# Patient Record
Sex: FEMALE | Race: WHITE | NOT HISPANIC OR LATINO | Employment: OTHER | URBAN - NONMETROPOLITAN AREA
[De-identification: names, ages, dates, MRNs, and addresses within clinical notes are randomized per-mention and may not be internally consistent; named-entity substitution may affect disease eponyms.]

---

## 2017-01-03 DIAGNOSIS — M25.551 CHRONIC PAIN OF RIGHT HIP: Primary | ICD-10-CM

## 2017-01-03 DIAGNOSIS — G89.29 CHRONIC PAIN OF RIGHT HIP: Primary | ICD-10-CM

## 2017-01-13 NOTE — OR NURSING
Email sent to total joint replacement team at [facility] as follows;    We have Claudine Bustos : 1930 coming in  for RTHA with Dr. Muñoz.  Alex attended class in  and had a knee replacement done 2016.  After her knee replacement, she went to [facility] and had a good experience there with rehab.  She said she is open to going there again.  She will have one of her daughters home with her during surgery.   She also lives with her  but he has had some health issues this year and he is not able to take care of her at home.  Alex has four steps to enter home, bedroom/bathroom on same level.  She still have equipment from knee replacement.  Currently using Tylenol arthritis for pain control.    Thank you,  Rosmery Guerrero R.N.  Pre-Anesthesia Testing  [facility]

## 2017-01-16 PROBLEM — I10 BENIGN ESSENTIAL HYPERTENSION: Status: ACTIVE | Noted: 2017-01-16

## 2017-01-17 ENCOUNTER — OFFICE VISIT (OUTPATIENT)
Dept: FAMILY MEDICINE | Facility: OTHER | Age: 82
End: 2017-01-17
Attending: FAMILY MEDICINE
Payer: MEDICARE

## 2017-01-17 VITALS
RESPIRATION RATE: 16 BRPM | WEIGHT: 125 LBS | SYSTOLIC BLOOD PRESSURE: 121 MMHG | BODY MASS INDEX: 25.2 KG/M2 | DIASTOLIC BLOOD PRESSURE: 72 MMHG | HEART RATE: 68 BPM | HEIGHT: 59 IN | TEMPERATURE: 97.1 F

## 2017-01-17 DIAGNOSIS — Z01.818 PREOP GENERAL PHYSICAL EXAM: ICD-10-CM

## 2017-01-17 DIAGNOSIS — I10 BENIGN ESSENTIAL HYPERTENSION: ICD-10-CM

## 2017-01-17 DIAGNOSIS — Z01.810 PRE-OPERATIVE CARDIOVASCULAR EXAMINATION: Primary | ICD-10-CM

## 2017-01-17 LAB
ALBUMIN SERPL-MCNC: 3.5 G/DL (ref 3.4–5)
ALP SERPL-CCNC: 112 U/L (ref 40–150)
ALT SERPL W P-5'-P-CCNC: 22 U/L (ref 0–50)
ANION GAP SERPL CALCULATED.3IONS-SCNC: 8 MMOL/L (ref 3–14)
AST SERPL W P-5'-P-CCNC: 16 U/L (ref 0–45)
BILIRUB SERPL-MCNC: 0.5 MG/DL (ref 0.2–1.3)
BUN SERPL-MCNC: 19 MG/DL (ref 7–30)
CALCIUM SERPL-MCNC: 9.1 MG/DL (ref 8.5–10.1)
CHLORIDE SERPL-SCNC: 107 MMOL/L (ref 94–109)
CO2 SERPL-SCNC: 28 MMOL/L (ref 20–32)
CREAT SERPL-MCNC: 0.83 MG/DL (ref 0.52–1.04)
ERYTHROCYTE [DISTWIDTH] IN BLOOD BY AUTOMATED COUNT: 14.9 % (ref 10–15)
GFR SERPL CREATININE-BSD FRML MDRD: 65 ML/MIN/1.7M2
GLUCOSE SERPL-MCNC: 94 MG/DL (ref 70–99)
HCT VFR BLD AUTO: 37.5 % (ref 35–47)
HGB BLD-MCNC: 11.9 G/DL (ref 11.7–15.7)
MCH RBC QN AUTO: 28.1 PG (ref 26.5–33)
MCHC RBC AUTO-ENTMCNC: 31.7 G/DL (ref 31.5–36.5)
MCV RBC AUTO: 89 FL (ref 78–100)
PLATELET # BLD AUTO: 263 10E9/L (ref 150–450)
POTASSIUM SERPL-SCNC: 3.9 MMOL/L (ref 3.4–5.3)
PROT SERPL-MCNC: 7 G/DL (ref 6.8–8.8)
RBC # BLD AUTO: 4.23 10E12/L (ref 3.8–5.2)
SODIUM SERPL-SCNC: 143 MMOL/L (ref 133–144)
WBC # BLD AUTO: 6.5 10E9/L (ref 4–11)

## 2017-01-17 PROCEDURE — 85027 COMPLETE CBC AUTOMATED: CPT | Performed by: FAMILY MEDICINE

## 2017-01-17 PROCEDURE — 99212 OFFICE O/P EST SF 10 MIN: CPT

## 2017-01-17 PROCEDURE — 93005 ELECTROCARDIOGRAM TRACING: CPT

## 2017-01-17 PROCEDURE — 99214 OFFICE O/P EST MOD 30 MIN: CPT | Performed by: FAMILY MEDICINE

## 2017-01-17 PROCEDURE — 36415 COLL VENOUS BLD VENIPUNCTURE: CPT | Performed by: FAMILY MEDICINE

## 2017-01-17 PROCEDURE — 93010 ELECTROCARDIOGRAM REPORT: CPT | Performed by: INTERNAL MEDICINE

## 2017-01-17 PROCEDURE — 80053 COMPREHEN METABOLIC PANEL: CPT | Performed by: FAMILY MEDICINE

## 2017-01-17 RX ORDER — LISINOPRIL 20 MG/1
10 TABLET ORAL DAILY
Qty: 30 TABLET | Refills: 0 | COMMUNITY
Start: 2017-01-17 | End: 2017-03-01 | Stop reason: DRUGHIGH

## 2017-01-17 ASSESSMENT — PAIN SCALES - GENERAL: PAINLEVEL: WORST PAIN (10)

## 2017-01-17 NOTE — NURSING NOTE
"Chief Complaint   Patient presents with     Pre-Op Exam       Initial /72 mmHg  Pulse 68  Temp(Src) 97.1  F (36.2  C)  Resp 16  Ht 4' 11\" (1.499 m)  Wt 125 lb (56.7 kg)  BMI 25.23 kg/m2 Estimated body mass index is 25.23 kg/(m^2) as calculated from the following:    Height as of this encounter: 4' 11\" (1.499 m).    Weight as of this encounter: 125 lb (56.7 kg).  BP completed using cuff size: regular  "

## 2017-01-17 NOTE — MR AVS SNAPSHOT
After Visit Summary   1/17/2017    Claudine Bustos    MRN: [id number]           Patient Information     Date Of Birth          4/11/1930        Visit Information        Provider Department      1/17/2017 10:00 AM Katarina Gutierrez MD [facility]        Today's Diagnoses     Pre-operative cardiovascular examination    -  1     Benign essential hypertension         Preop general physical exam           Care Instructions      Before Your Surgery      Call your surgeon if there is any change in your health. This includes signs of a cold or flu (such as a sore throat, runny nose, cough, rash or fever).    Do not smoke, drink alcohol or take over the counter medicine (unless your surgeon or primary care doctor tells you to) for the 24 hours before and after surgery.    If you take prescribed drugs: Follow your doctor s orders about which medicines to take and which to stop until after surgery.    Eating and drinking prior to surgery: follow the instructions from your surgeon    Take a shower or bath the night before surgery. Use the soap your surgeon gave you to gently clean your skin. If you do not have soap from your surgeon, use your regular soap. Do not shave or scrub the surgery site.  Wear clean pajamas and have clean sheets on your bed.         Follow-ups after your visit        Your next 10 appointments already scheduled     Jan 23, 2017   Procedure with Jose Manuel Muñoz MD   [facility] ([facility] )    [address] [place] [id number] [phone]            Feb 22, 2017 11:40 AM   (Arrive by 11:25 AM)   Post Op with Jose Manuel Muñoz MD    ORTHOPEDICS ([facility])    [address] [place] [id number] [phone]              Who to contact     If you have questions or need follow up information about today's clinic visit or your schedule please contact [facility] directly at [phone].  Normal or non-critical  "lab and imaging results will be communicated to you by MyChart, letter or phone within 4 business days after the clinic has received the results. If you do not hear from us within 7 days, please contact the clinic through [employer] or phone. If you have a critical or abnormal lab result, we will notify you by phone as soon as possible.  Submit refill requests through [employer] or call your pharmacy and they will forward the refill request to us. Please allow 3 business days for your refill to be completed.          Additional Information About Your Visit        [employer] Information     [employer] lets you send messages to your doctor, view your test results, renew your prescriptions, schedule appointments and more. To sign up, go to [email or web address] . Click on \"Log in\" on the left side of the screen, which will take you to the Welcome page. Then click on \"Sign up Now\" on the right side of the page.     You will be asked to enter the access code listed below, as well as some personal information. Please follow the directions to create your username and password.     Your access code is: [email or web address]  Expires: 2017 10:56 AM     Your access code will  in 90 days. If you need help or a new code, please call your [place] clinic or [phone].        Care EveryWhere ID     This is your Care EveryWhere ID. This could be used by other organizations to access your [place] medical records  [id number]        Your Vitals Were     Pulse Temperature Respirations Height BMI (Body Mass Index)       68 97.1  F (36.2  C) 16 4' 11\" (1.499 m) 25.23 kg/m2        Blood Pressure from Last 3 Encounters:   17 121/72   16 132/68   16 122/76    Weight from Last 3 Encounters:   17 125 lb (56.7 kg)   16 127 lb 12.8 oz (57.97 kg)   16 131 lb (59.421 kg)              We Performed the Following     CBC with platelets     Comprehensive metabolic panel     EKG 12-lead complete w/read - (Clinic " Performed)        Primary Care Provider Office Phone # Fax #    Katarina Gutierrez [phone]       [facility] [place] [address]  [place] [address]        Thank you!     Thank you for choosing [facility] [place]  for your care. Our goal is always to provide you with excellent care. Hearing back from our patients is one way we can continue to improve our services. Please take a few minutes to complete the written survey that you may receive in the mail after your visit with us. Thank you!             Your Updated Medication List - Protect others around you: Learn how to safely use, store and throw away your medicines at www.disposemymeds.org.          This list is accurate as of: 1/17/17 10:56 AM.  Always use your most recent med list.                   Brand Name Dispense Instructions for use    acetaminophen 650 MG 8 hour tablet     100 tablet    Take 650 mg by mouth every 4 hours as needed for other (surgical pain)       Calcium + D3 600-200 MG-UNIT Tabs      Take 1 tablet by mouth daily       HYDROcodone-acetaminophen 5-325 MG per tablet    NORCO    30 tablet    TAKE 1 TABLET BY MOUTH EVERY 6 HOURS AS NEEDED FOR MODERATE TO SEVEREPAIN       lisinopril 20 MG tablet    PRINIVIL/ZESTRIL    90 tablet    TAKE 1 TABLET BY MOUTH DAILY       multivitamin per tablet      Take 1 tablet by mouth daily with food.

## 2017-01-17 NOTE — PROGRESS NOTES
[facility] [place]  [address]  [place] [address]  [phone]  Dept: [phone]    PRE-OP EVALUATION:  Today's date: 2017    Claudine Bustos (: 1930) presents for pre-operative evaluation assessment as requested by Dr. Muñoz.  She requires evaluation and anesthesia risk assessment prior to undergoing surgery/procedure for treatment of right hip .  Proposed procedure: right hip replacement     Date of Surgery/ Procedure: 17  Time of Surgery/ Procedure: [facility]/Surgical Facility: [facility]  Primary Physician: Katarina Gutierrez  Type of Anesthesia Anticipated: to be determined    Patient has a Health Care Directive or Living Will:  NO    1. NO - Do you have a history of heart attack, stroke, stent, bypass or surgery on an artery in the head, neck, heart or legs?  2. NO - Do you ever have any pain or discomfort in your chest?  3. NO - Do you have a history of  Heart Failure?  4. NO - Are you troubled by shortness of breath when: walking on the level, up a slight hill or at night?  5. NO - Do you currently have a cold, bronchitis or other respiratory infection?  6. NO - Do you have a cough, shortness of breath or wheezing?  7. NO - Do you sometimes get pains in the calves of your legs when you walk?  8. NO - Do you or anyone in your family have previous history of blood clots?  9. NO - Do you or does anyone in your family have a serious bleeding problem such as prolonged bleeding following surgeries or cuts?  10. YES - Have you ever had problems with anemia or been told to take iron pills?  11. NO - Have you had any abnormal blood loss such as black, tarry or bloody stools, or abnormal vaginal bleeding?  12. NO - Have you ever had a blood transfusion?  13. NO - Have you or any of your relatives ever had problems with anesthesia?  14. NO - Do you have sleep apnea, excessive snoring or daytime drowsiness?  15. NO - Do you have any prosthetic heart valves?  16. YES- Do you have  prosthetic joints?bilateral knee  17. NO - Is there any chance that you may be pregnant?      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  Problems all longstanding and stable, except as noted/documented.  See ROS for pertinent symptoms related to these conditions.                                                                                                  .    MEDICAL HISTORY:                                                      Patient Active Problem List    Diagnosis Date Noted     Benign essential hypertension 01/16/2017     Priority: Medium     ACP (advance care planning) 06/09/2016     Priority: Medium     Advance Care Planning 6/9/2016: ACP Review of Chart / Resources Provided:  Reviewed chart for advance care plan.  Claudine Bustos has no plan or code status on file. Discussed available resources and provided with information. Confirmed code status reflects current choices pending further ACP discussions.  Confirmed/documented legally designated decision makers.  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 Provider to review and confirm.       Advanced care planning/counseling discussion 08/03/2012     Priority: Medium      Past Medical History   Diagnosis Date     HTN (hypertension) 4/4/2000     Personal history of colonic polyps 6/8/2004     Diastolic dysfunction 2/27/2012     echo 2/2012  EF 60%     Chronic kidney disease, unspecified 8/3/2012     Past Surgical History   Procedure Laterality Date     Tonsillectomy       Urethral dilitation every 4 months       Excision of rectal villus adenoma       Colonoscopy  2004, 2009      Thoracic schwannoma resection       Knee replacement Right 1/2011     Phacoemulsification with standard intraocular lens implant  2/25/2014     Procedure: PHACOEMULSIFICATION WITH STANDARD INTRAOCULAR LENS IMPLANT;  CATARACT EXTRACTION WITH INTRA OCULAR LENS LEFT;  Surgeon: Jah Bee MD;  Location: [facility]     Phacoemulsification with standard intraocular lens implant Right 5/12/2015     Procedure: PHACOEMULSIFICATION WITH STANDARD INTRAOCULAR LENS IMPLANT;  Surgeon: Jah Bee MD;  Location: [facility]     Arthroplasty knee Left 1/25/2016     Procedure: ARTHROPLASTY KNEE;  Surgeon: Jose Manuel Muñoz MD;  Location: [facility] OR     Current Outpatient Prescriptions   Medication Sig Dispense Refill     HYDROcodone-acetaminophen (NORCO) 5-325 MG per tablet TAKE 1 TABLET BY MOUTH EVERY 6 HOURS AS NEEDED FOR MODERATE TO SEVEREPAIN 30 tablet 0     lisinopril (PRINIVIL,ZESTRIL) 20 MG tablet TAKE 1 TABLET BY MOUTH DAILY 90 tablet 0     acetaminophen 650 MG TABS Take 650 mg by mouth every 4 hours as needed for other (surgical pain) 100 tablet      Calcium Carb-Cholecalciferol (CALCIUM + D3) 600-200 MG-UNIT TABS Take 1 tablet by mouth daily       Multiple Vitamin (MULTIVITAMIN) per tablet Take 1 tablet by mouth daily with food.       OTC products: None, except as noted above    No Known Allergies   Latex Allergy: NO    Social History   Substance Use Topics     Smoking status: Never Smoker      Smokeless tobacco: Not on file     Alcohol Use: Yes      Comment: Mixed, rarely     History   Drug Use No       REVIEW OF SYSTEMS:                                                    C: NEGATIVE for fever, chills, change in weight  I: NEGATIVE for worrisome rashes, moles or lesions  E: NEGATIVE for vision changes or irritation  E/M: NEGATIVE for ear, mouth and throat problems  R: NEGATIVE for significant cough or SOB  B: NEGATIVE for masses, tenderness or discharge  CV: NEGATIVE for chest pain, palpitations or peripheral edema  GI:  NEGATIVE for nausea, abdominal pain, heartburn, or change in bowel habits  : NEGATIVE for frequency, dysuria, or hematuria  M: NEGATIVE for significant arthralgias or myalgia  N: NEGATIVE for weakness, dizziness or paresthesias  E: NEGATIVE for temperature intolerance, skin/hair changes  H: NEGATIVE for bleeding problems  P: NEGATIVE for changes in mood or affect    EXAM:                                                    There were no vitals taken for this visit.    GENERAL APPEARANCE: healthy, alert and no distress     EYES: EOMI,- PERRL     HENT: ear canals and TM's normal and nose and mouth without ulcers or lesions     NECK: no adenopathy, no asymmetry, masses, or scars and thyroid normal to palpation     RESP: lungs clear to auscultation - no rales, rhonchi or wheezes     CV: regular rates and rhythm, normal S1 S2, no S3 or S4 and no murmur, click or rub -     ABDOMEN:  soft, nontender, no HSM or masses and bowel sounds normal     MS: extremities normal- no gross deformities noted, no evidence of inflammation in joints, FROM in all extremities.     SKIN: no suspicious lesions or rashes     NEURO: Normal strength and tone, sensory exam grossly normal, mentation intact and speech normal     PSYCH: mentation appears normal. and affect normal/bright     LYMPHATICS: No axillary, cervical, inguinal, or supraclavicular nodes    DIAGNOSTICS:                                                      EKG: appears normal, NSR, normal axis, normal intervals, no acute ST/T changes c/w ischemia, no LVH by voltage criteria, unchanged from previous tracings  Labs Resulted Today:   Results for orders placed or performed in visit on 01/17/17   CBC with platelets   Result Value Ref Range    WBC 6.5 4.0 - 11.0 10e9/L    RBC Count 4.23 3.8 - 5.2 10e12/L    Hemoglobin 11.9 11.7 - 15.7 g/dL    Hematocrit 37.5 35.0 - 47.0 %    MCV 89 78 - 100 fl    MCH 28.1 26.5 - 33.0 pg    MCHC 31.7 31.5 - 36.5 g/dL    RDW 14.9 10.0 - 15.0 %     Platelet Count 263 150 - 450 10e9/L   Comprehensive metabolic panel   Result Value Ref Range    Sodium 143 133 - 144 mmol/L    Potassium 3.9 3.4 - 5.3 mmol/L    Chloride 107 94 - 109 mmol/L    Carbon Dioxide 28 20 - 32 mmol/L    Anion Gap 8 3 - 14 mmol/L    Glucose 94 70 - 99 mg/dL    Urea Nitrogen 19 7 - 30 mg/dL    Creatinine 0.83 0.52 - 1.04 mg/dL    GFR Estimate 65 >60 mL/min/1.7m2    GFR Estimate If Black 79 >60 mL/min/1.7m2    Calcium 9.1 8.5 - 10.1 mg/dL    Bilirubin Total 0.5 0.2 - 1.3 mg/dL    Albumin 3.5 3.4 - 5.0 g/dL    Protein Total 7.0 6.8 - 8.8 g/dL    Alkaline Phosphatase 112 40 - 150 U/L    ALT 22 0 - 50 U/L    AST 16 0 - 45 U/L       Recent Labs   Lab Test  01/17/17   0955  12/08/16   1215   01/20/16   0830   HGB  11.9  12.4   < >  13.5   PLT  263  302   --   256   NA   --   141   --   140   POTASSIUM   --   4.2   --   4.1   CR   --   0.88   --   0.90    < > = values in this interval not displayed.        IMPRESSION:                                                    Reason for surgery/procedure: right hip DJD  Diagnosis/reason for consult: cardiopulmonary clearance    The proposed surgical procedure is considered INTERMEDIATE risk.    REVISED CARDIAC RISK INDEX  The patient has the following serious cardiovascular risks for perioperative complications such as (MI, PE, VFib and 3  AV Block):  No serious cardiac risks  INTERPRETATION: 0 risks: Class I (very low risk - 0.4% complication rate)    The patient has the following additional risks for perioperative complications:  No identified additional risks  Score not calculated      ICD-10-CM    1. Pre-operative cardiovascular examination Z01.810 EKG 12-lead complete w/read - (Clinic Performed)     CBC with platelets     Comprehensive metabolic panel     EKG 12-lead complete w/read - (Clinic Performed)     CBC with platelets     Comprehensive metabolic panel   2. Benign essential hypertension I10 EKG 12-lead complete w/read - (Clinic Performed)      CBC with platelets     Comprehensive metabolic panel     EKG 12-lead complete w/read - (Clinic Performed)     CBC with platelets     Comprehensive metabolic panel       RECOMMENDATIONS:                                                      --Patient is to take all scheduled medications on the day of surgery EXCEPT for modifications listed below.    ACE Inhibitor or Angiotensin Receptor Blocker (ARB) Use  Ace inhibitor or Angiotensin Receptor Blocker (ARB) and should HOLD this medication for the 24 hours prior to surgery.  WILL DECREASE DOSE TO 10 MG AND F/U IN 6-7 WKS    APPROVAL GIVEN to proceed with proposed procedure, without further diagnostic evaluation       Signed Electronically by: Katarina Gutierrez MD    Copy of this evaluation report is provided to requesting physician.    [place] Preop Guidelines

## 2017-01-19 DIAGNOSIS — R30.0 DYSURIA: Primary | ICD-10-CM

## 2017-01-19 NOTE — H&P (VIEW-ONLY)
[facility] [place]  [address]  [place] [address]  [phone]  Dept: [phone]    PRE-OP EVALUATION:  Today's date: 2017    Claudine Bustos (: 1930) presents for pre-operative evaluation assessment as requested by Dr. Muñoz.  She requires evaluation and anesthesia risk assessment prior to undergoing surgery/procedure for treatment of right hip .  Proposed procedure: right hip replacement     Date of Surgery/ Procedure: 17  Time of Surgery/ Procedure: [facility]: [facility] [place]  Primary Physician: Katarina Gutierrez  Type of Anesthesia Anticipated: to be determined    Patient has a Health Care Directive or Living Will:  NO    1. NO - Do you have a history of heart attack, stroke, stent, bypass or surgery on an artery in the head, neck, heart or legs?  2. NO - Do you ever have any pain or discomfort in your chest?  3. NO - Do you have a history of  Heart Failure?  4. NO - Are you troubled by shortness of breath when: walking on the level, up a slight hill or at night?  5. NO - Do you currently have a cold, bronchitis or other respiratory infection?  6. NO - Do you have a cough, shortness of breath or wheezing?  7. NO - Do you sometimes get pains in the calves of your legs when you walk?  8. NO - Do you or anyone in your family have previous history of blood clots?  9. NO - Do you or does anyone in your family have a serious bleeding problem such as prolonged bleeding following surgeries or cuts?  10. YES - Have you ever had problems with anemia or been told to take iron pills?  11. NO - Have you had any abnormal blood loss such as black, tarry or bloody stools, or abnormal vaginal bleeding?  12. NO - Have you ever had a blood transfusion?  13. NO - Have you or any of your relatives ever had problems with anesthesia?  14. NO - Do you have sleep apnea, excessive snoring or daytime drowsiness?  15. NO - Do you have any prosthetic heart valves?  16. YES- Do you have  prosthetic joints?bilateral knee  17. NO - Is there any chance that you may be pregnant?      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  Problems all longstanding and stable, except as noted/documented.  See ROS for pertinent symptoms related to these conditions.                                                                                                  .    MEDICAL HISTORY:                                                      Patient Active Problem List    Diagnosis Date Noted     Benign essential hypertension 01/16/2017     Priority: Medium     ACP (advance care planning) 06/09/2016     Priority: Medium     Advance Care Planning 6/9/2016: ACP Review of Chart / Resources Provided:  Reviewed chart for advance care plan.  Claudine Bustos has no plan or code status on file. Discussed available resources and provided with information. Confirmed code status reflects current choices pending further ACP discussions.  Confirmed/documented legally designated decision makers.  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 Provider to review and confirm.       Advanced care planning/counseling discussion 08/03/2012     Priority: Medium      Past Medical History   Diagnosis Date     HTN (hypertension) 4/4/2000     Personal history of colonic polyps 6/8/2004     Diastolic dysfunction 2/27/2012     echo 2/2012  EF 60%     Chronic kidney disease, unspecified 8/3/2012     Past Surgical History   Procedure Laterality Date     Tonsillectomy       Urethral dilitation every 4 months       Excision of rectal villus adenoma       Colonoscopy  2004, 2009      Thoracic schwannoma resection       Knee replacement Right 1/2011     Phacoemulsification with standard intraocular lens implant  2/25/2014     Procedure: PHACOEMULSIFICATION WITH STANDARD INTRAOCULAR LENS IMPLANT;  CATARACT EXTRACTION WITH INTRA OCULAR LENS LEFT;  Surgeon: Jah Bee MD;  Location: [facility] OR     Phacoemulsification with standard intraocular lens implant Right 5/12/2015     Procedure: PHACOEMULSIFICATION WITH STANDARD INTRAOCULAR LENS IMPLANT;  Surgeon: Jah Bee MD;  Location: [facility] OR     Arthroplasty knee Left 1/25/2016     Procedure: ARTHROPLASTY KNEE;  Surgeon: Jose Manuel Muñoz MD;  Location: [facility] OR     Current Outpatient Prescriptions   Medication Sig Dispense Refill     HYDROcodone-acetaminophen (NORCO) 5-325 MG per tablet TAKE 1 TABLET BY MOUTH EVERY 6 HOURS AS NEEDED FOR MODERATE TO SEVEREPAIN 30 tablet 0     lisinopril (PRINIVIL,ZESTRIL) 20 MG tablet TAKE 1 TABLET BY MOUTH DAILY 90 tablet 0     acetaminophen 650 MG TABS Take 650 mg by mouth every 4 hours as needed for other (surgical pain) 100 tablet      Calcium Carb-Cholecalciferol (CALCIUM + D3) 600-200 MG-UNIT TABS Take 1 tablet by mouth daily       Multiple Vitamin (MULTIVITAMIN) per tablet Take 1 tablet by mouth daily with food.       OTC products: None, except as noted above    No Known Allergies   Latex Allergy: NO    Social History   Substance Use Topics     Smoking status: Never Smoker      Smokeless tobacco: Not on file     Alcohol Use: Yes      Comment: Mixed, rarely     History   Drug Use No       REVIEW OF SYSTEMS:                                                    C: NEGATIVE for fever, chills, change in weight  I: NEGATIVE for worrisome rashes, moles or lesions  E: NEGATIVE for vision changes or irritation  E/M: NEGATIVE for ear, mouth and throat problems  R: NEGATIVE for significant cough or SOB  B: NEGATIVE for masses, tenderness or discharge  CV: NEGATIVE for chest pain, palpitations or peripheral edema  GI:  NEGATIVE for nausea, abdominal pain, heartburn, or change in bowel habits  : NEGATIVE for frequency, dysuria, or hematuria  M: NEGATIVE for significant arthralgias or myalgia  N: NEGATIVE for weakness, dizziness or paresthesias  E: NEGATIVE for temperature intolerance, skin/hair changes  H: NEGATIVE for bleeding problems  P: NEGATIVE for changes in mood or affect    EXAM:                                                    There were no vitals taken for this visit.    GENERAL APPEARANCE: healthy, alert and no distress     EYES: EOMI,- PERRL     HENT: ear canals and TM's normal and nose and mouth without ulcers or lesions     NECK: no adenopathy, no asymmetry, masses, or scars and thyroid normal to palpation     RESP: lungs clear to auscultation - no rales, rhonchi or wheezes     CV: regular rates and rhythm, normal S1 S2, no S3 or S4 and no murmur, click or rub -     ABDOMEN:  soft, nontender, no HSM or masses and bowel sounds normal     MS: extremities normal- no gross deformities noted, no evidence of inflammation in joints, FROM in all extremities.     SKIN: no suspicious lesions or rashes     NEURO: Normal strength and tone, sensory exam grossly normal, mentation intact and speech normal     PSYCH: mentation appears normal. and affect normal/bright     LYMPHATICS: No axillary, cervical, inguinal, or supraclavicular nodes    DIAGNOSTICS:                                                      EKG: appears normal, NSR, normal axis, normal intervals, no acute ST/T changes c/w ischemia, no LVH by voltage criteria, unchanged from previous tracings  Labs Resulted Today:   Results for orders placed or performed in visit on 01/17/17   CBC with platelets   Result Value Ref Range    WBC 6.5 4.0 - 11.0 10e9/L    RBC Count 4.23 3.8 - 5.2 10e12/L    Hemoglobin 11.9 11.7 - 15.7 g/dL    Hematocrit 37.5 35.0 - 47.0 %    MCV 89 78 - 100 fl    MCH 28.1 26.5 - 33.0 pg    MCHC 31.7 31.5 - 36.5 g/dL    RDW 14.9 10.0 - 15.0 %     Platelet Count 263 150 - 450 10e9/L   Comprehensive metabolic panel   Result Value Ref Range    Sodium 143 133 - 144 mmol/L    Potassium 3.9 3.4 - 5.3 mmol/L    Chloride 107 94 - 109 mmol/L    Carbon Dioxide 28 20 - 32 mmol/L    Anion Gap 8 3 - 14 mmol/L    Glucose 94 70 - 99 mg/dL    Urea Nitrogen 19 7 - 30 mg/dL    Creatinine 0.83 0.52 - 1.04 mg/dL    GFR Estimate 65 >60 mL/min/1.7m2    GFR Estimate If Black 79 >60 mL/min/1.7m2    Calcium 9.1 8.5 - 10.1 mg/dL    Bilirubin Total 0.5 0.2 - 1.3 mg/dL    Albumin 3.5 3.4 - 5.0 g/dL    Protein Total 7.0 6.8 - 8.8 g/dL    Alkaline Phosphatase 112 40 - 150 U/L    ALT 22 0 - 50 U/L    AST 16 0 - 45 U/L       Recent Labs   Lab Test  01/17/17   0955  12/08/16   1215   01/20/16   0830   HGB  11.9  12.4   < >  13.5   PLT  263  302   --   256   NA   --   141   --   140   POTASSIUM   --   4.2   --   4.1   CR   --   0.88   --   0.90    < > = values in this interval not displayed.        IMPRESSION:                                                    Reason for surgery/procedure: right hip DJD  Diagnosis/reason for consult: cardiopulmonary clearance    The proposed surgical procedure is considered INTERMEDIATE risk.    REVISED CARDIAC RISK INDEX  The patient has the following serious cardiovascular risks for perioperative complications such as (MI, PE, VFib and 3  AV Block):  No serious cardiac risks  INTERPRETATION: 0 risks: Class I (very low risk - 0.4% complication rate)    The patient has the following additional risks for perioperative complications:  No identified additional risks  Score not calculated      ICD-10-CM    1. Pre-operative cardiovascular examination Z01.810 EKG 12-lead complete w/read - (Clinic Performed)     CBC with platelets     Comprehensive metabolic panel     EKG 12-lead complete w/read - (Clinic Performed)     CBC with platelets     Comprehensive metabolic panel   2. Benign essential hypertension I10 EKG 12-lead complete w/read - (Clinic Performed)      CBC with platelets     Comprehensive metabolic panel     EKG 12-lead complete w/read - (Clinic Performed)     CBC with platelets     Comprehensive metabolic panel       RECOMMENDATIONS:                                                      --Patient is to take all scheduled medications on the day of surgery EXCEPT for modifications listed below.    ACE Inhibitor or Angiotensin Receptor Blocker (ARB) Use  Ace inhibitor or Angiotensin Receptor Blocker (ARB) and should HOLD this medication for the 24 hours prior to surgery.  WILL DECREASE DOSE TO 10 MG AND F/U IN 6-7 WKS    APPROVAL GIVEN to proceed with proposed procedure, without further diagnostic evaluation       Signed Electronically by: Katarina Gutierrez MD    Copy of this evaluation report is provided to requesting physician.    [facility] Preop Guidelines

## 2017-01-20 ENCOUNTER — ANESTHESIA EVENT (OUTPATIENT)
Dept: SURGERY | Facility: HOSPITAL | Age: 82
DRG: 470 | End: 2017-01-20
Payer: MEDICARE

## 2017-01-23 ENCOUNTER — ANESTHESIA (OUTPATIENT)
Dept: SURGERY | Facility: HOSPITAL | Age: 82
DRG: 470 | End: 2017-01-23
Payer: MEDICARE

## 2017-01-23 ENCOUNTER — HOSPITAL ENCOUNTER (INPATIENT)
Facility: HOSPITAL | Age: 82
LOS: 3 days | Discharge: NURSING, REHAB OR LONG-TERM CARE | DRG: 470 | End: 2017-01-26
Attending: ORTHOPAEDIC SURGERY | Admitting: ORTHOPAEDIC SURGERY
Payer: MEDICARE

## 2017-01-23 ENCOUNTER — APPOINTMENT (OUTPATIENT)
Dept: GENERAL RADIOLOGY | Facility: HOSPITAL | Age: 82
DRG: 470 | End: 2017-01-23
Attending: ORTHOPAEDIC SURGERY
Payer: MEDICARE

## 2017-01-23 DIAGNOSIS — R30.0 DYSURIA: ICD-10-CM

## 2017-01-23 DIAGNOSIS — T40.2X5A CONSTIPATION DUE TO OPIOID THERAPY: ICD-10-CM

## 2017-01-23 DIAGNOSIS — K59.03 CONSTIPATION DUE TO OPIOID THERAPY: ICD-10-CM

## 2017-01-23 DIAGNOSIS — Z96.641 STATUS POST TOTAL REPLACEMENT OF RIGHT HIP: Primary | ICD-10-CM

## 2017-01-23 LAB
ALBUMIN UR-MCNC: NEGATIVE MG/DL
APPEARANCE UR: CLEAR
BILIRUB UR QL STRIP: NEGATIVE
COLOR UR AUTO: NORMAL
GLUCOSE UR STRIP-MCNC: NEGATIVE MG/DL
GRAM STN SPEC: NORMAL
HGB UR QL STRIP: NEGATIVE
KETONES UR STRIP-MCNC: NEGATIVE MG/DL
LEUKOCYTE ESTERASE UR QL STRIP: NEGATIVE
MICRO REPORT STATUS: NORMAL
NITRATE UR QL: NEGATIVE
PH UR STRIP: 7 PH (ref 4.7–8)
SP GR UR STRIP: 1.01 (ref 1–1.03)
SPECIMEN SOURCE: NORMAL
URN SPEC COLLECT METH UR: NORMAL
UROBILINOGEN UR STRIP-MCNC: NORMAL MG/DL (ref 0–2)

## 2017-01-23 PROCEDURE — 71000014 ZZH RECOVERY PHASE 1 LEVEL 2 FIRST HR: Performed by: ORTHOPAEDIC SURGERY

## 2017-01-23 PROCEDURE — 25000125 ZZHC RX 250: Performed by: ORTHOPAEDIC SURGERY

## 2017-01-23 PROCEDURE — 25000128 H RX IP 250 OP 636: Performed by: NURSE ANESTHETIST, CERTIFIED REGISTERED

## 2017-01-23 PROCEDURE — 25000128 H RX IP 250 OP 636: Performed by: ORTHOPAEDIC SURGERY

## 2017-01-23 PROCEDURE — C1713 ANCHOR/SCREW BN/BN,TIS/BN: HCPCS | Performed by: ORTHOPAEDIC SURGERY

## 2017-01-23 PROCEDURE — 87070 CULTURE OTHR SPECIMN AEROBIC: CPT | Performed by: ORTHOPAEDIC SURGERY

## 2017-01-23 PROCEDURE — 99100 ANES PT EXTEME AGE<1 YR&>70: CPT | Performed by: NURSE ANESTHETIST, CERTIFIED REGISTERED

## 2017-01-23 PROCEDURE — 25000125 ZZHC RX 250: Performed by: NURSE ANESTHETIST, CERTIFIED REGISTERED

## 2017-01-23 PROCEDURE — 27810169 ZZH OR IMPLANT GENERAL: Performed by: ORTHOPAEDIC SURGERY

## 2017-01-23 PROCEDURE — C9290 INJ, BUPIVACAINE LIPOSOME: HCPCS | Performed by: ORTHOPAEDIC SURGERY

## 2017-01-23 PROCEDURE — 40000832 H STATISTIC POST OPERATIVE IMAGING: Mod: TC | Performed by: RADIOLOGY

## 2017-01-23 PROCEDURE — 40000306 ZZH STATISTIC PRE PROC ASSESS II: Performed by: ORTHOPAEDIC SURGERY

## 2017-01-23 PROCEDURE — 0SR9029 REPLACEMENT OF RIGHT HIP JOINT WITH METAL ON POLYETHYLENE SYNTHETIC SUBSTITUTE, CEMENTED, OPEN APPROACH: ICD-10-PCS | Performed by: ORTHOPAEDIC SURGERY

## 2017-01-23 PROCEDURE — C1776 JOINT DEVICE (IMPLANTABLE): HCPCS | Performed by: ORTHOPAEDIC SURGERY

## 2017-01-23 PROCEDURE — 25000566 ZZH SEVOFLURANE, EA 15 MIN: Performed by: NURSE ANESTHETIST, CERTIFIED REGISTERED

## 2017-01-23 PROCEDURE — 37000008 ZZH ANESTHESIA TECHNICAL FEE, 1ST 30 MIN: Performed by: ORTHOPAEDIC SURGERY

## 2017-01-23 PROCEDURE — 27130 TOTAL HIP ARTHROPLASTY: CPT | Mod: AS | Performed by: PHYSICIAN ASSISTANT

## 2017-01-23 PROCEDURE — A9270 NON-COVERED ITEM OR SERVICE: HCPCS | Mod: GY | Performed by: PHYSICIAN ASSISTANT

## 2017-01-23 PROCEDURE — 27210995 ZZH RX 272: Performed by: ORTHOPAEDIC SURGERY

## 2017-01-23 PROCEDURE — 87205 SMEAR GRAM STAIN: CPT | Performed by: ORTHOPAEDIC SURGERY

## 2017-01-23 PROCEDURE — 87075 CULTR BACTERIA EXCEPT BLOOD: CPT | Performed by: ORTHOPAEDIC SURGERY

## 2017-01-23 PROCEDURE — 40000832 H STATISTIC POST OPERATIVE IMAGING: Mod: TC

## 2017-01-23 PROCEDURE — 40000275 ZZH STATISTIC RCP TIME EA 10 MIN

## 2017-01-23 PROCEDURE — 36000063 ZZH SURGERY LEVEL 4 EA 15 ADDTL MIN: Performed by: ORTHOPAEDIC SURGERY

## 2017-01-23 PROCEDURE — 20000003 ZZH R&B ICU

## 2017-01-23 PROCEDURE — 27110028 ZZH OR GENERAL SUPPLY NON-STERILE: Performed by: ORTHOPAEDIC SURGERY

## 2017-01-23 PROCEDURE — 36000093 ZZH SURGERY LEVEL 4 1ST 30 MIN: Performed by: ORTHOPAEDIC SURGERY

## 2017-01-23 PROCEDURE — 40000786 ZZHCL STATISTIC ACTIVE MRSA SURVEILLANCE CULTURE: Performed by: ORTHOPAEDIC SURGERY

## 2017-01-23 PROCEDURE — 40000864: Mod: TC | Performed by: RADIOLOGY

## 2017-01-23 PROCEDURE — 25000132 ZZH RX MED GY IP 250 OP 250 PS 637: Mod: GY | Performed by: PHYSICIAN ASSISTANT

## 2017-01-23 PROCEDURE — 25800025 ZZH RX 258: Performed by: NURSE ANESTHETIST, CERTIFIED REGISTERED

## 2017-01-23 PROCEDURE — 27210794 ZZH OR GENERAL SUPPLY STERILE: Performed by: ORTHOPAEDIC SURGERY

## 2017-01-23 PROCEDURE — 40000864: Mod: TC

## 2017-01-23 PROCEDURE — 88300 SURGICAL PATH GROSS: CPT | Mod: TC | Performed by: ORTHOPAEDIC SURGERY

## 2017-01-23 PROCEDURE — 37000009 ZZH ANESTHESIA TECHNICAL FEE, EACH ADDTL 15 MIN: Performed by: ORTHOPAEDIC SURGERY

## 2017-01-23 PROCEDURE — 27130 TOTAL HIP ARTHROPLASTY: CPT | Mod: RT | Performed by: ORTHOPAEDIC SURGERY

## 2017-01-23 PROCEDURE — 25000125 ZZHC RX 250: Performed by: PHYSICIAN ASSISTANT

## 2017-01-23 PROCEDURE — 27130 TOTAL HIP ARTHROPLASTY: CPT | Performed by: NURSE ANESTHETIST, CERTIFIED REGISTERED

## 2017-01-23 PROCEDURE — 71000015 ZZH RECOVERY PHASE 1 LEVEL 2 EA ADDTL HR: Performed by: ORTHOPAEDIC SURGERY

## 2017-01-23 PROCEDURE — 81003 URINALYSIS AUTO W/O SCOPE: CPT | Performed by: ORTHOPAEDIC SURGERY

## 2017-01-23 DEVICE — IMPLANTABLE DEVICE: Type: IMPLANTABLE DEVICE | Site: HIP | Status: FUNCTIONAL

## 2017-01-23 DEVICE — BONE CEMENT-SIMPLEX: Type: IMPLANTABLE DEVICE | Site: HIP | Status: FUNCTIONAL

## 2017-01-23 RX ORDER — ACETAMINOPHEN 325 MG/1
975 TABLET ORAL EVERY 8 HOURS
Status: COMPLETED | OUTPATIENT
Start: 2017-01-23 | End: 2017-01-26

## 2017-01-23 RX ORDER — EPHEDRINE SULFATE 50 MG/ML
INJECTION, SOLUTION INTRAMUSCULAR; INTRAVENOUS; SUBCUTANEOUS PRN
Status: DISCONTINUED | OUTPATIENT
Start: 2017-01-23 | End: 2017-01-23

## 2017-01-23 RX ORDER — DEXAMETHASONE SODIUM PHOSPHATE 4 MG/ML
4 INJECTION, SOLUTION INTRA-ARTICULAR; INTRALESIONAL; INTRAMUSCULAR; INTRAVENOUS; SOFT TISSUE
Status: DISCONTINUED | OUTPATIENT
Start: 2017-01-23 | End: 2017-01-23 | Stop reason: HOSPADM

## 2017-01-23 RX ORDER — SCOLOPAMINE TRANSDERMAL SYSTEM 1 MG/1
1 PATCH, EXTENDED RELEASE TRANSDERMAL
Status: DISCONTINUED | OUTPATIENT
Start: 2017-01-23 | End: 2017-01-25

## 2017-01-23 RX ORDER — PROPOFOL 10 MG/ML
INJECTION, EMULSION INTRAVENOUS PRN
Status: DISCONTINUED | OUTPATIENT
Start: 2017-01-23 | End: 2017-01-23

## 2017-01-23 RX ORDER — NALOXONE HYDROCHLORIDE 0.4 MG/ML
.1-.4 INJECTION, SOLUTION INTRAMUSCULAR; INTRAVENOUS; SUBCUTANEOUS
Status: DISCONTINUED | OUTPATIENT
Start: 2017-01-23 | End: 2017-01-23

## 2017-01-23 RX ORDER — HYDROMORPHONE HYDROCHLORIDE 1 MG/ML
0.2 INJECTION, SOLUTION INTRAMUSCULAR; INTRAVENOUS; SUBCUTANEOUS
Status: DISCONTINUED | OUTPATIENT
Start: 2017-01-23 | End: 2017-01-26 | Stop reason: HOSPADM

## 2017-01-23 RX ORDER — ONDANSETRON 4 MG/1
4 TABLET, ORALLY DISINTEGRATING ORAL EVERY 30 MIN PRN
Status: DISCONTINUED | OUTPATIENT
Start: 2017-01-23 | End: 2017-01-23 | Stop reason: HOSPADM

## 2017-01-23 RX ORDER — SODIUM CHLORIDE, SODIUM LACTATE, POTASSIUM CHLORIDE, CALCIUM CHLORIDE 600; 310; 30; 20 MG/100ML; MG/100ML; MG/100ML; MG/100ML
INJECTION, SOLUTION INTRAVENOUS CONTINUOUS
Status: DISCONTINUED | OUTPATIENT
Start: 2017-01-23 | End: 2017-01-24

## 2017-01-23 RX ORDER — KETOROLAC TROMETHAMINE 30 MG/ML
15 INJECTION, SOLUTION INTRAMUSCULAR; INTRAVENOUS
Status: COMPLETED | OUTPATIENT
Start: 2017-01-23 | End: 2017-01-23

## 2017-01-23 RX ORDER — ONDANSETRON 4 MG/1
4 TABLET, ORALLY DISINTEGRATING ORAL EVERY 6 HOURS PRN
Status: DISCONTINUED | OUTPATIENT
Start: 2017-01-23 | End: 2017-01-26 | Stop reason: HOSPADM

## 2017-01-23 RX ORDER — LISINOPRIL 10 MG/1
10 TABLET ORAL DAILY
Status: DISCONTINUED | OUTPATIENT
Start: 2017-01-23 | End: 2017-01-24

## 2017-01-23 RX ORDER — DEXAMETHASONE SODIUM PHOSPHATE 10 MG/ML
INJECTION, SOLUTION INTRAMUSCULAR; INTRAVENOUS PRN
Status: DISCONTINUED | OUTPATIENT
Start: 2017-01-23 | End: 2017-01-23

## 2017-01-23 RX ORDER — SODIUM CHLORIDE, SODIUM LACTATE, POTASSIUM CHLORIDE, CALCIUM CHLORIDE 600; 310; 30; 20 MG/100ML; MG/100ML; MG/100ML; MG/100ML
INJECTION, SOLUTION INTRAVENOUS CONTINUOUS
Status: DISCONTINUED | OUTPATIENT
Start: 2017-01-23 | End: 2017-01-23

## 2017-01-23 RX ORDER — LIDOCAINE 40 MG/G
CREAM TOPICAL
Status: DISCONTINUED | OUTPATIENT
Start: 2017-01-23 | End: 2017-01-23

## 2017-01-23 RX ORDER — OXYCODONE HYDROCHLORIDE 5 MG/1
5 TABLET ORAL
Status: DISCONTINUED | OUTPATIENT
Start: 2017-01-23 | End: 2017-01-25

## 2017-01-23 RX ORDER — LIDOCAINE HYDROCHLORIDE 20 MG/ML
INJECTION, SOLUTION INFILTRATION; PERINEURAL PRN
Status: DISCONTINUED | OUTPATIENT
Start: 2017-01-23 | End: 2017-01-23

## 2017-01-23 RX ORDER — ONDANSETRON 2 MG/ML
4 INJECTION INTRAMUSCULAR; INTRAVENOUS EVERY 30 MIN PRN
Status: DISCONTINUED | OUTPATIENT
Start: 2017-01-23 | End: 2017-01-23 | Stop reason: HOSPADM

## 2017-01-23 RX ORDER — ASPIRIN 325 MG
325 TABLET ORAL 2 TIMES DAILY
Status: DISCONTINUED | OUTPATIENT
Start: 2017-01-23 | End: 2017-01-26 | Stop reason: HOSPADM

## 2017-01-23 RX ORDER — FENTANYL CITRATE 50 UG/ML
INJECTION, SOLUTION INTRAMUSCULAR; INTRAVENOUS PRN
Status: DISCONTINUED | OUTPATIENT
Start: 2017-01-23 | End: 2017-01-23

## 2017-01-23 RX ORDER — CEFAZOLIN SODIUM 2 G/100ML
2 INJECTION, SOLUTION INTRAVENOUS
Status: COMPLETED | OUTPATIENT
Start: 2017-01-23 | End: 2017-01-23

## 2017-01-23 RX ORDER — BUPIVACAINE HYDROCHLORIDE 2.5 MG/ML
INJECTION, SOLUTION INFILTRATION; PERINEURAL PRN
Status: DISCONTINUED | OUTPATIENT
Start: 2017-01-23 | End: 2017-01-23 | Stop reason: HOSPADM

## 2017-01-23 RX ORDER — AMOXICILLIN 250 MG
1-2 CAPSULE ORAL 2 TIMES DAILY
Status: DISCONTINUED | OUTPATIENT
Start: 2017-01-23 | End: 2017-01-26 | Stop reason: HOSPADM

## 2017-01-23 RX ORDER — SODIUM CHLORIDE, SODIUM LACTATE, POTASSIUM CHLORIDE, CALCIUM CHLORIDE 600; 310; 30; 20 MG/100ML; MG/100ML; MG/100ML; MG/100ML
INJECTION, SOLUTION INTRAVENOUS CONTINUOUS
Status: DISCONTINUED | OUTPATIENT
Start: 2017-01-23 | End: 2017-01-23 | Stop reason: HOSPADM

## 2017-01-23 RX ORDER — ONDANSETRON 2 MG/ML
INJECTION INTRAMUSCULAR; INTRAVENOUS PRN
Status: DISCONTINUED | OUTPATIENT
Start: 2017-01-23 | End: 2017-01-23

## 2017-01-23 RX ORDER — MEPERIDINE HYDROCHLORIDE 25 MG/ML
12.5 INJECTION INTRAMUSCULAR; INTRAVENOUS; SUBCUTANEOUS EVERY 5 MIN PRN
Status: DISCONTINUED | OUTPATIENT
Start: 2017-01-23 | End: 2017-01-23 | Stop reason: HOSPADM

## 2017-01-23 RX ORDER — ONDANSETRON 2 MG/ML
4 INJECTION INTRAMUSCULAR; INTRAVENOUS EVERY 6 HOURS PRN
Status: DISCONTINUED | OUTPATIENT
Start: 2017-01-23 | End: 2017-01-26 | Stop reason: HOSPADM

## 2017-01-23 RX ORDER — FENTANYL CITRATE 50 UG/ML
25-50 INJECTION, SOLUTION INTRAMUSCULAR; INTRAVENOUS
Status: DISCONTINUED | OUTPATIENT
Start: 2017-01-23 | End: 2017-01-23 | Stop reason: HOSPADM

## 2017-01-23 RX ORDER — MORPHINE SULFATE 2 MG/ML
2-4 INJECTION, SOLUTION INTRAMUSCULAR; INTRAVENOUS EVERY 5 MIN PRN
Status: DISCONTINUED | OUTPATIENT
Start: 2017-01-23 | End: 2017-01-23 | Stop reason: HOSPADM

## 2017-01-23 RX ORDER — NALOXONE HYDROCHLORIDE 0.4 MG/ML
.1-.4 INJECTION, SOLUTION INTRAMUSCULAR; INTRAVENOUS; SUBCUTANEOUS
Status: DISCONTINUED | OUTPATIENT
Start: 2017-01-23 | End: 2017-01-26 | Stop reason: HOSPADM

## 2017-01-23 RX ADMIN — Medication 5 MG: at 10:33

## 2017-01-23 RX ADMIN — FENTANYL CITRATE 50 MCG: 50 INJECTION, SOLUTION INTRAMUSCULAR; INTRAVENOUS at 11:20

## 2017-01-23 RX ADMIN — PROPOFOL 100 MG: 10 INJECTION, EMULSION INTRAVENOUS at 10:17

## 2017-01-23 RX ADMIN — FENTANYL CITRATE 50 MCG: 50 INJECTION, SOLUTION INTRAMUSCULAR; INTRAVENOUS at 10:08

## 2017-01-23 RX ADMIN — FENTANYL CITRATE 25 MCG: 50 INJECTION, SOLUTION INTRAMUSCULAR; INTRAVENOUS at 15:12

## 2017-01-23 RX ADMIN — CEFAZOLIN SODIUM 2 G: 2 INJECTION, SOLUTION INTRAVENOUS at 10:08

## 2017-01-23 RX ADMIN — HYDROMORPHONE HYDROCHLORIDE 0.2 MG: 1 INJECTION, SOLUTION INTRAMUSCULAR; INTRAVENOUS; SUBCUTANEOUS at 19:53

## 2017-01-23 RX ADMIN — Medication 10 MG: at 10:35

## 2017-01-23 RX ADMIN — ASPIRIN 325 MG: 325 TABLET ORAL at 20:00

## 2017-01-23 RX ADMIN — TRANEXAMIC ACID 1 G: 100 INJECTION, SOLUTION INTRAVENOUS at 10:08

## 2017-01-23 RX ADMIN — SODIUM CHLORIDE, POTASSIUM CHLORIDE, SODIUM LACTATE AND CALCIUM CHLORIDE 1000 ML: 600; 310; 30; 20 INJECTION, SOLUTION INTRAVENOUS at 15:24

## 2017-01-23 RX ADMIN — ROCURONIUM BROMIDE 10 MG: 10 INJECTION INTRAVENOUS at 10:17

## 2017-01-23 RX ADMIN — SENNOSIDES AND DOCUSATE SODIUM 1 TABLET: 8.6; 5 TABLET ORAL at 20:00

## 2017-01-23 RX ADMIN — Medication 10 MG: at 12:52

## 2017-01-23 RX ADMIN — KETOROLAC TROMETHAMINE 15 MG: 30 INJECTION, SOLUTION INTRAMUSCULAR; INTRAVENOUS at 14:45

## 2017-01-23 RX ADMIN — FENTANYL CITRATE 50 MCG: 50 INJECTION, SOLUTION INTRAMUSCULAR; INTRAVENOUS at 11:13

## 2017-01-23 RX ADMIN — FENTANYL CITRATE 25 MCG: 50 INJECTION, SOLUTION INTRAMUSCULAR; INTRAVENOUS at 15:19

## 2017-01-23 RX ADMIN — OXYCODONE HYDROCHLORIDE 5 MG: 5 TABLET ORAL at 18:28

## 2017-01-23 RX ADMIN — ACETAMINOPHEN 975 MG: 325 TABLET, FILM COATED ORAL at 16:48

## 2017-01-23 RX ADMIN — SODIUM CHLORIDE, POTASSIUM CHLORIDE, SODIUM LACTATE AND CALCIUM CHLORIDE 1000 ML: 600; 310; 30; 20 INJECTION, SOLUTION INTRAVENOUS at 09:36

## 2017-01-23 RX ADMIN — Medication 10 MG: at 13:34

## 2017-01-23 RX ADMIN — SODIUM CHLORIDE, POTASSIUM CHLORIDE, SODIUM LACTATE AND CALCIUM CHLORIDE: 600; 310; 30; 20 INJECTION, SOLUTION INTRAVENOUS at 12:00

## 2017-01-23 RX ADMIN — CEFAZOLIN SODIUM 1 G: 1 INJECTION, SOLUTION INTRAVENOUS at 18:08

## 2017-01-23 RX ADMIN — OXYCODONE HYDROCHLORIDE 5 MG: 5 TABLET ORAL at 23:03

## 2017-01-23 RX ADMIN — ONDANSETRON 4 MG: 2 INJECTION INTRAMUSCULAR; INTRAVENOUS at 12:50

## 2017-01-23 RX ADMIN — LIDOCAINE HYDROCHLORIDE 40 MG: 20 INJECTION, SOLUTION INFILTRATION; PERINEURAL at 10:17

## 2017-01-23 RX ADMIN — FENTANYL CITRATE 25 MCG: 50 INJECTION, SOLUTION INTRAMUSCULAR; INTRAVENOUS at 15:06

## 2017-01-23 RX ADMIN — ACETAMINOPHEN 975 MG: 325 TABLET, FILM COATED ORAL at 23:03

## 2017-01-23 RX ADMIN — DEXAMETHASONE SODIUM PHOSPHATE 10 MG: 10 INJECTION, SOLUTION INTRAMUSCULAR; INTRAVENOUS at 10:45

## 2017-01-23 RX ADMIN — HYDROMORPHONE HYDROCHLORIDE 0.2 MG: 1 INJECTION, SOLUTION INTRAMUSCULAR; INTRAVENOUS; SUBCUTANEOUS at 16:14

## 2017-01-23 RX ADMIN — SUCCINYLCHOLINE CHLORIDE 100 MG: 20 INJECTION, SOLUTION INTRAMUSCULAR; INTRAVENOUS at 10:17

## 2017-01-23 RX ADMIN — Medication 1 TABLET: at 16:48

## 2017-01-23 RX ADMIN — FENTANYL CITRATE 50 MCG: 50 INJECTION, SOLUTION INTRAMUSCULAR; INTRAVENOUS at 10:17

## 2017-01-23 ASSESSMENT — PAIN DESCRIPTION - DESCRIPTORS
DESCRIPTORS: ACHING
DESCRIPTORS: ACHING

## 2017-01-23 NOTE — IP AVS SNAPSHOT
MRN:[id number]                      After Visit Summary   1/23/2017    Claudine Bustos    MRN: [id number]           Thank you!     Thank you for choosing [facility] for your care. Our goal is always to provide you with excellent care. Hearing back from our patients is one way we can continue to improve our services. Please take a few minutes to complete the written survey that you may receive in the mail after you visit with us. Thank you!        Patient Information     Date Of Birth          4/11/1930        About your hospital stay     You were admitted on:  January 23, 2017 You last received care in the:  14 Intensive Care Unit    You were discharged on:  January 26, 2017       Who to Call     For medical emergencies, please call 911.  For non-urgent questions about your medical care, please call your primary care provider or clinic, [phone]  For questions related to your surgery, please call your surgery clinic        Attending Provider     Provider    Jose Manuel Muñoz MD       Primary Care Provider Office Phone # Fax #    Katarina Gutierrez [phone]       [facility] [place] [address]  [place] [address]        After Care Instructions     Activity - Up with assistive device           Advance Diet as Tolerated       Follow this diet upon discharge: Orders Placed This Encounter  Advance Diet as Tolerated: Regular Diet Adult            General info for SNF       Length of Stay Estimate: Short Term Care: Estimated # of Days <30  Condition at Discharge: Improving  Level of care:skilled   Rehabilitation Potential: Excellent  Admission H&P remains valid and up-to-date: Yes  Recent Chemotherapy: N/A  Use Nursing Home Standing Orders: Yes            Hip precautions           Mantoux instructions       Give two-step Mantoux (PPD) Per Facility Policy Yes            Wound care (specify)       Site:   Right hip  Instructions:  Daily dressing changes with dry sterile gauze   May use press and seal type dressing or other waterproof dressing to shower.                  Your next 10 appointments already scheduled     Feb 22, 2017 11:40 AM   (Arrive by 11:25 AM)   Post Op with Jose Manuel Muñoz MD    [facility] ([facility] [place] [facility])    [address]  [place] [address]   [phone]            Mar 02, 2017  2:15 PM   (Arrive by 2:00 PM)   SHORT with Katarina Gutierrez MD   [facility] [place] ([facility] [place] [facility])    [address]  [place] [address]   [phone]              Additional Services     Occupational Therapy Adult Consult       Evaluate and treat as clinically indicated.    Reason:  Right total hip            Physical Therapy Adult Consult       Evaluate and treat as clinically indicated.    Reason:  Right total hip                  Future tests that were ordered for you     AntiEmbolism Stockings       Bilateral below knee length.On in the morning, off at night                  Further instructions from your care team       You have a post op appointment scheduled for February 22, 2017 @ 11:25 AM with Dr. Muñoz.    Pending Results     Date and Time Order Name Status Description    1/23/2017 1502 XR Post Surgical Imaging [place] Preliminary     1/23/2017 1118 Fluid Culture Aerobic Bacterial Preliminary     1/23/2017 1118 Anaerobic bacterial culture Preliminary             Statement of Approval     Ordered          01/26/17 0941  I have reviewed and agree with all the recommendations and orders detailed in this document.   EFFECTIVE NOW     Approved and electronically signed by:  Darren Cordero MD             Admission Information        Provider Department Dept Phone    1/23/2017 Jose Manuel Muñoz MD [facility] [phone]      Your Vitals Were     Blood Pressure Pulse Temperature    111/65 mmHg 88 97.7  F (36.5  C) (Tympanic)    Respirations Height Weight    18 1.524 m (5') 63.2 kg (139 lb 5.3 oz)    BMI (Body Mass Index) Pulse Oximetry       27.21  "kg/m2 97%       [facility] Information     [facility] lets you send messages to your doctor, view your test results, renew your prescriptions, schedule appointments and more. To sign up, go to [email or web address] . Click on \"Log in\" on the left side of the screen, which will take you to the Welcome page. Then click on \"Sign up Now\" on the right side of the page.     You will be asked to enter the access code listed below, as well as some personal information. Please follow the directions to create your username and password.     Your access code is: [email or web address]  Expires: 2017 10:56 AM     Your access code will  in 90 days. If you need help or a new code, please call your [facility] clinic or [phone].        Care EveryWhere ID     This is your Care EveryWhere ID. This could be used by other organizations to access your [facility] medical records  [id number]           Review of your medicines      START taking        Dose / Directions    aspirin 325 MG tablet   Used for:  Status post total replacement of right hip        Dose:  325 mg   Take 1 tablet (325 mg) by mouth 2 times daily for 28 days   Quantity:  120 tablet   Refills:  0       oxyCODONE 10 MG IR tablet   Commonly known as:  ROXICODONE   Used for:  Status post total replacement of right hip        Dose:  10 mg   Take 1 tablet (10 mg) by mouth every 3 hours as needed for moderate to severe pain   Quantity:  40 tablet   Refills:  0       senna-docusate 8.6-50 MG per tablet   Commonly known as:  SENOKOT-S;PERICOLACE   Used for:  Constipation due to opioid therapy        Dose:  1-2 tablet   Take 1-2 tablets by mouth 2 times daily   Quantity:  100 tablet   Refills:  0         CONTINUE these medicines which have NOT CHANGED        Dose / Directions    acetaminophen 650 MG 8 hour tablet   Used for:  S/P total knee replacement using cement, left        Dose:  650 mg   Take 650 mg by mouth every 4 hours as needed for other (surgical pain)   Quantity:  100 " tablet   Refills:  0       Calcium + D3 600-200 MG-UNIT Tabs        Dose:  1 tablet   Take 1 tablet by mouth daily   Refills:  0       lisinopril 20 MG tablet   Commonly known as:  PRINIVIL/ZESTRIL   Used for:  Benign essential hypertension        Dose:  10 mg   Take 10 mg by mouth daily   Quantity:  30 tablet   Refills:  0       multivitamin per tablet        Dose:  1 tablet   Take 1 tablet by mouth daily with food.   Refills:  0         STOP taking     HYDROcodone-acetaminophen 5-325 MG per tablet   Commonly known as:  NORCO                Where to get your medicines      Some of these will need a paper prescription and others can be bought over the counter. Ask your nurse if you have questions.     Bring a paper prescription for each of these medications    - oxyCODONE 10 MG IR tablet    You don't need a prescription for these medications    - aspirin 325 MG tablet  - senna-docusate 8.6-50 MG per tablet             Protect others around you: Learn how to safely use, store and throw away your medicines at www.disposemymeds.org.             Medication List: This is a list of all your medications and when to take them. Check marks below indicate your daily home schedule. Keep this list as a reference.      Medications           Morning Afternoon Evening Bedtime As Needed    acetaminophen 650 MG 8 hour tablet   Take 650 mg by mouth every 4 hours as needed for other (surgical pain)   Last time this was given:  975 mg on 1/26/2017  6:31 AM                                aspirin 325 MG tablet   Take 1 tablet (325 mg) by mouth 2 times daily for 28 days   Last time this was given:  325 mg on 1/26/2017  8:38 AM                                Calcium + D3 600-200 MG-UNIT Tabs   Take 1 tablet by mouth daily                                lisinopril 20 MG tablet   Commonly known as:  PRINIVIL/ZESTRIL   Take 10 mg by mouth daily                                multivitamin per tablet   Take 1 tablet by mouth daily with food.                                 oxyCODONE 10 MG IR tablet   Commonly known as:  ROXICODONE   Take 1 tablet (10 mg) by mouth every 3 hours as needed for moderate to severe pain   Last time this was given:  10 mg on 1/26/2017  8:38 AM                                senna-docusate 8.6-50 MG per tablet   Commonly known as:  SENOKOT-S;PERICOLACE   Take 1-2 tablets by mouth 2 times daily   Last time this was given:  2 tablets on 1/26/2017  8:38 AM

## 2017-01-23 NOTE — IP AVS SNAPSHOT
JaciquiqueClaudine #[id number] (CSN: [id number])  (86 year old F)  (Adm: 17)     [id number]               14 INTENSIVE CARE UNIT: [phone]            Patient Demographics     Patient Name Sex          Age SSN Address Phone    Claudine Bustos Female 1930 (86 year old) [id number] [facility] [address] [phone] (Home)  [phone] (Work)  [phone] (Mobile) *Preferred*      Emergency Contact(s)     Name Relation Home Work Mobile    Dharmesh Bustos Spouse [phone]      Latonia Gustafson Daughter [phone]      Hira Bustos Son   [phone]      Admission Information     Attending Provider Admitting Provider Admission Type Admission Date/Time    Jose Manuel Muñoz MD Denoncourt, Paul M, MD Elective 17  0824    Discharge Date Hospital Service Auth/Cert Status Service Area     General Medicine Incomplete [facility]    Unit Room/Bed Admission Status    HI ICU 3128/3128-1 Admission (Confirmed)            Admission     Complaint    CHRONIC PAIN RIGHT HIP, Status post total hip replacement, right      Hospital Account     Name Acct ID Class Status Primary Coverage    [employer] TREVOR [id number] Inpatient Open MEDICARE - MEDICARE            Guarantor Account (for Hospital Account #[id number])     Name Relation to Pt Service Area Active? Acct Type    Claudine Bustos Self [facility] Yes Personal/Family    Address Phone          [address] [place] [address] [phone](H)  [phone](O)              Coverage Information (for Hospital Account #[id number])     1. MEDICARE/MEDICARE     F/O Payor/Plan Precert #    MEDICARE/MEDICARE     Subscriber Subscriber #    Claudine Bustos [id number]    Address Phone    ATTN CLAIMS  [address]  [facility] [place] [address] [phone]          2. COMMERCIAL/AARP     F/O Payor/Plan Precert #    COMMERCIAL/AARP     Subscriber Subscriber #    Claudine Bustos [id number]    Address Phone    [employer]  [address]  [place] [address] [phone]                                                      INTERAGENCY TRANSFER FORM - PHYSICIAN ORDERS   1/23/2017                       14 INTENSIVE CARE UNIT: [phone]            Attending Provider: Jose Manuel Muñoz MD     Allergies:  No Known Allergies    Infection:  None   Service:  GENERAL MEDI    Ht:  1.524 m (5')   Wt:  63.2 kg (139 lb 5.3 oz)   Admission Wt:  56.7 kg (125 lb)    BMI:  27.21 kg/m 2   BSA:  1.64 m 2            ED Clinical Impression     Diagnosis Description Comment Added By Time Added    Dysuria [R30.0] Dysuria [R30.0]  Vanessa Robison RN 1/23/2017  9:05 AM    Status post total replacement of right hip [Z96.641] Status post total replacement of right hip [Z96.641]  Darren Cordero MD 1/26/2017  9:35 AM    Constipation due to opioid therapy [K59.03, T40.2X5A] Constipation due to opioid therapy [K59.03, T40.2X5A]  Darren Cordero MD 1/26/2017  9:37 AM      Hospital Problems as of 1/26/2017              Priority Class Noted POA    Hyperlipidemia with target LDL less than 130   3/30/2015 Yes    Benign essential hypertension Medium  1/16/2017 Yes    * (Principal)Status post total replacement of right hip Medium  1/23/2017 Yes    Postoperative anemia due to acute blood loss Medium  1/24/2017 No      Non-Hospital Problems as of 1/26/2017              Priority Class Noted    Diastolic dysfunction Medium  2/27/2012    Advanced care planning/counseling discussion Medium  8/3/2012    Chronic kidney disease, unspecified Medium  8/3/2012    S/P total knee replacement using cement, left Medium  1/25/2016    Nursing Home Visit Medium  2/10/2016    ACP (advance care planning)   6/9/2016    Status post total hip replacement, right Medium  1/23/2017      Code Status History     Date Active Date Inactive Code Status Order ID Comments User Context    1/26/2017  9:41 AM  Full Code [id number]  Darren Cordero MD Outpatient     1/23/2017  3:42 PM 1/26/2017  9:41 AM Full Code [id number]  Vasile Shelton PA-C Inpatient    1/28/2016  8:17 AM 1/23/2017  3:42 PM Full Code [id number]  Darren Cordero MD Outpatient    1/25/2016  1:43 PM 1/28/2016  8:17 AM Full Code [id number]  Vasile Shelton PA-C Inpatient      Current Code Status     Date Active Code Status Order ID Comments User Context       Prior         Medication Review      START taking        Dose / Directions Comments    aspirin 325 MG tablet   Used for:  Status post total replacement of right hip        Dose:  325 mg   Take 1 tablet (325 mg) by mouth 2 times daily for 28 days   Quantity:  120 tablet   Refills:  0        oxyCODONE 10 MG IR tablet   Commonly known as:  ROXICODONE   Used for:  Status post total replacement of right hip        Dose:  10 mg   Take 1 tablet (10 mg) by mouth every 3 hours as needed for moderate to severe pain   Quantity:  40 tablet   Refills:  0        senna-docusate 8.6-50 MG per tablet   Commonly known as:  SENOKOT-S;PERICOLACE   Used for:  Constipation due to opioid therapy        Dose:  1-2 tablet   Take 1-2 tablets by mouth 2 times daily   Quantity:  100 tablet   Refills:  0          CONTINUE these medications which have NOT CHANGED        Dose / Directions Comments    acetaminophen 650 MG 8 hour tablet   Used for:  S/P total knee replacement using cement, left        Dose:  650 mg   Take 650 mg by mouth every 4 hours as needed for other (surgical pain)   Quantity:  100 tablet   Refills:  0        Calcium + D3 600-200 MG-UNIT Tabs        Dose:  1 tablet   Take 1 tablet by mouth daily   Refills:  0        lisinopril 20 MG tablet   Commonly known as:  PRINIVIL/ZESTRIL   Used for:  Benign essential hypertension        Dose:  10 mg   Take 10 mg by mouth daily   Quantity:  30 tablet   Refills:  0        multivitamin per tablet        Dose:  1 tablet   Take 1 tablet by mouth daily with food.   Refills:  0          STOP taking      HYDROcodone-acetaminophen 5-325 MG per tablet   Commonly known as:  NORCO                   After Care     Activity - Up with assistive device           Advance Diet as Tolerated       Follow this diet upon discharge: Orders Placed This Encounter  Advance Diet as Tolerated: Regular Diet Adult       General info for SNF       Length of Stay Estimate: Short Term Care: Estimated # of Days <30  Condition at Discharge: Improving  Level of care:skilled   Rehabilitation Potential: Excellent  Admission H&P remains valid and up-to-date: Yes  Recent Chemotherapy: N/A  Use Nursing Home Standing Orders: Yes       Hip precautions           Mantoux instructions       Give two-step Mantoux (PPD) Per Facility Policy Yes       Wound care (specify)       Site:   Right hip  Instructions:  Daily dressing changes with dry sterile gauze  May use press and seal type dressing or other waterproof dressing to shower.               Further instructions from your care team       You have a post op appointment scheduled for February 22, 2017 @ 11:25 AM with Dr. Muñoz.    Supplies     AntiEmbolism Stockings       Bilateral below knee length.On in the morning, off at night             Referrals     Occupational Therapy Adult Consult       Evaluate and treat as clinically indicated.    Reason:  Right total hip       Physical Therapy Adult Consult       Evaluate and treat as clinically indicated.    Reason:  Right total hip             Your next 10 appointments already scheduled     Feb 22, 2017 11:40 AM   (Arrive by 11:25 AM)   Post Op with Jose Manuel Muñoz MD    ORTHOPEDICS ([facility])    [address] [facility] [address]   [phone]            Mar 02, 2017  2:15 PM   (Arrive by 2:00 PM)   SHORT with Katarina Gutierrez MD   [facility] ([facility])    [address] [place] [address]   [phone]              Statement of Approval     Ordered          01/26/17 0941  I have reviewed and agree  with all the recommendations and orders detailed in this document.   EFFECTIVE NOW     Approved and electronically signed by:  Darren Cordero MD                                                 INTERAGENCY TRANSFER FORM - NURSING   1/23/2017                       14 INTENSIVE CARE UNIT: [phone]            Attending Provider: Jose Manuel Muñoz MD     Allergies:  No Known Allergies    Infection:  None   Service:  GENERAL MEDI    Ht:  1.524 m (5')   Wt:  63.2 kg (139 lb 5.3 oz)   Admission Wt:  56.7 kg (125 lb)    BMI:  27.21 kg/m 2   BSA:  1.64 m 2            Advance Directives        Does patient have a scanned Advance Directive/ACP document in EPIC?           Yes        Immunizations     Name Date      Pneumococcal (PCV 13) 09/12/16     Pneumococcal 23 valent 08/15/14     TD (ADULT, 7+) 01/18/08     TD (ADULT, 7+) 08/20/97       ASSESSMENT     Discharge Profile Flowsheet     EXPECTED DISCHARGE     Assesssment of Functional Status  Needs placement in a SNF/TCF for rehabilitation 01/25/17 1040    Expected Discharge Date  01/27/17 (1/26 or 1/27, as per pain control) 01/25/17 1040   GASTROINTESTINAL (ADULT,PEDIATRIC,OB)      DISCHARGE NEEDS ASSESSMENT     GI WDL  WDL 01/25/17 2310    Patient/family verbalizes understanding of discharge plan recommendations?  Yes 01/24/17 0802   Last Bowel Movement  01/25/16 (prior to surgery) 01/25/16 1634    Medical Team notified of plan?  yes 01/24/17 0802   COMMUNICATION ASSESSMENT      Readmission Within The Last 30 Days  no previous admission in last 30 days 01/24/17 0802   Patient's communication style  spoken language (English or Bilingual) 01/23/17 1558    Equipment Currently Used at Home  walker, rolling 01/25/17 1040   SKIN      Transportation Available  agency transportation 01/25/17 1040   Inspection  Full 01/25/17 2310    Current Discharge Risk  physical impairment 01/24/17 0802   Procedural focused assessment (identify areas inspected)   -- (operative site)  "01/23/17 1517    Interventions provided  discharge planning 01/24/17 0802   Skin WDL  ex 01/25/17 2310    # of Referrals Placed by CTS  Post Acute Facilities 01/24/17 0802   Skin Color/Characteristics  without discoloration 01/24/17 2017    Follow up plan  Community resource assistance 01/24/17 0802   Skin Temperature  warm 01/25/17 1910    Key Recommendations  snf 01/24/17 0802   Skin Moisture  dry 01/24/17 2017    Equipment Used at Home  cane, straight 01/26/16 1337   Skin Integrity  incision(s) 01/25/17 2310    ASSESSMENT OF FUNCTIONAL STATUS     SAFETY      Prior to admission patient needed assistance with:  Laundry/Housekeeping;Home maintenance/Yard work 01/25/17 1040   Safety WDL  WDL 01/25/17 2310                 Assessment WDL (Within Defined Limits) Definitions           Safety WDL     Effective: 09/28/15    Row Information: <b>WDL Definition:</b> Bed in low position, wheels locked; call light in reach; upper side rails up x 2; ID band on<br> <font color=\"gray\"><i>Item=AS safety wdl>>List=AS safety wdl>>Version=F14</i></font>      Skin WDL     Effective: 09/28/15    Row Information: <b>WDL Definition:</b> Warm; dry; intact; elastic; without discoloration; pressure points without redness<br> <font color=\"gray\"><i>Item=AS skin wdl>>List=AS skin wdl>>Version=F14</i></font>      Vitals     Vital Signs Flowsheet     VITAL SIGNS     Response to Interventions  Decrease in pain 01/25/17 2022    Temp  97.7  F (36.5  C) 01/26/17 0838   ANALGESIA SIDE EFFECTS MONITORING      Temp src  Tympanic 01/26/17 0838   Side Effects Monitoring: Respiratory Quality  R 01/25/17 2258    Resp  18 01/26/17 0838   Side Effects Monitoring: Respiratory Depth  N 01/25/17 2258    Pulse  88 01/25/17 2235   Side Effects Monitoring: Sedation Level  S 01/25/17 2258    Heart Rate  82 01/26/17 0838   HEIGHT AND WEIGHT      Pulse/Heart Rate Source  Monitor 01/24/17 1927   Height  1.524 m (5') 01/23/17 1748    BP  111/65 mmHg 01/26/17 0838   " Weight  63.2 kg (139 lb 5.3 oz) 01/26/17 0513    Patient Position  Sitting 01/23/17 0924   BSA (Calculated - sq m)  1.53 01/24/17 0949    OXYGEN THERAPY     BMI (Calculated)  23.99 01/24/17 0949    SpO2  97 % 01/26/17 0838   POSITIONING      O2 Device  None (Room air) 01/26/17 0838   Body Position  independently positioning 01/26/17 0838    Oxygen Delivery  3 LPM 01/23/17 1448   Head of Bed (HOB)  HOB at 60 degrees (eating breakfast) 01/25/17 1910    PAIN/COMFORT     Chair  Upright in chair 01/26/17 0838    Patient Currently in Pain  yes 01/26/17 0838   ECG      Preferred Pain Scale  number (Numeric Rating Pain Scale) 01/26/17 0838   ECG Rhythm  Sinus rhythm 01/24/17 1319    Patient's Stated Pain Goal  No pain 01/26/17 0838   Ectopy  None 01/24/17 1319    0-10 Pain Scale  5 01/26/17 0838   DAILY CARE      Pain Location  Hip 01/26/17 0844   Activity Type  activity adjusted per tolerance;activity encouraged 01/26/17 0838    Pain Orientation  Right 01/26/17 0844   Activity Level of Assistance  assistance, 1 person 01/26/17 0838    Pain Descriptors  Aching 01/26/17 0844   Activity Assistive Device  gait belt;walker 01/26/17 0652    Pain Management Interventions  analgesia administered 01/26/17 0844   Ambulation Distance (Feet)  25 01/23/17 1852    Pain Intervention(s)  Medication (See eMAR) 01/26/17 0844                 Patient Lines/Drains/Airways Status    Active LINES/DRAINS/AIRWAYS     Name: Placement date: Placement time: Site: Days: Last dressing change:    Peripheral IV 01/23/17 Right 01/23/17  0933    3     Incision/Surgical Site 01/25/16 Left Knee 01/25/16  0959   366     Incision/Surgical Site 01/23/17 Right Hip 01/23/17  1129   2             Patient Lines/Drains/Airways Status    Active PICC/CVC     **None**            Intake/Output Detail Report     Date Intake     Output   Net    Shift P.O. I.V. IV Piggyback Total Urine Blood Total       Noc 01/24/17 2300 - 01/25/17 0659 -- -- -- -- 300 -- 300 -300    Day  01/25/17 0700 - 01/25/17 1459 240 -- -- 240 300 -- 300 -60    Sofiya 01/25/17 1500 - 01/25/17 2259 240 -- -- 240 400 -- 400 -160    Noc 01/25/17 2300 - 01/26/17 0659 -- -- -- -- 300 -- 300 -300    Day 01/26/17 0700 - 01/26/17 1459 480 -- -- 480 -- -- -- 480      Last Void/BM       Most Recent Value    Urine Occurrence 1 at 01/25/2017 0400    Stool Occurrence       Case Management/Discharge Planning     Case Management/Discharge Planning Flowsheet     REFERRAL INFORMATION     COPING/STRESS      Did the Initial Social Work Assessment result in a Social Work Case?  Yes 01/24/17 0802   Major Change/Loss/Stressor  hospitalization;surgery/procedure 01/13/17 1251    Admission Type  inpatient 01/24/17 0802   EXPECTED DISCHARGE      Arrived From  home or self-care 01/24/17 0802   Expected Discharge Date  01/27/17 (1/26 or 1/27, as per pain control) 01/25/17 1040    Referral Source  admission list 01/24/17 0802   DISCHARGE PLANNING      # of Referrals Placed by [facility]  Post Acute Facilities 01/24/17 0802   Patient/family verbalizes understanding of discharge plan recommendations?  Yes 01/24/17 0802    Reason For Consult  discharge planning 01/24/17 0802   Medical Team notified of plan?  yes 01/24/17 0802    Record Reviewed  history and physical;medical record;patient profile 01/24/17 0802   Readmission Within The Last 30 Days  no previous admission in last 30 days 01/24/17 0802     Assigned to Ghassan Hansen RN 01/24/17 0802   Transportation Available  agency transportation 01/25/17 1040    [facility] 01/24/17 0802   Current Discharge Risk  physical impairment 01/24/17 0802    Primary Care MD Name  Matt 01/24/17 0802   Interventions provided  discharge planning 01/24/17 0802    LIVING ENVIRONMENT     Follow up plan  Community resource assistance 01/24/17 0802    Lives With  spouse 01/24/17 1458   Key Recommendations  snf 01/24/17 0802    Living Arrangements  house 01/24/17 1458   Equipment  Used at Home  cane, straight 01/26/16 1337    Provides Primary Care For  no one 01/24/17 0802   PATIENT PLACEMENT INFORMATION      Able to Return to Prior Living Arrangements  yes 01/24/17 0802   [facility] 01/24/17 0802    Living Arrangement Comments  SNF prior to returning home  01/24/17 0802   [facility] 2 [facility]  [facility] 01/24/17 0802    HOME SAFETY     FINAL NOTE      Patient Feels Safe Living in Home?  yes 01/24/17 0802   Final Note  see care pllan 01/24/17 0802    ASSESSMENT OF FAMILY/SOCIAL SUPPORT     FINAL RESOURCES      Marital Status   01/24/17 0802   Equipment Currently Used at Home  walker, rolling 01/25/17 1040    Who is your support system?   01/24/17 0802   ABUSE RISK SCREEN      Spouse's Name  Lee 01/24/17 0802   QUESTION TO PATIENT:  Has a member of your family or a partner(now or in the past) intimidated, hurt, manipulated, or controlled you in any way?  no 01/23/17 0848    ASSESSMENT OF FUNCTIONAL STATUS     QUESTION TO PATIENT: Do you feel safe going back to the place where you are living?  yes 01/23/17 0848    Prior to admission patient needed assistance with:  Laundry/Housekeeping;Home maintenance/Yard work 01/25/17 1040   OBSERVATION: Is there reason to believe there has been maltreatment of a vulnerable adult (ie. Physical/Sexual/Emotional abuse, self neglect, lack of adequate food, shelter, medical care, or financial exploitation)?  no 01/23/17 0848    Assesssment of Functional Status  Needs placement in a SNF/TCF for rehabilitation 01/25/17 1040   (R) MENTAL HEALTH SUICIDE RISK      EMPLOYMENT     Are you depressed or being treated for depression?  No 01/23/17 1608    Do you work full or part-time?  no 01/25/17 1040                       14 INTENSIVE CARE UNIT: [phone]            Medication Administration Report for Claudine Bustos as of 01/26/17 0953   Legend:    Given Hold Not Given Due Canceled Entry Other Actions    Time Time (Time) Time   Time-Action       Inactive    Active    Linked        Medications 01/20/17 01/21/17 01/22/17 01/23/17 01/24/17 01/25/17 01/26/17    aspirin tablet 325 mg  Dose: 325 mg Freq: 2 TIMES DAILY Route: PO  Start: 01/23/17 2100       2000 (325 mg)-Given        0810 (325 mg)-Given       2157 (325 mg)-Given        1030 (325 mg)-Given       2218 (325 mg)-Given        0838 (325 mg)-Given       [ ] 2100           benzocaine-menthol (CEPACOL) 15-3.6 MG lozenge 1-2 lozenge  Dose: 1-2 lozenge Freq: EVERY 1 HOUR PRN Route: BU  PRN Reason: sore throat  PRN Comment: sore throat without fever  Start: 01/23/17 1542   Admin Instructions: Attn Nursing: Available from [address] [place] [address]               calcium-vitamin D (CALTRATE) 600-400 MG-UNIT per tablet 1 tablet  Dose: 1 tablet Freq: DAILY Route: PO  Start: 01/23/17 1545       1648 (1 tablet)-Given        0810 (1 tablet)-Given        1030 (1 tablet)-Given        0838 (1 tablet)-Given           HYDROmorphone (PF) (DILAUDID) injection 0.2 mg  Dose: 0.2 mg Freq: EVERY 2 HOURS PRN Route: IV  PRN Reason: severe pain  PRN Comment: or if patient unable to take PO  Start: 01/23/17 1542   Admin Instructions: Hold while on PCA.        1614 (0.2 mg)-Given       1953 (0.2 mg)-Given        1353 (0.2 mg)-Given       1900 (0.2 mg)-Given             naloxone (NARCAN) injection 0.1-0.4 mg  Dose: 0.1-0.4 mg Freq: EVERY 2 MIN PRN Route: IV  PRN Reason: opioid reversal  Start: 01/23/17 1437   Admin Instructions: For respiratory rate LESS than or EQUAL to 8.  Partial reversal dose:  0.1 mg titrated q 2 minutes for Analgesia Side Effects Monitoring Sedation Level of 3 (frequently drowsy, arousable, drifts to sleep during conversation).Full reversal dose:  0.4 mg bolus for Analgesia Side Effects Monitoring Sedation Level of 4 (somnolent, minimal or no response to stimulation).               ondansetron (ZOFRAN-ODT) ODT tab 4 mg  Dose: 4 mg Freq: EVERY 6 HOURS PRN Route: PO  PRN Reason: nausea  Start: 01/23/17  1542   Admin Instructions: This is Step 1 of nausea and vomiting management.  If nausea not resolved in 15 minutes, go to Step 2 prochlorperazine (COMPAZINE). Do not push through foil backing. Peel back foil and gently remove. Place on tongue immediately. Administration with liquid unnecessary              Or  ondansetron (ZOFRAN) injection 4 mg  Dose: 4 mg Freq: EVERY 6 HOURS PRN Route: IV  PRN Reasons: nausea,vomiting  Start: 01/23/17 1542   Admin Instructions: This is Step 1 of nausea and vomiting management.  If nausea not resolved in 15 minutes, go to Step 2 prochlorperazine (COMPAZINE).               oxyCODONE (ROXICODONE) IR tablet 10 mg  Dose: 10 mg Freq: EVERY 3 HOURS PRN Route: PO  PRN Reason: moderate to severe pain  Start: 01/25/17 0917   Admin Instructions: IF CrCl is UNKNOWN start at lowest end of dosing range.  Hold while on PCA or with regular IV opioid dosing.          0922 (10 mg)-Given       1355 (10 mg)-Given       1936 (10 mg)-Given        0037 (10 mg)-Given       0501 (10 mg)-Given       0838 (10 mg)-Given           senna-docusate (SENOKOT-S;PERICOLACE) 8.6-50 MG per tablet 1-2 tablet  Dose: 1-2 tablet Freq: 2 TIMES DAILY Route: PO  Start: 01/23/17 2100   Admin Instructions: Start with 1 tablet PO BID, If no bowel movement in 24 hours, increase to 2 tablets PO BID.  Hold for loose stools.        2000 (1 tablet)-Given        0810 (1 tablet)-Given       2157 (2 tablet)-Given        1030 (1 tablet)-Given       2218 (1 tablet)-Given        0838 (2 tablet)-Given       [ ] 2100           sodium chloride (PF) 0.9% PF flush 3 mL  Dose: 3 mL Freq: EVERY 8 HOURS Route: IK  Start: 01/23/17 1545   Admin Instructions: And Q1H PRN, to lock peripheral IV dormant line.        (1619)-Not Given       (2304)-Not Given        (0656)-Not Given       2258 (3 mL)-Given               1356 (3 mL)-Given        0641 (3 mL)-Given       (0642)-Not Given       [ ] 1300       [ ] 2100           sodium chloride (PF) 0.9% PF  flush 3 mL  Dose: 3 mL Freq: EVERY 1 HOUR PRN Route: IK  PRN Reason: line flush  PRN Comment: for peripheral IV flush post IV meds  Start: 01/23/17 1542             Completed Medications  Medications 01/20/17 01/21/17 01/22/17 01/23/17 01/24/17 01/25/17 01/26/17         Dose: 500 mL Freq: ONCE Route: IV  Last Dose: 500 mL (01/24/17 0651)  Start: 01/24/17 0645   End: 01/24/17 0851        0651 (500 mL)-New Bag               Dose: 975 mg Freq: EVERY 8 HOURS Route: PO  Start: 01/23/17 1545   End: 01/26/17 0631   Admin Instructions: Do not use if patient has an active opioid/acetaminophen analgesic order for pain  Maximum acetaminophen dose from all sources = 75 mg/kg/day not to exceed 4 grams/day.        1648 (975 mg)-Given       2303 (975 mg)-Given        0655 (975 mg)-Given       1522 (975 mg)-Given       2257 (975 mg)-Given        0614 (975 mg)-Given       1521 (975 mg)-Given       2218 (975 mg)-Given        0631 (975 mg)-Given       [ ] 1500             Dose: 1 g Freq: EVERY 8 HOURS Route: IV  Indications of Use: SURGICAL PROPHYLAXIS  Start: 01/23/17 1800   End: 01/24/17 0231   Admin Instructions: First post-op dose due 8 hours after intra-op dose, see eMAR.        1808 (1 g)-New Bag        0201 (1 g)-New Bag               Dose: 2 g Freq: PRE-OP/PRE-PROCEDURE Route: IV  Indications of Use: SURGICAL PROPHYLAXIS  Start: 01/23/17 0850   End: 01/23/17 1008   Admin Instructions: Give first dose within 1 hour PRIOR to incision. If patient weight is greater than or equal to 120 kg increase dose to 3 g.        1008 (2 g)-Given                Dose: 15 mg Freq: ONCE PRN Route: IV  PRN Reason: moderate to severe pain  Start: 01/23/17 1435   End: 01/23/17 1445   Admin Instructions: IF celecoxib was given pre-operatively, start ketorolac 12 hours after celecoxib given  Check with Surgical Team PRIOR to administration.        1445 (15 mg)-Given             Discontinued Medications  Medications 01/20/17 01/21/17 01/22/17 01/23/17  01/24/17 01/25/17 01/26/17         Freq: PRN  Start: 01/23/17 1403   End: 01/23/17 1539       1403 (30 mL)-Given       1539-Med Discontinued            Freq: PRN  Start: 01/23/17 1403   End: 01/23/17 1539       1403 (20 mL)-Given       1539-Med Discontinued            Dose: 1 g Freq: SEE ADMIN INSTRUCTIONS Route: IV  Indications of Use: SURGICAL PROPHYLAXIS  Start: 01/23/17 0850   End: 01/25/17 0855   Admin Instructions: Intra-Op Dose.  Give every 2 hours while patient in surgery, starting 2 hours after pre-op dose.  DO NOT GIVE intra-op dose if CrCl less than 10 mL/min (on dialysis).  If CrCL less than 50 mL/min, double the time interval between doses.          0855-Med Discontinued          Dose: 4 mg Freq: ONCE PRN Route: IV  PRN Reason: nausea  PRN Comment: nausea  (If not administered in the OR and Post Op Nausea and Vomiting persists after droperidol dosing if ordered)  Start: 01/23/17 1435   End: 01/23/17 1539       1539-Med Discontinued            Dose: 4 mg Freq: ONCE PRN Route: IV  PRN Reason: other  PRN Comment: nausea  (If not administered in the OR and Post Op Nausea and Vomiting persists after droperidol dosing if ordered)  Start: 01/23/17 1435   End: 01/23/17 1539   Admin Instructions: May repeat times 1        1539-Med Discontinued            Dose: 25-50 mcg Freq: EVERY 2 MIN PRN Route: IV  PRN Reason: other  PRN Comment: acute pain  Start: 01/23/17 1435   End: 01/23/17 1539   Admin Instructions: MAX cumulative dose = 250 mcg.    Use Fentanyl initially, as a short acting agent for acute pain control.  If insufficient, or a longer acting agent is needed, begin Morphine or Hydromorphone if ordered.        1506 (25 mcg)-Given       1512 (25 mcg)-Given       1519 (25 mcg)-Given       1539-Med Discontinued            Freq: PRN  Start: 01/23/17 1439   End: 01/23/17 1539       1439 (1 each)-Given [C]       1539-Med Discontinued            Rate: 75 mL/hr Freq: CONTINUOUS Route: IV  Last Dose: Stopped  "(01/24/17 1318)  Start: 01/23/17 1545   End: 01/24/17 1208   Admin Instructions: Change to saline lock when PO well tolerated.        1619 ( )-Rate/Dose Change        0218 ( )-New Bag       1208-Med Discontinued  1318-Stopped               Rate: 100 mL/hr Freq: CONTINUOUS Route: IV  Start: 01/23/17 0945   End: 01/23/17 1608       1608-Med Discontinued                  Rate: 100 mL/hr Freq: CONTINUOUS Route: IV  Last Dose: 1,000 mL (01/23/17 1524)  Start: 01/23/17 0930   End: 01/23/17 1608       0936 (1,000 mL)-New Bag       1200 ( )-New Bag       1428-Stopped       1437 (200 mL)-Rate/Dose Verify       1524 (1,000 mL)-New Bag       1608-Med Discontinued            Rate: 100 mL/hr Freq: CONTINUOUS Route: IV  Start: 01/23/17 1445   End: 01/23/17 1539   Admin Instructions: Continue until IV catheter is weaned               1539-Med Discontinued            Freq: EVERY 1 HOUR PRN Route: Top  PRN Reason: pain  PRN Comment: with VAD insertion or accessing implanted port.  Start: 01/23/17 1542   End: 01/23/17 1554   Admin Instructions: Do NOT give if patient has a history of allergy to any local anesthetic or any \"grisel\" product.   Apply 30 minutes prior to VAD insertion or port access.  MAX Dose:  2.5 g (  of 5 g tube)        1554-Med Discontinued            Dose: 1 mL Freq: EVERY 1 HOUR PRN Route: OTHER  PRN Comment: mild pain with VAD insertion or accessing implanted port  Start: 01/23/17 1542   End: 01/23/17 1554   Admin Instructions: Do NOT give if patient has a history of allergy to any local anesthetic or any \"grisel\" product. MAX dose 1 mL subcutaneous OR intradermal in divided doses.        1554-Med Discontinued            Dose: 10 mg Freq: DAILY Route: PO  Start: 01/23/17 1545   End: 01/24/17 0635       (1616)-Not Given [C]        0635-Med Discontinued           Dose: 12.5 mg Freq: EVERY 5 MIN PRN Route: IV  PRN Comment: post anesthesia shivering if Respiratory Rate greater than 10  Start: 01/23/17 1435   End: " 01/23/17 1539       1539-Med Discontinued            Dose: 2-4 mg Freq: EVERY 5 MIN PRN Route: IV  PRN Reason: other  PRN Comment: acute pain.  Start: 01/23/17 1435   End: 01/23/17 1539   Admin Instructions: If fentanyl is also ordered, use morphine if pain control insufficient with fentanyl or a longer acting agent is needed.   Max cumulative dose = 10 mg        1539-Med Discontinued            Dose: 0.1-0.4 mg Freq: EVERY 2 MIN PRN Route: IV  PRN Reason: opioid reversal  Start: 01/23/17 1542   End: 01/23/17 1608   Admin Instructions: For respiratory rate LESS than or EQUAL to 8.  Partial reversal dose:  0.1 mg titrated q 2 minutes for Analgesia Side Effects Monitoring Sedation Level of 3 (frequently drowsy, arousable, drifts to sleep during conversation).Full reversal dose:  0.4 mg bolus for Analgesia Side Effects Monitoring Sedation Level of 4 (somnolent, minimal or no response to stimulation).        1608-Med Discontinued            Dose: 4 mg Freq: EVERY 30 MIN PRN Route: PO  PRN Reason: nausea  Start: 01/23/17 1435   End: 01/23/17 1539   Admin Instructions: MAX total dose = 8 mg, including OR dosing. If not resolved in 15 minutes, then go to step 2 (Prochlorperazine if ordered).        1539-Med Discontinued         Or    Dose: 4 mg Freq: EVERY 30 MIN PRN Route: IV  PRN Reason: nausea  Start: 01/23/17 1435   End: 01/23/17 1539   Admin Instructions: MAX total dose = 8 mg, including OR dosing. If not resolved in 15 minutes, then go to step 2 (Prochlorperazine if ordered).        1539-Med Discontinued            Dose: 5 mg Freq: EVERY 3 HOURS PRN Route: PO  PRN Reason: moderate to severe pain  Start: 01/23/17 1542   End: 01/25/17 0918   Admin Instructions: IF CrCl is UNKNOWN start at lowest end of dosing range.  Hold while on PCA or with regular IV opioid dosing.        1828 (5 mg)-Given       2303 (5 mg)-Given        0218 (5 mg)-Given       0810 (5 mg)-Given       1123 (5 mg)-Given       1709 (5 mg)-Given        2157 (5 mg)-Given        0237 (5 mg)-Given       0614 (5 mg)-Given       0918-Med Discontinued          Dose: 1 patch Freq: PRE-OP/PRE-PROCEDURE Route: TD  Start: 01/23/17 0918   End: 01/25/17 0855   Admin Instructions: Apply patch to skin, behind ear.  Place in Pre-Op.  Remove after 24 hours.  Each 1.5 mg patch delivers 1 mg of scopolamine.          0855-Med Discontinued          Freq: EVERY 8 HOURS Route: TD  Start: 01/23/17 1930   End: 01/24/17 1929   Admin Instructions: Chart every shift, confirming that patch is still in place on patient (no barcode scan needed). See patch order for dose information.        (2240)-Not Given [C]        (0409)-Not Given [C]       (1125)-Not Given [C]       1929-Med Discontinued           Freq: ONCE Route: TD  Start: 01/24/17 1600   End: 01/25/17 0855   Admin Instructions: Remove at 16:00 on P0D # 1.  Remove patch 24 hours after it was placed.                 0855-Med Discontinued          Freq: PRN  Start: 01/23/17 1440   End: 01/23/17 1539       1440 (1,000 Units)-Given [C]       1539-Med Discontinued            Dose: 1 g Freq: PRE-OP/PRE-PROCEDURE Route: IV  Start: 01/23/17 0853   End: 01/25/17 0855   Admin Instructions: Each 1 gram to be infused over 10 minutes.          0855-Med Discontinued     Medications 01/20/17 01/21/17 01/22/17 01/23/17 01/24/17 01/25/17 01/26/17               INTERAGENCY TRANSFER FORM - NOTES (H&P, Discharge Summary, Consults, Procedures, Therapies)   1/23/2017                       14 INTENSIVE CARE UNIT: [phone]               History & Physicals      Interval H&P Note by Soumya Yousif RN at 1/19/2017  8:22 AM     Author:  Soumya Yousif RN Service:  (none) Author Type:  Registered Nurse    Filed:  1/19/2017  8:22 AM Note Time:  1/19/2017  8:22 AM Related:  Original note: H&P (View-Only) by Katarina Gutierrez MD filed at 1/17/2017 11:45 AM    Status:  Signed :  Soumya Yousif RN (Registered Nurse)         [facility]       H&P (View-Only) by  Katarina Gutierrez MD at 2017 10:24 AM     Author:  Katarina Gutierrez MD Service:  (none) Author Type:  Physician    Filed:  2017 11:45 AM Note Time:  2017 10:24 AM Status:  Signed    :  Katarina Gutierrez MD (Physician)             [facility] [place]  [address]  [place] [address]  [phone]  Dept: [phone]    PRE-OP EVALUATION:  Today's date: 2017    Claudine Bustos (: 1930) presents for pre-operative evaluation assessment as requested by Dr. Muñoz.  She requires evaluation and anesthesia risk assessment prior to undergoing surgery/procedure for treatment of right hip .  Proposed procedure: right hip replacement     Date of Surgery/ Procedure: 17  Time of Surgery/ Procedure: [facility] [facility] [place] Physician: Katarina Gutierrez  Type of Anesthesia Anticipated: to be determined    Patient has a Health Care Directive or Living Will:  NO    1. NO - Do you have a history of heart attack, stroke, stent, bypass or surgery on an artery in the head, neck, heart or legs?  2. NO - Do you ever have any pain or discomfort in your chest?  3. NO - Do you have a history of  Heart Failure?  4. NO - Are you troubled by shortness of breath when: walking on the level, up a slight hill or at night?  5. NO - Do you currently have a cold, bronchitis or other respiratory infection?  6. NO - Do you have a cough, shortness of breath or wheezing?  7. NO - Do you sometimes get pains in the calves of your legs when you walk?  8. NO - Do you or anyone in your family have previous history of blood clots?  9. NO - Do you or does anyone in your family have a serious bleeding problem such as prolonged bleeding following surgeries or cuts?  10. YES - Have you ever had problems with anemia or been told to take iron pills?  11. NO - Have you had any abnormal blood loss such as black, tarry or bloody stools, or abnormal vaginal bleeding?  12. NO - Have  you ever had a blood transfusion?  13. NO - Have you or any of your relatives ever had problems with anesthesia?  14. NO - Do you have sleep apnea, excessive snoring or daytime drowsiness?  15. NO - Do you have any prosthetic heart valves?  16. YES- Do you have prosthetic joints?bilateral knee  17. NO - Is there any chance that you may be pregnant?      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  Problems all longstanding and stable, except as noted/documented.  See ROS for pertinent symptoms related to these conditions.                                                                                                  .    MEDICAL HISTORY:                                                      Patient Active Problem List    Diagnosis Date Noted     Benign essential hypertension 01/16/2017     Priority: Medium     ACP (advance care planning) 06/09/2016     Priority: Medium     Advance Care Planning 6/9/2016: ACP Review of Chart / Resources Provided:  Reviewed chart for advance care plan.  Claudine TREVOR Bustos has no plan or code status on file. Discussed available resources and provided with information. Confirmed code status reflects current choices pending further ACP discussions.  Confirmed/documented legally designated decision makers.  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 Provider to review and confirm.       Advanced care planning/counseling discussion 08/03/2012     Priority: Medium      Past Medical History   Diagnosis Date     HTN (hypertension) 4/4/2000     Personal history of colonic polyps 6/8/2004     Diastolic dysfunction 2/27/2012      echo 2/2012  EF 60%     Chronic kidney disease, unspecified 8/3/2012     Past Surgical History   Procedure Laterality Date     Tonsillectomy       Urethral dilitation every 4 months       Excision of rectal villus adenoma       Colonoscopy  2004, 2009     Thoracic schwannoma resection       Knee replacement Right 1/2011     Phacoemulsification with standard intraocular lens implant  2/25/2014     Procedure: PHACOEMULSIFICATION WITH STANDARD INTRAOCULAR LENS IMPLANT;  CATARACT EXTRACTION WITH INTRA OCULAR LENS LEFT;  Surgeon: Jah Bee MD;  Location: [facility] OR     Phacoemulsification with standard intraocular lens implant Right 5/12/2015     Procedure: PHACOEMULSIFICATION WITH STANDARD INTRAOCULAR LENS IMPLANT;  Surgeon: Jah Bee MD;  Location: [facility] OR     Arthroplasty knee Left 1/25/2016     Procedure: ARTHROPLASTY KNEE;  Surgeon: Jose Manuel Muñoz MD;  Location: [facility] OR     Current Outpatient Prescriptions   Medication Sig Dispense Refill     HYDROcodone-acetaminophen (NORCO) 5-325 MG per tablet TAKE 1 TABLET BY MOUTH EVERY 6 HOURS AS NEEDED FOR MODERATE TO SEVEREPAIN 30 tablet 0     lisinopril (PRINIVIL,ZESTRIL) 20 MG tablet TAKE 1 TABLET BY MOUTH DAILY 90 tablet 0     acetaminophen 650 MG TABS Take 650 mg by mouth every 4 hours as needed for other (surgical pain) 100 tablet      Calcium Carb-Cholecalciferol (CALCIUM + D3) 600-200 MG-UNIT TABS Take 1 tablet by mouth daily       Multiple Vitamin (MULTIVITAMIN) per tablet Take 1 tablet by mouth daily with food.       OTC products: None, except as noted above    No Known Allergies   Latex Allergy: NO    Social History   Substance Use Topics     Smoking status: Never Smoker      Smokeless tobacco: Not on file     Alcohol Use: Yes      Comment: Mixed, rarely     History   Drug Use No       REVIEW OF SYSTEMS:                                                    C: NEGATIVE for fever, chills, change in weight  I: NEGATIVE for worrisome rashes, moles or lesions  E:  NEGATIVE for vision changes or irritation  E/M: NEGATIVE for ear, mouth and throat problems  R: NEGATIVE for significant cough or SOB  B: NEGATIVE for masses, tenderness or discharge  CV: NEGATIVE for chest pain, palpitations or peripheral edema  GI: NEGATIVE for nausea, abdominal pain, heartburn, or change in bowel habits  : NEGATIVE for frequency, dysuria, or hematuria  M: NEGATIVE for significant arthralgias or myalgia  N: NEGATIVE for weakness, dizziness or paresthesias  E: NEGATIVE for temperature intolerance, skin/hair changes  H: NEGATIVE for bleeding problems  P: NEGATIVE for changes in mood or affect    EXAM:                                                    There were no vitals taken for this visit.    GENERAL APPEARANCE: healthy, alert and no distress     EYES: EOMI,- PERRL     HENT: ear canals and TM's normal and nose and mouth without ulcers or lesions     NECK: no adenopathy, no asymmetry, masses, or scars and thyroid normal to palpation     RESP: lungs clear to auscultation - no rales, rhonchi or wheezes     CV: regular rates and rhythm, normal S1 S2, no S3 or S4 and no murmur, click or rub -     ABDOMEN:  soft, nontender, no HSM or masses and bowel sounds normal     MS: extremities normal- no gross deformities noted, no evidence of inflammation in joints, FROM in all extremities.     SKIN: no suspicious lesions or rashes     NEURO: Normal strength and tone, sensory exam grossly normal, mentation intact and speech normal     PSYCH: mentation appears normal. and affect normal/bright     LYMPHATICS: No axillary, cervical, inguinal, or supraclavicular nodes    DIAGNOSTICS:                                                      EKG: appears normal, NSR, normal axis, normal intervals, no acute ST/T changes c/w ischemia, no LVH by voltage criteria, unchanged from previous tracings  Labs Resulted Today:   Results for orders placed or performed in visit on 01/17/17   CBC with platelets   Result Value Ref  Range    WBC 6.5 4.0 - 11.0 10e9/L    RBC Count 4.23 3.8 - 5.2 10e12/L    Hemoglobin 11.9 11.7 - 15.7 g/dL    Hematocrit 37.5 35.0 - 47.0 %    MCV 89 78 - 100 fl    MCH 28.1 26.5 - 33.0 pg    MCHC 31.7 31.5 - 36.5 g/dL    RDW 14.9 10.0 - 15.0 %    Platelet Count 263 150 - 450 10e9/L   Comprehensive metabolic panel   Result Value Ref Range    Sodium 143 133 - 144 mmol/L    Potassium 3.9 3.4 - 5.3 mmol/L    Chloride 107 94 - 109 mmol/L    Carbon Dioxide 28 20 - 32 mmol/L    Anion Gap 8 3 - 14 mmol/L    Glucose 94 70 - 99 mg/dL    Urea Nitrogen 19 7 - 30 mg/dL    Creatinine 0.83 0.52 - 1.04 mg/dL    GFR Estimate 65 >60 mL/min/1.7m2    GFR Estimate If Black 79 >60 mL/min/1.7m2    Calcium 9.1 8.5 - 10.1 mg/dL    Bilirubin Total 0.5 0.2 - 1.3 mg/dL    Albumin 3.5 3.4 - 5.0 g/dL    Protein Total 7.0 6.8 - 8.8 g/dL    Alkaline Phosphatase 112 40 - 150 U/L    ALT 22 0 - 50 U/L    AST 16 0 - 45 U/L       Recent Labs   Lab Test  01/17/17   0955  12/08/16   1215   01/20/16   0830   HGB  11.9  12.4   < >  13.5   PLT  263  302   --   256   NA   --   141   --   140   POTASSIUM   --   4.2   --   4.1   CR   --   0.88   --   0.90    < > = values in this interval not displayed.        IMPRESSION:                                                    Reason for surgery/procedure: right hip DJD  Diagnosis/reason for consult: cardiopulmonary clearance    The proposed surgical procedure is considered INTERMEDIATE risk.    REVISED CARDIAC RISK INDEX  The patient has the following serious cardiovascular risks for perioperative complications such as (MI, PE, VFib and 3  AV Block):  No serious cardiac risks  INTERPRETATION: 0 risks: Class I (very low risk - 0.4% complication rate)    The patient has the following additional risks for perioperative complications:  No identified additional risks  Score not calculated      ICD-10-CM    1. Pre-operative cardiovascular examination Z01.810 EKG 12-lead complete w/read - (Clinic Performed)     CBC with  platelets     Comprehensive metabolic panel     EKG 12-lead complete w/read - (Clinic Performed)     CBC with platelets     Comprehensive metabolic panel   2. Benign essential hypertension I10 EKG 12-lead complete w/read - (Clinic Performed)     CBC with platelets     Comprehensive metabolic panel     EKG 12-lead complete w/read - (Clinic Performed)     CBC with platelets     Comprehensive metabolic panel       RECOMMENDATIONS:                                                      --Patient is to take all scheduled medications on the day of surgery EXCEPT for modifications listed below.    ACE Inhibitor or Angiotensin Receptor Blocker (ARB) Use  Ace inhibitor or Angiotensin Receptor Blocker (ARB) and should HOLD this medication for the 24 hours prior to surgery.  WILL DECREASE DOSE TO 10 MG AND F/U IN 6-7 WKS    APPROVAL GIVEN to proceed with proposed procedure, without further diagnostic evaluation       Signed Electronically by: Katarina Gutierrez MD    Copy of this evaluation report is provided to requesting physician.    [facility] Preop Guidelines             Discharge Summaries     No notes of this type exist for this encounter.      Consult Notes     No notes of this type exist for this encounter.         Progress Notes - Physician (Notes for yesterday and today)      Progress Notes by Vasile Shelton PA-C at 1/26/2017  7:44 AM     Author:  Vasile Shelton PA-C Service:  (none) Author Type:  Physician Assistant - C    Filed:  1/26/2017  7:52 AM Note Time:  1/26/2017  7:44 AM Status:  Signed    :  Vasile Shelton PA-C (Physician Assistant - C)           [facility]    Orthopedics  Daily Post-Op Note    Assessment and Plan  Procedure(s):  ARTHROPLASTY HIP, RIGHT   -3 Days Post-Op  Doing well.  Clean wound without signs of infection.  No immediate surgical complications identified.  No excessive bleeding  Pain well-controlled.  Tolerating physical therapy and rehabilitation  "well.  Plan:  -Ambulate  -Continue supportive and symptomatic treatment  -Start or continue physical therapy  -Pain control measures  -Advance diet as tolerated  -Routine wound care  -Aspirin for DVT prophylaxis  -Additional problems being followed by medicine service  -TEDS   -D/C today to NH if ok with medicine    UPON DISCHARGE  -Patient may continue Oxycodone 5 mg 1-2 tabs orally every 4-6 hours prn and Tylenol 975 mg every 8 hrs for pain control  -Patient would benefit from stool softener until regular bowel movements  -Patient will do PT/OT at NH following standard [facility] protocol for anterolateral approach.  No precautions necessary.  May weight-bear as tolerated with walker assistance.  -Dressing changes should be performed daily with dry sterile dressings. Keep wound clean and dry. May shower with \"press and seal\" type dressing or other waterproof dressing. If wound gets wet, please pat dry.  -Allow steri strips to remain in place.  Will fall off on own.  -Patient should follow up in clinic with Orthopaedics 4 weeks from the date of surgery.  -Patient should take Aspirin 325 mg twice daily until seen in orthopedic clinic for postop follow up.  Continue TEDS.  -Upon leaving NH, patient may need to be set up with Home Health for PT versus outpatient PT.    Principal Problem:    Status post total replacement of right hip  Active Problems:    Hyperlipidemia with target LDL less than 130    Benign essential hypertension    Postoperative anemia due to acute blood loss      Vasile Shelton PA-C    Interval History  Patient seen today along with Dr. Muñoz and physical therapist and .  Doing well.  Continues to improve.  Pain is well-controlled.  No fevers.  Ambulating well with PT.  Denies major complaints.    Physical Exam  Temp: 98.6  F (37  C) Temp src: Tympanic BP: 120/54 mmHg Pulse: 88 Heart Rate: 85 Resp: 18 SpO2: 95 % O2 Device: None (Room air)    Filed Vitals:    01/23/17 1506 01/24/17 " 0617 01/26/17 0513   Weight: 122 lb 9.2 oz (55.6 kg) 121 lb 4.1 oz (55 kg) 139 lb 5.3 oz (63.2 kg)     Vital Signs with Ranges  Temp:  [98.4  F (36.9  C)-99.1  F (37.3  C)] 98.6  F (37  C)  Pulse:  [88] 88  Heart Rate:  [] 85  Resp:  [16-18] 18  BP: (117-136)/(45-68) 120/54 mmHg  SpO2:  [95 %-98 %] 95 %  I/O last 3 completed shifts:  In: 480 [P.O.:480]  Out: 1000 [Urine:1000]    Dressing dry and intact.  Wound clean and dry with no drainage.  Surrounding skin without erythema.  Compartments soft and nttp.  Minimal distal RLE edema.  Distal CMS intact.      Data  Results for orders placed or performed during the hospital encounter of 01/23/17 (from the past 24 hour(s))   Basic metabolic panel   Result Value Ref Range    Sodium 136 133 - 144 mmol/L    Potassium 3.9 3.4 - 5.3 mmol/L    Chloride 105 94 - 109 mmol/L    Carbon Dioxide 27 20 - 32 mmol/L    Anion Gap 4 3 - 14 mmol/L    Glucose 105 (H) 70 - 99 mg/dL    Urea Nitrogen 23 7 - 30 mg/dL    Creatinine 0.68 0.52 - 1.04 mg/dL    GFR Estimate 81 >60 mL/min/1.7m2    GFR Estimate If Black >90   GFR Calc   >60 mL/min/1.7m2    Calcium 7.9 (L) 8.5 - 10.1 mg/dL   CBC with platelets differential   Result Value Ref Range    WBC 12.3 (H) 4.0 - 11.0 10e9/L    RBC Count 2.59 (L) 3.8 - 5.2 10e12/L    Hemoglobin 7.3 (L) 11.7 - 15.7 g/dL    Hematocrit 23.3 (L) 35.0 - 47.0 %    MCV 90 78 - 100 fl    MCH 28.2 26.5 - 33.0 pg    MCHC 31.3 (L) 31.5 - 36.5 g/dL    RDW 15.1 (H) 10.0 - 15.0 %    Platelet Count 174 150 - 450 10e9/L    Diff Method Automated Method     % Neutrophils 75.8 %    % Lymphocytes 12.0 %    % Monocytes 10.7 %    % Eosinophils 0.9 %    % Basophils 0.1 %    % Immature Granulocytes 0.5 %    Nucleated RBCs 0 0 /100    Absolute Neutrophil 9.3 (H) 1.6 - 8.3 10e9/L    Absolute Lymphocytes 1.5 0.8 - 5.3 10e9/L    Absolute Monocytes 1.3 0.0 - 1.3 10e9/L    Absolute Eosinophils 0.1 0.0 - 0.7 10e9/L    Absolute Basophils 0.0 0.0 - 0.2 10e9/L    Abs  Immature Granulocytes 0.1 0 - 0.4 10e9/L    Absolute Nucleated RBC 0.0               Progress Notes by Darren Cordero MD at 1/25/2017 10:23 AM     Author:  Darren Cordero MD Service:  Internal Medicine Author Type:  Physician    Filed:  1/25/2017 10:31 AM Note Time:  1/25/2017 10:23 AM Status:  Signed    :  Darren Cordero MD (Physician)           [facility]    Hospitalist Progress Note    Date of Service (when I saw the patient): 01/25/2017    Assessment and Plan  Claudine Bustos is a 86 year old female who was admitted on 1/23/2017.      1.  POD #2 s/p Right total hip arthroplasty:  Doing well except for some increased pain  Getting 5 mg oxycodone and still uncomfortable.  Have increased to 10 mg dosing  will continue with her PT OT.  Wound looks good.  Tentatively planning on going to [facility] for rehab post discharge.  No fevers  Hgb stable at 8.1.    2. Hypertension hx:  vitals stable here on no medications.    3. Pulmonary  On room air with good SATs has IS and is up with PT and up in chair.     DVT Prophylaxis: Defer to primary service  Is on ASA  Code Status: Full Code    Disposition: Expected discharge in 1-2 days once pain control adequate..    Darren Cordero    Interval History  Feels okay but has pain.   [place] that her knee replacements were easier to handle in past than this hip.   No nausea no sob or chest pain   will increase her oxycodone and see how does  Is getting up with PT. otherwise feels fine.  [facility] going [facility] maybe in am.    -Data reviewed today: I reviewed all new labs and imaging results over the last 24 hours. I personally reviewed no images or EKG's today.    Physical Exam  Temp: 98.6  F (37  C) Temp src: Tympanic BP: 116/70 mmHg   Heart Rate: 104 Resp: 20 SpO2: 97 % O2 Device: None (Room air)    Filed Vitals:    01/23/17 0921 01/23/17 1506 01/24/17 0617   Weight: 56.7 kg (125 lb) 55.6 kg (122 lb 9.2 oz) 55 kg (121 lb 4.1 oz)      Vital Signs with Ranges  Temp:  [97.9  F (36.6  C)-99.1  F (37.3  C)] 98.6  F (37  C)  Heart Rate:  [] 104  Resp:  [20] 20  BP: (113-132)/(55-70) 116/70 mmHg  SpO2:  [96 %-98 %] 97 %  I/O last 3 completed shifts:  In: 2709 [P.O.:600; I.V.:1599; IV Piggyback:510]  Out: 1400 [Urine:1400]    Peripheral IV 01/23/17 Right (Active)   Site Assessment WDL 1/24/2017 10:00 PM   Line Status Saline locked 1/24/2017 10:00 PM   Phlebitis Scale 0-->no symptoms 1/24/2017 10:00 PM   Infiltration Scale 0 1/24/2017 10:00 PM   Extravasation? No 1/24/2017  6:00 AM   Dressing Intervention New dressing  1/23/2017  9:33 AM   Number of days:2       Incision/Surgical Site 01/25/16 Left Knee (Active)   Number of days:366       Incision/Surgical Site 01/23/17 Right Hip (Active)   Incision Assessment [place] 1/24/2017  8:15 PM   Leni-Incision Assessment [facility] 1/24/2017  8:15 PM   Closure Sutures;Adhesive strip(s) 1/23/2017  2:21 PM   Drainage Description [facility] 1/24/2017  8:15 PM   Incision Care Ice applied 1/24/2017  8:15 PM   Dressing Intervention Clean, dry, intact 1/24/2017  8:15 PM   Number of days:2     nothing    Constitutional: Alert and oriented x3. No distress    HEENT: NC/AT, PERRL, EOMI, mouth moist, neck supple, no LN.     Cardiovascular: RRR. no Murmur, no  rubs, or gallops.  JVD flat.  Bruits no.  Pulses 2+    Respiratory: Clear to auscultation bilaterally    Abdomen: Soft, NTND, no organomegaly. Bowel sounds present    Extremities: Warm/dry. No edema    Neuro:   Non focal, cranial nerves intact, Moves all extremities.    Medications       calcium-vitamin D  1 tablet Oral Daily     sodium chloride (PF)  3 mL Intracatheter Q8H     acetaminophen  975 mg Oral Q8H     senna-docusate  1-2 tablet Oral BID     aspirin  325 mg Oral BID       Data    Recent Labs  Lab 01/25/17  0447 01/24/17  0445   HGB 8.1* 8.3*   GLC  --  129*          No results found for this or any previous visit (from the past 24 hour(s)).                 Progress  Notes by Vasile Shelton PA-C at 1/25/2017  7:42 AM     Author:  Vasile Shelton PA-C Service:  (none) Author Type:  Physician Assistant Nathen ISAACS    Filed:  1/25/2017  7:46 AM Note Time:  1/25/2017  7:42 AM Status:  Signed    :  Vasile Shelton PA-C (Physician Assistant - FERNY)           [facility]    Orthopedics  Daily Post-Op Note    Assessment and Plan  Procedure(s):  ARTHROPLASTY HIP, RIGHT   -2 Days Post-Op  Doing well.  No immediate surgical complications identified.  No excessive bleeding  Pain reasonably well-controlled.    Tolerating physical therapy and rehabilitation well.  Plan:  -Ambulate  -Continue supportive and symptomatic treatment  -Start or continue physical therapy  -Pain control measures  -Advance diet as tolerated  -Routine wound care  -Aspirin for DVT prophylaxis  -Additional problems being followed by medicine service  -D/C planning for once patient meets criteria  Principal Problem:    Status post total replacement of right hip  Active Problems:    Hyperlipidemia with target LDL less than 130    Benign essential hypertension    Postoperative anemia due to acute blood loss      Vasile Shelton PA-C    Interval History  Patient seen today along with Dr. Muñoz and physical therapist and .  Doing well.  Continues to improve.  Pain is reasonably well-controlled.  No fevers.  Denies N/V.  Did have increased pain yesterday after PT in afternoon.  Was able to ambulate 120 ft x 2 with walker assist.      Physical Exam  Temp: 98.6  F (37  C) Temp src: Tympanic BP: 116/70 mmHg   Heart Rate: 104 Resp: 20 SpO2: 97 % O2 Device: None (Room air)    Filed Vitals:    01/23/17 0921 01/23/17 1506 01/24/17 0617   Weight: 125 lb (56.7 kg) 122 lb 9.2 oz (55.6 kg) 121 lb 4.1 oz (55 kg)     Vital Signs with Ranges  Temp:  [97.9  F (36.6  C)-99.1  F (37.3  C)] 98.6  F (37  C)  Heart Rate:  [] 104  Resp:  [20] 20  BP: (113-132)/(55-70) 116/70 mmHg  SpO2:  [95  %-98 %] 97 %  I/O last 3 completed shifts:  In: 2709 [P.O.:600; I.V.:1599; IV Piggyback:510]  Out: 1400 [Urine:1400]    Dressing dry and intact.  Compartments soft and nttp RLE.  Distal CMS intact RLE.      Data  Results for orders placed or performed during the hospital encounter of 01/23/17 (from the past 24 hour(s))   Hemoglobin   Result Value Ref Range    Hemoglobin 8.1 (L) 11.7 - 15.7 g/dL     Nares MRSA culture negative.  Intraoperative cultures show no growth after 2 days.                 Procedure Notes     No notes of this type exist for this encounter.         Progress Notes - Therapies (Notes from 01/23/17 through 01/26/17)      Progress Notes by Sushma Berger OTR/L at 1/24/2017  2:58 PM     Author:  Sushma Berger OTR/L Service:  Acute IP Rehab Author Type:  Occupational Therapist    Filed:  1/24/2017  2:58 PM Note Time:  1/24/2017  2:58 PM Status:  Signed    :  Sushma Berger OTR/L (Occupational Therapist)            01/24/17 1429   Quick Adds   Type of Visit Initial Occupational Therapy Evaluation   Living Environment   Lives With spouse   Living Arrangements house   Home Accessibility stairs to enter home;stairs within home;tub/shower is not walk in;grab bars present (bathtub)   Number of Stairs to Enter Home 4   Number of Stairs Within Home 12   Stair Railings at Home outside, present at both sides;inside, present at both sides   Transportation Available car   Self-Care   Dominant Hand right   Usual Activity Tolerance moderate   Current Activity Tolerance fair   Regular Exercise no   Equipment Currently Used at Home cane, straight   Functional Level Prior   Ambulation 1-->assistive equipment   Transferring 1-->assistive equipment   Toileting 0-->independent   Bathing 0-->independent   Dressing 0-->independent   Eating 0-->independent   Communication 0-->understands/communicates without difficulty   Swallowing 0-->swallows foods/liquids without difficulty   Cognition 0 - no  cognition issues reported   Fall history within last six months no   General Information   Onset of Illness/Injury or Date of Surgery - Date 01/23/17   Referring Physician Vasile Shelton PA-C   Patient/Family Goals Statement get back home after rehab   Additional Occupational Profile Info/Pertinent History of Current Problem s/p R VY   Precautions/Limitations fall precautions   Weight-Bearing Status - LUE full weight-bearing   Weight-Bearing Status - RUE full weight-bearing   Weight-Bearing Status - LLE full weight-bearing   Weight-Bearing Status - RLE weight-bearing as tolerated   Heart Disease Risk Factors High blood pressure   Cognitive Status Examination   Orientation orientation to person, place and time   Level of Consciousness alert   Able to Follow Commands WNL/WFL   Personal Safety (Cognitive) WNL/WFL   Memory intact   Attention No deficits were identified   Organization/Problem Solving No deficits were identified   Executive Function No deficits were identified   Visual Perception   Visual Perception Wears glasses   Sensory Examination   Sensory Quick Adds No deficits were identified   Pain Assessment   Patient Currently in Pain Yes, see Vital Sign flowsheet  (7/10)   Range of Motion (ROM)   ROM Comment L shoulder 100 degrees of flx, B UE WFL   Strength   Strength Comments L shoulder 2+/5, R UE grossly 3/5   Muscle Tone Assessment   Muscle Tone Quick Adds No deficits were identified   Coordination   Upper Extremity Coordination No deficits were identified   Transfer Skill: Sit to Stand   Level of [place]: Sit/Stand minimum assist (75% patients effort)   Physical Assist/Nonphysical Assist: Sit/Stand 1 person assist;verbal cues   Transfer Skill: Sit to Stand weight-bearing as tolerated   Assistive Device for Transfer: Sit/Stand standard walker   Transfer Skill: Toilet Transfer   Level of Palmer: Toilet minimum assist (75% patients effort)   Physical Assist/Nonphysical Assist: Toilet  "supervision;verbal cues   Weight-Bearing Restrictions: Toilet weight-bearing as tolerated   Assistive Device standard walker;grab bars   Upper Body Dressing   Level of Dolores: Dress Upper Body stand-by assist   Lower Body Dressing   Level of Dolores: Dress Lower Body moderate assist (50% patients effort)   Physical Assist/Nonphysical Assist: Dress Lower Body 1 person assist   Toileting   Level of Dolores: Toilet stand-by assist   Physical Assist/Nonphysical Assist: Toilet supervision   Grooming   Level of Dolores: Grooming stand-by assist   Physical Assist/Nonphysical Assist: Grooming set-up required   Eating/Self Feeding   Level of Dol[place]: Eating independent   Instrumental Activities of Daily Living (IADL)   Previous Responsibilities meal prep;housekeeping;driving;finances;medication management;shopping;laundry   Activities of Daily Living Analysis   Impairments Contributing to Impaired Activities of Daily Living pain;post surgical precautions;strength decreased   General Therapy Interventions   Planned Therapy Interventions ADL retraining;progressive activity/exercise;strengthening   Clinical Impression   Criteria for Skilled Therapeutic Interventions Met yes, treatment indicated   OT Diagnosis decreased ADL function   Influenced by the following impairments pain and post-surgical precautions   Assessment of Occupational Performance 3-5 Performance Deficits   Identified Performance Deficits flexibility, strength, mobility   Clinical Decision Making (Complexity) Moderate complexity   Therapy Frequency 5 times/wk   Predicted Duration of Therapy Intervention (days/wks) 3 days   Anticipated Discharge Disposition Transitional Care Facility   Risks and Benefits of Treatment have been explained. Yes   Patient, Family & other staff in agreement with plan of care Yes   Clinical Impression Comments Pt will need short-term rehab to return to prior level of function   [facility] AM-PAC TM \"6 " "Clicks\"   2016, Trustees of [facility], under license to [employer].  All rights reserved.   6 Clicks Short Forms Basic Mobility Inpatient Short Form;Daily Activity Inpatient Short Form   [facility]  \"6 Clicks\" V.2 Basic Mobility Inpatient Short Form   1. Turning from your back to your side while in a flat bed without using bedrails? 3 - A Little   2. Moving from lying on your back to sitting on the side of a flat bed without using bedrails? 3 - A Little   3. Moving to and from a bed to a chair (including a wheelchair)? 3 - A Little   4. Standing up from a chair using your arms (e.g., wheelchair, or bedside chair)? 3 - A Little   5. To walk in hospital room? 3 - A Little   6. Climbing 3-5 steps with a railing? 2 - A Lot   Basic Mobility Raw Score (Score out of 24.Lower scores equate to lower levels of function) 17   [facility]  \"6 Clicks\" Daily Activity Inpatient Short Form   1. Putting on and taking off regular lower body clothing? 2 - A Lot   2. Bathing (including washing, rinsing, drying)? 3 - A Little   3. Toileting, which includes using toilet, bedpan or urinal? 3 - A Little   4. Putting on and taking off regular upper body clothing? 4 - None   5. Taking care of personal grooming such as brushing teeth? 4 - None   6. Eating meals? 4 - None   Daily Activity Raw Score (Score out of 24.Lower scores equate to lower levels of function) 20   Total Evaluation Time   Total Evaluation Time (Minutes) 30          Progress Notes by April Schuster, PT at 1/24/2017  2:21 PM     Author:  April Schuster, PT Service:  (none) Author Type:  Physical Therapist    Filed:  1/24/2017  2:21 PM Note Time:  1/24/2017  2:21 PM Status:  Signed    :  April Schuster, PT (Physical Therapist)            01/24/17 1243   Quick Adds   Type of Visit Initial PT Evaluation   Living Environment   Lives With spouse   Living Arrangements house   Home Accessibility stairs to enter home;stairs within home "   Number of Stairs to Enter Home 4   Number of Stairs Within Home 12   Stair Railings at Home outside, present at both sides;inside, present at both sides   Transportation Available car   Self-Care   Usual Activity Tolerance moderate   Current Activity Tolerance fair   Regular Exercise no   Equipment Currently Used at Home cane, straight   Activity/Exercise/Self-Care Comment Has FWW at home as well   Functional Level Prior   Ambulation 1-->assistive equipment   Transferring 1-->assistive equipment   Toileting 0-->independent   Bathing 0-->independent   Dressing 0-->independent   Eating 0-->independent   Communication 0-->understands/communicates without difficulty   Swallowing 0-->swallows foods/liquids without difficulty   Cognition 0 - no cognition issues reported   Fall history within last six months no   Which of the above functional risks had a recent onset or change? ambulation;transferring;toileting;bathing;dressing   Prior Functional Level Comment Pt states she was independent however pain was getting to be so severe that she was becoming more limited   General Information   Onset of Illness/Injury or Date of Surgery - Date 01/23/17   Referring Physician Vasile Shelton PA-C   Patient/Family Goals Statement to go to SNF for short term rehab   Pertinent History of Current Problem (include personal factors and/or comorbidities that impact the POC) Pt s/p R VY with more of an anterolateral approach.  Per Dr. Muñoz no hip precautions except excessive hip extension/ER.  Pt was previously independent with all functional mobility and ADLs prior to surgery but no requiring assist for all mobility and all ADLs   Weight-Bearing Status - LLE full weight-bearing   Weight-Bearing Status - RLE weight-bearing as tolerated   General Observations Pt up in chair upon PT arrival, pleasant and agreeable to PT eval   Cognitive Status Examination   Orientation orientation to person, place and time   Level of Consciousness  alert   Follows Commands and Answers Questions 100% of the time   Personal Safety and Judgment intact   Memory intact   Pain Assessment   Patient Currently in Pain Yes, see Vital Sign flowsheet  (5/10 in R hip)   Integumentary/Edema   Integumentary/Edema Comments incision to lyly/lateral aspect of R hip   Range of Motion (ROM)   ROM Comment Bilateral LE AROM WFL,  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 and 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   Strength   Strength Comments R hip flex and abduction NT due to recent surgery but appear at least 3/5, R knee ext 4-/5, L LE grossly 4+/5 throughout   Bed Mobility   Bed Mobility Comments NT, up in chair   Transfer Skills   Transfer Comments sit>stand mod A with retropulsion noted initially upon standing   Gait   Gait Comments Pt ambulated 120' with FWW min A with step-to gait noted, does ambulate onto R forefoot, did provide cues to work on heel strike but pt has difficulty with this due to foot deformity   Balance   Balance Comments fair-   General Therapy Interventions   Planned Therapy Interventions balance training;bed mobility training;gait training;neuromuscular re-education;strengthening;transfer training;home program guidelines;risk factor education;progressive activity/exercise   Clinical Impression   Criteria for Skilled Therapeutic Intervention yes, treatment indicated   PT Diagnosis gait disturbance s/p R VY   Influenced by the following impairments pain, decreased ROM, decreased strength, decreased balance, decreased activity tolerance   Functional limitations due to impairments difficulty with transfers, difficulty with gait, difficulty with stairs   Clinical Presentation Stable/Uncomplicated   Clinical Presentation Rationale pt s/p R VY with pain and mobility advancing as expected   Clinical Decision Making (Complexity) Low complexity   Therapy Frequency` 2 times/day   Predicted Duration of Therapy Intervention (days/wks) 5 days  "  Anticipated Discharge Disposition Transitional Care Facility   Risk & Benefits of therapy have been explained Yes   Patient, Family & other staff in agreement with plan of care Yes   Clinical Impression Comments Pt s/p R VY.  Doing well with mobility, pain well controlled at time of eval.   [facility] AM-PAC TM \"6 Clicks\"   2016, Trustees of [facility], under license to [employer].  All rights reserved.   6 Clicks Short Forms Basic Mobility Inpatient Short Form   [facility] AM-PAC  \"6 Clicks\" V.2 Basic Mobility Inpatient Short Form   1. Turning from your back to your side while in a flat bed without using bedrails? 3 - A Little   2. Moving from lying on your back to sitting on the side of a flat bed without using bedrails? 3 - A Little   3. Moving to and from a bed to a chair (including a wheelchair)? 3 - A Little   4. Standing up from a chair using your arms (e.g., wheelchair, or bedside chair)? 2 - A Lot   5. To walk in hospital room? 3 - A Little   6. Climbing 3-5 steps with a railing? 2 - A Lot   Basic Mobility Raw Score (Score out of 24.Lower scores equate to lower levels of function) 16                                                    INTERAGENCY TRANSFER FORM - LAB / IMAGING / EKG / EMG RESULTS   1/23/2017                       14 INTENSIVE CARE UNIT: [phone]            Unresulted Labs     None         Lab Results - 3 Days (01/26/17 - 01/23/17)      Fluid Culture Aerobic Bacterial [[id number]]  Resulted: 01/26/17 0809, Result status: Preliminary result    Ordering provider: Jose Manuel Muñoz MD  01/23/17 1118 Resulting lab: [facility]    Specimen Information    Type Source Collected On   Fluid Other 01/23/17 1113          Components       Value Reference Range Flag Lab   Specimen Description Synovial fluid Right Hip   HI   Culture Micro No growth after 3 days   HI   Micro Report Status Pending   HI            Basic metabolic panel [[id number]] (Abnormal)  " Resulted: 01/26/17 0534, Result status: Final result    Ordering provider: Darren Cordero MD  01/25/17 2300 Resulting lab: [facility]    Specimen Information    Type Source Collected On   Blood  01/26/17 0450          Components       Value Reference Range Flag Lab   Sodium 136 133 - 144 mmol/L  HI   Potassium 3.9 3.4 - 5.3 mmol/L  HI   Chloride 105 94 - 109 mmol/L  HI   Carbon Dioxide 27 20 - 32 mmol/L  HI   Anion Gap 4 3 - 14 mmol/L  HI   Glucose 105 70 - 99 mg/dL H HI   Urea Nitrogen 23 7 - 30 mg/dL  HI   Creatinine 0.68 0.52 - 1.04 mg/dL  HI   GFR Estimate 81 >60 mL/min/1.7m2  HI   Comment:  Non  GFR Calc   GFR Estimate If Black -- >60 mL/min/1.7m2  HI   Result:         >90   GFR Calc     Calcium 7.9 8.5 - 10.1 mg/dL L HI   Result:              CBC with platelets differential [[id number]] (Abnormal)  Resulted: 01/26/17 0522, Result status: Final result    Ordering provider: Darren Cordero MD  01/25/17 2300 Resulting lab: [facility]    Specimen Information    Type Source Collected On   Blood  01/26/17 0450          Components       Value Reference Range Flag Lab   WBC 12.3 4.0 - 11.0 10e9/L H HI   RBC Count 2.59 3.8 - 5.2 10e12/L L HI   Hemoglobin 7.3 11.7 - 15.7 g/dL L HI   Hematocrit 23.3 35.0 - 47.0 % L HI   MCV 90 78 - 100 fl  HI   MCH 28.2 26.5 - 33.0 pg  HI   MCHC 31.3 31.5 - 36.5 g/dL L HI   RDW 15.1 10.0 - 15.0 % H HI   Platelet Count 174 150 - 450 10e9/L  HI   Diff Method Automated Method   HI   % Neutrophils 75.8 %  HI   % Lymphocytes 12.0 %  HI   % Monocytes 10.7 %  HI   % Eosinophils 0.9 %  HI   % Basophils 0.1 %  HI   % Immature Granulocytes 0.5 %  HI   Nucleated RBCs 0 0 /100  HI   Absolute Neutrophil 9.3 1.6 - 8.3 10e9/L H HI   Absolute Lymphocytes 1.5 0.8 - 5.3 10e9/L  HI   Absolute Monocytes 1.3 0.0 - 1.3 10e9/L  HI   Absolute Eosinophils 0.1 0.0 - 0.7 10e9/L  HI   Absolute Basophils 0.0 0.0 - 0.2 10e9/L  HI   Abs Immature  Granulocytes 0.1 0 - 0.4 10e9/L  HI   Absolute Nucleated RBC 0.0   HI            Surgical pathology exam [[id number]]  Resulted: 01/25/17 1607, Result status: Edited Result - FINAL    Ordering provider: Jose Manuel Muñoz MD  01/23/17 1446 Resulting lab: [facility]    Specimen Information    Type Source Collected On   Bone Hip, Right 01/23/17 1443          Components       Value Reference Range Flag Lab   Copath Report --      Result:         Patient Name: MANUELA FONSECA  MR#: [id number]  Specimen #:   Collected: 1/23/2017  Received: 1/24/2017  Reported: 1/25/2017 15:59  Ordering Phy(s): JOSE MANUEL MUÑOZ  Additional Phy(s): LISA PEREZ    For improved result formatting, select 'View Enhanced Report Format'  under Linked Documents section.    SPECIMEN(S):  Bone fragment(s), right hip and tissue    FINAL DIAGNOSIS:  Right hip, total arthroplasty  - Arthritis    Electronically signed out by:    Erlin Mae M.D.    CLINICAL HISTORY:  Chronic pain right hip; right total hip replacement.    GROSS:  The specimen is a femoral head which is 4.5 x 4 x 4.5 cm.  It has severe  eburnation and fibrillation.  The proximal cut margin has a fairly  uniform trabeculated appearance.  There are pieces of bone or bone  marrow in the container which are approximately 3 x 3 x 2 cm in  aggregate and have a uniform trabeculated maroon appearance. (Dictated  by: Erlin Mae MD 1/24/2017 05:29 PM)    CPT Codes  A: 36917-MY     TESTING LAB LOCATION:  [address] [address]  [phone]    COLLECTION SITE:  Client: [facility]  Location: HIPACU (B)              Anaerobic bacterial culture [[id number]]  Resulted: 01/25/17 1129, Result status: Preliminary result    Ordering provider: Jose Manuel Muñoz MD  01/23/17 1118 Resulting lab: [facility]    Specimen Information    Type Source Collected On   Fluid Other 01/23/17 1113           Components       Value Reference Range Flag Lab   Specimen Description Synovial fluid Right Hip   HI   Culture Micro Culture in progress   HI   Micro Report Status Pending   HI            Hemoglobin [[id number]] (Abnormal)  Resulted: 01/25/17 0458, Result status: Final result    Ordering provider: Vasile Shelton PA-C  01/24/17 2300 Resulting lab: [facility]    Specimen Information    Type Source Collected On   Blood  01/25/17 0447          Components       Value Reference Range Flag Lab   Hemoglobin 8.1 11.7 - 15.7 g/dL L HI            Active MRSA Surveillance Culture [[id number]]  Resulted: 01/24/17 0808, Result status: Final result    Ordering provider: Jose Manuel Muñoz MD  01/23/17 0919 Resulting lab: [facility]    Specimen Information    Type Source Collected On   Swab Nasopharyngeal 01/23/17 0919          Components       Value Reference Range Flag Lab   Specimen Description Nares   HI   Culture Micro No MRSA isolated   HI   Micro Report Status FINAL 01/24/2017   HI            Glucose (AM Draw) [[id number]] (Abnormal)  Resulted: 01/24/17 0600, Result status: Final result    Ordering provider: Jose Manuel Muñoz MD  01/24/17 0515 Resulting lab: [facility]    Specimen Information    Type Source Collected On   Blood  01/24/17 0445          Components       Value Reference Range Flag Lab   Glucose 129 70 - 99 mg/dL H HI            Hemoglobin [[id number]] (Abnormal)  Resulted: 01/24/17 0456, Result status: Final result    Ordering provider: Vasile Shelton PA-C  01/23/17 2300 Resulting lab: [facility]    Specimen Information    Type Source Collected On   Blood  01/24/17 0445          Components       Value Reference Range Flag Lab   Hemoglobin 8.3 11.7 - 15.7 g/dL L HI            Hematocrit [[id number]] (Abnormal)  Resulted: 01/24/17 0456, Result status: Final result    Ordering provider: Vasile Shelton PA-C  01/23/17 2300  Resulting lab: [facility]    Specimen Information    Type Source Collected On   Blood  01/24/17 0445          Components       Value Reference Range Flag Lab   Hematocrit 25.4 35.0 - 47.0 % L HI            Gram stain [[id number]]  Resulted: 01/23/17 1315, Result status: Final result    Ordering provider: Jose Manuel Muñoz MD  01/23/17 1118 Resulting lab: [facility]    Specimen Information    Type Source Collected On   Fluid Other 01/23/17 1113          Components       Value Reference Range Flag Lab   Specimen Description Synovial fluid Right Hip   HI   Gram Stain --   HI   Result:         Few PMNs seen  No organisms seen     Micro Report Status FINAL 01/23/2017   HI   Result:              UA reflex to Microscopic and Culture [[id number]]  Resulted: 01/23/17 0931, Result status: Final result    Ordering provider: Jose Manuel Muñoz MD  01/23/17 0905 Resulting lab: [facility]    Specimen Information    Type Source Collected On   Urine  01/23/17 0911          Components       Value Reference Range Flag Lab   Color Urine Light Yellow   HI   Appearance Urine Clear   HI   Glucose Urine Negative NEG mg/dL  HI   Bilirubin Urine Negative NEG   HI   Ketones Urine Negative NEG mg/dL  HI   Specific Gravity Urine 1.009 1.003 - 1.035   HI   Blood Urine Negative NEG   HI   pH Urine 7.0 4.7 - 8.0 pH  HI   Protein Albumin Urine Negative NEG mg/dL  HI   Urobilinogen mg/dL Normal 0.0 - 2.0 mg/dL  HI   Nitrite Urine Negative NEG   HI   Leukocyte Esterase Urine Negative NEG   HI   Source Midstream Urine   HI            Testing Performed By     Lab - Abbreviation Name Director Address Valid Date Range    88 - Unknown [facility] Unknown Unknown 10/30/02 0000 - Present    210 - HI [facility] Unknown [address]  [place] [address] 05/08/15 1057 - Present               Imaging Results - 3 Days (01/23/17 - 01/23/17)      XR Post Surgical Imaging [place] [[id number]]   Resulted: 01/23/17 1738, Result status: Preliminary result    Ordering provider: Jose Manuel Muñoz MD  01/23/17 1502 Resulted by: Alban Tiwari MD    Performed: 01/23/17 1515 - 01/23/17 1515 Resulting lab: [facility]    Narrative:           RIGHT HIP TWO VIEWS    FINDINGS:  Two views of the right hip were obtained.  The patient is  status post total right total right arthroplasty.  The femoral  component has been cemented.  A cerclage wire is present.  Alignment  is anatomic.    IMPRESSION:  RIGHT TOTAL HIP ARTHROPLASTY WITHOUT EVIDENCE OF  COMPLICATION AT THIS TIME.  Exam Date: Jan 23, 2017 03:15:00 PM  Author: ALBAN TIWARI  This report is preliminary and transcribed      Specimen Information    Type Source Collected On     01/23/17 1527            XR Intra-Operative [facility] [place] [[id number]]  Resulted: 01/23/17 1606, Result status: Final result    Ordering provider: Jose Manuel Muñoz MD  01/23/17 0958 Resulted by: Alban Tiwari MD    Performed: 01/23/17 1000 - 01/23/17 1352 Resulting lab: [facility]    Narrative:           RIGHT HIP TWO VIEWS    HISTORY:  An 86-year-old male with right hip arthroplasty.    FINDINGS:  Two views of right hip were obtained during right hip  arthroplasty.  A femoral localizer device is in place.  Alignment is  anatomic.    IMPRESSION: ANATOMIC ALIGNMENT DURING RIGHT TOTAL HIP ARTHROPLASTY.  Exam Date: Jan 23, 2017 01:52:56 PM  Author: ALBAN TIWARI  This report is final and signed      Specimen Information    Type Source Collected On     01/23/17 1352            Testing Performed By     Lab - Abbreviation Name Director Address Valid Date Range    183 -  [facility] Unknown Unknown 11/29/12 1227 - Present            Encounter-Level Documents:     There are no encounter-level documents.      Order-Level Documents:     There are no order-level documents.

## 2017-01-23 NOTE — PLAN OF CARE
[facility] Inpatient Admission Note:    Patient admitted to 3128/3128-1 at approximately 1522 via bed accompanied by daughter from surgery . Report received from DIVYA Cano in SBAR format at 1530 via face to face in room. Patient is alert and oriented X 3, reports pain; rates at 5 on 0-10 scale.  Patient oriented to room, unit, hourly rounding, and plan of care. Explained admission packet and patient handbook with patient bill of rights brochure. Will continue to monitor and document as needed.     Inpatient Nursing criteria listed below was met:      Health care directives status obtained and documented: Yes      Care Everywhere authorization obtained No      MRSA swab completed for patient 65 years and older: Yes - in surgery      Patient identifies a surrogate decision maker: Yes If yes, who:Dharmesh (spouse) Contact Information:[phone]      Core Measure diagnosis present:Yes. If yes, state diagnosis: SKIP       If initial lactic acid >2.0, repeat lactic acid drawn within one hour of arrival to unit: NA.       Vaccination assessment and education completed: Yes   Vaccinations received prior to admission: Pneumovax yes  Influenza(seasonal)  NO   Vaccination(s) ordered: patient declines      Clergy visit ordered if patient requests: Yes      Skin issues/needs documented: Yes      Isolation Patient: yes Education given, correct sign in place and documentation row added to PCS:  Yes      Fall Prevention Yes: Care plan updated, education given and documented, sticker and magnet in place: Yes      Care Plan initiated: Yes      Education Documented (including assessment): Yes      Patient has discharge needs : No

## 2017-01-23 NOTE — ANESTHESIA PREPROCEDURE EVALUATION
Anesthesia Evaluation     .        ROS/MED HX    ENT/Pulmonary:  - neg pulmonary ROS     Neurologic:  - neg neurologic ROS   (+)TIA     Cardiovascular:     (+) Dyslipidemia, hypertension----. : . . . :. .       METS/Exercise Tolerance:     Hematologic:     (+) History of blood clots pt is not anticoagulated, -      Musculoskeletal:   (+) , , other musculoskeletal- DJD with s/p bilat TKA      GI/Hepatic:  - neg GI/hepatic ROS       Renal/Genitourinary:  - ROS Renal section negative       Endo:  - neg endo ROS       Psychiatric:  - neg psychiatric ROS       Infectious Disease:   (+) MRSA,       Malignancy:      - no malignancy   Other:    (+) No chance of pregnancy C-spine cleared: N/A, no H/O Chronic Pain,no other significant disability   - neg other ROS           Physical Exam  Normal systems: cardiovascular and pulmonary    Airway   Mallampati: II  TM distance: >3 FB  Neck ROM: full    Dental     Cardiovascular   Rhythm and rate: regular and normal      Pulmonary    breath sounds clear to auscultation                    Anesthesia Plan      History & Physical Review  History and physical reviewed and following examination; no interval change.    ASA Status:  3 .    NPO Status:  > 8 hours    Plan for General with Intravenous and Propofol induction. Maintenance will be Balanced.    PONV prophylaxis:  Ondansetron (or other 5HT-3), Dexamethasone or Solumedrol and Scopolamine patch       Postoperative Care  Postoperative pain management:  IV analgesics.      Consents  Anesthetic plan, risks, benefits and alternatives discussed with:  Patient.  Use of blood products discussed: No .   .                          .

## 2017-01-23 NOTE — PROGRESS NOTES
[facility]    Hospitalist Progress Note    Date of Service (when I saw the patient): 01/23/2017    Assessment and Plan   Patient presented today for planned right total hip arthroplasty for severe degenerative arthritis. She has had bilateral TKA in the past. Dr. Muñoz has asked to follow patient for medical management. Patient is examined upon arrival to the medical floor.       Principal Problem:    Status post total replacement of right hip: per orthopedics, Request ICU overnight for close monitoring.     Active Problems:    Benign essential hypertension: Will continue her low dose Lisinopril tomorrow if her blood pressures remain stable, no dose today.      Diastolic dysfunction: Lat ECHO EF 60%, she is not currently on any long term medications for this diagnosis. She was at one time on Lasix 20 mg PRN and Norvasc but these have been discontinued for some time now. Watch I/O carefully.       Hyperlipidemia with target LDL less than 130: not currently on a statin. Last LDL this summer was 127.    DVT Prophylaxis: Defer to primary service-Plan ASA BID,pneumatic pumps  Code Status: Full Code    Disposition: Expected discharge in 2-3 days once approved by orthopedics and medically stable.    Arabella Angulo, CNP3    -Data reviewed today: I reviewed all new labs and imaging results over the last 24 hours. I personally reviewed no images or EKG's today.    Physical Exam  Temp: 97.9  F (36.6  C) Temp src: Temporal BP: 128/66 mmHg Pulse: 88 Heart Rate: 83 Resp: 20 SpO2: 99 % O2 Device: Nasal cannula Oxygen Delivery: 3 LPM  Filed Vitals:    01/23/17 0921   Weight: 56.7 kg (125 lb)     Vital Signs with Ranges  Temp:  [96.9  F (36.1  C)-97.9  F (36.6  C)] 97.9  F (36.6  C)  Pulse:  [88] 88  Heart Rate:  [83-95] 83  Resp:  [16-26] 20  BP: (121-164)/(63-80) 128/66 mmHg  SpO2:  [94 %-99 %] 99 %  I/O last 3 completed shifts:  In: 1800 [I.V.:1800]  Out: 700 [Urine:400; Blood:300]    Peripheral IV 01/23/17  Right (Active)   Site Assessment WDL 1/23/2017  2:48 PM   Line Status Infusing 1/23/2017  2:48 PM   Phlebitis Scale 0-->no symptoms 1/23/2017  2:48 PM   Infiltration Scale 0 1/23/2017  2:48 PM   Extravasation? No 1/23/2017  2:48 PM   Dressing Intervention New dressing  1/23/2017  9:33 AM   Number of days:0       Incision/Surgical Site 01/25/16 Left Knee (Active)   Number of days:364       Incision/Surgical Site 01/23/17 Right Hip (Active)   Incision Assessment UTV 1/23/2017  2:48 PM   Closure Sutures;Adhesive strip(s) 1/23/2017  2:21 PM   Dressing Intervention Clean, dry, intact 1/23/2017  2:48 PM   Number of days:0     Line/device assessment(s) completed for medical necessity     Subjective:  Alert and oriented. Rates pain 5/10 at this time. Denies chest pain,shortness of breath or nausea.      Exam:  Constitutional: Awake and alert, follows commands easily. Joking with staff.   Respiratory: Clear bilaterally, easy respirations, equal chest rise and fall.   Cardiovascular: HRR, 1/6 murmur, no peripheral edema   GI: Abdomen soft and non tender, bowel sounds hypoactive   Skin/Integumen: Clear ,no open areas or rashes.   Operative Extremity: Right hip dressing in place, no signs of saturations to dressing. No surrounding erythema or bruising at this time. Distal CMS intact and equal to left.     Medications    lactated ringers       lactated ringers 200 mL (01/23/17 1437)     lactated ringers         ceFAZolin  1 g Intravenous See Admin Instructions     Tranexamic Acid  1 g Intravenous Pre-Op/Pre-procedure x 1 dose     scopolamine  1 patch Transdermal Pre-Op/Pre-procedure x 1 dose       Data    Recent Labs  Lab 01/17/17  0955   WBC 6.5   HGB 11.9   MCV 89         POTASSIUM 3.9   CHLORIDE 107   CO2 28   BUN 19   CR 0.83   ANIONGAP 8   LUZMARIA 9.1   GLC 94   ALBUMIN 3.5   PROTTOTAL 7.0   BILITOTAL 0.5   ALKPHOS 112   ALT 22   AST 16       No results found for this or any previous visit (from the past 24  hour(s)).

## 2017-01-23 NOTE — ANESTHESIA CARE TRANSFER NOTE
Patient: Claudine Bustos    ARTHROPLASTY HIP (Right Hip)  Additional InformationProcedure(s):  RIGHT TOTAL HIP REPLACEMENT - Wound Class: I-Clean    Diagnosis: CHRONIC PAIN RIGHT HIP  Diagnosis Additional Information: No value filed.    Anesthesia Type:   General     Note:  Airway :Nasal Cannula  Patient transferred to:PACU        Vitals: (Last set prior to Anesthesia Care Transfer)              Electronically Signed By: TATI Cortez CRNA  January 23, 2017  2:35 PM

## 2017-01-23 NOTE — OP NOTE
DATE OF PROCEDURE:  01/23/2017      PREOPERATIVE DIAGNOSIS:  Osteoarthritis, right hip.      POSTOPERATIVE DIAGNOSIS:  Osteoarthritis, right hip.      PROCEDURE:  Right total hip arthroplasty.      SURGEON:  Jose Manuel Muñoz MD      ASSISTANT:  Vasile Shelton PA-C      ANESTHESIA:  General.      ESTIMATED BLOOD LOSS:  300 mL.      COMPLICATIONS:  Right femoral cortical perforation.      DRAINS:  None.      SPECIMENS:  Femoral head to pathology, joint fluid culture swab.     COMPONENTS IMPLANTED:  A 52 mm Kirill Continuum acetabular shell with cluster holes and an elevated highly cross-linked polyethylene insert, Kirill Advocate size 14 cemented femoral stem with a 36 mm +0 cobalt chromium head, and a Synthes 1.7 mm cable with crimp.      OPERATIVE SUMMARY:  Preoperatively, Claudine Butsos received intravenous Ancef to prophylax against infection.  Postoperative orders include orders for 24 hours or less of that antibiotic at which time the medication is discontinued.  She also received tranexamic acid preoperatively for intraoperative hemostasis.  In the operating room, she was given a general anesthetic and then placed in a left lateral decubitus position with all bony prominences padded.  The right hip was sterilely prepped and draped.  A timeout was held.      A 4-1/2 inch incision was made beginning 2 inches distal to the tip of the greater trochanter and extending towards the anterior superior iliac spine.  The incision was continued through the subcutaneous tissues with hemostasis accomplished by electrocautery.  The gluteal fascia was incised the length of the incision.  The interval between the gluteus medius and minimus posteriorly and the tensor fascia hope anteriorly was bluntly developed down to the anterolateral hip capsule.  The reflected head of the rectus femoris muscle was elevated off the anterior capsule for better access to the capsule.  A Z-shaped capsulotomy was performed.  A large joint effusion  was encountered.  Swabs of joint fluid was sent to the lab for stat Gram stain, which showed few polymorphonuclear leukocytes and no organisms.  Cultures were planted.  The hip was dislocated anteriorly.  An oscillating saw was used to make a femoral neck cut, leaving about 1.5 cm of medial neck behind because preoperatively her right leg was about a centimeter shorter than the left leg.  Leaving additional neck behind would effectively lengthen her leg.  The acetabular labrum was sharply excised.  Sequential hemispherical reaming was performed up to 51 mm in diameter.  Her acetabular bone was found to be extremely soft.  The 51 mm reamer produced subcortical bleeding in all 4 quadrants of the acetabulum.  A 51 mm trial acetabular component was placed and fit well.  It was removed and replaced with a 52 mm Kirill Continuum acetabular shell with cluster holes, taking care to incorporate appropriate abduction angle and anteversion.  Good fixation was obtained but I did not feel I could trust it with her soft bone, so I added 2 additional acetabular screws for additional fixation of the acetabular component.  A trial liner was placed within the acetabular shell and attention was turned to the femur.      The femoral canal was opened with a curved blunt rasp.  It was then opened further with a curved starter reamer for the [place] system.  Unfortunately, the reamer perforated the lateral cortex just distal to the greater trochanter, her bone being so soft.  I therefore switched femoral systems to the Kirill Advocate femoral stem.  The femoral canal was reamed with cylindrical reamers and then broached up to a 14 mm size.  A 14 mm trial femoral stem was inserted in appropriate anteversion.  It fit very well.  A trial +0 head was applied and the hip was reduced.  An AP x-ray was taken.  It showed that the components were in excellent position and her leg was lengthened about the amount desired.  The trial components were  removed.  The trial acetabular liner was replaced with the real hooded polyethylene liner with the amaya anterior.  The femoral canal was plugged with a cement restrictor.  It was lavaged with 2 liters of saline through a Pulsavac system and then dried while 2 bags of methyl methacrylate were mixed in a vacuum mixer.  The femoral canal was filled in retrograde manner using a cement gun.  Proximal digital pressurization was performed.  A size 14 Advocate femoral stem with a 12 mm distal centralizer was inserted until fully seated.  Pressure was maintained on the component while excess cement was removed.  During the insertion process my finger plugged the lateral cortical perforation so that no cement would enter the soft tissues.  Once the cement had hardened, the real femoral head was applied to the stem.  It was a 36 mm +0 cobalt chromium head.  Hip was reduced and ranged.  It was stable in full extension, combined with flexion and rotation, full abduction combined with full external rotation, and at 90 degrees of flexion with as much internal rotation as the posterior capsule would permit.  Leg lengths appeared to be equal.  A Synthes cable was applied around the proximal femur just below the femoral neck cut for further stabilization of the proximal femur.  The joint was copiously irrigated with antibiotic solution.  Vancomycin powder was placed within the joint at this time.  It should be mentioned that vancomycin powder was included in the cement that was mixed to cement the femoral stem as well.  The capsule was repaired with #5 Tycron suture.  The gluteal fascia was repaired with a running #1 PDS suture.  Additional vancomycin powder was placed in the subcutaneous tissues before they were closed with 2-0 and 3-0 Vicryl sutures.  The skin was closed with a running 4-0 undyed Monocryl suture.  Benzoin and Steri-Strips were applied.      The patient was rolled into a supine position.  She was catheterized to  empty her bladder.  The catheter was not left in place.  The patient was awakened and transferred to a bed upon which she left the OR in stable condition, having tolerated the procedure well.  There were no complications.  The estimated blood loss was 300 mL.      All sponge and needle and instrument counts were correct.  The first assistant was necessary for leg holding and ranging, irrigation lavage and aspiration, assistance with closure, and application of the dressing.           SHERRON MARTINEZ MD             D: 2017 14:22   T: 2017 15:56   MT: JEFF      Name:     MANUELA FONSECA   MRN:      [id number]        Account:        [id number]   :      1930           Procedure Date: 2017      Document: [id number]

## 2017-01-23 NOTE — IP AVS SNAPSHOT
14 Intensive Care Unit    [address] [place] [address]    Phone:  [phone]    Fax:  [phone]                                       After Visit Summary   1/23/2017    Claudine Bustos    MRN: [id number]           After Visit Summary Signature Page     I have received my discharge instructions, and my questions have been answered. I have discussed any challenges I see with this plan with the nurse or doctor.    ..........................................................................................................................................  Patient/Patient Representative Signature      ..........................................................................................................................................  Patient Representative Print Name and Relationship to Patient    ..................................................               ................................................  Date                                            Time    ..........................................................................................................................................  Reviewed by Signature/Title    ...................................................              ..............................................  Date                                                            Time

## 2017-01-23 NOTE — BRIEF OP NOTE
[facility] Brief Operative Note    Pre-operative diagnosis: CHRONIC PAIN RIGHT HIP   Post-operative diagnosis Osteoarthritis right hip   Procedure: Procedure(s):  RIGHT TOTAL HIP REPLACEMENT - Wound Class: I-Clean   Surgeon: Jose Manuel Muñoz MD   Assistants(s): JAYY CALVILLO   Estimated blood loss: 300 ml    Specimens: Femoral head, joint fluid culture   Findings: Severe DJD, joint effusion

## 2017-01-23 NOTE — PLAN OF CARE
Problem: Patient Goal: Rt Focus  Goal: 1. Patient Goal: RT Focus  [facility] - Respiratory Clinical Assessment    Current Patient History:    Respiratory History: no history of pneumonia or bronchitis    Smoking History: none    Oxygen dependency: No,    Oxygen prescribed: none        1/23/2017 4:48 PM Patient Initial Assessment:     Level of Consciousness: alert  and oriented, cooperative and pleasant    Skin color: pink    Lung sounds:clear        Respirations:  Normal with easy respirations and no use of accessory muscles to breathe    Respiratory symptoms: non-productive cough    Cough/Sputum:  dry    Current oxygenation status: 93% room air

## 2017-01-24 ENCOUNTER — APPOINTMENT (OUTPATIENT)
Dept: PHYSICAL THERAPY | Facility: HOSPITAL | Age: 82
DRG: 470 | End: 2017-01-24
Attending: PHYSICIAN ASSISTANT
Payer: MEDICARE

## 2017-01-24 ENCOUNTER — APPOINTMENT (OUTPATIENT)
Dept: OCCUPATIONAL THERAPY | Facility: HOSPITAL | Age: 82
DRG: 470 | End: 2017-01-24
Attending: PHYSICIAN ASSISTANT
Payer: MEDICARE

## 2017-01-24 ENCOUNTER — APPOINTMENT (OUTPATIENT)
Dept: PHYSICAL THERAPY | Facility: HOSPITAL | Age: 82
DRG: 470 | End: 2017-01-24
Attending: ORTHOPAEDIC SURGERY
Payer: MEDICARE

## 2017-01-24 PROBLEM — D62 POSTOPERATIVE ANEMIA DUE TO ACUTE BLOOD LOSS: Status: ACTIVE | Noted: 2017-01-24

## 2017-01-24 LAB
BACTERIA SPEC CULT: NORMAL
GLUCOSE SERPL-MCNC: 129 MG/DL (ref 70–99)
HCT VFR BLD AUTO: 25.4 % (ref 35–47)
HGB BLD-MCNC: 8.3 G/DL (ref 11.7–15.7)
MICRO REPORT STATUS: NORMAL
SPECIMEN SOURCE: NORMAL

## 2017-01-24 PROCEDURE — 40000275 ZZH STATISTIC RCP TIME EA 10 MIN

## 2017-01-24 PROCEDURE — 97166 OT EVAL MOD COMPLEX 45 MIN: CPT | Mod: GO

## 2017-01-24 PROCEDURE — 99232 SBSQ HOSP IP/OBS MODERATE 35: CPT | Performed by: NURSE PRACTITIONER

## 2017-01-24 PROCEDURE — A9270 NON-COVERED ITEM OR SERVICE: HCPCS | Mod: GY | Performed by: PHYSICIAN ASSISTANT

## 2017-01-24 PROCEDURE — 25000125 ZZHC RX 250: Performed by: PHYSICIAN ASSISTANT

## 2017-01-24 PROCEDURE — 97161 PT EVAL LOW COMPLEX 20 MIN: CPT | Mod: GP | Performed by: PHYSICAL THERAPIST

## 2017-01-24 PROCEDURE — 36415 COLL VENOUS BLD VENIPUNCTURE: CPT | Performed by: PHYSICIAN ASSISTANT

## 2017-01-24 PROCEDURE — 20000003 ZZH R&B ICU

## 2017-01-24 PROCEDURE — 25000128 H RX IP 250 OP 636: Performed by: NURSE PRACTITIONER

## 2017-01-24 PROCEDURE — 40000133 ZZH STATISTIC OT WARD VISIT

## 2017-01-24 PROCEDURE — 40000193 ZZH STATISTIC PT WARD VISIT: Performed by: PHYSICAL THERAPIST

## 2017-01-24 PROCEDURE — 25000132 ZZH RX MED GY IP 250 OP 250 PS 637: Mod: GY | Performed by: PHYSICIAN ASSISTANT

## 2017-01-24 PROCEDURE — 85018 HEMOGLOBIN: CPT | Performed by: PHYSICIAN ASSISTANT

## 2017-01-24 PROCEDURE — 85014 HEMATOCRIT: CPT | Performed by: PHYSICIAN ASSISTANT

## 2017-01-24 PROCEDURE — 97110 THERAPEUTIC EXERCISES: CPT | Mod: GP | Performed by: PHYSICAL THERAPIST

## 2017-01-24 PROCEDURE — 25800025 ZZH RX 258: Performed by: PHYSICIAN ASSISTANT

## 2017-01-24 PROCEDURE — 82947 ASSAY GLUCOSE BLOOD QUANT: CPT | Performed by: ORTHOPAEDIC SURGERY

## 2017-01-24 PROCEDURE — 97116 GAIT TRAINING THERAPY: CPT | Mod: GP | Performed by: PHYSICAL THERAPIST

## 2017-01-24 RX ADMIN — Medication 1 TABLET: at 08:10

## 2017-01-24 RX ADMIN — OXYCODONE HYDROCHLORIDE 5 MG: 5 TABLET ORAL at 11:23

## 2017-01-24 RX ADMIN — SENNOSIDES AND DOCUSATE SODIUM 2 TABLET: 8.6; 5 TABLET ORAL at 21:57

## 2017-01-24 RX ADMIN — OXYCODONE HYDROCHLORIDE 5 MG: 5 TABLET ORAL at 17:09

## 2017-01-24 RX ADMIN — SENNOSIDES AND DOCUSATE SODIUM 1 TABLET: 8.6; 5 TABLET ORAL at 08:10

## 2017-01-24 RX ADMIN — HYDROMORPHONE HYDROCHLORIDE 0.2 MG: 1 INJECTION, SOLUTION INTRAMUSCULAR; INTRAVENOUS; SUBCUTANEOUS at 13:53

## 2017-01-24 RX ADMIN — ASPIRIN 325 MG: 325 TABLET ORAL at 08:10

## 2017-01-24 RX ADMIN — ACETAMINOPHEN 975 MG: 325 TABLET, FILM COATED ORAL at 06:55

## 2017-01-24 RX ADMIN — CEFAZOLIN SODIUM 1 G: 1 INJECTION, SOLUTION INTRAVENOUS at 02:01

## 2017-01-24 RX ADMIN — ACETAMINOPHEN 975 MG: 325 TABLET, FILM COATED ORAL at 15:22

## 2017-01-24 RX ADMIN — ASPIRIN 325 MG: 325 TABLET ORAL at 21:57

## 2017-01-24 RX ADMIN — OXYCODONE HYDROCHLORIDE 5 MG: 5 TABLET ORAL at 02:18

## 2017-01-24 RX ADMIN — SODIUM CHLORIDE, POTASSIUM CHLORIDE, SODIUM LACTATE AND CALCIUM CHLORIDE: 600; 310; 30; 20 INJECTION, SOLUTION INTRAVENOUS at 02:18

## 2017-01-24 RX ADMIN — OXYCODONE HYDROCHLORIDE 5 MG: 5 TABLET ORAL at 21:57

## 2017-01-24 RX ADMIN — HYDROMORPHONE HYDROCHLORIDE 0.2 MG: 1 INJECTION, SOLUTION INTRAMUSCULAR; INTRAVENOUS; SUBCUTANEOUS at 19:00

## 2017-01-24 RX ADMIN — SODIUM CHLORIDE 500 ML: 9 INJECTION, SOLUTION INTRAVENOUS at 06:51

## 2017-01-24 RX ADMIN — ACETAMINOPHEN 975 MG: 325 TABLET, FILM COATED ORAL at 22:57

## 2017-01-24 RX ADMIN — OXYCODONE HYDROCHLORIDE 5 MG: 5 TABLET ORAL at 08:10

## 2017-01-24 ASSESSMENT — PAIN DESCRIPTION - DESCRIPTORS
DESCRIPTORS: ACHING

## 2017-01-24 NOTE — PLAN OF CARE
Problem: Goal Outcome Summary  Goal: Goal Outcome Summary  Outcome: No Change  GAIT/AMBULATION: 120' with FWW min A, sit>stand mod A with retropulsion upon standing  EQUIPMENT NEEDS AT DISCHARGE: none  D/C RECOMMENDATIONS: short term rehab at SNF  COMMENTS: Pt s/p R VY.  Doing well with mobility, pain well controlled at time of eval.

## 2017-01-24 NOTE — PLAN OF CARE
Problem: Patient Goal: Rt Focus  Goal: 1. Patient Goal: RT Focus  Outcome: Improving  Pt using IS as instructed

## 2017-01-24 NOTE — PLAN OF CARE
Face to face report given with opportunity to observe patient.    Report given to Crystal T., RN Annelyse J. Stromberg   1/24/2017  7:15 PM

## 2017-01-24 NOTE — ANESTHESIA POSTPROCEDURE EVALUATION
Patient: Claudine Bustos    ARTHROPLASTY HIP (Right Hip)  Additional InformationProcedure(s):  RIGHT TOTAL HIP REPLACEMENT - Wound Class: I-Clean    Diagnosis:CHRONIC PAIN RIGHT HIP  Diagnosis Additional Information: No value filed.    Anesthesia Type:  General    Note:  Anesthesia Post Evaluation    Patient location during evaluation: PACU  Patient participation: Able to fully participate in evaluation  Level of consciousness: awake  Pain management: adequate  Airway patency: patent  Cardiovascular status: acceptable  Respiratory status: acceptable and nasal airway  Hydration status: acceptable  PONV: none             Last vitals:  Filed Vitals:    01/24/17 0400 01/24/17 0600 01/24/17 0746   BP: 99/49 107/56 115/62   Pulse:      Temp: 98.1  F (36.7  C)  97.9  F (36.6  C)   Resp: 16 16 20   SpO2: 96% 95% 95%       Electronically Signed By: TATI Altman CRNA  January 24, 2017  9:50 AM

## 2017-01-24 NOTE — PLAN OF CARE
Problem: Patient Goal: Social Work Focus  Goal: 1. Patient Goal: Social Work Focus  Assessment as per flow sheet.     Met with Dr. Toni Johnson T. Harvey, and April CASTELLANOS this morning. Alex states she would like to go to a SNF following dc.Per discharge planning, the choice of vendor list has been provided to the patient/family for a skilled nursing facility.    First Choice : Skilled Nursing Facility [facility]     [phone]    Second Choice: [facility]illed Nursing [facility]    [phone]    Third Choice: She did not provide a 3rd choice at this time.     Plan: SNF following dc.

## 2017-01-24 NOTE — PLAN OF CARE
Problem: Goal Outcome Summary  Goal: Goal Outcome Summary  Outcome: No Change  SR 70's.  Rated right hip pain 5-7/10; IV dilaudid 0.2 mg given x 1, PO Roxicodone 5 mg given x 2, scheduled PO tyelonol 975 mg, and ice applied, decrease in pain.  Pt states pain is adequate when laying in bed, when pt gets up to ambulate the pain becomes a 7/10 and requires intervention. Pt ambulated to bathroom to void, with walker and gait belt.  Pt able to bear some weight on RLE.  CMS intact to RLE, pedal pulse intact.  Dressing CDI to Rt hip.      Face to face report given with opportunity to observe patient.    Report given to DIVYA Wyatt.     Kaylyn Walker   1/24/2017  7:09 AM

## 2017-01-24 NOTE — PLAN OF CARE
Problem: Patient Goal: Social Work Focus  Goal: 1. Patient Goal: Social Work Focus  Referral called and faxed to Katiana at [facility].

## 2017-01-24 NOTE — PROGRESS NOTES
01/24/17 1243   Quick Adds   Type of Visit Initial PT Evaluation   Living Environment   Lives With spouse   Living Arrangements house   Home Accessibility stairs to enter home;stairs within home   Number of Stairs to Enter Home 4   Number of Stairs Within Home 12   Stair Railings at Home outside, present at both sides;inside, present at both sides   Transportation Available car   Self-Care   Usual Activity Tolerance moderate   Current Activity Tolerance fair   Regular Exercise no   Equipment Currently Used at Home cane, straight   Activity/Exercise/Self-Care Comment Has FWW at home as well   Functional Level Prior   Ambulation 1-->assistive equipment   Transferring 1-->assistive equipment   Toileting 0-->independent   Bathing 0-->independent   Dressing 0-->independent   Eating 0-->independent   Communication 0-->understands/communicates without difficulty   Swallowing 0-->swallows foods/liquids without difficulty   Cognition 0 - no cognition issues reported   Fall history within last six months no   Which of the above functional risks had a recent onset or change? ambulation;transferring;toileting;bathing;dressing   Prior Functional Level Comment Pt states she was independent however pain was getting to be so severe that she was becoming more limited   General Information   Onset of Illness/Injury or Date of Surgery - Date 01/23/17   Referring Physician Vasile Shelton PA-C   Patient/Family Goals Statement to go to SNF for short term rehab   Pertinent History of Current Problem (include personal factors and/or comorbidities that impact the POC) Pt s/p R VY with more of an anterolateral approach.  Per Dr. Muñoz no hip precautions except excessive hip extension/ER.  Pt was previously independent with all functional mobility and ADLs prior to surgery but no requiring assist for all mobility and all ADLs   Weight-Bearing Status - LLE full weight-bearing   Weight-Bearing Status - RLE weight-bearing as tolerated    General Observations Pt up in chair upon PT arrival, pleasant and agreeable to PT eval   Cognitive Status Examination   Orientation orientation to person, place and time   Level of Consciousness alert   Follows Commands and Answers Questions 100% of the time   Personal Safety and Judgment intact   Memory intact   Pain Assessment   Patient Currently in Pain Yes, see Vital Sign flowsheet  (5/10 in R hip)   Integumentary/Edema   Integumentary/Edema Comments incision to lyly/lateral aspect of R hip   Range of Motion (ROM)   ROM Comment Bilateral LE AROM WFL,  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 and 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   Strength   Strength Comments R hip flex and abduction NT due to recent surgery but appear at least 3/5, R knee ext 4-/5, L LE grossly 4+/5 throughout   Bed Mobility   Bed Mobility Comments NT, up in chair   Transfer Skills   Transfer Comments sit>stand mod A with retropulsion noted initially upon standing   Gait   Gait Comments Pt ambulated 120' with FWW min A with step-to gait noted, does ambulate onto R forefoot, did provide cues to work on heel strike but pt has difficulty with this due to foot deformity   Balance   Balance Comments fair-   General Therapy Interventions   Planned Therapy Interventions balance training;bed mobility training;gait training;neuromuscular re-education;strengthening;transfer training;home program guidelines;risk factor education;progressive activity/exercise   Clinical Impression   Criteria for Skilled Therapeutic Intervention yes, treatment indicated   PT Diagnosis gait disturbance s/p R VY   Influenced by the following impairments pain, decreased ROM, decreased strength, decreased balance, decreased activity tolerance   Functional limitations due to impairments difficulty with transfers, difficulty with gait, difficulty with stairs   Clinical Presentation Stable/Uncomplicated   Clinical Presentation Rationale pt s/p R VY with pain  "and mobility advancing as expected   Clinical Decision Making (Complexity) Low complexity   Therapy Frequency` 2 times/day   Predicted Duration of Therapy Intervention (days/wks) 5 days   Anticipated Discharge Disposition Transitional Care Facility   Risk & Benefits of therapy have been explained Yes   Patient, Family & other staff in agreement with plan of care Yes   Clinical Impression Comments Pt s/p R VY.  Doing well with mobility, pain well controlled at time of eval.   [facility] AM-PAC TM \"6 Clicks\"   2016, Trustees of [facility], under license to [employer].  All rights reserved.   6 Clicks Short Forms Basic Mobility Inpatient Short Form   [facility] AM-PAC  \"6 Clicks\" V.2 Basic Mobility Inpatient Short Form   1. Turning from your back to your side while in a flat bed without using bedrails? 3 - A Little   2. Moving from lying on your back to sitting on the side of a flat bed without using bedrails? 3 - A Little   3. Moving to and from a bed to a chair (including a wheelchair)? 3 - A Little   4. Standing up from a chair using your arms (e.g., wheelchair, or bedside chair)? 2 - A Lot   5. To walk in hospital room? 3 - A Little   6. Climbing 3-5 steps with a railing? 2 - A Lot   Basic Mobility Raw Score (Score out of 24.Lower scores equate to lower levels of function) 16     "

## 2017-01-24 NOTE — PROGRESS NOTES
01/24/17 1429   Quick Adds   Type of Visit Initial Occupational Therapy Evaluation   Living Environment   Lives With spouse   Living Arrangements house   Home Accessibility stairs to enter home;stairs within home;tub/shower is not walk in;grab bars present (bathtub)   Number of Stairs to Enter Home 4   Number of Stairs Within Home 12   Stair Railings at Home outside, present at both sides;inside, present at both sides   Transportation Available car   Self-Care   Dominant Hand right   Usual Activity Tolerance moderate   Current Activity Tolerance fair   Regular Exercise no   Equipment Currently Used at Home cane, straight   Functional Level Prior   Ambulation 1-->assistive equipment   Transferring 1-->assistive equipment   Toileting 0-->independent   Bathing 0-->independent   Dressing 0-->independent   Eating 0-->independent   Communication 0-->understands/communicates without difficulty   Swallowing 0-->swallows foods/liquids without difficulty   Cognition 0 - no cognition issues reported   Fall history within last six months no   General Information   Onset of Illness/Injury or Date of Surgery - Date 01/23/17   Referring Physician Vasile Shelton PA-C   Patient/Family Goals Statement get back home after rehab   Additional Occupational Profile Info/Pertinent History of Current Problem s/p R VY   Precautions/Limitations fall precautions   Weight-Bearing Status - LUE full weight-bearing   Weight-Bearing Status - RUE full weight-bearing   Weight-Bearing Status - LLE full weight-bearing   Weight-Bearing Status - RLE weight-bearing as tolerated   Heart Disease Risk Factors High blood pressure   Cognitive Status Examination   Orientation orientation to person, place and time   Level of Consciousness alert   Able to Follow Commands WNL/WFL   Personal Safety (Cognitive) WNL/WFL   Memory intact   Attention No deficits were identified   Organization/Problem Solving No deficits were identified   Executive Function No deficits  were identified   Visual Perception   Visual Perception Wears glasses   Sensory Examination   Sensory Quick Adds No deficits were identified   Pain Assessment   Patient Currently in Pain Yes, see Vital Sign flowsheet  (7/10)   Range of Motion (ROM)   ROM Comment L shoulder 100 degrees of flx, B UE WFL   Strength   Strength Comments L shoulder 2+/5, R UE grossly 3/5   Muscle Tone Assessment   Muscle Tone Quick Adds No deficits were identified   Coordination   Upper Extremity Coordination No deficits were identified   Transfer Skill: Sit to Stand   Level of Jones: Sit/Stand minimum assist (75% patients effort)   Physical Assist/Nonphysical Assist: Sit/Stand 1 person assist;verbal cues   Transfer Skill: Sit to Stand weight-bearing as tolerated   Assistive Device for Transfer: Sit/Stand standard walker   Transfer Skill: Toilet Transfer   Level of Jones: Toilet minimum assist (75% patients effort)   Physical Assist/Nonphysical Assist: Toilet supervision;verbal cues   Weight-Bearing Restrictions: Toilet weight-bearing as tolerated   Assistive Device standard walker;grab bars   Upper Body Dressing   Level of Jones: Dress Upper Body stand-by assist   Lower Body Dressing   Level of Jones: Dress Lower Body moderate assist (50% patients effort)   Physical Assist/Nonphysical Assist: Dress Lower Body 1 person assist   Toileting   Level of Jones: Toilet stand-by assist   Physical Assist/Nonphysical Assist: Toilet supervision   Grooming   Level of Jones: Grooming stand-by assist   Physical Assist/Nonphysical Assist: Grooming set-up required   Eating/Self Feeding   Level of Jones: Eating independent   Instrumental Activities of Daily Living (IADL)   Previous Responsibilities meal prep;housekeeping;driving;finances;medication management;shopping;laundry   Activities of Daily Living Analysis   Impairments Contributing to Impaired Activities of Daily Living pain;post surgical  "precautions;strength decreased   General Therapy Interventions   Planned Therapy Interventions ADL retraining;progressive activity/exercise;strengthening   Clinical Impression   Criteria for Skilled Therapeutic Interventions Met yes, treatment indicated   OT Diagnosis decreased ADL function   Influenced by the following impairments pain and post-surgical precautions   Assessment of Occupational Performance 3-5 Performance Deficits   Identified Performance Deficits flexibility, strength, mobility   Clinical Decision Making (Complexity) Moderate complexity   Therapy Frequency 5 times/wk   Predicted Duration of Therapy Intervention (days/wks) 3 days   Anticipated Discharge Disposition Transitional Care Facility   Risks and Benefits of Treatment have been explained. Yes   Patient, Family & other staff in agreement with plan of care Yes   Clinical Impression Comments Pt will need short-term rehab to return to prior level of function   [facility] \"6 Clicks\"   2016, Trustees of [facility], under license to [employer].  All rights reserved.   6 Clicks Short Forms Basic Mobility Inpatient Short Form;Daily Activity Inpatient Short Form   [facility]  \"6 Clicks\" V.2 Basic Mobility Inpatient Short Form   1. Turning from your back to your side while in a flat bed without using bedrails? 3 - A Little   2. Moving from lying on your back to sitting on the side of a flat bed without using bedrails? 3 - A Little   3. Moving to and from a bed to a chair (including a wheelchair)? 3 - A Little   4. Standing up from a chair using your arms (e.g., wheelchair, or bedside chair)? 3 - A Little   5. To walk in hospital room? 3 - A Little   6. Climbing 3-5 steps with a railing? 2 - A Lot   Basic Mobility Raw Score (Score out of 24.Lower scores equate to lower levels of function) 17   [facility]  \"6 Clicks\" Daily Activity Inpatient Short Form   1. Putting on and taking off regular lower body " clothing? 2 - A Lot   2. Bathing (including washing, rinsing, drying)? 3 - A Little   3. Toileting, which includes using toilet, bedpan or urinal? 3 - A Little   4. Putting on and taking off regular upper body clothing? 4 - None   5. Taking care of personal grooming such as brushing teeth? 4 - None   6. Eating meals? 4 - None   Daily Activity Raw Score (Score out of 24.Lower scores equate to lower levels of function) 20   Total Evaluation Time   Total Evaluation Time (Minutes) 30

## 2017-01-24 NOTE — PROGRESS NOTES
[facility]    Orthopedics  Daily Post-Op Note    Assessment and Plan  Procedure(s):  ARTHROPLASTY HIP   -1 Day Post-Op  Doing well.  No immediate surgical complications identified.  No excessive bleeding  Pain well-controlled.  Has not worked with PT yet but has been out of bed with nurses walking short distances well.  Plan:  -Ambulate  -Continue supportive and symptomatic treatment  -Start or continue physical therapy  -Pain control measures  -Advance diet as tolerated  -Routine wound care.  Daily dressing changes.  -Aspirin for DVT prophylaxis  -Additional problems being followed by medicine service  Principal Problem:    Status post total replacement of right hip  Active Problems:    Hyperlipidemia with target LDL less than 130    Benign essential hypertension    Postoperative anemia due to acute blood loss      Vasile Shelton PA-C    Interval History  Patient seen today along with Dr. Muñoz and physical therapist and .  Stable.  Doing well.  Pain is reasonably controlled.  No fevers. Denies N/V.  Has good appetite.  Ambulated short distances with nursing and has been up to bathroom multiple times without troubles.    Physical Exam  Temp: 98.1  F (36.7  C) Temp src: Tympanic BP: 107/56 mmHg Pulse: 88 Heart Rate: 70 Resp: 16 SpO2: 95 % O2 Device: None (Room air) Oxygen Delivery: 3 LPM  Filed Vitals:    01/23/17 0921 01/24/17 0617   Weight: 125 lb (56.7 kg) 121 lb 4.1 oz (55 kg)     Vital Signs with Ranges  Temp:  [95  F (35  C)-98.1  F (36.7  C)] 98.1  F (36.7  C)  Pulse:  [88] 88  Heart Rate:  [] 70  Resp:  [15-29] 16  BP: ()/(49-80) 107/56 mmHg  SpO2:  [92 %-99 %] 95 %  I/O last 3 completed shifts:  In: 2790 [P.O.:840; I.V.:1950]  Out: 825 [Urine:525; Blood:300]    Dressing dry and intact.  Compartments soft and nttp BLE.  Distal CMS intact RLE.      Data  Results for orders placed or performed during the hospital encounter of 01/23/17 (from the past 24 hour(s))    UA reflex to Microscopic and Culture   Result Value Ref Range    Color Urine Light Yellow     Appearance Urine Clear     Glucose Urine Negative NEG mg/dL    Bilirubin Urine Negative NEG    Ketones Urine Negative NEG mg/dL    Specific Gravity Urine 1.009 1.003 - 1.035    Blood Urine Negative NEG    pH Urine 7.0 4.7 - 8.0 pH    Protein Albumin Urine Negative NEG mg/dL    Urobilinogen mg/dL Normal 0.0 - 2.0 mg/dL    Nitrite Urine Negative NEG    Leukocyte Esterase Urine Negative NEG    Source Midstream Urine    Anaerobic bacterial culture   Result Value Ref Range    Specimen Description Synovial fluid Right Hip     Culture Micro Culture in progress     Micro Report Status Pending    Fluid Culture Aerobic Bacterial   Result Value Ref Range    Specimen Description Synovial fluid Right Hip     Culture Micro No growth after 1 day     Micro Report Status Pending    Gram stain   Result Value Ref Range    Specimen Description Synovial fluid Right Hip     Gram Stain Few PMNs seen  No organisms seen       Micro Report Status FINAL 01/23/2017    [facility] Intra[facility] [place]    Narrative    RIGHT HIP TWO VIEWS    HISTORY:  An 86-year-old male with right hip arthroplasty.    FINDINGS:  Two views of right hip were obtained during right hip  arthroplasty.  A femoral localizer device is in place.  Alignment is  anatomic.    IMPRESSION: ANATOMIC ALIGNMENT DURING RIGHT TOTAL HIP ARTHROPLASTY.  Exam Date: Jan 23, 2017 01:52:56 PM  Author: NADIR MANRIQUEZ  This report is final and signed     [facility] Post [facility] [place]    Narrative    RIGHT HIP TWO VIEWS    FINDINGS:  Two views of the right hip were obtained.  The patient is  status post total right total right arthroplasty.  The femoral  component has been cemented.  A cerclage wire is present.  Alignment  is anatomic.    IMPRESSION:  RIGHT TOTAL HIP ARTHROPLASTY WITHOUT EVIDENCE OF  COMPLICATION AT THIS TIME.  Exam Date: Jan 23, 2017 03:15:00 PM  Author: NADIR  MITRA  This report is preliminary and transcribed     Hemoglobin   Result Value Ref Range    Hemoglobin 8.3 (L) 11.7 - 15.7 g/dL   Hematocrit   Result Value Ref Range    Hematocrit 25.4 (L) 35.0 - 47.0 %   Glucose (AM Draw)   Result Value Ref Range    Glucose 129 (H) 70 - 99 mg/dL

## 2017-01-24 NOTE — PLAN OF CARE
Problem: Goal Outcome Summary  Goal: Goal Outcome Summary  Outcome: No Change  OT evaluation completed.  ADL DIFFICULTIES: min to mod A for lower body dressing, bed mobility  EQUIPMENT NEEDS: NA  D/C RECOMMENDATIONS: short-term rehab  TREATMENT D/C RECOMMENDATIONS: OT to return to prior level of function with ADLs/IADLs  COMMENTS: Pt is doing well will see 1x/day

## 2017-01-24 NOTE — PLAN OF CARE
Problem: Goal Outcome Summary  Goal: Goal Outcome Summary  Outcome: No Change  Pt arrived from surgery at 1522, alert and oriented. VSS. Temp of 95.0, placed under bear hugger, improved to 96.8. HR , sinus rhythm. BP stable. RR 16, non labored, pt denies SOB, O2 sat >90% on RA, lungs clear bilaterally. Pt reports pain to right hip tolerable at 5/10, treated with IV Dilaudid x1 and PO Oxycodone x1. Ice applied to hip. Dressing clean, dry and intact. CMS intact to bilateral extremities. Pt out of bed with assistance of 2 and walker. Stood from bed with out assistance. Ambulated from bed, a few steps out into the hallway, back in to the room, around bed and to chair on opposite side of room. Pt tolerated well, limps on right leg, able to bear weight. Pt tolerating regular diet, up in chair for dinner. Denies any nausea. Family at bedside.

## 2017-01-24 NOTE — PROGRESS NOTES
[facility]    Hospitalist Progress Note    Date of Service (when I saw the patient): 01/24/2017    Assessment and Plan  Claudine Bustos is a 86 year old female who was admitted on 1/23/2017.     Principal Problem:    Status post total replacement of right hip: per orthopedics.    Active Problems:    Postoperative anemia due to acute blood loss: Preop Hgb 11.9, this am 8.3.No signs of active bleeding. Asymptomatic but does have borderline hypotension without her normal lisinopril. Bolus 500 cc given and pressures came up nicely. Recheck in AM      Benign essential hypertension: Resume lisinopril when pressures stabilized.      Hyperlipidemia with target LDL less than 130    DVT Prophylaxis: Pneumatic Compression Devices and ASA  Code Status: Full Code    Disposition: Expected discharge in 1-2 days once stable, approved by orthopedics.    Arabella Angulo, CNP    Interval History  Doing well. Denies chest pain, shortness of breath, abdominal pain or nausea. Surgical pain with movement but otherwise denies pain. Current oral pain medications adequate.    -Data reviewed today: I reviewed all new labs and imaging results over the last 24 hours. I personally reviewed no images or EKG's today.    Physical Exam  Temp: 97.9  F (36.6  C) Temp src: Tympanic BP: 115/62 mmHg   Heart Rate: 85 Resp: 20 SpO2: 97 % O2 Device: None (Room air) Oxygen Delivery: 3 LPM  Filed Vitals:    01/23/17 0921 01/23/17 1506 01/24/17 0617   Weight: 56.7 kg (125 lb) 55.6 kg (122 lb 9.2 oz) 55 kg (121 lb 4.1 oz)     Vital Signs with Ranges  Temp:  [95  F (35  C)-98.1  F (36.7  C)] 97.9  F (36.6  C)  Heart Rate:  [] 85  Resp:  [15-29] 20  BP: ()/(49-73) 115/62 mmHg  SpO2:  [92 %-99 %] 97 %  I/O last 3 completed shifts:  In: 2790 [P.O.:840; I.V.:1950]  Out: 825 [Urine:525; Blood:300]    Peripheral IV 01/23/17 Right (Active)   Site Assessment WDL 1/24/2017  8:00 AM   Line Status Infusing;Checked every 1-2 hour 1/24/2017  8:00  AM   Phlebitis Scale 0-->no symptoms 1/24/2017  8:00 AM   Infiltration Scale 0 1/24/2017  8:00 AM   Extravasation? No 1/24/2017  6:00 AM   Dressing Intervention New dressing  1/23/2017  9:33 AM   Number of days:1       Incision/Surgical Site 01/25/16 Left Knee (Active)   Number of days:365       Incision/Surgical Site 01/23/17 Right Hip (Active)   Incision Assessment [place] 1/24/2017  7:46 AM   Leni-Incision Assessment [facility] 1/24/2017  7:46 AM   Closure Sutures;Adhesive strip(s) 1/23/2017  2:21 PM   Drainage Description UT 1/24/2017  7:46 AM   Incision Care Ice applied 1/24/2017  7:46 AM   Dressing Intervention Clean, dry, intact 1/24/2017  7:46 AM   Number of days:1     Line/device assessment(s) completed for medical necessity    Constitutional: Awake and alert  Respiratory: Clear bilaterally, equal chest rise and fall.   Cardiovascular: HRR, + murmur, trace pitting edema bilateral lower leg.   GI: Soft, non tender, bowel sounds hypoactive.   Skin/Integumen: No rashes, bruising or open areas identified.       Medications       ceFAZolin  1 g Intravenous See Admin Instructions     Tranexamic Acid  1 g Intravenous Pre-Op/Pre-procedure x 1 dose     scopolamine  1 patch Transdermal Pre-Op/Pre-procedure x 1 dose     scopolamine   Transdermal Q8H     scopolamine   Transdermal Once     calcium-vitamin D  1 tablet Oral Daily     sodium chloride (PF)  3 mL Intracatheter Q8H     acetaminophen  975 mg Oral Q8H     senna-docusate  1-2 tablet Oral BID     aspirin  325 mg Oral BID       Data    Recent Labs  Lab 01/24/17  0445   HGB 8.3*   *       Recent Results (from the past 24 hour(s))   [facility] Intra-Operative [facility] [place]    Narrative    RIGHT HIP TWO VIEWS    HISTORY:  An 86-year-old male with right hip arthroplasty.    FINDINGS:  Two views of right hip were obtained during right hip  arthroplasty.  A femoral localizer device is in place.  Alignment is  anatomic.    IMPRESSION: ANATOMIC ALIGNMENT DURING RIGHT TOTAL HIP  ARTHROPLASTY.  Exam Date: Jan 23, 2017 01:52:56 PM  Author: NADIR MANRIQUEZ  This report is final and signed     [facility] Post Surgical [facility] [place]    Narrative    RIGHT HIP TWO VIEWS    FINDINGS:  Two views of the right hip were obtained.  The patient is  status post total right total right arthroplasty.  The femoral  component has been cemented.  A cerclage wire is present.  Alignment  is anatomic.    IMPRESSION:  RIGHT TOTAL HIP ARTHROPLASTY WITHOUT EVIDENCE OF  COMPLICATION AT THIS TIME.  Exam Date: Jan 23, 2017 03:15:00 PM  Author: NADIR MANRIQUEZ  This report is preliminary and transcribed

## 2017-01-25 ENCOUNTER — APPOINTMENT (OUTPATIENT)
Dept: OCCUPATIONAL THERAPY | Facility: HOSPITAL | Age: 82
DRG: 470 | End: 2017-01-25
Attending: ORTHOPAEDIC SURGERY
Payer: MEDICARE

## 2017-01-25 ENCOUNTER — APPOINTMENT (OUTPATIENT)
Dept: PHYSICAL THERAPY | Facility: HOSPITAL | Age: 82
DRG: 470 | End: 2017-01-25
Attending: ORTHOPAEDIC SURGERY
Payer: MEDICARE

## 2017-01-25 LAB
COPATH REPORT: NORMAL
HGB BLD-MCNC: 8.1 G/DL (ref 11.7–15.7)

## 2017-01-25 PROCEDURE — 40000275 ZZH STATISTIC RCP TIME EA 10 MIN

## 2017-01-25 PROCEDURE — 97110 THERAPEUTIC EXERCISES: CPT | Mod: GP

## 2017-01-25 PROCEDURE — 36415 COLL VENOUS BLD VENIPUNCTURE: CPT | Performed by: NURSE PRACTITIONER

## 2017-01-25 PROCEDURE — A9270 NON-COVERED ITEM OR SERVICE: HCPCS | Mod: GY | Performed by: PHYSICIAN ASSISTANT

## 2017-01-25 PROCEDURE — 40000133 ZZH STATISTIC OT WARD VISIT

## 2017-01-25 PROCEDURE — 85018 HEMOGLOBIN: CPT | Performed by: NURSE PRACTITIONER

## 2017-01-25 PROCEDURE — 99232 SBSQ HOSP IP/OBS MODERATE 35: CPT | Performed by: INTERNAL MEDICINE

## 2017-01-25 PROCEDURE — 20000003 ZZH R&B ICU

## 2017-01-25 PROCEDURE — A9270 NON-COVERED ITEM OR SERVICE: HCPCS | Mod: GY | Performed by: INTERNAL MEDICINE

## 2017-01-25 PROCEDURE — 25000132 ZZH RX MED GY IP 250 OP 250 PS 637: Mod: GY | Performed by: PHYSICIAN ASSISTANT

## 2017-01-25 PROCEDURE — 97116 GAIT TRAINING THERAPY: CPT | Mod: GP

## 2017-01-25 PROCEDURE — 12000011 ZZH R&B MS OVERFLOW

## 2017-01-25 PROCEDURE — 97110 THERAPEUTIC EXERCISES: CPT | Mod: GO

## 2017-01-25 PROCEDURE — 25000132 ZZH RX MED GY IP 250 OP 250 PS 637: Mod: GY | Performed by: INTERNAL MEDICINE

## 2017-01-25 PROCEDURE — 40000193 ZZH STATISTIC PT WARD VISIT

## 2017-01-25 RX ORDER — OXYCODONE HYDROCHLORIDE 5 MG/1
10 TABLET ORAL
Status: DISCONTINUED | OUTPATIENT
Start: 2017-01-25 | End: 2017-01-26 | Stop reason: HOSPADM

## 2017-01-25 RX ADMIN — OXYCODONE HYDROCHLORIDE 10 MG: 5 TABLET ORAL at 19:36

## 2017-01-25 RX ADMIN — ASPIRIN 325 MG: 325 TABLET ORAL at 10:30

## 2017-01-25 RX ADMIN — ACETAMINOPHEN 975 MG: 325 TABLET, FILM COATED ORAL at 06:14

## 2017-01-25 RX ADMIN — ACETAMINOPHEN 975 MG: 325 TABLET, FILM COATED ORAL at 22:18

## 2017-01-25 RX ADMIN — SENNOSIDES AND DOCUSATE SODIUM 1 TABLET: 8.6; 5 TABLET ORAL at 10:30

## 2017-01-25 RX ADMIN — OXYCODONE HYDROCHLORIDE 10 MG: 5 TABLET ORAL at 09:22

## 2017-01-25 RX ADMIN — Medication 1 TABLET: at 10:30

## 2017-01-25 RX ADMIN — ASPIRIN 325 MG: 325 TABLET ORAL at 22:18

## 2017-01-25 RX ADMIN — ACETAMINOPHEN 975 MG: 325 TABLET, FILM COATED ORAL at 15:21

## 2017-01-25 RX ADMIN — OXYCODONE HYDROCHLORIDE 10 MG: 5 TABLET ORAL at 13:55

## 2017-01-25 RX ADMIN — OXYCODONE HYDROCHLORIDE 5 MG: 5 TABLET ORAL at 06:14

## 2017-01-25 RX ADMIN — OXYCODONE HYDROCHLORIDE 5 MG: 5 TABLET ORAL at 02:37

## 2017-01-25 RX ADMIN — SENNOSIDES AND DOCUSATE SODIUM 1 TABLET: 8.6; 5 TABLET ORAL at 22:18

## 2017-01-25 ASSESSMENT — PAIN DESCRIPTION - DESCRIPTORS
DESCRIPTORS: ACHING
DESCRIPTORS: ACHING
DESCRIPTORS: ACHING;CONSTANT
DESCRIPTORS: ACHING

## 2017-01-25 NOTE — PLAN OF CARE
Problem: Goal Outcome Summary  Goal: Goal Outcome Summary  Outcome: Improving  Pt doing well. Up in chair through out most of day. Ambulated in hallway x2 with PT. Ambulating to and from bathroom in room. Assist of 1 and walker. Pt tolerates well. CMS intact to lower extremities. Trace edema to bilateral ankles. Pt denies numbness or tingling. Dressing clean, dry and intact. Ice applied. VSS. Afebrile. HR 80s. BP 100s/50s. O2 sats 90s on RA. Lung sounds clear bilaterally. Pt reports pain 5/10 at rest, 8-10/10 with ambulation. Pain controlled with scheduled tylenol, PRN oxycodone and IV dilaudid.

## 2017-01-25 NOTE — PROGRESS NOTES
Pt was in the recliner upon arrival but was willing to get up and walk with the fww. Pt did short distances due to pain and being tired this afternoon. Pt was limping due to pain and was feeling anxious and was fearful of falling. Seated exercises 10 reps: LAQ'S, marching, ankle pumps, glut sets, pillow squeezes, Iso abd.

## 2017-01-25 NOTE — PLAN OF CARE
Face to face report given with opportunity to observe patient.    Report given to Crystal T., RN Annelyse J. Stromberg   1/24/2017  7:26 PM

## 2017-01-25 NOTE — PLAN OF CARE
Problem: Goal Outcome Summary  Goal: Goal Outcome Summary  VSS. Afebrile.  States that she is having increased pain today.  Dr. Cordero paged and pain medication has been increased. Rating her pain 10/10 aching in the right  upper thigh   Ambulated in room to commode and chair.  Ambulated with PT in hallway.  Patient uses gait belt and walker.   Eating and taking fluids without problems.

## 2017-01-25 NOTE — PLAN OF CARE
Problem: Goal Outcome Summary  Goal: Goal Outcome Summary  Outcome: Therapy, progress toward functional goals is gradual  ADL DIFFICULTIES: min A   EQUIPMENT NEEDS: reacher  D/C RECOMMENDATIONS: short-term rehab  TREATMENT D/C RECOMMENDATIONS: OT to improve ADL function  COMMENTS: Pt was sleeping when approached but was willing to participate.

## 2017-01-25 NOTE — PLAN OF CARE
Problem: Patient Goal: Social Work Focus  Goal: 1. Patient Goal: Social Work Focus  Met with Alex this morning several times with [employer].     Alex lives in [place] with her spouse Lee. She has 5 children (Rubio, Shakira, Latonia, Hira, & Benson). son in law is here from [place] and is staying at the house with Lee. Alex would like transportation arranged with health line. PCP is Dr. Gutierrez. Follows with dental care and eye Dr. She did not share with who these providers are. (per chart review Dr Marquez and Dr. Thomason respectively). She states she believes her  is working on a [employer]. She does not received VA benefits. Not county assistance/. Community programs. Has a wheeled walker at home, denies falls. Was working part time at [employer], however, stopped working around [facility] due to her hip pain and spouse needing assistance . Alex does drive and is independent with meals, groceries, house keeping. Daughter helps with laundry, in the basement. Denies tobacco use. Does drink wine, very little. Is Christian. Uses [facility], [place].     [facility] is scheduled for 1100 Thursday provided patient is ready for dc.   Will need confirmation from [facility] of acceptance. [facility] is Ernie second choice.

## 2017-01-25 NOTE — PLAN OF CARE
Problem: Patient Goal: Social Work Focus  Goal: 1. Patient Goal: Social Work Focus  Spoke with Alex regarding dc planning. She would really like to go to [facility] upon dc or [facility] for therapy, likes their departments. Prefers to not go to [facility] however does not want to go very far for family to visit.   Will follow up with Alex and the SNF's in the morning.

## 2017-01-25 NOTE — PLAN OF CARE
Problem: Patient Goal: Social Work Focus  Goal: 1. Patient Goal: Social Work Focus  DC planning status:  [facility]: acceptance is contingent on their previous referrals.  [facility]: referral faxed. However, concern for available openings.   [facility]: referral faxed, patient has been accepted to facility.  Into update Alex, she is resting in her chair. Will return to provide update.   Nursing updated of acceptance to [facility] and transportation tentatively arranged for 1100 tomorrow, pending ready for dc.

## 2017-01-25 NOTE — PLAN OF CARE
Problem: Goal Outcome Summary  Goal: Goal Outcome Summary  Outcome: No Change  Rated pain 5-8/10 to rt hip; PO Roxicodone given x 3, scheduled PO tylenol, and ice applied, decrease in pain when not moving in bed.  Pt stated her pain has been much worse throughout the day.  Pt unable to bear much weight on RLE, needing to use commode at bedside the last couple times up to void.  Slept most of NOC.      Face to face report given with opportunity to observe patient.    Report given to DIVYA Alejandra.    Kaylyn Walker   1/25/2017  7:10 AM

## 2017-01-25 NOTE — PLAN OF CARE
Problem: Patient Goal: Social Work Focus  Goal: 1. Patient Goal: Social Work Focus  Received message from Katiana, referrals have been received before Alex's and theirs are in consideration first.  Message left for [place] at [facility] of this referral and notes faxed.   Update Alex.

## 2017-01-25 NOTE — PLAN OF CARE
Problem: Patient Goal: Rt Focus  Goal: 1. Patient Goal: RT Focus  Outcome: Improving  Encouraged IS use

## 2017-01-25 NOTE — PROGRESS NOTES
[facility]    Hospitalist Progress Note    Date of Service (when I saw the patient): 01/25/2017    Assessment and Plan  Claudine Bustos is a 86 year old female who was admitted on 1/23/2017.      1.  POD #2 s/p Right total hip arthroplasty:  Doing well except for some increased pain  Getting 5 mg oxycodone and still uncomfortable.  Have increased to 10 mg dosing  will continue with her PT OT.  Wound looks good.  [facility].  No fevers  Hgb stable at 8.1.    2. Hypertension hx:  vitals stable here on no medications.    3. Pulmonary  On room air with good SATs has IS and is up with PT and up in chair.     DVT Prophylaxis: Defer to primary service  Is on ASA  Code Status: Full Code    Disposition: Expected discharge in 1-2 days once pain control adequate..    Darren Cordero    Interval History  Feels okay but has pain.   Vernon that her knee replacements were easier to handle in past than this hip.   No nausea no sob or chest pain   will increase her oxycodone and see how does  Is getting up with PT. otherwise feels fine.  [facility] maybe in am.    -Data reviewed today: I reviewed all new labs and imaging results over the last 24 hours. I personally reviewed no images or EKG's today.    Physical Exam  Temp: 98.6  F (37  C) Temp src: Tympanic BP: 116/70 mmHg   Heart Rate: 104 Resp: 20 SpO2: 97 % O2 Device: None (Room air)    Filed Vitals:    01/23/17 0921 01/23/17 1506 01/24/17 0617   Weight: 56.7 kg (125 lb) 55.6 kg (122 lb 9.2 oz) 55 kg (121 lb 4.1 oz)     Vital Signs with Ranges  Temp:  [97.9  F (36.6  C)-99.1  F (37.3  C)] 98.6  F (37  C)  Heart Rate:  [] 104  Resp:  [20] 20  BP: (113-132)/(55-70) 116/70 mmHg  SpO2:  [96 %-98 %] 97 %  I/O last 3 completed shifts:  In: 2709 [P.O.:600; I.V.:1599; IV Piggyback:510]  Out: 1400 [Urine:1400]    Peripheral IV 01/23/17 Right (Active)   Site Assessment WDL 1/24/2017  10:00 PM   Line Status Saline locked 1/24/2017 10:00 PM   Phlebitis Scale 0-->no symptoms 1/24/2017 10:00 PM   Infiltration Scale 0 1/24/2017 10:00 PM   Extravasation? No 1/24/2017  6:00 AM   Dressing Intervention New dressing  1/23/2017  9:33 AM   Number of days:2       Incision/Surgical Site 01/25/16 Left Knee (Active)   Number of days:366       Incision/Surgical Site 01/23/17 Right Hip (Active)   Incision Assessment [facility] 1/24/2017  8:15 PM   Leni-Incision Assessment [facility] 1/24/2017  8:15 PM   Closure Sutures;Adhesive strip(s) 1/23/2017  2:21 PM   Drainage Description [facility] 1/24/2017  8:15 PM   Incision Care Ice applied 1/24/2017  8:15 PM   Dressing Intervention Clean, dry, intact 1/24/2017  8:15 PM   Number of days:2     nothing    Constitutional: Alert and oriented x3. No distress    HEENT: NC/AT, PERRL, EOMI, mouth moist, neck supple, no LN.     Cardiovascular: RRR. no Murmur, no  rubs, or gallops.  JVD flat.  Bruits no.  Pulses 2+    Respiratory: Clear to auscultation bilaterally    Abdomen: Soft, NTND, no organomegaly. Bowel sounds present    Extremities: Warm/dry. No edema    Neuro:   Non focal, cranial nerves intact, Moves all extremities.    Medications       calcium-vitamin D  1 tablet Oral Daily     sodium chloride (PF)  3 mL Intracatheter Q8H     acetaminophen  975 mg Oral Q8H     senna-docusate  1-2 tablet Oral BID     aspirin  325 mg Oral BID       Data    Recent Labs  Lab 01/25/17 0447 01/24/17  0445   HGB 8.1* 8.3*   GLC  --  129*          No results found for this or any previous visit (from the past 24 hour(s)).

## 2017-01-25 NOTE — PLAN OF CARE
Problem: Goal Outcome Summary  Goal: Goal Outcome Summary  IPPT... Pt was sore this afternoon and fearful of falling with standing and walking but was able to walk short distances mina2 to moda2 when transitioning from standing to sitting in the w/c.

## 2017-01-25 NOTE — PROGRESS NOTES
[facility]    Orthopedics  Daily Post-Op Note    Assessment and Plan  Procedure(s):  ARTHROPLASTY HIP, RIGHT   -2 Days Post-Op  Doing well.  No immediate surgical complications identified.  No excessive bleeding  Pain reasonably well-controlled.    Tolerating physical therapy and rehabilitation well.  Plan:  -Ambulate  -Continue supportive and symptomatic treatment  -Start or continue physical therapy  -Pain control measures  -Advance diet as tolerated  -Routine wound care  -Aspirin for DVT prophylaxis  -Additional problems being followed by medicine service  -D/C planning for once patient meets criteria  Principal Problem:    Status post total replacement of right hip  Active Problems:    Hyperlipidemia with target LDL less than 130    Benign essential hypertension    Postoperative anemia due to acute blood loss      KARLI Null-C    Interval History  Patient seen today along with Dr. Muñoz and physical therapist and .  Doing well.  Continues to improve.  Pain is reasonably well-controlled.  No fevers.  Denies N/V.  Did have increased pain yesterday after PT in afternoon.  Was able to ambulate 120 ft x 2 with walker assist.      Physical Exam  Temp: 98.6  F (37  C) Temp src: Tympanic BP: 116/70 mmHg   Heart Rate: 104 Resp: 20 SpO2: 97 % O2 Device: None (Room air)    Filed Vitals:    01/23/17 0921 01/23/17 1506 01/24/17 0617   Weight: 125 lb (56.7 kg) 122 lb 9.2 oz (55.6 kg) 121 lb 4.1 oz (55 kg)     Vital Signs with Ranges  Temp:  [97.9  F (36.6  C)-99.1  F (37.3  C)] 98.6  F (37  C)  Heart Rate:  [] 104  Resp:  [20] 20  BP: (113-132)/(55-70) 116/70 mmHg  SpO2:  [95 %-98 %] 97 %  I/O last 3 completed shifts:  In: 2709 [P.O.:600; I.V.:1599; IV Piggyback:510]  Out: 1400 [Urine:1400]    Dressing dry and intact.  Compartments soft and nttp RLE.  Distal CMS intact RLE.      Data  Results for orders placed or performed during the hospital encounter of 01/23/17 (from the  past 24 hour(s))   Hemoglobin   Result Value Ref [facility]    Hemoglobin 8.1 (L) 11.7 - 15.7 g/dL     Nares MRSA culture negative.  Intraoperative cultures show no growth after 2 days.

## 2017-01-25 NOTE — PLAN OF CARE
Problem: Goal Outcome Summary  Goal: Goal Outcome Summary  GAIT/AMBULATION: 140ft c FWW and CGA c 1 seated rest break due to pain  EQUIPMENT NEEDS AT DISCHARGE: NA  D/C RECOMMENDATIONS: SNF  TREATMENT AT D/C: PT  COMMENTS: Pt participating well in PT but more sore today. Plan to treat pt again this PM. She plans to d/c to SNF tomorrow.

## 2017-01-26 ENCOUNTER — APPOINTMENT (OUTPATIENT)
Dept: PHYSICAL THERAPY | Facility: HOSPITAL | Age: 82
DRG: 470 | End: 2017-01-26
Attending: ORTHOPAEDIC SURGERY
Payer: MEDICARE

## 2017-01-26 ENCOUNTER — TELEPHONE (OUTPATIENT)
Dept: ORTHOPEDICS | Facility: OTHER | Age: 82
End: 2017-01-26

## 2017-01-26 VITALS
RESPIRATION RATE: 18 BRPM | BODY MASS INDEX: 27.35 KG/M2 | SYSTOLIC BLOOD PRESSURE: 111 MMHG | HEART RATE: 88 BPM | DIASTOLIC BLOOD PRESSURE: 65 MMHG | TEMPERATURE: 97.7 F | WEIGHT: 139.33 LBS | HEIGHT: 60 IN | OXYGEN SATURATION: 97 %

## 2017-01-26 LAB
ANION GAP SERPL CALCULATED.3IONS-SCNC: 4 MMOL/L (ref 3–14)
BASOPHILS # BLD AUTO: 0 10E9/L (ref 0–0.2)
BASOPHILS NFR BLD AUTO: 0.1 %
BUN SERPL-MCNC: 23 MG/DL (ref 7–30)
CALCIUM SERPL-MCNC: 7.9 MG/DL (ref 8.5–10.1)
CHLORIDE SERPL-SCNC: 105 MMOL/L (ref 94–109)
CO2 SERPL-SCNC: 27 MMOL/L (ref 20–32)
CREAT SERPL-MCNC: 0.68 MG/DL (ref 0.52–1.04)
DIFFERENTIAL METHOD BLD: ABNORMAL
EOSINOPHIL # BLD AUTO: 0.1 10E9/L (ref 0–0.7)
EOSINOPHIL NFR BLD AUTO: 0.9 %
ERYTHROCYTE [DISTWIDTH] IN BLOOD BY AUTOMATED COUNT: 15.1 % (ref 10–15)
GFR SERPL CREATININE-BSD FRML MDRD: 81 ML/MIN/1.7M2
GLUCOSE SERPL-MCNC: 105 MG/DL (ref 70–99)
HCT VFR BLD AUTO: 23.3 % (ref 35–47)
HGB BLD-MCNC: 7.3 G/DL (ref 11.7–15.7)
IMM GRANULOCYTES # BLD: 0.1 10E9/L (ref 0–0.4)
IMM GRANULOCYTES NFR BLD: 0.5 %
LYMPHOCYTES # BLD AUTO: 1.5 10E9/L (ref 0.8–5.3)
LYMPHOCYTES NFR BLD AUTO: 12 %
MCH RBC QN AUTO: 28.2 PG (ref 26.5–33)
MCHC RBC AUTO-ENTMCNC: 31.3 G/DL (ref 31.5–36.5)
MCV RBC AUTO: 90 FL (ref 78–100)
MONOCYTES # BLD AUTO: 1.3 10E9/L (ref 0–1.3)
MONOCYTES NFR BLD AUTO: 10.7 %
NEUTROPHILS # BLD AUTO: 9.3 10E9/L (ref 1.6–8.3)
NEUTROPHILS NFR BLD AUTO: 75.8 %
NRBC # BLD AUTO: 0 10*3/UL
NRBC BLD AUTO-RTO: 0 /100
PLATELET # BLD AUTO: 174 10E9/L (ref 150–450)
POTASSIUM SERPL-SCNC: 3.9 MMOL/L (ref 3.4–5.3)
RBC # BLD AUTO: 2.59 10E12/L (ref 3.8–5.2)
SODIUM SERPL-SCNC: 136 MMOL/L (ref 133–144)
WBC # BLD AUTO: 12.3 10E9/L (ref 4–11)

## 2017-01-26 PROCEDURE — 97530 THERAPEUTIC ACTIVITIES: CPT | Mod: GP

## 2017-01-26 PROCEDURE — 25000132 ZZH RX MED GY IP 250 OP 250 PS 637: Mod: GY | Performed by: INTERNAL MEDICINE

## 2017-01-26 PROCEDURE — 97110 THERAPEUTIC EXERCISES: CPT | Mod: GP

## 2017-01-26 PROCEDURE — 25000132 ZZH RX MED GY IP 250 OP 250 PS 637: Mod: GY | Performed by: PHYSICIAN ASSISTANT

## 2017-01-26 PROCEDURE — 36415 COLL VENOUS BLD VENIPUNCTURE: CPT | Performed by: INTERNAL MEDICINE

## 2017-01-26 PROCEDURE — 99238 HOSP IP/OBS DSCHRG MGMT 30/<: CPT | Performed by: INTERNAL MEDICINE

## 2017-01-26 PROCEDURE — A9270 NON-COVERED ITEM OR SERVICE: HCPCS | Mod: GY | Performed by: INTERNAL MEDICINE

## 2017-01-26 PROCEDURE — A9270 NON-COVERED ITEM OR SERVICE: HCPCS | Mod: GY | Performed by: PHYSICIAN ASSISTANT

## 2017-01-26 PROCEDURE — 40000193 ZZH STATISTIC PT WARD VISIT

## 2017-01-26 PROCEDURE — 80048 BASIC METABOLIC PNL TOTAL CA: CPT | Performed by: INTERNAL MEDICINE

## 2017-01-26 PROCEDURE — 85025 COMPLETE CBC W/AUTO DIFF WBC: CPT | Performed by: INTERNAL MEDICINE

## 2017-01-26 PROCEDURE — 40000275 ZZH STATISTIC RCP TIME EA 10 MIN

## 2017-01-26 RX ORDER — ASPIRIN 325 MG
325 TABLET ORAL 2 TIMES DAILY
Qty: 120 TABLET | DISCHARGE
Start: 2017-01-26 | End: 2017-02-23

## 2017-01-26 RX ORDER — AMOXICILLIN 250 MG
1-2 CAPSULE ORAL 2 TIMES DAILY
Qty: 100 TABLET | DISCHARGE
Start: 2017-01-26 | End: 2017-03-31

## 2017-01-26 RX ORDER — OXYCODONE HYDROCHLORIDE 10 MG/1
10 TABLET ORAL
Qty: 40 TABLET | Refills: 0 | Status: SHIPPED | OUTPATIENT
Start: 2017-01-26 | End: 2017-03-01

## 2017-01-26 RX ADMIN — Medication 1 TABLET: at 08:38

## 2017-01-26 RX ADMIN — ASPIRIN 325 MG: 325 TABLET ORAL at 08:38

## 2017-01-26 RX ADMIN — OXYCODONE HYDROCHLORIDE 10 MG: 5 TABLET ORAL at 00:37

## 2017-01-26 RX ADMIN — OXYCODONE HYDROCHLORIDE 10 MG: 5 TABLET ORAL at 05:01

## 2017-01-26 RX ADMIN — OXYCODONE HYDROCHLORIDE 10 MG: 5 TABLET ORAL at 08:38

## 2017-01-26 RX ADMIN — SENNOSIDES AND DOCUSATE SODIUM 2 TABLET: 8.6; 5 TABLET ORAL at 08:38

## 2017-01-26 RX ADMIN — ACETAMINOPHEN 975 MG: 325 TABLET, FILM COATED ORAL at 06:31

## 2017-01-26 ASSESSMENT — PAIN DESCRIPTION - DESCRIPTORS
DESCRIPTORS: ACHING

## 2017-01-26 NOTE — PLAN OF CARE
Problem: Goal Outcome Summary  Goal: Goal Outcome Summary  Physical Therapy Discharge Summary    Reason for therapy discharge:    Discharged to transitional care facility.    Progress towards therapy goal(s). See goals on Care Plan in [facility] electronic health record for goal details.  Goals met    Therapy recommendation(s):    Continued therapy is recommended.  Rationale/Recommendations:  Recommend continued inpatient PT for VY rehab .

## 2017-01-26 NOTE — TELEPHONE ENCOUNTER
Patients son in law, Duc, ALEAH on [employer] phone in regards to his wife (pt's daughter) needing a letter stating that Claudine had hip surgery and that she is in rehabilitation for some [facility] paperwork. Please call Duc back at [phone]. Thanks.

## 2017-01-26 NOTE — PLAN OF CARE
Face to face report given with opportunity to observe patient.  Report given to Arianna SANDS RN.    Ny Copeland  1/25/2017, 7:12 PM

## 2017-01-26 NOTE — PLAN OF CARE
Problem: Hip Replacement, Total (Adult)  Goal: Signs and Symptoms of Listed Potential Problems Will be Absent or Manageable (Hip Replacement, Total)  Signs and symptoms of listed potential problems will be absent or manageable by discharge/transition of care (reference Hip Replacement, Total (Adult) CPG).   Outcome: Improving    01/26/17 0444   Hip Replacement, Total   Problems Assessed (Total Hip Replacement) all   Problems Present (Total Hip Replacement) pain     Pt VSS. Received oxycodone and scheduled tylenol for pain. Up with assist of one with walker to BR. Alert and oriented. Makes needs known approp. New dressing applied after falling off. Steri strips in place. Ice to hip prn.

## 2017-01-26 NOTE — PLAN OF CARE
Called Karena from [facility] for nurse-to-nurse report. Left message. Will wait for call back.

## 2017-01-26 NOTE — PLAN OF CARE
Patient discharged at 11:09 AM via wheel chair accompanied by son and staff. Prescriptions sent with patient to fill . All belongings sent with patient.     Discharge instructions reviewed with Claudine. Listed belongings gathered and returned to patient. Clothing, shoes, belongings with pt's son-in-law.    Patient discharged to [facility].   Report called to Nursing Home:  Karena - waiting for call back.    Core Measures and Vaccines  Core Measures applicable during stay: No. If yes, state diagnosis: n/a  Pneumonia and Influenza given prior to discharge, if indicated: N/A    Surgical Patient   Surgical Procedures during stay: Yes  Did patient receive discharge instruction on wound care and recognition of infection symptoms? Yes    MISC  Follow up appointment made:  Yes  Home and hospital aquired medications returned to patient: Yes  Patient reports pain was well managed at discharge: Yes

## 2017-01-26 NOTE — PLAN OF CARE
Problem: Discharge Planning  Goal: Discharge Planning (Adult, OB, Behavioral, Peds)  Outcome: Adequate for Discharge Date Met:  01/26/17 01/26/17 1042   Discharge Planning   Patient/family verbalizes understanding of discharge plan recommendations? Yes   Medical Team notified of plan? yes   Current Health   Anticipated Changes Related to Illness inability to care for self   Functional Level Prior   Transportation Available agency transportation   Discharge information reviewed with Claudine. Will be discharged to [facility] - Reviewed medication and follow up appointments with pt. Pt expresses understanding and has no additional questions at this time.

## 2017-01-26 NOTE — PROGRESS NOTES
"[facility]    Orthopedics  Daily Post-Op Note    Assessment and Plan  Procedure(s):  ARTHROPLASTY HIP, RIGHT   -3 Days Post-Op  Doing well.  Clean wound without signs of infection.  No immediate surgical complications identified.  No excessive bleeding  Pain well-controlled.  Tolerating physical therapy and rehabilitation well.  Plan:  -Ambulate  -Continue supportive and symptomatic treatment  -Start or continue physical therapy  -Pain control measures  -Advance diet as tolerated  -Routine wound care  -Aspirin for DVT prophylaxis  -Additional problems being followed by medicine service  -TEDS   -D/C today to NH if ok with medicine    UPON DISCHARGE  -Patient may continue Oxycodone 5 mg 1-2 tabs orally every 4-6 hours prn and Tylenol 975 mg every 8 hrs for pain control  -Patient would benefit from stool softener until regular bowel movements  -Patient will do PT/OT at NH following standard VY protocol for anterolateral approach.  No precautions necessary.  May weight-bear as tolerated with walker assistance.  -Dressing changes should be performed daily with dry sterile dressings. Keep wound clean and dry. May shower with \"press and seal\" type dressing or other waterproof dressing. If wound gets wet, please pat dry.  -Allow steri strips to remain in place.  Will fall off on own.  -Patient should follow up in clinic with Orthopaedics 4 weeks from the date of surgery.  -Patient should take Aspirin 325 mg twice daily until seen in orthopedic clinic for postop follow up.  Continue TEDS.  -Upon leaving NH, patient may need to be set up with Home Health for PT versus outpatient PT.    Principal Problem:    Status post total replacement of right hip  Active Problems:    Hyperlipidemia with target LDL less than 130    Benign essential hypertension    Postoperative anemia due to acute blood loss      KARLI Null-C    Interval History  Patient seen today along with Dr. Muñoz and physical therapist " and .  Doing well.  Continues to improve.  Pain is well-controlled.  No fevers.  Ambulating well with PT.  Denies major complaints.    Physical Exam  Temp: 98.6  F (37  C) Temp src: Tympanic BP: 120/54 mmHg Pulse: 88 Heart Rate: 85 Resp: 18 SpO2: 95 % O2 Device: None (Room air)    Filed Vitals:    01/23/17 1506 01/24/17 0617 01/26/17 0513   Weight: 122 lb 9.2 oz (55.6 kg) 121 lb 4.1 oz (55 kg) 139 lb 5.3 oz (63.2 kg)     Vital Signs with Ranges  Temp:  [98.4  F (36.9  C)-99.1  F (37.3  C)] 98.6  F (37  C)  Pulse:  [88] 88  Heart Rate:  [] 85  Resp:  [16-18] 18  BP: (117-136)/(45-68) 120/54 mmHg  SpO2:  [95 %-98 %] 95 %  I/O last 3 completed shifts:  In: 480 [P.O.:480]  Out: 1000 [Urine:1000]    Dressing dry and intact.  Wound clean and dry with no drainage.  Surrounding skin without erythema.  Compartments soft and nttp.  Minimal distal RLE edema.  Distal CMS intact.      Data  Results for orders placed or performed during the hospital encounter of 01/23/17 (from the past 24 hour(s))   Basic metabolic panel   Result Value Ref [facility]    Sodium 136 133 - 144 mmol/L    Potassium 3.9 3.4 - 5.3 mmol/L    Chloride 105 94 - 109 mmol/L    Carbon Dioxide 27 20 - 32 mmol/L    Anion Gap 4 3 - 14 mmol/L    Glucose 105 (H) 70 - 99 mg/dL    Urea Nitrogen 23 7 - 30 mg/dL    Creatinine 0.68 0.52 - 1.04 mg/dL    GFR Estimate 81 >60 mL/min/1.7m2    GFR Estimate If Black >90   GFR Calc   >60 mL/min/1.7m2    Calcium 7.9 (L) 8.5 - 10.1 mg/dL   CBC with platelets differential   Result Value Ref [facility]    WBC 12.3 (H) 4.0 - 11.0 10e9/L    RBC Count 2.59 (L) 3.8 - 5.2 10e12/L    Hemoglobin 7.3 (L) 11.7 - 15.7 g/dL    Hematocrit 23.3 (L) 35.0 - 47.0 %    MCV 90 78 - 100 fl    MCH 28.2 26.5 - 33.0 pg    MCHC 31.3 (L) 31.5 - 36.5 g/dL    RDW 15.1 (H) 10.0 - 15.0 %    Platelet Count 174 150 - 450 10e9/L    Diff Method Automated Method     % Neutrophils 75.8 %    % Lymphocytes 12.0 %    % Monocytes 10.7 %    %  Eosinophils 0.9 %    % Basophils 0.1 %    % Immature Granulocytes 0.5 %    Nucleated RBCs 0 0 /100    Absolute Neutrophil 9.3 (H) 1.6 - 8.3 10e9/L    Absolute Lymphocytes 1.5 0.8 - 5.3 10e9/L    Absolute Monocytes 1.3 0.0 - 1.3 10e9/L    Absolute Eosinophils 0.1 0.0 - 0.7 10e9/L    Absolute Basophils 0.0 0.0 - 0.2 10e9/L    Abs Immature Granulocytes 0.1 0 - 0.4 10e9/L    Absolute Nucleated RBC 0.0

## 2017-01-26 NOTE — PLAN OF CARE
Problem: Goal Outcome Summary  Goal: Goal Outcome Summary  Outcome: Completed Date Met:  01/26/17  Occupational Therapy Discharge Summary    Reason for therapy discharge:    Discharged to transitional care facility.    Progress towards therapy goal(s). See goals on Care Plan [facility] electronic health record for goal details.  Goals partially met.  Barriers to achieving goals:   limited tolerance for therapy and discharge from facility.    Therapy recommendation(s):    Continued therapy is recommended.  Rationale/Recommendations:  pt is not at [facility]line for ADL function.

## 2017-01-26 NOTE — PLAN OF CARE
"Problem: Patient Goal: Social Work Focus  Goal: 1. Patient Goal: Social Work Focus  DC orders today for Alex.   Her first choice is [facility].  Second is [facility].  Prefers to not go to [facility]     Pt accepted at all 3 places. [facility] was not sure if they would have been able to accommodate due to previous referrals. Pt will have a room mate and she is ok with this as [facility]   [facility] arranged for 1100. Patient and nursing aware. Notified Katiana at [facility] of plans and orders faxed. PAS completed. Contacted Dr. Armas at Katiana's request to inquire if he will follow her. Dr. Armas is agreeable to follow her while in the SNF.         "

## 2017-01-27 ENCOUNTER — TELEPHONE (OUTPATIENT)
Dept: FAMILY MEDICINE | Facility: OTHER | Age: 82
End: 2017-01-27

## 2017-01-27 NOTE — TELEPHONE ENCOUNTER
Patients Daughter needs FMLA paper work for taking care of mom during Hip surgery son in law will be faxing this paperwork given fax number [phone]) and Attn: Mary and patients son advised he would like to stop by this afternoon to see if paperwork has made it and son in law  advised he is welcome to stop by, just ask for Mary or Ortho Nurse.  Leah De Dios, JESSAN

## 2017-01-27 NOTE — DISCHARGE SUMMARY
DATE OF ADMISSION:  01/23/2017   DATE OF DISCHARGE:  01/26/2017      ADMISSION DIAGNOSIS:  Status post right total hip arthroplasty.      DISCHARGE DIAGNOSES:   1.  Status post right total hip arthroplasty.   2.  Hypertension.   3.  History of some chronic kidney disease.      PROCEDURES:  Total hip arthroplasty, Dr. Muñoz.      HOSPITAL COURSE:  Claudine Bustos is an 86-year-old female who has previously undergone bilateral total knee arthroplasties.  She presented with severe pain in her right hip and it was felt that hip replacement would be in her best interest.  She underwent that procedure on the day of admission and tolerated it quite well.  There were no intraoperative or perioperative complications.  She was monitored closely postoperatively.  She was having pain.  We titrated up her oral narcotics and seemed to be doing well.  She is ambulating without difficulty, no significant edema.  Her wound looks good, no fevers.  Vital signs have been stable.  Her hemoglobin was 7.3 at discharge today.  This will need to be followed up as an outpatient.  White count was 12,000, platelet count is 174,000.  Renal function was normal, BUN and creatinine were 23 and 0.68.  She will be discharged to [facility] for PT and OT.      MEDICATIONS AT DISCHARGE:   1.  Oxycodone 10 mg every 3 hours p.r.n. pain.   2.  Aspirin 325 p.o. b.i.d. for 28 days.   3.  Senokot-S 1-2 tablets b.i.d.   4.  Acetaminophen 650 mg every 4 hours as needed for pain.   5.  Calcium with vitamin D 1 tablet daily.   6.  Lisinopril 10 mg daily.   7.  Multivitamin daily.      CODE STATUS:  She is a full code.      FOLLOWUP:  She will be getting PT and OT there.  Daily dressing changes to her right hip, just dry sterile gauze.  Can use a Press-n-Seal water proof device for showering.  She will have a followup visit with Dr. Muñoz on 02/22 at 11:25 a.m.         FRED MARS MD             D: 01/26/2017 11:15   T: 01/27/2017  00:01   MT:       Name:     MANEULA FONSECA   MRN:      [id number]        Account:        [id number]   :      1930           Admit Date:                                       Discharge Date: 2017      Document: [id number]       cc: Katarina Gutierrez MD

## 2017-01-27 NOTE — TELEPHONE ENCOUNTER
10:56 AM    Reason for Call: Phone Call    Description: Latonia needs FMLA paperwork filled out for herself for the care of her mother Claudine.  Please call Latonia back    Was an appointment offered for this call?  No    Preferred method for responding to this message: [phone]    If we cannot reach you directly, may we leave a detailed response at the number you provided? Yes      Chata Schuster

## 2017-01-30 LAB
BACTERIA SPEC CULT: NORMAL
BACTERIA SPEC CULT: NORMAL
MICRO REPORT STATUS: NORMAL
MICRO REPORT STATUS: NORMAL
SPECIMEN SOURCE: NORMAL
SPECIMEN SOURCE: NORMAL

## 2017-02-01 ENCOUNTER — TELEPHONE (OUTPATIENT)
Dept: FAMILY MEDICINE | Facility: OTHER | Age: 82
End: 2017-02-01

## 2017-02-01 NOTE — TELEPHONE ENCOUNTER
Duc was notified that paperwork should have been faxed today and if it is not there by tomorrow to call back.

## 2017-02-01 NOTE — TELEPHONE ENCOUNTER
12:32 PM    Reason for Call: Phone Call    Description: Duc (son-in-law of pt/Shakira's spouse) called to see if FMLA paperwork had been faxed yet. Please call him back at [phone].    Was an appointment offered for this call? No    Preferred method for responding to this message: Telephone Call    If we cannot reach you directly, may we leave a detailed response at the number you provided? Yes    Can this message wait until your PCP/provider returns, if available today? Not applicable    Guerline Betancur

## 2017-02-07 ENCOUNTER — TELEPHONE (OUTPATIENT)
Dept: ORTHOPEDICS | Facility: OTHER | Age: 82
End: 2017-02-07

## 2017-02-07 NOTE — TELEPHONE ENCOUNTER
Paperwork filled out and faxed to number [phone] and copy made and sent to scanning original will be given to Mary in Work Comp. Spoke with Shakira she is in [place] now and is aware to check with work to make sure they received paperwork.  Leah De Dios, JASON

## 2017-02-10 ENCOUNTER — TRANSFERRED RECORDS (OUTPATIENT)
Dept: HEALTH INFORMATION MANAGEMENT | Facility: HOSPITAL | Age: 82
End: 2017-02-10

## 2017-02-22 ENCOUNTER — OFFICE VISIT (OUTPATIENT)
Dept: ORTHOPEDICS | Facility: OTHER | Age: 82
End: 2017-02-22
Attending: ORTHOPAEDIC SURGERY
Payer: MEDICARE

## 2017-02-22 ENCOUNTER — MEDICAL CORRESPONDENCE (OUTPATIENT)
Dept: HEALTH INFORMATION MANAGEMENT | Facility: HOSPITAL | Age: 82
End: 2017-02-22

## 2017-02-22 VITALS
DIASTOLIC BLOOD PRESSURE: 58 MMHG | BODY MASS INDEX: 25.8 KG/M2 | TEMPERATURE: 97.6 F | SYSTOLIC BLOOD PRESSURE: 116 MMHG | HEIGHT: 59 IN | OXYGEN SATURATION: 96 % | HEART RATE: 104 BPM | WEIGHT: 128 LBS

## 2017-02-22 DIAGNOSIS — Z47.1 AFTERCARE FOLLOWING RIGHT HIP JOINT REPLACEMENT SURGERY: Primary | ICD-10-CM

## 2017-02-22 DIAGNOSIS — Z96.641 AFTERCARE FOLLOWING RIGHT HIP JOINT REPLACEMENT SURGERY: Primary | ICD-10-CM

## 2017-02-22 PROCEDURE — 99024 POSTOP FOLLOW-UP VISIT: CPT | Performed by: ORTHOPAEDIC SURGERY

## 2017-02-22 PROCEDURE — 99212 OFFICE O/P EST SF 10 MIN: CPT

## 2017-02-22 ASSESSMENT — PAIN SCALES - GENERAL: PAINLEVEL: MODERATE PAIN (5)

## 2017-02-22 NOTE — PROGRESS NOTES
Chief complaint: 4 week check right VY.    Subjective: This 86-year-old female is back home following inpatient rehabilitation for a right VY.  An anterolateral approach was used.  Her bone was found to be extremely osteopenic.  A perforation of the lateral cortex distal to the greater trochanter occurred during surgery so her femoral stem was cemented in place and a calcar cable was used.    She was accompanied to today's visit by her outpatient physical therapist.  The therapist states that she is doing very well.  Initially her therapy was limited by pain but the pain has diminished now so that she is only using OTC analgesics.  What pain she has is mostly lateral, at the distal end of her incision.  She is ambulating with a walker however she has begun to start cane training in therapy as of her last visit.  She has had significant swelling in her lower extremities since surgery but this has been controlled with thigh-high compression stockings.  She has been on aspirin for DVT prophylaxis.    Examination: Her right hip incision is healed well with no evidence of infection.  At the distal end of the incision there is induration of the subcutaneous tissue suggesting a hematoma.  The family and the therapist add that this area of induration has grown smaller recently.  There is mildly tender to palpation.  She was able to get up and ambulate across the room and back with her walker.  The therapist notes that her hip abductors are still quite weak.    X-rays: We went over her postop films which show a well aligned hybrid VY with no complicating features.    Impression: Satisfactory for recheck right VY    Plan: She will continue outpatient physical therapy.  She will use only OTC analgesics.  She will stop the aspirin.  She will convert to a cane when the therapist feels she is safe to do so.  She will transition from thigh-high stockings to knee-high stockings to eventually none.  She should apply heat and  massage to the area of induration on the lateral side of her thigh to help resorbing hematoma.  She will return in 4 weeks for recheck.

## 2017-02-22 NOTE — NURSING NOTE
"Chief Complaint   Patient presents with     Surgical Followup     4 week follow up total right hip replacement       Initial /62 (BP Location: Right arm, Patient Position: Chair, Cuff Size: Adult Regular)  Pulse 104  Temp 97.6  F (36.4  C) (Tympanic)  Ht 4' 11\" (1.499 m)  Wt 128 lb (58.1 kg)  SpO2 96%  BMI 25.85 kg/m2 Estimated body mass index is 25.85 kg/(m^2) as calculated from the following:    Height as of this encounter: 4' 11\" (1.499 m).    Weight as of this encounter: 128 lb (58.1 kg).  Medication Reconciliation: complete   Norma Alford LPN      "

## 2017-02-22 NOTE — NURSING NOTE
Patient 2nd B/P was good and advise anytime she wants to check B/p she only needs a nurse only.  Leah De Dios LPN

## 2017-02-22 NOTE — MR AVS SNAPSHOT
After Visit Summary   2/22/2017    Claudine Bustos    MRN: [id number]           Patient Information     Date Of Birth          4/11/1930        Visit Information        Provider Department      2/22/2017 11:40 AM Jose Manuel Muñoz MD  ORTH[facility]EDICS        Today's Diagnoses     Aftercare following right hip joint replacement surgery    -  1      Care Instructions    Continue physical therapy        Follow-ups after your visit        Follow-up notes from your care team     Return in about 4 weeks (around 3/22/2017).      Your next 10 appointments already scheduled     Mar 01, 2017  2:00 PM CST   (Arrive by 1:45 PM)   SHORT with Katarina Gutierrez MD   [facility] [place] [facility] [place] [facility]    [address] [place] [address] [phone]            Mar 22, 2017  1:20 PM CDT   (Arrive by 1:05 PM)   Post Op with Jose Manuel Muñoz MD    ORTHOPEDICS ([facility] [place] [facility])    [address]  [place] [id number] [phone]              Who to contact     If you have questions or need follow up information about today's clinic visit or your schedule please contact  ORTHOPEDICS directly at [phone].  Normal or non-critical lab and imaging results will be communicated to you by MyChart, letter or phone within 4 business days after the clinic has received the results. If you do not hear from us within 7 days, please contact the clinic through MyChart or phone. If you have a critical or abnormal lab result, we will notify you by phone as soon as possible.  Submit refill requests through [employer] or call your pharmacy and they will forward the refill request to us. Please allow 3 business days for your refill to be completed.          Additional Information About Your Visit        MyChart Information     [employer] lets you send messages to your doctor, view your test results, renew your prescriptions, schedule appointments and more. To sign up, go to [email or web address] . Click on  "\"Log in\" on the left side of the screen, which will take you to the Welcome page. Then click on \"Sign up Now\" on the right side of the page.     You will be asked to enter the access code listed below, as well as some personal information. Please follow the directions to create your username and password.     Your access code is: [email or web address]  Expires: 2017 10:56 AM     Your access code will  in 90 days. If you need help or a new code, please call your [facility] or [phone].        Care EveryWhere ID     This is your [facility] EveryWhere ID. This could be used by other organizations to access your [place] medical records  [id number]        Your Vitals Were     Pulse Temperature Height Pulse Oximetry BMI (Body Mass Index)       104 97.6  F (36.4  C) (Tympanic) 4' 11\" (1.499 m) 96% 25.85 kg/m2        Blood Pressure from Last 3 Encounters:   17 116/58   17 111/65   17 121/72    Weight from Last 3 Encounters:   17 128 lb (58.1 kg)   17 139 lb 5.3 oz (63.2 kg)   17 125 lb (56.7 kg)              Today, you had the following     No orders found for display         Today's Medication Changes          These changes are accurate as of: 17 12:18 PM.  If you have any questions, ask your nurse or doctor.               These medicines have changed or have updated prescriptions.        Dose/Directions    aspirin 325 MG tablet   This may have changed:  when to take this   Used for:  Status post total replacement of right hip        Dose:  325 mg   Take 1 tablet (325 mg) by mouth 2 times daily for 28 days   Quantity:  120 tablet   Refills:  0                Primary Care Provider Office Phone # Fax #    Katarina Gutierrez [phone]       [facility] [place] [address]  [place] [address]        Thank you!     Thank you for choosing  ORTHOPEDICS  for your care. Our goal is always to provide you with excellent care. Hearing back from our patients is " one way we can continue to improve our services. Please take a few minutes to complete the written survey that you may receive in the mail after your visit with us. Thank you!             Your Updated Medication List - Protect others around you: Learn how to safely use, store and throw away your medicines at www.disposemymeds.org.          This list is accurate as of: 2/22/17 12:18 PM.  Always use your most recent med list.                   Brand Name Dispense Instructions for use    acetaminophen 650 MG 8 hour tablet     100 tablet    Take 650 mg by mouth every 4 hours as needed for other (surgical pain)       aspirin 325 MG tablet     120 tablet    Take 1 tablet (325 mg) by mouth 2 times daily for 28 days       Calcium + D3 600-200 MG-UNIT Tabs      Take 1 tablet by mouth daily       lisinopril 20 MG tablet    PRINIVIL/ZESTRIL    30 tablet    Take 10 mg by mouth daily       multivitamin per tablet      Take 1 tablet by mouth daily with food.       oxyCODONE 10 MG IR tablet    ROXICODONE    40 tablet    Take 1 tablet (10 mg) by mouth every 3 hours as needed for moderate to severe pain       senna-docusate 8.6-50 MG per tablet    SENOKOT-S;PERICOLACE    100 tablet    Take 1-2 tablets by mouth 2 times daily

## 2017-03-01 ENCOUNTER — OFFICE VISIT (OUTPATIENT)
Dept: FAMILY MEDICINE | Facility: OTHER | Age: 82
End: 2017-03-01
Attending: FAMILY MEDICINE
Payer: MEDICARE

## 2017-03-01 VITALS
WEIGHT: 133.4 LBS | SYSTOLIC BLOOD PRESSURE: 136 MMHG | BODY MASS INDEX: 26.94 KG/M2 | OXYGEN SATURATION: 95 % | DIASTOLIC BLOOD PRESSURE: 56 MMHG | HEART RATE: 97 BPM | TEMPERATURE: 96.4 F

## 2017-03-01 DIAGNOSIS — I10 BENIGN ESSENTIAL HYPERTENSION: Primary | ICD-10-CM

## 2017-03-01 DIAGNOSIS — Z96.641 STATUS POST TOTAL REPLACEMENT OF RIGHT HIP: ICD-10-CM

## 2017-03-01 PROCEDURE — 99213 OFFICE O/P EST LOW 20 MIN: CPT | Performed by: FAMILY MEDICINE

## 2017-03-01 PROCEDURE — 99212 OFFICE O/P EST SF 10 MIN: CPT

## 2017-03-01 RX ORDER — LISINOPRIL 10 MG/1
10 TABLET ORAL DAILY
Qty: 90 TABLET | Refills: 1 | Status: SHIPPED | OUTPATIENT
Start: 2017-03-01 | End: 2017-03-31 | Stop reason: DRUGHIGH

## 2017-03-01 RX ORDER — HYDROCODONE BITARTRATE AND ACETAMINOPHEN 5; 325 MG/1; MG/1
1 TABLET ORAL EVERY 4 HOURS PRN
Qty: 60 TABLET | Refills: 0 | Status: SHIPPED | OUTPATIENT
Start: 2017-03-01 | End: 2017-03-31

## 2017-03-01 ASSESSMENT — ANXIETY QUESTIONNAIRES
IF YOU CHECKED OFF ANY PROBLEMS ON THIS QUESTIONNAIRE, HOW DIFFICULT HAVE THESE PROBLEMS MADE IT FOR YOU TO DO YOUR WORK, TAKE CARE OF THINGS AT HOME, OR GET ALONG WITH OTHER PEOPLE: NOT DIFFICULT AT ALL
1. FEELING NERVOUS, ANXIOUS, OR ON EDGE: NOT AT ALL
2. NOT BEING ABLE TO STOP OR CONTROL WORRYING: NOT AT ALL
7. FEELING AFRAID AS IF SOMETHING AWFUL MIGHT HAPPEN: NOT AT ALL
6. BECOMING EASILY ANNOYED OR IRRITABLE: NOT AT ALL
5. BEING SO RESTLESS THAT IT IS HARD TO SIT STILL: NOT AT ALL
GAD7 TOTAL SCORE: 0
3. WORRYING TOO MUCH ABOUT DIFFERENT THINGS: NOT AT ALL

## 2017-03-01 ASSESSMENT — PATIENT HEALTH QUESTIONNAIRE - PHQ9: 5. POOR APPETITE OR OVEREATING: NOT AT ALL

## 2017-03-01 ASSESSMENT — PAIN SCALES - GENERAL: PAINLEVEL: MODERATE PAIN (4)

## 2017-03-01 NOTE — NURSING NOTE
"Chief Complaint   Patient presents with     Hypertension     Blood pressure follow up        Initial /56 (BP Location: Right arm, Patient Position: Chair, Cuff Size: Adult Regular)  Pulse 97  Temp 96.4  F (35.8  C) (Tympanic)  Wt 133 lb 6.4 oz (60.5 kg)  SpO2 95%  BMI 26.94 kg/m2 Estimated body mass index is 26.94 kg/(m^2) as calculated from the following:    Height as of 2/22/17: 4' 11\" (1.499 m).    Weight as of this encounter: 133 lb 6.4 oz (60.5 kg).  Medication Reconciliation: complete   Linda Clifton CMA(AAMA)      "

## 2017-03-01 NOTE — PROGRESS NOTES
SUBJECTIVE:                                                    Claudine Bustos is a 86 year old female who presents to clinic today for the following health issues:        Hypertension Follow-up      Outpatient blood pressures are not being checked.    Low Salt Diet: no added salt       Amount of exercise or physical activity: 2-3 days/week for an average of 30-45 minutes    Problems taking medications regularly: No    Medication side effects: none  Diet: regular (no restrictions)    S/p R hip replacement    Problem list and histories reviewed & adjusted, as indicated.  Additional history: as documented    Current Outpatient Prescriptions   Medication Sig Dispense Refill     lisinopril (PRINIVIL/ZESTRIL) 10 MG tablet Take 1 tablet (10 mg) by mouth daily 90 tablet 1     HYDROcodone-acetaminophen (NORCO) 5-325 MG per tablet Take 1 tablet by mouth every 4 hours as needed for moderate to severe pain maximum 6 tablet(s) per day 60 tablet 0     senna-docusate (SENOKOT-S;PERICOLACE) 8.6-50 MG per tablet Take 1-2 tablets by mouth 2 times daily 100 tablet      acetaminophen 650 MG TABS Take 650 mg by mouth every 4 hours as needed for other (surgical pain) 100 tablet      Calcium Carb-Cholecalciferol (CALCIUM + D3) 600-200 MG-UNIT TABS Take 1 tablet by mouth daily       Multiple Vitamin (MULTIVITAMIN) per tablet Take 1 tablet by mouth daily with food.       [DISCONTINUED] lisinopril (PRINIVIL/ZESTRIL) 20 MG tablet Take 10 mg by mouth daily  30 tablet 0     Labs reviewed in EPIC    Reviewed and updated as needed this visit by clinical staff       Reviewed and updated as needed this visit by Provider         ROS:  Constitutional, HEENT, cardiovascular, pulmonary, gi and gu systems are negative, except as otherwise noted.    OBJECTIVE:                                                    /56 (BP Location: Right arm, Patient Position: Chair, Cuff Size: Adult Regular)  Pulse 97  Temp 96.4  F (35.8  C) (Tympanic)  Wt 133 lb  6.4 oz (60.5 kg)  SpO2 95%  BMI 26.94 kg/m2  Body mass index is 26.94 kg/(m^2).  GENERAL APPEARANCE: healthy, alert and no distress  RESP: lungs clear to auscultation - no rales, rhonchi or wheezes  CV: regular rates and rhythm, normal S1 S2, no S3 or S4, no murmur, click or rub and grade 2/6 systolic murmur heard best over the RUSB  PSYCH: mentation appears normal and affect normal/bright       ASSESSMENT/PLAN:                                                    1. Benign essential hypertension  Continue 10mg, she has some pain yet  - lisinopril (PRINIVIL/ZESTRIL) 10 MG tablet; Take 1 tablet (10 mg) by mouth daily  Dispense: 90 tablet; Refill: 1    2. Status post total replacement of right hip  Would like to stop oxy  - HYDROcodone-acetaminophen (NORCO) 5-325 MG per tablet; Take 1 tablet by mouth every 4 hours as needed for moderate to severe pain maximum 6 tablet(s) per day  Dispense: 60 tablet; Refill: 0    Patient was agreeable to this plan and had no further questions.  See Patient Instructions    Katarina Gutierrez MD  [facility]

## 2017-03-01 NOTE — MR AVS SNAPSHOT
"              After Visit Summary   3/1/2017    Claudine Bustos    MRN: [id number]           Patient Information     Date Of Birth          4/11/1930        Visit Information        Provider Department      3/1/2017 2:00 PM Katarina Gutierrez MD [facility]        Today's Diagnoses     Benign essential hypertension    -  1    Status post total replacement of right hip           Follow-ups after your visit        Your next 10 appointments already scheduled     Mar 22, 2017  1:20 PM CDT   (Arrive by 1:05 PM)   Post Op with Jose Manuel Muñoz MD    ORTHOPEDICS ([facility])    [address] [facility] [address] [phone]              Who to contact     If you have questions or need follow up information about today's clinic visit or your schedule please contact [facility] directly at [phone].  Normal or non-critical lab and imaging results will be communicated to you by MyChart, letter or phone within 4 business days after the clinic has received the results. If you do not hear from us within 7 days, please contact the clinic through MyChart or phone. If you have a critical or abnormal lab result, we will notify you by phone as soon as possible.  Submit refill requests through [employer] or call your pharmacy and they will forward the refill request to us. Please allow 3 business days for your refill to be completed.          Additional Information About Your Visit        MyChart Information     [employer] lets you send messages to your doctor, view your test results, renew your prescriptions, schedule appointments and more. To sign up, go to [email or web address] . Click on \"Log in\" on the left side of the screen, which will take you to the Welcome page. Then click on \"Sign up Now\" on the right side of the page.     You will be asked to enter the access code listed below, as well as some personal information. Please follow the directions to create your username and " password.     Your access code is: [email or web address]  Expires: 2017 10:56 AM     Your access code will  in 90 days. If you need help or a new code, please call your [facility] or [phone].        Care EveryWhere ID     This is your Care EveryWhere ID. This could be used by other organizations to access your [place] medical records  [id number]        Your Vitals Were     Pulse Temperature Pulse Oximetry BMI (Body Mass Index)          97 96.4  F (35.8  C) (Tympanic) 95% 26.94 kg/m2         Blood Pressure from Last 3 Encounters:   17 136/56   17 116/58   17 111/65    Weight from Last 3 Encounters:   17 133 lb 6.4 oz (60.5 kg)   17 128 lb (58.1 kg)   17 139 lb 5.3 oz (63.2 kg)              Today, you had the following     No orders found for display         Today's Medication Changes          These changes are accurate as of: 3/1/17  2:26 PM.  If you have any questions, ask your nurse or doctor.               Start taking these medicines.        Dose/Directions    HYDROcodone-acetaminophen 5-325 MG per tablet   Commonly known as:  NORCO   Used for:  Status post total replacement of right hip   Started by:  Katarina Gutierrez MD        Dose:  1 tablet   Take 1 tablet by mouth every 4 hours as needed for moderate to severe pain maximum 6 tablet(s) per day   Quantity:  60 tablet   Refills:  0         These medicines have changed or have updated prescriptions.        Dose/Directions    lisinopril 10 MG tablet   Commonly known as:  PRINIVIL/ZESTRIL   This may have changed:  medication strength   Used for:  Benign essential hypertension   Changed by:  Katarina Gutierrez MD        Dose:  10 mg   Take 1 tablet (10 mg) by mouth daily   Quantity:  90 tablet   Refills:  1            Where to get your medicines      These medications were sent to [facility] - [address]  [address] [address]     Phone:  [phone]      lisinopril 10 MG tablet         Some of these will need a paper prescription and others can be bought over the counter.  Ask your nurse if you have questions.     Bring a paper prescription for each of these medications     HYDROcodone-acetaminophen 5-325 MG per tablet                Primary Care Provider Office Phone # Fax #    Katarina Gutierrez [phone]       [facility] [place] [address]  [place] [address]        Thank you!     Thank you for choosing [facility]  for your care. Our goal is always to provide you with excellent care. Hearing back from our patients is one way we can continue to improve our services. Please take a few minutes to complete the written survey that you may receive in the mail after your visit with us. Thank you!             Your Updated Medication List - Protect others around you: Learn how to safely use, store and throw away your medicines at www.disposemymeds.org.          This list is accurate as of: 3/1/17  2:26 PM.  Always use your most recent med list.                   Brand Name Dispense Instructions for use    acetaminophen 650 MG 8 hour tablet     100 tablet    Take 650 mg by mouth every 4 hours as needed for other (surgical pain)       Calcium + D3 600-200 MG-UNIT Tabs      Take 1 tablet by mouth daily       HYDROcodone-acetaminophen 5-325 MG per tablet    NORCO    60 tablet    Take 1 tablet by mouth every 4 hours as needed for moderate to severe pain maximum 6 tablet(s) per day       lisinopril 10 MG tablet    PRINIVIL/ZESTRIL    90 tablet    Take 1 tablet (10 mg) by mouth daily       multivitamin per tablet      Take 1 tablet by mouth daily with food.       senna-docusate 8.6-50 MG per tablet    SENOKOT-S;PERICOLACE    100 tablet    Take 1-2 tablets by mouth 2 times daily

## 2017-03-02 ASSESSMENT — PATIENT HEALTH QUESTIONNAIRE - PHQ9: SUM OF ALL RESPONSES TO PHQ QUESTIONS 1-9: 0

## 2017-03-02 ASSESSMENT — ANXIETY QUESTIONNAIRES: GAD7 TOTAL SCORE: 0

## 2017-03-14 DIAGNOSIS — Z96.652 S/P TOTAL KNEE REPLACEMENT USING CEMENT, LEFT: Primary | ICD-10-CM

## 2017-03-16 NOTE — TELEPHONE ENCOUNTER
Tylenol      Last Written Prescription Date: 1/28/17  Last Fill Quantity: 100,  # refills: 0   Last Office Visit with G, P or [facility] prescribing provider: 3/1/17

## 2017-03-22 ENCOUNTER — OFFICE VISIT (OUTPATIENT)
Dept: ORTHOPEDICS | Facility: OTHER | Age: 82
End: 2017-03-22
Attending: ORTHOPAEDIC SURGERY
Payer: MEDICARE

## 2017-03-22 ENCOUNTER — MEDICAL CORRESPONDENCE (OUTPATIENT)
Dept: HEALTH INFORMATION MANAGEMENT | Facility: HOSPITAL | Age: 82
End: 2017-03-22

## 2017-03-22 VITALS
RESPIRATION RATE: 18 BRPM | HEART RATE: 88 BPM | OXYGEN SATURATION: 97 % | DIASTOLIC BLOOD PRESSURE: 62 MMHG | HEIGHT: 59 IN | TEMPERATURE: 97.6 F | SYSTOLIC BLOOD PRESSURE: 144 MMHG | WEIGHT: 129.2 LBS | BODY MASS INDEX: 26.04 KG/M2

## 2017-03-22 DIAGNOSIS — Z96.641 AFTERCARE FOLLOWING RIGHT HIP JOINT REPLACEMENT SURGERY: Primary | ICD-10-CM

## 2017-03-22 DIAGNOSIS — Z47.1 AFTERCARE FOLLOWING RIGHT HIP JOINT REPLACEMENT SURGERY: Primary | ICD-10-CM

## 2017-03-22 PROCEDURE — 99212 OFFICE O/P EST SF 10 MIN: CPT

## 2017-03-22 PROCEDURE — 99024 POSTOP FOLLOW-UP VISIT: CPT | Performed by: ORTHOPAEDIC SURGERY

## 2017-03-22 ASSESSMENT — PAIN SCALES - GENERAL: PAINLEVEL: MILD PAIN (2)

## 2017-03-22 NOTE — NURSING NOTE
"Chief Complaint   Patient presents with     Musculoskeletal Problem     right hop follow up       Initial /62 (BP Location: Left arm, Patient Position: Chair, Cuff Size: Adult Regular)  Pulse 88  Temp 97.6  F (36.4  C) (Tympanic)  Resp 18  Ht 4' 11\" (1.499 m)  Wt 129 lb 3.2 oz (58.6 kg)  SpO2 97%  BMI 26.1 kg/m2 Estimated body mass index is 26.1 kg/(m^2) as calculated from the following:    Height as of this encounter: 4' 11\" (1.499 m).    Weight as of this encounter: 129 lb 3.2 oz (58.6 kg).  Medication Reconciliation: unable or not appropriate to perform   Pamela M Lechevalier LPN      "

## 2017-03-22 NOTE — PROGRESS NOTES
Chief complaint: Right VY 1/13/17    Other relevant diagnoses: status post bilateral TKA     Subjective: This 86-year-old female is back home following inpatient rehabilitation for a right VY. An anterolateral approach was used. Her bone was found to be extremely osteopenic. A perforation of the lateral cortex distal to the greater trochanter occurred during surgery so her femoral stem was cemented in place and a calcar cable was used. On 2/22/17 she was seen in follow up and was doing well. OPPT was ordered.    Today she reports she is made quite a bit of progress.  She is now ambulating with a cane.  Her bilateral lower leg swelling has resolved enough that she does not need to wear compression stockings.  The therapist wants to continue working with her for another 3 weeks.    Examination: Elderly female in no obvious distress who came to the office using a cane in the left hand.  There was mild tenderness to palpation over her incision.  She is able to get up from a seated position smoothly, and ambulate across the room with a cane.  Hip abduction strength is grade 4.  Hip flexion strength was  4+.    Impression: Satisfactory 2 month check right VY    Plan: She'll continue working with physical therapy.  She will return for a 3 month check in April.  We will get x-rays on arrival.

## 2017-03-22 NOTE — MR AVS SNAPSHOT
"              After Visit Summary   3/22/2017    Claudine Bustos    MRN: [id number]           Patient Information     Date Of Birth          4/11/1930        Visit Information        Provider Department      3/22/2017 1:20 PM Jose Manuel Muñoz MD  ORTHOPEDICS        Today's Diagnoses     Aftercare following right hip joint replacement surgery    -  1       Follow-ups after your visit        Follow-up notes from your care team     Return in about 5 weeks (around 4/26/2017) for XOA.      Your next 10 appointments already scheduled     Apr 26, 2017  1:00 PM CDT   X[facility] with  XRAY   [facility]    [address] [facility] [place] [address] [phone]            Apr 26, 2017  1:20 PM CDT   (Arrive by 1:05 PM)   Return Visit with Jose Manuel Muñoz MD    ORTHOPEDICS ([facility])    [address]  [facility] [id number] [phone]              Who to contact     If you have questions or need follow up information about today's clinic visit or your schedule please contact  ORTHOPEDICS directly at [phone].  Normal or non-critical lab and imaging results will be communicated to you by MyChart, letter or phone within 4 business days after the clinic has received the results. If you do not hear from us within 7 days, please contact the clinic through Trendyolhart or phone. If you have a critical or abnormal lab result, we will notify you by phone as soon as possible.  Submit refill requests through TandemLaunch or call your pharmacy and they will forward the refill request to us. Please allow 3 business days for your refill to be completed.          Additional Information About Your Visit        MyChart Information     TandemLaunch lets you send messages to your doctor, view your test results, renew your prescriptions, schedule appointments and more. To sign up, go to [email or web address] . Click on \"Log in\" on the left side of the screen, which will take you to the Welcome page. " "Then click on \"Sign up Now\" on the right side of the page.     You will be asked to enter the access code listed below, as well as some personal information. Please follow the directions to create your username and password.     Your access code is: [email or web address]  Expires: 2017 11:56 AM     Your access code will  in 90 days. If you need help or a new code, please call your [facility] or [phone].        Care EveryWhere ID     This is your Care EveryWhere ID. This could be used by other organizations to access your [place] medical records  [id number]         Blood Pressure from Last 3 Encounters:   17 136/56   17 116/58   17 111/65    Weight from Last 3 Encounters:   17 133 lb 6.4 oz (60.5 kg)   17 128 lb (58.1 kg)   17 139 lb 5.3 oz (63.2 kg)              Today, you had the following     No orders found for display       Primary Care Provider Office Phone # Fax #    Katarina Gutierrez [phone]       [facility] [place] [address] [facility]  [place] [address]        Thank you!     Thank you for choosing  ORTHOPEDICS  for your care. Our goal is always to provide you with excellent care. Hearing back from our patients is one way we can continue to improve our services. Please take a few minutes to complete the written survey that you may receive in the mail after your visit with us. Thank you!             Your Updated Medication List - Protect others around you: Learn how to safely use, store and throw away your medicines at www.disposemymeds.org.          This list is accurate as of: 3/22/17  1:41 PM.  Always use your most recent med list.                   Brand Name Dispense Instructions for use    * acetaminophen 650 MG 8 hour tablet     100 tablet    Take 650 mg by mouth every 4 hours as needed for other (surgical pain)       * ARTHRITIS PAIN 650 MG CR tablet   Generic drug:  acetaminophen     100 tablet    TAKE 1 TABLET BY MOUTH EVERY 4 " HOURS AS NEEDED       Calcium + D3 600-200 MG-UNIT Tabs      Take 1 tablet by mouth daily       HYDROcodone-acetaminophen 5-325 MG per tablet    NORCO    60 tablet    Take 1 tablet by mouth every 4 hours as needed for moderate to severe pain maximum 6 tablet(s) per day       lisinopril 10 MG tablet    PRINIVIL/ZESTRIL    90 tablet    Take 1 tablet (10 mg) by mouth daily       multivitamin per tablet      Take 1 tablet by mouth daily with food.       senna-docusate 8.6-50 MG per tablet    SENOKOT-S;PERICOLACE    100 tablet    Take 1-2 tablets by mouth 2 times daily       * Notice:  This list has 2 medication(s) that are the same as other medications prescribed for you. Read the directions carefully, and ask your doctor or other care provider to review them with you.

## 2017-03-31 ENCOUNTER — OFFICE VISIT (OUTPATIENT)
Dept: FAMILY MEDICINE | Facility: OTHER | Age: 82
End: 2017-03-31
Attending: FAMILY MEDICINE
Payer: MEDICARE

## 2017-03-31 VITALS
SYSTOLIC BLOOD PRESSURE: 122 MMHG | BODY MASS INDEX: 25.85 KG/M2 | DIASTOLIC BLOOD PRESSURE: 72 MMHG | OXYGEN SATURATION: 97 % | HEART RATE: 104 BPM | WEIGHT: 128 LBS | TEMPERATURE: 96 F | RESPIRATION RATE: 17 BRPM

## 2017-03-31 DIAGNOSIS — Z96.641 STATUS POST TOTAL REPLACEMENT OF RIGHT HIP: ICD-10-CM

## 2017-03-31 DIAGNOSIS — I10 BENIGN ESSENTIAL HTN: Primary | ICD-10-CM

## 2017-03-31 PROCEDURE — 99213 OFFICE O/P EST LOW 20 MIN: CPT | Performed by: FAMILY MEDICINE

## 2017-03-31 PROCEDURE — 99212 OFFICE O/P EST SF 10 MIN: CPT

## 2017-03-31 RX ORDER — HYDROCODONE BITARTRATE AND ACETAMINOPHEN 5; 325 MG/1; MG/1
1 TABLET ORAL EVERY 4 HOURS PRN
Qty: 60 TABLET | Refills: 0 | Status: SHIPPED | OUTPATIENT
Start: 2017-03-31 | End: 2017-03-31

## 2017-03-31 RX ORDER — HYDROCODONE BITARTRATE AND ACETAMINOPHEN 5; 325 MG/1; MG/1
1 TABLET ORAL 2 TIMES DAILY PRN
Qty: 60 TABLET | Refills: 0 | Status: SHIPPED | OUTPATIENT
Start: 2017-03-31 | End: 2017-04-21

## 2017-03-31 RX ORDER — LISINOPRIL 5 MG/1
5 TABLET ORAL DAILY
Qty: 30 TABLET | Refills: 1 | Status: SHIPPED | OUTPATIENT
Start: 2017-03-31 | End: 2017-05-12

## 2017-03-31 ASSESSMENT — PAIN SCALES - GENERAL: PAINLEVEL: MODERATE PAIN (4)

## 2017-03-31 NOTE — NURSING NOTE
"Chief Complaint   Patient presents with     Surgical Followup       Initial /72  Pulse 104  Temp 96  F (35.6  C)  Resp 17  Wt 128 lb (58.1 kg)  SpO2 97%  BMI 25.85 kg/m2 Estimated body mass index is 25.85 kg/(m^2) as calculated from the following:    Height as of 3/22/17: 4' 11\" (1.499 m).    Weight as of this encounter: 128 lb (58.1 kg).  Medication Reconciliation: complete  "

## 2017-03-31 NOTE — MR AVS SNAPSHOT
After Visit Summary   3/31/2017    Claudine Bustos    MRN: [id number]           Patient Information     Date Of Birth          4/11/1930        Visit Information        Provider Department      3/31/2017 9:45 AM Katarina Gutierrez MD [facility]        Today's Diagnoses     Benign essential HTN    -  1    Status post total replacement of right hip           Follow-ups after your visit        Your next 10 appointments already scheduled     Apr 26, 2017  1:00 PM CDT   Xray with HC XRAY   [facility]    [address] [place] [address]   [phone]            Apr 26, 2017  1:20 PM CDT   (Arrive by 1:05 PM)   Return Visit with Jose Manuel Muñoz MD    ORTHOPEDICS ([facility])    [address]  [facility] [id number] [phone]            Apr 26, 2017  2:30 PM CDT   (Arrive by 2:15 PM)   SHORT with Katarina Gutierrez MD   [facility]    [address] [facility] [place] [address]   [phone]              Who to contact     If you have questions or need follow up information about today's clinic visit or your schedule please contact [facility] directly at [phone].  Normal or non-critical lab and imaging results will be communicated to you by MyChart, letter or phone within 4 business days after the clinic has received the results. If you do not hear from us within 7 days, please contact the clinic through MyChart or phone. If you have a critical or abnormal lab result, we will notify you by phone as soon as possible.  Submit refill requests through iCharts or call your pharmacy and they will forward the refill request to us. Please allow 3 business days for your refill to be completed.          Additional Information About Your Visit        ZOOM TechnologiesharLiveU Information     iCharts lets you send messages to your doctor, view your test results, renew your prescriptions, schedule appointments and  "more. To sign up, go to [email or web address] . Click on \"Log in\" on the left side of the screen, which will take you to the Welcome page. Then click on \"Sign up Now\" on the right side of the page.     You will be asked to enter the access code listed below, as well as some personal information. Please follow the directions to create your username and password.     Your access code is: [email or web address]  Expires: 2017 11:56 AM     Your access code will  in 90 days. If you need help or a new code, please call your [place] clinic or [phone].        Care EveryWhere ID     This is your Care EveryWhere ID. This could be used by other organizations to access your [place] medical records  [id number]        Your Vitals Were     Pulse Temperature Respirations Pulse Oximetry BMI (Body Mass Index)       104 96  F (35.6  C) 17 97% 25.85 kg/m2        Blood Pressure from Last 3 Encounters:   17 122/72   17 144/62   17 136/56    Weight from Last 3 Encounters:   17 128 lb (58.1 kg)   17 129 lb 3.2 oz (58.6 kg)   17 133 lb 6.4 oz (60.5 kg)              Today, you had the following     No orders found for display         Today's Medication Changes          These changes are accurate as of: 3/31/17  9:49 AM.  If you have any questions, ask your nurse or doctor.               Start taking these medicines.        Dose/Directions    HYDROcodone-acetaminophen 5-325 MG per tablet   Commonly known as:  NORCO   Used for:  Status post total replacement of right hip   Started by:  Katarina Gutierrez MD        Dose:  1 tablet   Take 1 tablet by mouth 2 times daily as needed for moderate to severe pain   Quantity:  60 tablet   Refills:  0         These medicines have changed or have updated prescriptions.        Dose/Directions    lisinopril 5 MG tablet   Commonly known as:  PRINIVIL/ZESTRIL   This may have changed:    - medication strength  - how much to take   Used for:  Benign essential " HTN   Changed by:  Katarina Gutierrez MD        Dose:  5 mg   Take 1 tablet (5 mg) by mouth daily   Quantity:  30 tablet   Refills:  1            Where to get your medicines      These medications were sent to [facility] - [address]  [address] [place] [address]     Phone:  [phone]     lisinopril 5 MG tablet         Some of these will need a paper prescription and others can be bought over the counter.  Ask your nurse if you have questions.     Bring a paper prescription for each of these medications     HYDROcodone-acetaminophen 5-325 MG per tablet                Primary Care Provider Office Phone # Fax #    Katarina Gutierrez [phone]       [facility] [place] [address]  [place] [address]        Thank you!     Thank you for choosing [facility]  for your care. Our goal is always to provide you with excellent care. Hearing back from our patients is one way we can continue to improve our services. Please take a few minutes to complete the written survey that you may receive in the mail after your visit with us. Thank you!             Your Updated Medication List - Protect others around you: Learn how to safely use, store and throw away your medicines at www.disposemymeds.org.          This list is accurate as of: 3/31/17  9:49 AM.  Always use your most recent med list.                   Brand Name Dispense Instructions for use    ARTHRITIS PAIN 650 MG CR tablet   Generic drug:  acetaminophen     100 tablet    TAKE 1 TABLET BY MOUTH EVERY 4 HOURS AS NEEDED       Calcium + D3 600-200 MG-UNIT Tabs      Take 1 tablet by mouth daily       HYDROcodone-acetaminophen 5-325 MG per tablet    NORCO    60 tablet    Take 1 tablet by mouth 2 times daily as needed for moderate to severe pain       lisinopril 5 MG tablet    PRINIVIL/ZESTRIL    30 tablet    Take 1 tablet (5 mg) by mouth daily       multivitamin per tablet      Take 1 tablet by  mouth daily with food.

## 2017-04-07 ENCOUNTER — TELEPHONE (OUTPATIENT)
Dept: FAMILY MEDICINE | Facility: OTHER | Age: 82
End: 2017-04-07

## 2017-04-07 ENCOUNTER — OFFICE VISIT (OUTPATIENT)
Dept: FAMILY MEDICINE | Facility: OTHER | Age: 82
End: 2017-04-07
Attending: FAMILY MEDICINE
Payer: MEDICARE

## 2017-04-07 VITALS
OXYGEN SATURATION: 95 % | TEMPERATURE: 96 F | DIASTOLIC BLOOD PRESSURE: 70 MMHG | WEIGHT: 128 LBS | BODY MASS INDEX: 25.85 KG/M2 | RESPIRATION RATE: 17 BRPM | HEART RATE: 98 BPM | SYSTOLIC BLOOD PRESSURE: 130 MMHG

## 2017-04-07 DIAGNOSIS — L98.9 SKIN LESION: Primary | ICD-10-CM

## 2017-04-07 DIAGNOSIS — I10 BENIGN ESSENTIAL HYPERTENSION: ICD-10-CM

## 2017-04-07 DIAGNOSIS — C44.320 SCC (SQUAMOUS CELL CARCINOMA), FACE: ICD-10-CM

## 2017-04-07 PROCEDURE — 99214 OFFICE O/P EST MOD 30 MIN: CPT | Performed by: FAMILY MEDICINE

## 2017-04-07 PROCEDURE — 99212 OFFICE O/P EST SF 10 MIN: CPT

## 2017-04-07 ASSESSMENT — PAIN SCALES - GENERAL: PAINLEVEL: NO PAIN (0)

## 2017-04-07 NOTE — MR AVS SNAPSHOT
After Visit Summary   4/7/2017    Claudine Bustos    MRN: [id number]           Patient Information     Date Of Birth          4/11/1930        Visit Information        Provider Department      4/7/2017 11:45 AM Katarina Gutierrez MD [facility]        Today's Diagnoses     Skin lesion    -  1       Follow-ups after your visit        Additional Services     GENERAL SURG ADULT REFERRAL       Your provider has referred you to: N: [facility] [place] [phone]   [email or web address]    Previously had a lesion by her left eye, and a surgeon here removed it, but she would prefer a 'lady doctor'    Please be aware that coverage of these services is subject to the terms and limitations of your health insurance plan.  Call member services at your health plan with any benefit or coverage questions.      Please bring the following with you to your appointment:    (1) Any X-Rays, CTs or MRIs which have been performed.  Contact the facility where they were done to arrange for  prior to your scheduled appointment.   (2) List of current medications   (3) This referral request   (4) Any documents/labs given to you for this referral                  Your next 10 appointments already scheduled     Apr 07, 2017 11:45 AM CDT   (Arrive by 11:30 AM)   SHORT with Katarina Gutierrez MD   [facility] [place] ([facility] [place] [facility])    [address]  [place] [address]   [phone]            Apr 26, 2017  1:00 PM CDT   Xray with  XRAY   [facility] [place] ([facility] [place] [facility])    [address]  [place] [address]   [phone]            Apr 26, 2017  1:20 PM CDT   (Arrive by 1:05 PM)   Return Visit with Jose Manuel Muñoz MD    ORTHOPEDICS ([facility] [place] [facility])    [address]  [place] [address]   [phone]            Jul 10, 2017 11:00 AM CDT   (Arrive by 10:45 AM)   New Visit with LANEY Altman MD  "  [facility]    [address] [place] [id number] [phone]              Who to contact     If you have questions or need follow up information about today's clinic visit or your schedule please contact [facility] directly at [phone].  Normal or non-critical lab and imaging results will be communicated to you by MyChart, letter or phone within 4 business days after the clinic has received the results. If you do not hear from us within 7 days, please contact the clinic through MyChart or phone. If you have a critical or abnormal lab result, we will notify you by phone as soon as possible.  Submit refill requests through Nasuni or call your pharmacy and they will forward the refill request to us. Please allow 3 business days for your refill to be completed.          Additional Information About Your Visit        MyChart Information     Nasuni lets you send messages to your doctor, view your test results, renew your prescriptions, schedule appointments and more. To sign up, go to [email or web address] . Click on \"Log in\" on the left side of the screen, which will take you to the Welcome page. Then click on \"Sign up Now\" on the right side of the page.     You will be asked to enter the access code listed below, as well as some personal information. Please follow the directions to create your username and password.     Your access code is: [email or web address]  Expires: 2017 11:56 AM     Your access code will  in 90 days. If you need help or a new code, please call your [facility] or [phone].        Care EveryWhere ID     This is your Care EveryWhere ID. This could be used by other organizations to access your [place] medical records  [id number]        Your Vitals Were     Pulse Temperature Respirations Pulse Oximetry BMI (Body Mass Index)       98 96  F (35.6  C) 17 95% 25.85 kg/m2        Blood Pressure from Last 3 Encounters: "   04/07/17 130/70   03/31/17 122/72   03/22/17 144/62    Weight from Last 3 Encounters:   04/07/17 128 lb (58.1 kg)   03/31/17 128 lb (58.1 kg)   03/22/17 129 lb 3.2 oz (58.6 kg)              We Performed the Following     GENERAL SURG ADULT REFERRAL        Primary Care Provider Office Phone # Fax #    Katarina Gutierrez [phone]       [facility] [place] [address] [place] [address]        Thank you!     Thank you for choosing [facility] [place]  for your care. Our goal is always to provide you with excellent care. Hearing back from our patients is one way we can continue to improve our services. Please take a few minutes to complete the written survey that you may receive in the mail after your visit with us. Thank you!             Your Updated Medication List - Protect others around you: Learn how to safely use, store and throw away your medicines at www.disposemymeds.org.          This list is accurate as of: 4/7/17 10:31 AM.  Always use your most recent med list.                   Brand Name Dispense Instructions for use    ARTHRITIS PAIN 650 MG CR tablet   Generic drug:  acetaminophen     100 tablet    TAKE 1 TABLET BY MOUTH EVERY 4 HOURS AS NEEDED       Calcium + D3 600-200 MG-UNIT Tabs      Take 1 tablet by mouth daily       HYDROcodone-acetaminophen 5-325 MG per tablet    NORCO    60 tablet    Take 1 tablet by mouth 2 times daily as needed for moderate to severe pain       lisinopril 5 MG tablet    PRINIVIL/ZESTRIL    30 tablet    Take 1 tablet (5 mg) by mouth daily       multivitamin per tablet      Take 1 tablet by mouth daily with food.

## 2017-04-07 NOTE — PROGRESS NOTES
SUBJECTIVE:                                                    Claudine Bustos is a 86 year old female who presents to clinic today for the following health issues:        Eye problem       Duration: beginning of week    Description (location/character/radiation): under left eye    Intensity:  mild    Accompanying signs and symptoms: mole that was removed and grew back and started bleeding, dried blood on there, itching    History (similar episodes/previous evaluation): None    Precipitating or alleviating factors: None    Therapies tried and outcome: None       Hypertension Follow-up      Outpatient blood pressures are not being checked.    Low Salt Diet: no added salt       Problem list and histories reviewed & adjusted, as indicated.  Additional history: as documented      ROS:  Constitutional, HEENT, cardiovascular, pulmonary, gi and gu systems are negative, except as otherwise noted.    OBJECTIVE:                                                    /70  Pulse 98  Temp 96  F (35.6  C)  Resp 17  Wt 128 lb (58.1 kg)  SpO2 95%  BMI 25.85 kg/m2  Body mass index is 25.85 kg/(m^2).  GENERAL APPEARANCE: healthy, alert and no distress  RESP: lungs clear to auscultation - no rales, rhonchi or wheezes  CV: regular rates and rhythm, normal S1 S2, no S3 or S4 and no murmur, click or rub  SKIN: papule - lower eye lid, left  PSYCH: mentation appears normal and affect normal/bright       ASSESSMENT/PLAN:                                                    1. Skin lesion  concer for SCC, will set her up to see dermatology [place]      2.  Benign essential htn  Plan:  Stable, continue 5mg lisinopril    Patient was agreeable to this plan and had no further questions.  See Patient Instructions    Katarina Gutierrez MD  [facility]

## 2017-04-07 NOTE — TELEPHONE ENCOUNTER
8:11 AM    Reason for Call: OVERBOOK    Patient is having the following symptoms: LT eye mole/bleeding//paperwork for jury duty for 2-3  days.    The patient is requesting an appointment for today with Dr Gutierrez.    Was an appointment offered for this call? Yes    Preferred method for responding to this message: Telephone Call    If we cannot reach you directly, may we leave a detailed response at the number you provided? Yes    Can this message wait until your PCP/provider returns, if unavailable today? Not applicable    Guerline Betancur

## 2017-04-07 NOTE — NURSING NOTE
"Chief Complaint   Patient presents with     Mole       Initial /70  Pulse 98  Temp 96  F (35.6  C)  Resp 17  Wt 128 lb (58.1 kg)  SpO2 95%  BMI 25.85 kg/m2 Estimated body mass index is 25.85 kg/(m^2) as calculated from the following:    Height as of 3/22/17: 4' 11\" (1.499 m).    Weight as of this encounter: 128 lb (58.1 kg).  Medication Reconciliation: complete  "

## 2017-04-19 DIAGNOSIS — Z96.641 STATUS POST TOTAL REPLACEMENT OF RIGHT HIP: ICD-10-CM

## 2017-04-20 RX ORDER — HYDROCODONE BITARTRATE AND ACETAMINOPHEN 5; 325 MG/1; MG/1
TABLET ORAL
Qty: 60 TABLET | Refills: 0 | OUTPATIENT
Start: 2017-04-20

## 2017-04-20 NOTE — TELEPHONE ENCOUNTER
Hydrocodone-APAP 5-325mg      Last Written Prescription Date:  3-  Last Fill Quantity: 60,   # refills: 0  Last Office Visit with [facility], [facility] or [facility] prescribing provider: 4-7-2017  Future Office visit:    Next 5 appointments (look out 90 days)     Apr 26, 2017  1:20 PM CDT   (Arrive by 1:05 PM)   Return Visit with Jose Manuel Muñoz MD    ORTHOPEDICS ([facility])    [address] [facility] [address] [phone]                   Routing refill request to provider for review/approval because:  Drug not on the [facility], [facility] or [facility] refill protocol or controlled substance

## 2017-04-21 DIAGNOSIS — Z96.641 STATUS POST TOTAL REPLACEMENT OF RIGHT HIP: ICD-10-CM

## 2017-04-24 RX ORDER — HYDROCODONE BITARTRATE AND ACETAMINOPHEN 5; 325 MG/1; MG/1
TABLET ORAL
Qty: 60 TABLET | Refills: 0 | Status: SHIPPED | OUTPATIENT
Start: 2017-04-28 | End: 2017-05-12

## 2017-04-26 ENCOUNTER — OFFICE VISIT (OUTPATIENT)
Dept: ORTHOPEDICS | Facility: OTHER | Age: 82
End: 2017-04-26
Attending: ORTHOPAEDIC SURGERY
Payer: MEDICARE

## 2017-04-26 ENCOUNTER — APPOINTMENT (OUTPATIENT)
Dept: GENERAL RADIOLOGY | Facility: OTHER | Age: 82
End: 2017-04-26
Attending: ORTHOPAEDIC SURGERY
Payer: MEDICARE

## 2017-04-26 VITALS
WEIGHT: 133 LBS | SYSTOLIC BLOOD PRESSURE: 154 MMHG | TEMPERATURE: 96.4 F | BODY MASS INDEX: 28.69 KG/M2 | OXYGEN SATURATION: 97 % | DIASTOLIC BLOOD PRESSURE: 74 MMHG | HEIGHT: 57 IN | HEART RATE: 92 BPM

## 2017-04-26 DIAGNOSIS — Z53.9 ERRONEOUS ENCOUNTER--DISREGARD: Primary | ICD-10-CM

## 2017-04-26 DIAGNOSIS — Z96.641 STATUS POST TOTAL REPLACEMENT OF RIGHT HIP: ICD-10-CM

## 2017-04-26 DIAGNOSIS — M25.551 HIP JOINT PAINFUL ON MOVEMENT, RIGHT: Primary | ICD-10-CM

## 2017-04-26 PROCEDURE — 99212 OFFICE O/P EST SF 10 MIN: CPT

## 2017-04-26 PROCEDURE — 99213 OFFICE O/P EST LOW 20 MIN: CPT | Performed by: ORTHOPAEDIC SURGERY

## 2017-04-26 PROCEDURE — 73502 X-RAY EXAM HIP UNI 2-3 VIEWS: CPT | Mod: TC,RT

## 2017-04-26 RX ORDER — TRAMADOL HYDROCHLORIDE 50 MG/1
50-100 TABLET ORAL
Qty: 60 TABLET | Refills: 2 | Status: SHIPPED | OUTPATIENT
Start: 2017-04-26 | End: 2017-05-12

## 2017-04-26 ASSESSMENT — PAIN SCALES - GENERAL: PAINLEVEL: MILD PAIN (3)

## 2017-04-26 NOTE — NURSING NOTE
"Chief Complaint   Patient presents with     Surgical Followup     3 month follow up on right hip total hip replacement.       Initial /74 (BP Location: Right arm, Patient Position: Chair, Cuff Size: Adult Regular)  Pulse 92  Temp 96.4  F (35.8  C) (Tympanic)  Ht 4' 9\" (1.448 m)  Wt 133 lb (60.3 kg)  SpO2 97%  BMI 28.78 kg/m2 Estimated body mass index is 28.78 kg/(m^2) as calculated from the following:    Height as of this encounter: 4' 9\" (1.448 m).    Weight as of this encounter: 133 lb (60.3 kg).  Medication Reconciliation: complete   Norma Alford LPN      "

## 2017-04-26 NOTE — MR AVS SNAPSHOT
"              After Visit Summary   2017    Claudine Bustos    MRN: [id number]           Patient Information     Date Of Birth          1930        Visit Information        Provider Department      2017 1:20 PM Jose Manuel Muñoz MD  [facility]        Today's Diagnoses     Hip joint painful on movement, right    -  1    Status post total replacement of right hip           Follow-ups after your visit        Follow-up notes from your care team     Return if symptoms worsen or fail to improve.      Who to contact     If you have questions or need follow up information about today's clinic visit or your schedule please contact  [facility] directly at [phone].  Normal or non-critical lab and imaging results will be communicated to you by MyChart, letter or phone within 4 business days after the clinic has received the results. If you do not hear from us within 7 days, please contact the clinic through MyChart or phone. If you have a critical or abnormal lab result, we will notify you by phone as soon as possible.  Submit refill requests through [employer] or call your pharmacy and they will forward the refill request to us. Please allow 3 business days for your refill to be completed.          Additional Information About Your Visit        MyChart Information     [employer] lets you send messages to your doctor, view your test results, renew your prescriptions, schedule appointments and more. To sign up, go to [email or web address] . Click on \"Log in\" on the left side of the screen, which will take you to the Welcome page. Then click on \"Sign up Now\" on the right side of the page.     You will be asked to enter the access code listed below, as well as some personal information. Please follow the directions to create your username and password.     Your access code is: [email or web address]  Expires: 2017  3:05 PM     Your access code will  in 90 days. If you need help or a new code, please call " "your [place] clinic or [phone].        Care EveryWhere ID     This is your Care EveryWhere ID. This could be used by other organizations to access your [place] medical records  [id number]        Your Vitals Were     Pulse Temperature Height Pulse Oximetry BMI (Body Mass Index)       92 96.4  F (35.8  C) (Tympanic) 4' 9\" (1.448 m) 97% 28.78 kg/m2        Blood Pressure from Last 3 Encounters:   04/26/17 154/74   04/07/17 130/70   03/31/17 122/72    Weight from Last 3 Encounters:   04/26/17 133 lb (60.3 kg)   04/07/17 128 lb (58.1 kg)   03/31/17 128 lb (58.1 kg)              We Performed the Following     XR Pelvis including Hip Right 2-3 Views(Clinic Performed)          Today's Medication Changes          These changes are accurate as of: 4/26/17  4:25 PM.  If you have any questions, ask your nurse or doctor.               Start taking these medicines.        Dose/Directions    traMADol 50 MG tablet   Commonly known as:  ULTRAM   Used for:  Status post total replacement of right hip   Started by:  Jose Manuel Muñoz MD        Dose:   mg   Take 1-2 tablets ( mg) by mouth nightly as needed for pain maximum 2 tablet(s) per day   Quantity:  60 tablet   Refills:  2            Where to get your medicines      Some of these will need a paper prescription and others can be bought over the counter.  Ask your nurse if you have questions.     Bring a paper prescription for each of these medications     traMADol 50 MG tablet                Primary Care Provider Office Phone # Fax #    Katarina Gutierrez [phone]       [facility] [place] [address]  [place] [address]        Thank you!     Thank you for choosing  [facility]  for your care. Our goal is always to provide you with excellent care. Hearing back from our patients is one way we can continue to improve our services. Please take a few minutes to complete the written survey that you may receive in the mail after your " visit with us. Thank you!             Your Updated Medication List - Protect others around you: Learn how to safely use, store and throw away your medicines at www.disposemymeds.org.          This list is accurate as of: 4/26/17  4:25 PM.  Always use your most recent med list.                   Brand Name Dispense Instructions for use    ARTHRITIS PAIN 650 MG CR tablet   Generic drug:  acetaminophen     100 tablet    TAKE 1 TABLET BY MOUTH EVERY 4 HOURS AS NEEDED       Calcium + D3 600-200 MG-UNIT Tabs      Take 1 tablet by mouth daily       HYDROcodone-acetaminophen 5-325 MG per tablet   Start taking on:  4/28/2017    NORCO    60 tablet    TAKE 1 TABLET BY MOUTH TWICE DAILY AS NEEDED FOR MODERATE TO SEVERE PAIN       lisinopril 5 MG tablet    PRINIVIL/ZESTRIL    30 tablet    Take 1 tablet (5 mg) by mouth daily       multivitamin per tablet      Take 1 tablet by mouth daily with food.       traMADol 50 MG tablet    ULTRAM    60 tablet    Take 1-2 tablets ( mg) by mouth nightly as needed for pain maximum 2 tablet(s) per day

## 2017-04-26 NOTE — PROGRESS NOTES
"Chief complaint: Right VY 1/13/17    Subjective: This 87-year-old female underwent a right VY on the above date using an anterolateral approach.  Her bone was extremely osteopenic.  A perforation of the lateral cortex just distal to the greater trochanter occurred so the femoral stem was cemented and a calcar cable was used.  She has been doing physical therapy to rehabilitate her hip, and 2 weeks ago was discharged from PT to a home exercise program, which she claims she is doing faithfully.  She ambulates with a cane in the left hand.  She realizes she will need to use a cane indefinitely.  She only has pain in the posterolateral aspect of the right hip if she \"does too much\" and sometimes at night.  She has been using Norco at bedtime for the night pain.    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    Examination: Healthy-appearing elderly female in no obvious distress.  While ambulating with a cane she demonstrates a good gait pattern, but without the cane she has a Trendelenburg pattern.  There was mild tenderness over the greater trochanter of the right hip.  Hip abduction strength was grade 5 minus.  Knee extension strength was graded 5.    X-ray; plain films right hip taken today show a well aligned hybrid VY.  There is a calcar cable around the proximal femur.  The tip of her greater trochanter is displaced posteriorly and healed in that position.    Impression: 4 months status post right VY with greater trochanter fracture and malunion.    Plan: She will continue to do her home exercise program, and walk with a cane, indefinitely.  She is agreeable to that.  I suggested she get off the narcotic medication.  I prescribed Ultram 50 mg tablets to take 1 or 2 at night as needed.  She'll return as needed.  "

## 2017-05-12 ENCOUNTER — APPOINTMENT (OUTPATIENT)
Dept: LAB | Facility: OTHER | Age: 82
End: 2017-05-12
Attending: NURSE PRACTITIONER
Payer: MEDICARE

## 2017-05-12 ENCOUNTER — TELEPHONE (OUTPATIENT)
Dept: FAMILY MEDICINE | Facility: OTHER | Age: 82
End: 2017-05-12

## 2017-05-12 ENCOUNTER — OFFICE VISIT (OUTPATIENT)
Dept: OBGYN | Facility: OTHER | Age: 82
End: 2017-05-12
Attending: NURSE PRACTITIONER
Payer: MEDICARE

## 2017-05-12 VITALS
SYSTOLIC BLOOD PRESSURE: 136 MMHG | TEMPERATURE: 98.3 F | BODY MASS INDEX: 28.78 KG/M2 | HEART RATE: 92 BPM | OXYGEN SATURATION: 94 % | WEIGHT: 133 LBS | DIASTOLIC BLOOD PRESSURE: 84 MMHG

## 2017-05-12 DIAGNOSIS — R30.0 DYSURIA: Primary | ICD-10-CM

## 2017-05-12 DIAGNOSIS — I10 BENIGN ESSENTIAL HTN: ICD-10-CM

## 2017-05-12 LAB
ALBUMIN UR-MCNC: 10 MG/DL
AMORPH CRY #/AREA URNS HPF: ABNORMAL /HPF
APPEARANCE UR: ABNORMAL
BACTERIA #/AREA URNS HPF: ABNORMAL /HPF
BILIRUB UR QL STRIP: NEGATIVE
COLOR UR AUTO: YELLOW
GLUCOSE UR STRIP-MCNC: NEGATIVE MG/DL
HGB UR QL STRIP: ABNORMAL
KETONES UR STRIP-MCNC: NEGATIVE MG/DL
LEUKOCYTE ESTERASE UR QL STRIP: NEGATIVE
MUCOUS THREADS #/AREA URNS LPF: PRESENT /LPF
NITRATE UR QL: NEGATIVE
PH UR STRIP: 6.5 PH (ref 4.7–8)
RBC #/AREA URNS AUTO: 46 /HPF (ref 0–2)
SP GR UR STRIP: 1.01 (ref 1–1.03)
SQUAMOUS #/AREA URNS AUTO: 2 /HPF (ref 0–1)
URN SPEC COLLECT METH UR: ABNORMAL
UROBILINOGEN UR STRIP-MCNC: NORMAL MG/DL (ref 0–2)
WBC #/AREA URNS AUTO: 10 /HPF (ref 0–2)

## 2017-05-12 PROCEDURE — 81001 URINALYSIS AUTO W/SCOPE: CPT | Mod: ZL | Performed by: NURSE PRACTITIONER

## 2017-05-12 PROCEDURE — 99212 OFFICE O/P EST SF 10 MIN: CPT | Performed by: NURSE PRACTITIONER

## 2017-05-12 PROCEDURE — 87086 URINE CULTURE/COLONY COUNT: CPT | Mod: ZL | Performed by: NURSE PRACTITIONER

## 2017-05-12 RX ORDER — NITROFURANTOIN 25; 75 MG/1; MG/1
100 CAPSULE ORAL 2 TIMES DAILY
Qty: 14 CAPSULE | Refills: 0 | Status: SHIPPED | OUTPATIENT
Start: 2017-05-12 | End: 2017-05-19

## 2017-05-12 RX ORDER — LISINOPRIL 5 MG/1
TABLET ORAL
Qty: 30 TABLET | Refills: 9 | Status: SHIPPED | OUTPATIENT
Start: 2017-05-12 | End: 2018-02-01 | Stop reason: DRUGHIGH

## 2017-05-12 ASSESSMENT — PAIN SCALES - GENERAL: PAINLEVEL: SEVERE PAIN (6)

## 2017-05-12 NOTE — MR AVS SNAPSHOT
"              After Visit Summary   5/12/2017    Claudine Bustos    MRN: [id number]           Patient Information     Date Of Birth          4/11/1930        Visit Information        Provider Department      5/12/2017 1:15 PM Amalia Sparks NP [facility]        Today's Diagnoses     Dysuria    -  1      Care Instructions    Follow up as needed        Follow-ups after your visit        Your next 10 appointments already scheduled     May 19, 2017  9:15 AM CDT   (Arrive by 9:00 AM)   SHORT with Katarina Gutierrez MD   [facility] ([facility])    [address]  [facility] [address]   [phone]              Who to contact     If you have questions or need follow up information about today's clinic visit or your schedule please contact [facility] directly at [phone].  Normal or non-critical lab and imaging results will be communicated to you by MyChart, letter or phone within 4 business days after the clinic has received the results. If you do not hear from us within 7 days, please contact the clinic through MyChart or phone. If you have a critical or abnormal lab result, we will notify you by phone as soon as possible.  Submit refill requests through [employer] or call your pharmacy and they will forward the refill request to us. Please allow 3 business days for your refill to be completed.          Additional Information About Your Visit        MyChart Information     [employer] lets you send messages to your doctor, view your test results, renew your prescriptions, schedule appointments and more. To sign up, go to [email or web address] . Click on \"Log in\" on the left side of the screen, which will take you to the Welcome page. Then click on \"Sign up Now\" on the right side of the page.     You will be asked to enter the access code listed below, as well as some personal information. Please follow the directions to create your username and password.     Your access " code is: [email or web address]  Expires: 2017  3:05 PM     Your access code will  in 90 days. If you need help or a new code, please call your [place] clinic or [phone].        Care EveryWhere ID     This is your Care EveryWhere ID. This could be used by other organizations to access your [place] medical records  [id number]        Your Vitals Were     Pulse Temperature Pulse Oximetry BMI (Body Mass Index)          92 98.3  F (36.8  C) (Tympanic) 94% 28.78 kg/m2         Blood Pressure from Last 3 Encounters:   17 136/84   17 154/74   17 130/70    Weight from Last 3 Encounters:   17 133 lb (60.3 kg)   17 133 lb (60.3 kg)   17 128 lb (58.1 kg)              We Performed the Following     UA reflex to Microscopic     Urine Culture Aerobic Bacterial          Today's Medication Changes          These changes are accurate as of: 17 11:59 PM.  If you have any questions, ask your nurse or doctor.               Start taking these medicines.        Dose/Directions    nitrofurantoin (macrocrystal-monohydrate) 100 MG capsule   Commonly known as:  MACROBID   Used for:  Dysuria   Started by:  Amalia Sparks NP        Dose:  100 mg   Take 1 capsule (100 mg) by mouth 2 times daily   Quantity:  14 capsule   Refills:  0         These medicines have changed or have updated prescriptions.        Dose/Directions    lisinopril 5 MG tablet   Commonly known as:  PRINIVIL/ZESTRIL   This may have changed:  See the new instructions.   Used for:  Benign essential HTN   Changed by:  Katarina Gutierrez MD        TAKE 1 TABLET BY MOUTH DAILY   Quantity:  30 tablet   Refills:  9            Where to get your medicines      These medications were sent to [facility] - [address]  [address] [address]     Phone:  [phone]     lisinopril 5 MG tablet    nitrofurantoin (macrocrystal-monohydrate) 100 MG capsule                Primary Care Provider Office  Phone # Fax #    Katarina Gutierrez [phone]       [facility] [place] [address]  [place] [address]        Thank you!     Thank you for choosing [facility]  for your care. Our goal is always to provide you with excellent care. Hearing back from our patients is one way we can continue to improve our services. Please take a few minutes to complete the written survey that you may receive in the mail after your visit with us. Thank you!             Your Updated Medication List - Protect others around you: Learn how to safely use, store and throw away your medicines at www.disposemymeds.org.          This list is accurate as of: 5/12/17 11:59 PM.  Always use your most recent med list.                   Brand Name Dispense Instructions for use    ARTHRITIS PAIN 650 MG CR tablet   Generic drug:  acetaminophen     100 tablet    TAKE 1 TABLET BY MOUTH EVERY 4 HOURS AS NEEDED       Calcium + D3 600-200 MG-UNIT Tabs      Take 1 tablet by mouth daily       lisinopril 5 MG tablet    PRINIVIL/ZESTRIL    30 tablet    TAKE 1 TABLET BY MOUTH DAILY       multivitamin per tablet      Take 1 tablet by mouth daily with food.       nitrofurantoin (macrocrystal-monohydrate) 100 MG capsule    MACROBID    14 capsule    Take 1 capsule (100 mg) by mouth 2 times daily

## 2017-05-12 NOTE — TELEPHONE ENCOUNTER
Lisinopril      Last Written Prescription Date: 3/31/2017  Last Fill Quantity: 30, # refills: 1  Last Office Visit with G, P or [facility] prescribing provider: 4/7/2017       Potassium   Date Value Ref Range Status   01/26/2017 3.9 3.4 - 5.3 mmol/L Final     Creatinine   Date Value Ref Range Status   01/26/2017 0.68 0.52 - 1.04 mg/dL Final     BP Readings from Last 3 Encounters:   05/12/17 136/84   04/26/17 154/74   04/07/17 130/70

## 2017-05-12 NOTE — TELEPHONE ENCOUNTER
7:55 AM    Reason for Call: OVERBOOK    Patient is having the following symptoms: Poss UTI for 2 days.    The patient is requesting an appointment for today with Dr Amaral (prefers female and PCP is out).    Was an appointment offered for this call? Yes    Preferred method for responding to this message: Telephone Call    If we cannot reach you directly, may we leave a detailed response at the number you provided? Yes    Can this message wait until your PCP/provider returns, if unavailable today? No, PCP out    Guerline Betancur

## 2017-05-12 NOTE — NURSING NOTE
"Chief Complaint   Patient presents with     UTI     Burning and frequency started 3 days ago.        Initial /84 (BP Location: Right arm, Patient Position: Chair, Cuff Size: Adult Regular)  Pulse 92  Temp 98.3  F (36.8  C) (Tympanic)  Wt 133 lb (60.3 kg)  SpO2 94%  BMI 28.78 kg/m2 Estimated body mass index is 28.78 kg/(m^2) as calculated from the following:    Height as of 4/26/17: 4' 9\" (1.448 m).    Weight as of this encounter: 133 lb (60.3 kg).  Medication Reconciliation: eldon Chilel      "

## 2017-05-14 LAB
BACTERIA SPEC CULT: NO GROWTH
MICRO REPORT STATUS: NORMAL
SPECIMEN SOURCE: NORMAL

## 2017-05-16 NOTE — PROGRESS NOTES
[facility]                HPI   Claudine presents with back pain and dysuria for 2 days.  Symptoms are increasing.  Burning and frequency without hematuria. Denies fever. Denies vaginal bleeding or discharge.                Medications:     Current Outpatient Prescriptions Ordered in Epic   Medication     nitrofurantoin, macrocrystal-monohydrate, (MACROBID) 100 MG capsule     ARTHRITIS PAIN 650 MG CR tablet     Calcium Carb-Cholecalciferol (CALCIUM + D3) 600-200 MG-UNIT TABS     Multiple Vitamin (MULTIVITAMIN) per tablet     lisinopril (PRINIVIL/ZESTRIL) 5 MG tablet     No current Epic-ordered facility-administered medications on file.                 Allergies:   Review of patient's allergies indicates no known allergies.         Review of Systems:   The 5 point Review of Systems is negative other than noted in the HPI                     Physical Exam:   Blood pressure 136/84, pulse 92, temperature 98.3  F (36.8  C), temperature source Tympanic, weight 133 lb (60.3 kg), SpO2 94 %, not currently breastfeeding.  Constitutional:   awake, alert, cooperative, no apparent distress, and appears stated age     Back:   costal vertebral tenderness present bilaterally     Abdomen:   soft, non-distended and non-tender              Data:   All laboratory data reviewed  Urine to culture          Assessment and Plan:   Dysuria - plan to start antibiotics pending urine culture.  She is to return if symptoms are increasing or if she develops increasing pain or fever.      JULISSA Russell  5/16/2017  8:25 AM

## 2017-05-19 ENCOUNTER — OFFICE VISIT (OUTPATIENT)
Dept: FAMILY MEDICINE | Facility: OTHER | Age: 82
End: 2017-05-19
Attending: FAMILY MEDICINE
Payer: MEDICARE

## 2017-05-19 ENCOUNTER — HOSPITAL ENCOUNTER (OUTPATIENT)
Dept: CT IMAGING | Facility: HOSPITAL | Age: 82
Discharge: HOME | End: 2017-05-19
Attending: FAMILY MEDICINE | Admitting: FAMILY MEDICINE
Payer: MEDICARE

## 2017-05-19 VITALS
DIASTOLIC BLOOD PRESSURE: 78 MMHG | OXYGEN SATURATION: 97 % | HEART RATE: 95 BPM | SYSTOLIC BLOOD PRESSURE: 140 MMHG | BODY MASS INDEX: 27.27 KG/M2 | TEMPERATURE: 97 F | WEIGHT: 126 LBS | RESPIRATION RATE: 20 BRPM

## 2017-05-19 DIAGNOSIS — N28.89 RENAL MASS OF UNKNOWN NATURE: ICD-10-CM

## 2017-05-19 DIAGNOSIS — R31.9 HEMATURIA: Primary | ICD-10-CM

## 2017-05-19 DIAGNOSIS — Z01.812 BLOOD TESTS PRIOR TO TREATMENT OR PROCEDURE: Primary | ICD-10-CM

## 2017-05-19 DIAGNOSIS — N21.8 CALCULUS OF OTHER LOWER URINARY TRACT LOCATION: ICD-10-CM

## 2017-05-19 DIAGNOSIS — C44.310 BCC (BASAL CELL CARCINOMA), FACE: ICD-10-CM

## 2017-05-19 DIAGNOSIS — B37.31 CANDIDIASIS OF VULVA AND VAGINA: ICD-10-CM

## 2017-05-19 PROCEDURE — 99212 OFFICE O/P EST SF 10 MIN: CPT | Mod: 25

## 2017-05-19 PROCEDURE — 99214 OFFICE O/P EST MOD 30 MIN: CPT | Performed by: FAMILY MEDICINE

## 2017-05-19 PROCEDURE — 74176 CT ABD & PELVIS W/O CONTRAST: CPT | Mod: TC

## 2017-05-19 RX ORDER — HYDROCODONE BITARTRATE AND ACETAMINOPHEN 5; 325 MG/1; MG/1
.5-1 TABLET ORAL EVERY 4 HOURS PRN
Qty: 30 TABLET | Refills: 0 | Status: SHIPPED | OUTPATIENT
Start: 2017-05-19 | End: 2017-07-26

## 2017-05-19 RX ORDER — FLUCONAZOLE 150 MG/1
150 TABLET ORAL ONCE
Qty: 1 TABLET | Refills: 0 | Status: SHIPPED | OUTPATIENT
Start: 2017-05-19 | End: 2017-05-19

## 2017-05-19 ASSESSMENT — PAIN SCALES - GENERAL: PAINLEVEL: NO PAIN (0)

## 2017-05-19 NOTE — PROGRESS NOTES
SUBJECTIVE:                                                    Claudine Bustos is a 87 year old female who presents to clinic today for the following health issues:      Vaginal Symptoms     Onset: Last week, did see Amalia Sparks, had UA done was started on Macrobid but told to stop after 3 days of taking it.     Description:  Vaginal Discharge: none   Itching (Pruritis): YES  Burning sensation:  YES  Odor: no     Accompanying Signs & Symptoms:  Pain with Urination: no   Abdominal Pain: no   Fever: no    History:   Sexually active: no   New Partner: no   Possibility of Pregnancy:  No    Precipitating factors:   Recent Antibiotic Use: YES    Alleviating factors:  none   Therapies Tried and outcome: Macrobid for 3 days    Skin ca      Duration: few months    Description (location/character/radiation): left face/upper cheek    Intensity:  moderate    Accompanying signs and symptoms: n/a    History (similar episodes/previous evaluation): saw [facility] for skin check and they referred her to the [facility] and she would prefer to see Dr JOHNSON    Precipitating or alleviating factors: None    Therapies tried and outcome: None       Problem list and histories reviewed & adjusted, as indicated.  Additional history: as documented    Current Outpatient Prescriptions   Medication Sig Dispense Refill     fluconazole (DIFLUCAN) 150 MG tablet Take 1 tablet (150 mg) by mouth once for 1 dose 1 tablet 0     HYDROcodone-acetaminophen (NORCO) 5-325 MG per tablet Take 0.5-1 tablets by mouth every 4 hours as needed for moderate to severe pain maximum 4 tablet(s) per day 30 tablet 0     lisinopril (PRINIVIL/ZESTRIL) 5 MG tablet TAKE 1 TABLET BY MOUTH DAILY 30 tablet 9     ARTHRITIS PAIN 650 MG CR tablet TAKE 1 TABLET BY MOUTH EVERY 4 HOURS AS NEEDED 100 tablet 3     Multiple Vitamin (MULTIVITAMIN) per tablet Take 1 tablet by mouth daily with food.       Calcium Carb-Cholecalciferol (CALCIUM + D3) 600-200 MG-UNIT TABS Take 1 tablet by  mouth daily       Labs reviewed in EPIC    Reviewed and updated as needed this visit by clinical staff  Tobacco  Allergies  Meds  Med Hx  Surg Hx  Fam Hx  Soc Hx      Reviewed and updated as needed this visit by Provider         ROS:  Constitutional, HEENT, cardiovascular, pulmonary, gi and gu systems are negative, except as otherwise noted.    OBJECTIVE:                                                    /78  Pulse 95  Temp 97  F (36.1  C) (Tympanic)  Resp 20  Wt 126 lb (57.2 kg)  SpO2 97%  Breastfeeding? No  BMI 27.27 kg/m2  Body mass index is 27.27 kg/(m^2).  GENERAL APPEARANCE: healthy, alert and no distress  RESP: lungs clear to auscultation - no rales, rhonchi or wheezes  CV: regular rates and rhythm, normal S1 S2, no S3 or S4 and no murmur, click or rub  ABDOMEN: soft, nontender, without hepatosplenomegaly or masses, bowel sounds normal and no suprapubic pressure  MS: no CVA tenderness  PSYCH: mentation appears normal and affect normal/bright       ASSESSMENT/PLAN:                                                    1. Hematuria  No stones but 5 cm mass on right kidney  - CT Abdomen Pelvis w/o Contrast; Future  - CT Abdomen Pelvis w/o Contrast    2. Calculus of other lower urinary tract location  Not seen  - CT Abdomen Pelvis w/o Contrast; Future  - CT Abdomen Pelvis w/o Contrast    3. BCC (basal cell carcinoma), face    - OTOLARYNGOLOGY REFERRAL    4. Candidiasis of vulva and vagina    - fluconazole (DIFLUCAN) 150 MG tablet; Take 1 tablet (150 mg) by mouth once for 1 dose  Dispense: 1 tablet; Refill: 0    5. 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  See Patient Instructions    Katarina Gutierrez MD  [facility]

## 2017-05-19 NOTE — MR AVS SNAPSHOT
After Visit Summary   5/19/2017    Claudine Bustos    MRN: [id number]           Patient Information     Date Of Birth          4/11/1930        Visit Information        Provider Department      5/19/2017 9:15 AM Katarina Gutierrez MD [facility]        Today's Diagnoses     Hematuria    -  1    Calculus of other lower urinary tract location        BCC (basal cell carcinoma), face        Candidiasis of vulva and vagina        Renal mass of unknown nature           Follow-ups after your visit        Additional Services     OTOLARYNGOLOGY REFERRAL       Your provider has referred you to: [facility] [phone]   [email or web address]    Please be aware that coverage of these services is subject to the terms and limitations of your health insurance plan.  Call member services at your health plan with any benefit or coverage questions.      Please bring the following with you to your appointment:    (1) Any X-Rays, CTs or MRIs which have been performed.  Contact the facility where they were done to arrange for  prior to your scheduled appointment.   (2) List of current medications  (3) This referral request   (4) Any documents/labs given to you for this referral                  Your next 10 appointments already scheduled     May 24, 2017  2:00 PM CDT   Radiology with HI CT SCAN   HI CT Scan ([facility] )    [address] [id number] [phone]            Jul 06, 2017  9:45 AM CDT   (Arrive by 9:30 AM)   New Visit with Kia Andino MD   [facility] ([facility])    [address] [place] [address]   [phone]              Who to contact     If you have questions or need follow up information about today's clinic visit or your schedule please contact [facility] directly at [phone].  Normal or non-critical lab and imaging  "results will be communicated to you by MyChart, letter or phone within 4 business days after the clinic has received the results. If you do not hear from us within 7 days, please contact the clinic through BlackDuck or phone. If you have a critical or abnormal lab result, we will notify you by phone as soon as possible.  Submit refill requests through BlackDuck or call your pharmacy and they will forward the refill request to us. Please allow 3 business days for your refill to be completed.          Additional Information About Your Visit        BlackDuck Information     BlackDuck lets you send messages to your doctor, view your test results, renew your prescriptions, schedule appointments and more. To sign up, go to [email or web address] . Click on \"Log in\" on the left side of the screen, which will take you to the Welcome page. Then click on \"Sign up Now\" on the right side of the page.     You will be asked to enter the access code listed below, as well as some personal information. Please follow the directions to create your username and password.     Your access code is: [email or web address]  Expires: 2017  3:05 PM     Your access code will  in 90 days. If you need help or a new code, please call your [place] clinic or [phone].        Care EveryWhere ID     This is your Care EveryWhere ID. This could be used by other organizations to access your [place] medical records  [id number]        Your Vitals Were     Pulse Temperature Respirations Pulse Oximetry Breastfeeding? BMI (Body Mass Index)    95 97  F (36.1  C) (Tympanic) 20 97% No 27.27 kg/m2       Blood Pressure from Last 3 Encounters:   17 140/78   17 136/84   17 154/74    Weight from Last 3 Encounters:   17 126 lb (57.2 kg)   17 133 lb (60.3 kg)   17 133 lb (60.3 kg)              We Performed the Following     CT Abdomen Pelvis w/o & w Contrast     CT Abdomen Pelvis w/o Contrast     OTOLARYNGOLOGY REFERRAL        "   Today's Medication Changes          These changes are accurate as of: 5/19/17 11:10 AM.  If you have any questions, ask your nurse or doctor.               Start taking these medicines.        Dose/Directions    fluconazole 150 MG tablet   Commonly known as:  DIFLUCAN   Used for:  Candidiasis of vulva and vagina   Started by:  Katarina Gutierrez MD        Dose:  150 mg   Take 1 tablet (150 mg) by mouth once for 1 dose   Quantity:  1 tablet   Refills:  0       HYDROcodone-acetaminophen 5-325 MG per tablet   Commonly known as:  NORCO   Used for:  Renal mass of unknown nature   Started by:  Katarina Gutierrez MD        Dose:  0.5-1 tablet   Take 0.5-1 tablets by mouth every 4 hours as needed for moderate to severe pain maximum 4 tablet(s) per day   Quantity:  30 tablet   Refills:  0            Where to get your medicines      These medications were sent to [facility] [place] [place] - [address]  [address] [place] [address]     Phone:  [phone]     fluconazole 150 MG tablet         Some of these will need a paper prescription and others can be bought over the counter.  Ask your nurse if you have questions.     Bring a paper prescription for each of these medications     HYDROcodone-acetaminophen 5-325 MG per tablet                Primary Care Provider Office Phone # Fax #    Katarina Gutierrez [phone]       [facility] [address] [place] [address]        Thank you!     Thank you for choosing [facility]  for your care. Our goal is always to provide you with excellent care. Hearing back from our patients is one way we can continue to improve our services. Please take a few minutes to complete the written survey that you may receive in the mail after your visit with us. Thank you!             Your Updated Medication List - Protect others around you: Learn how to safely use, store and throw away your medicines at www.disposemymeds.org.           This list is accurate as of: 5/19/17 11:10 AM.  Always use your most recent med list.                   Brand Name Dispense Instructions for use    ARTHRITIS PAIN 650 MG CR tablet   Generic drug:  acetaminophen     100 tablet    TAKE 1 TABLET BY MOUTH EVERY 4 HOURS AS NEEDED       Calcium + D3 600-200 MG-UNIT Tabs      Take 1 tablet by mouth daily       fluconazole 150 MG tablet    DIFLUCAN    1 tablet    Take 1 tablet (150 mg) by mouth once for 1 dose       HYDROcodone-acetaminophen 5-325 MG per tablet    NORCO    30 tablet    Take 0.5-1 tablets by mouth every 4 hours as needed for moderate to severe pain maximum 4 tablet(s) per day       lisinopril 5 MG tablet    PRINIVIL/ZESTRIL    30 tablet    TAKE 1 TABLET BY MOUTH DAILY       multivitamin per tablet      Take 1 tablet by mouth daily with food.

## 2017-05-24 ENCOUNTER — HOSPITAL ENCOUNTER (OUTPATIENT)
Dept: CT IMAGING | Facility: HOSPITAL | Age: 82
Discharge: HOME | End: 2017-05-24
Attending: FAMILY MEDICINE | Admitting: FAMILY MEDICINE
Payer: MEDICARE

## 2017-05-24 DIAGNOSIS — Z01.812 BLOOD TESTS PRIOR TO TREATMENT OR PROCEDURE: ICD-10-CM

## 2017-05-24 DIAGNOSIS — C64.1 RENAL CELL CARCINOMA, RIGHT (H): Primary | ICD-10-CM

## 2017-05-24 LAB
CREAT SERPL-MCNC: 0.65 MG/DL (ref 0.52–1.04)
GFR SERPL CREATININE-BSD FRML MDRD: 87 ML/MIN/1.7M2

## 2017-05-24 PROCEDURE — 74178 CT ABD&PLV WO CNTR FLWD CNTR: CPT | Mod: TC

## 2017-05-24 PROCEDURE — 82565 ASSAY OF CREATININE: CPT | Performed by: FAMILY MEDICINE

## 2017-05-24 PROCEDURE — 36415 COLL VENOUS BLD VENIPUNCTURE: CPT | Performed by: FAMILY MEDICINE

## 2017-05-24 RX ORDER — IOPAMIDOL 612 MG/ML
100 INJECTION, SOLUTION INTRAVASCULAR ONCE
Status: COMPLETED | OUTPATIENT
Start: 2017-05-24 | End: 2017-05-24

## 2017-05-24 RX ADMIN — IOPAMIDOL 100 ML: 612 INJECTION, SOLUTION INTRAVASCULAR at 13:35

## 2017-05-30 ENCOUNTER — TRANSFERRED RECORDS (OUTPATIENT)
Dept: HEALTH INFORMATION MANAGEMENT | Facility: HOSPITAL | Age: 82
End: 2017-05-30

## 2017-07-06 ENCOUNTER — OFFICE VISIT (OUTPATIENT)
Dept: OTOLARYNGOLOGY | Facility: OTHER | Age: 82
End: 2017-07-06
Attending: FAMILY MEDICINE
Payer: MEDICARE

## 2017-07-06 VITALS
BODY MASS INDEX: 28.48 KG/M2 | DIASTOLIC BLOOD PRESSURE: 70 MMHG | TEMPERATURE: 95.2 F | HEIGHT: 57 IN | HEART RATE: 94 BPM | OXYGEN SATURATION: 97 % | SYSTOLIC BLOOD PRESSURE: 138 MMHG | WEIGHT: 132 LBS

## 2017-07-06 DIAGNOSIS — C44.310 BCC (BASAL CELL CARCINOMA), FACE: ICD-10-CM

## 2017-07-06 PROCEDURE — 99207 ZZC NO CHARGE LOS: CPT | Performed by: OTOLARYNGOLOGY

## 2017-07-06 PROCEDURE — 99204 OFFICE O/P NEW MOD 45 MIN: CPT

## 2017-07-06 ASSESSMENT — PAIN SCALES - GENERAL: PAINLEVEL: NO PAIN (0)

## 2017-07-06 NOTE — PROGRESS NOTES
Otolaryngology Consultation    Patient: Claudine MURRAY Akash  : 1930    Patient presents with:  Consult: BCC, face- bud       This patient presents today for a left inferior eyelid basal cell carcinoma.    This [place]  by Dr. Lyons and found to be a basal cell carcinoma  Several other actinic keratosis also found      I examined Claudine and she does have a small healed biopsy site at her left lower eyelid      I told Claudine and her daughter that since this is on the eyelid itself, she should see oculoplastics or similar in the [facility] for excision and closure

## 2017-07-06 NOTE — MR AVS SNAPSHOT
"              After Visit Summary   2017    Claudine Bustos    MRN: [id number]           Patient Information     Date Of Birth          1930        Visit Information        Provider Department      2017 9:45 AM Kia Andino MD [facility]        Today's Diagnoses     BCC (basal cell carcinoma), face           Follow-ups after your visit        Who to contact     If you have questions or need follow up information about today's clinic visit or your schedule please contact [facility] directly at [phone].  Normal or non-critical lab and imaging results will be communicated to you by DocLogixhart, letter or phone within 4 business days after the clinic has received the results. If you do not hear from us within 7 days, please contact the clinic through DocLogixhart or phone. If you have a critical or abnormal lab result, we will notify you by phone as soon as possible.  Submit refill requests through Sape or call your pharmacy and they will forward the refill request to us. Please allow 3 business days for your refill to be completed.          Additional Information About Your Visit        MyChart Information     Sape lets you send messages to your doctor, view your test results, renew your prescriptions, schedule appointments and more. To sign up, go to [email or web address] . Click on \"Log in\" on the left side of the screen, which will take you to the Welcome page. Then click on \"Sign up Now\" on the right side of the page.     You will be asked to enter the access code listed below, as well as some personal information. Please follow the directions to create your username and password.     Your access code is: [email or web address]  Expires: 2017  3:05 PM     Your access code will  in 90 days. If you need help or a new code, please call your [facility] or [phone].        Care EveryWhere ID     This is your Care EveryWhere ID. This could be used by other " "organizations to access your [place] medical records  [id number]        Your Vitals Were     Pulse Temperature Height Pulse Oximetry BMI (Body Mass Index)       94 95.2  F (35.1  C) (Tympanic) 4' 9\" (1.448 m) 97% 28.56 kg/m2        Blood Pressure from Last 3 Encounters:   07/06/17 138/70   05/19/17 140/78   05/12/17 136/84    Weight from Last 3 Encounters:   07/06/17 132 lb (59.9 kg)   05/19/17 126 lb (57.2 kg)   05/12/17 133 lb (60.3 kg)              Today, you had the following     No orders found for display       Primary Care Provider Office Phone # Fax #    Katarina Gutierrez [phone]       [facility] [place] [address]  [facility] [address]        Equal Access to Services     [facility]: Hadii aad ku hadasho Soomaali, waaxda luqadaha, qaybta kaalmada adeegyada, waxay ianin hayaan raisa blevins . So [facility] [phone].    ATENCIÓN: Si habla español, tiene a fulton disposición servicios gratuitos de asistencia lingüística. Llame al [phone].    We comply with applicable federal civil rights laws and [place] laws. We do not discriminate on the basis of race, color, national origin, age, disability sex, sexual orientation or gender identity.            Thank you!     Thank you [facility]  [facility] your [facility]. Our goal is always to provide you with excellent care. Hearing back from our patients is one way we can continue to improve our services. Please take a few minutes to complete the written survey that you may receive in the mail after your visit with us. Thank you!             Your Updated Medication List - Protect others around you: Learn how to safely use, store and throw away your medicines at www.disposemymeds.org.          This list is accurate as of: 7/6/17 10:09 AM.  Always use your most recent med list.                   Brand Name Dispense Instructions for use Diagnosis    ARTHRITIS PAIN 650 MG CR tablet   Generic drug:  acetaminophen     " 100 tablet    TAKE 1 TABLET BY MOUTH EVERY 4 HOURS AS NEEDED    S/P total knee replacement using cement, left       Calcium + D3 600-200 MG-UNIT Tabs      Take 1 tablet by mouth daily        HYDROcodone-acetaminophen 5-325 MG per tablet    NORCO    30 tablet    Take 0.5-1 tablets by mouth every 4 hours as needed for moderate to severe pain maximum 4 tablet(s) per day    Renal mass of unknown nature       lisinopril 5 MG tablet    PRINIVIL/ZESTRIL    30 tablet    TAKE 1 TABLET BY MOUTH DAILY    Benign essential HTN       multivitamin per tablet      Take 1 tablet by mouth daily with food.

## 2017-07-06 NOTE — NURSING NOTE
"Chief Complaint   Patient presents with     Consult     BCC, face- bud        Initial /70 (BP Location: Right arm, Patient Position: Chair, Cuff Size: Adult Regular)  Pulse 94  Temp 95.2  F (35.1  C) (Tympanic)  Ht 4' 9\" (1.448 m)  Wt 132 lb (59.9 kg)  SpO2 97%  BMI 28.56 kg/m2 Estimated body mass index is 28.56 kg/(m^2) as calculated from the following:    Height as of this encounter: 4' 9\" (1.448 m).    Weight as of this encounter: 132 lb (59.9 kg).  Medication Reconciliation: complete     Ingrid Davies LPN      "

## 2017-07-26 ENCOUNTER — OFFICE VISIT (OUTPATIENT)
Dept: FAMILY MEDICINE | Facility: OTHER | Age: 82
End: 2017-07-26
Attending: FAMILY MEDICINE
Payer: MEDICARE

## 2017-07-26 VITALS
BODY MASS INDEX: 27.4 KG/M2 | HEIGHT: 57 IN | HEART RATE: 64 BPM | SYSTOLIC BLOOD PRESSURE: 162 MMHG | DIASTOLIC BLOOD PRESSURE: 72 MMHG | WEIGHT: 127 LBS

## 2017-07-26 DIAGNOSIS — I51.9 HEART DISEASE, UNSPECIFIED: ICD-10-CM

## 2017-07-26 DIAGNOSIS — Z01.810 PRE-OPERATIVE CARDIOVASCULAR EXAMINATION: Primary | ICD-10-CM

## 2017-07-26 DIAGNOSIS — I10 BENIGN ESSENTIAL HYPERTENSION: ICD-10-CM

## 2017-07-26 DIAGNOSIS — Z01.818 PREOP GENERAL PHYSICAL EXAM: ICD-10-CM

## 2017-07-26 LAB
ALBUMIN SERPL-MCNC: 3.8 G/DL (ref 3.4–5)
ALP SERPL-CCNC: 94 U/L (ref 40–150)
ALT SERPL W P-5'-P-CCNC: 22 U/L (ref 0–50)
ANION GAP SERPL CALCULATED.3IONS-SCNC: 6 MMOL/L (ref 3–14)
AST SERPL W P-5'-P-CCNC: 20 U/L (ref 0–45)
BILIRUB SERPL-MCNC: 0.6 MG/DL (ref 0.2–1.3)
BUN SERPL-MCNC: 15 MG/DL (ref 7–30)
CALCIUM SERPL-MCNC: 8.8 MG/DL (ref 8.5–10.1)
CHLORIDE SERPL-SCNC: 107 MMOL/L (ref 94–109)
CO2 SERPL-SCNC: 26 MMOL/L (ref 20–32)
CREAT SERPL-MCNC: 0.74 MG/DL (ref 0.52–1.04)
ERYTHROCYTE [DISTWIDTH] IN BLOOD BY AUTOMATED COUNT: 15.5 % (ref 10–15)
GFR SERPL CREATININE-BSD FRML MDRD: 75 ML/MIN/1.7M2
GLUCOSE SERPL-MCNC: 90 MG/DL (ref 70–99)
HCT VFR BLD AUTO: 35.7 % (ref 35–47)
HGB BLD-MCNC: 11.1 G/DL (ref 11.7–15.7)
MCH RBC QN AUTO: 26 PG (ref 26.5–33)
MCHC RBC AUTO-ENTMCNC: 31.1 G/DL (ref 31.5–36.5)
MCV RBC AUTO: 84 FL (ref 78–100)
PLATELET # BLD AUTO: 260 10E9/L (ref 150–450)
POTASSIUM SERPL-SCNC: 3.9 MMOL/L (ref 3.4–5.3)
PROT SERPL-MCNC: 7.2 G/DL (ref 6.8–8.8)
RBC # BLD AUTO: 4.27 10E12/L (ref 3.8–5.2)
SODIUM SERPL-SCNC: 139 MMOL/L (ref 133–144)
WBC # BLD AUTO: 6.4 10E9/L (ref 4–11)

## 2017-07-26 PROCEDURE — 93010 ELECTROCARDIOGRAM REPORT: CPT | Performed by: FAMILY MEDICINE

## 2017-07-26 PROCEDURE — 80053 COMPREHEN METABOLIC PANEL: CPT | Mod: ZL | Performed by: FAMILY MEDICINE

## 2017-07-26 PROCEDURE — 85027 COMPLETE CBC AUTOMATED: CPT | Mod: ZL | Performed by: FAMILY MEDICINE

## 2017-07-26 PROCEDURE — 93005 ELECTROCARDIOGRAM TRACING: CPT

## 2017-07-26 PROCEDURE — 99215 OFFICE O/P EST HI 40 MIN: CPT | Mod: 25 | Performed by: FAMILY MEDICINE

## 2017-07-26 PROCEDURE — 99213 OFFICE O/P EST LOW 20 MIN: CPT

## 2017-07-26 PROCEDURE — 36415 COLL VENOUS BLD VENIPUNCTURE: CPT | Mod: ZL | Performed by: FAMILY MEDICINE

## 2017-07-26 ASSESSMENT — ANXIETY QUESTIONNAIRES
7. FEELING AFRAID AS IF SOMETHING AWFUL MIGHT HAPPEN: NOT AT ALL
4. TROUBLE RELAXING: NOT AT ALL
GAD7 TOTAL SCORE: 0
6. BECOMING EASILY ANNOYED OR IRRITABLE: NOT AT ALL
1. FEELING NERVOUS, ANXIOUS, OR ON EDGE: NOT AT ALL
2. NOT BEING ABLE TO STOP OR CONTROL WORRYING: NOT AT ALL
3. WORRYING TOO MUCH ABOUT DIFFERENT THINGS: NOT AT ALL
5. BEING SO RESTLESS THAT IT IS HARD TO SIT STILL: NOT AT ALL

## 2017-07-26 ASSESSMENT — PAIN SCALES - GENERAL: PAINLEVEL: NO PAIN (0)

## 2017-07-26 NOTE — NURSING NOTE
"Chief Complaint   Patient presents with     Pre-Op Exam       Initial /72  Pulse 64  Ht 4' 9\" (1.448 m)  Wt 127 lb (57.6 kg)  BMI 27.48 kg/m2 Estimated body mass index is 27.48 kg/(m^2) as calculated from the following:    Height as of this encounter: 4' 9\" (1.448 m).    Weight as of this encounter: 127 lb (57.6 kg).  Medication Reconciliation: complete   Maliha Alvarez    "

## 2017-07-26 NOTE — MR AVS SNAPSHOT
After Visit Summary   7/26/2017    Claudine Bustos    MRN: [id number]           Patient Information     Date Of Birth          4/11/1930        Visit Information        Provider Department      7/26/2017 11:00 AM Katarina Gutierrez MD [facility]        Today's Diagnoses     Pre-operative cardiovascular examination    -  1    Benign essential hypertension        Diastolic dysfunction        Preop general physical exam          Care Instructions      Before Your Surgery      Call your surgeon if there is any change in your health. This includes signs of a cold or flu (such as a sore throat, runny nose, cough, rash or fever).    Do not smoke, drink alcohol or take over the counter medicine (unless your surgeon or primary care doctor tells you to) for the 24 hours before and after surgery.    If you take prescribed drugs: Follow your doctor s orders about which medicines to take and which to stop until after surgery.    Eating and drinking prior to surgery: follow the instructions from your surgeon    Take a shower or bath the night before surgery. Use the soap your surgeon gave you to gently clean your skin. If you do not have soap from your surgeon, use your regular soap. Do not shave or scrub the surgery site.  Wear clean pajamas and have clean sheets on your bed.           Follow-ups after your visit        Your next 10 appointments already scheduled     Aug 01, 2017   [facility]    [address]  [facility] [id number] [phone]              Who to contact     If you have questions or need follow up information about today's clinic visit or your schedule please contact [place] [facility] directly at [phone].  Normal or non-critical lab and imaging results will be communicated to you by MyChart, letter or phone within 4 business days after the clinic has received the results. If you do not  "hear from us within 7 days, please contact the clinic through LEAPIN Digital Keys or phone. If you have a critical or abnormal lab result, we will notify you by phone as soon as possible.  Submit refill requests through LEAPIN Digital Keys or call your pharmacy and they will forward the refill request to us. Please allow 3 business days for your refill to be completed.          Additional Information About Your Visit        BIBA ApparelsharAnipipo Information     LEAPIN Digital Keys lets you send messages to your doctor, view your test results, renew your prescriptions, schedule appointments and more. To sign up, go to [email or web address] . Click on \"Log in\" on the left side of the screen, which will take you to the Welcome page. Then click on \"Sign up Now\" on the right side of the page.     You will be asked to enter the access code listed below, as well as some personal information. Please follow the directions to create your username and password.     Your access code is: [email or web address]  Expires: 10/24/2017 12:12 PM     Your access code will  in 90 days. If you need help or a new code, please call your [place] clinic or [phone].        Care EveryWhere ID     This is your Care EveryWhere ID. This could be used by other organizations to access your [place] medical records  [id number]        Your Vitals Were     Pulse Height BMI (Body Mass Index)             64 4' 9\" (1.448 m) 27.48 kg/m2          Blood Pressure from Last 3 Encounters:   17 162/72   17 138/70   17 140/78    Weight from Last 3 Encounters:   17 127 lb (57.6 kg)   17 132 lb (59.9 kg)   17 126 lb (57.2 kg)              We Performed the Following     CBC with platelets     Comprehensive metabolic panel          Today's Medication Changes          These changes are accurate as of: 17 12:12 PM.  If you have any questions, ask your nurse or doctor.               Stop taking these medicines if you haven't already. Please contact your care team if you " have questions.     HYDROcodone-acetaminophen 5-325 MG per tablet   Commonly known as:  NORCO   Stopped by:  Katarina Gutierrez MD                    Primary Care Provider Office Phone # Fax #    Katarina Gutierrez [phone]       [facility] [place] [address]  [place] [address]        Equal Access to Services     [facility]: Hadii aad ku hadasho Soomaali, waaxda luqadaha, qaybta kaalmada adeegyada, waxay idiin hayaan adeeg kharash la'aan . So [facility] [phone].    ATENCIÓN: Si habla español, tiene a fulton disposición servicios gratuitos de asistencia lingüística. Llame al [phone].    We comply with applicable federal civil rights laws and [place] laws. We do not discriminate on the basis of race, color, national origin, age, disability sex, sexual orientation or gender identity.            Thank you!     Thank you for choosing [facility]  for your care. Our goal is always to provide you with excellent care. Hearing back from our patients is one way we can continue to improve our services. Please take a few minutes to complete the written survey that you may receive in the mail after your visit with us. Thank you!             Your Updated Medication List - Protect others around you: Learn how to safely use, store and throw away your medicines at www.disposemymeds.org.          This list is accurate as of: 7/26/17 12:12 PM.  Always use your most recent med list.                   Brand Name Dispense Instructions for use Diagnosis    ARTHRITIS PAIN 650 MG CR tablet   Generic drug:  acetaminophen     100 tablet    TAKE 1 TABLET BY MOUTH EVERY 4 HOURS AS NEEDED    S/P total knee replacement using cement, left       Calcium + D3 600-200 MG-UNIT Tabs      Take 1 tablet by mouth daily        lisinopril 5 MG tablet    PRINIVIL/ZESTRIL    30 tablet    TAKE 1 TABLET BY MOUTH DAILY    Benign essential HTN       multivitamin per tablet      Take 1 tablet by mouth daily with  food.

## 2017-07-26 NOTE — PROGRESS NOTES
[facility] [place]  [address]  [place] [address]  [phone]  Dept: [phone]    PRE-OP EVALUATION:  Today's date: 2017    Claudine Bustos (: 1930) presents for pre-operative evaluation assessment as requested by Dr. Quinten Sotelo.  She requires evaluation and anesthesia risk assessment prior to undergoing surgery/procedure for treatment of basal cell carcinoma .  Proposed procedure: excision skin lesion on left eye    Date of Surgery/ Procedure: 17  Time of Surgery/ Procedure: 11:15 am  Hospital/Surgical Facility: [facility]  Fax number for surgical facility: [phone]  Primary Physician: Katarina Gutierrez  Type of Anesthesia Anticipated: to be determined    Patient has a Health Care Directive or Living Will:  NO    1. NO - Do you have a history of heart attack, stroke, stent, bypass or surgery on an artery in the head, neck, heart or legs?  2. NO - Do you ever have any pain or discomfort in your chest?  3. NO - Do you have a history of  Heart Failure?  4. NO - Are you troubled by shortness of breath when: walking on the level, up a slight hill or at night?  5. NO - Do you currently have a cold, bronchitis or other respiratory infection?  6. NO - Do you have a cough, shortness of breath or wheezing?  7. NO - Do you sometimes get pains in the calves of your legs when you walk?  8. NO - Do you or anyone in your family have previous history of blood clots?  9. NO - Do you or does anyone in your family have a serious bleeding problem such as prolonged bleeding following surgeries or cuts?  10. NO - Have you ever had problems with anemia or been told to take iron pills?  11. NO - Have you had any abnormal blood loss such as black, tarry or bloody stools, or abnormal vaginal bleeding?  12. NO - Have you ever had a blood transfusion?  13. NO - Have you or any of your relatives ever had problems with anesthesia?  14. NO - Do you have sleep apnea, excessive snoring or daytime  drowsiness?  15. NO - Do you have any prosthetic heart valves?  16. NO - Do you have prosthetic joints?  17. NO - Is there any chance that you may be pregnant?        HPI:                                                      Brief HPI related to upcoming procedure: left eye basal cell ca      See problem list for active medical problems.  Problems all longstanding and stable, except as noted/documented.  See ROS for pertinent symptoms related to these conditions.                                                                                                  .    MEDICAL HISTORY:                                                    Patient Active Problem List    Diagnosis Date Noted     Postoperative anemia due to acute blood loss 01/24/2017     Priority: Medium     Status post total replacement of right hip 01/23/2017     Priority: Medium     Status post total hip replacement, right 01/23/2017     Priority: Medium     Benign essential hypertension 01/16/2017     Priority: Medium     ACP (advance care planning) 06/09/2016     Priority: Medium     Advance Care Planning 6/9/2016: ACP Review of Chart / Resources Provided:  Reviewed chart for advance care plan.  Claudine Bustos has no plan or code status on file. Discussed available resources and provided with information. Confirmed code status reflects current choices pending further ACP discussions.  Confirmed/documented legally designated decision makers.  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 Provider to review and confirm.       Advanced care planning/counseling discussion 08/03/2012     Priority: Medium     Chronic kidney disease, unspecified 08/03/2012     Priority: Medium     Diastolic dysfunction  02/27/2012     Priority: Medium     echo 2/2012  EF 60%        Past Medical History:   Diagnosis Date     Chronic kidney disease, unspecified 8/3/2012     Diastolic dysfunction 2/27/2012    echo 2/2012  EF 60%     HTN (hypertension) 4/4/2000     Personal history of colonic polyps 6/8/2004     Past Surgical History:   Procedure Laterality Date     ARTHROPLASTY HIP Right 1/23/2017    Procedure: ARTHROPLASTY HIP;  Surgeon: Jose Manuel Muñoz MD;  Location: [facility]     ARTHROPLASTY KNEE Left 1/25/2016    Procedure: ARTHROPLASTY KNEE;  Surgeon: Jose Manuel Muñoz MD;  Location: [facility]     colonoscopy  2004, 2009     excision of rectal villus adenoma       knee replacement Right 1/2011     PHACOEMULSIFICATION WITH STANDARD INTRAOCULAR LENS IMPLANT  2/25/2014    Procedure: PHACOEMULSIFICATION WITH STANDARD INTRAOCULAR LENS IMPLANT;  CATARACT EXTRACTION WITH INTRA OCULAR LENS LEFT;  Surgeon: Jah Bee MD;  Location: [facility]     PHACOEMULSIFICATION WITH STANDARD INTRAOCULAR LENS IMPLANT Right 5/12/2015    Procedure: PHACOEMULSIFICATION WITH STANDARD INTRAOCULAR LENS IMPLANT;  Surgeon: Jah Bee MD;  Location: [facility]     thoracic schwannoma resection       tonsillectomy       urethral dilitation every 4 months       Current Outpatient Prescriptions   Medication Sig Dispense Refill     HYDROcodone-acetaminophen (NORCO) 5-325 MG per tablet Take 0.5-1 tablets by mouth every 4 hours as needed for moderate to severe pain maximum 4 tablet(s) per day 30 tablet 0     lisinopril (PRINIVIL/ZESTRIL) 5 MG tablet TAKE 1 TABLET BY MOUTH DAILY 30 tablet 9     ARTHRITIS PAIN 650 MG CR tablet TAKE 1 TABLET BY MOUTH EVERY 4 HOURS AS NEEDED 100 tablet 3     Calcium Carb-Cholecalciferol (CALCIUM + D3) 600-200 MG-UNIT TABS Take 1 tablet by mouth daily       Multiple Vitamin (MULTIVITAMIN) per tablet Take 1 tablet by mouth daily with food.       OTC products: None, except as noted above    No Known Allergies   Latex Allergy: NO    Social  "History   Substance Use Topics     Smoking status: Never Smoker     Smokeless tobacco: Never Used     Alcohol use Yes      Comment: Mixed, rarely     History   Drug Use No       REVIEW OF SYSTEMS:                                                    C: NEGATIVE for fever, chills, change in weight  I: NEGATIVE for worrisome rashes, moles or lesions  E: NEGATIVE for vision changes or irritation  E/M: NEGATIVE for ear, mouth and throat problems  R: NEGATIVE for significant cough or SOB  B: NEGATIVE for masses, tenderness or discharge  CV: NEGATIVE for chest pain, palpitations or peripheral edema  GI: NEGATIVE for nausea, abdominal pain, heartburn, or change in bowel habits  : NEGATIVE for frequency, dysuria, or hematuria  M: NEGATIVE for significant arthralgias or myalgia  N: NEGATIVE for weakness, dizziness or paresthesias  E: NEGATIVE for temperature intolerance, skin/hair changes  H: NEGATIVE for bleeding problems  P: NEGATIVE for changes in mood or affect    EXAM:                                                    /72  Pulse 64  Ht 4' 9\" (1.448 m)  Wt 127 lb (57.6 kg)  BMI 27.48 kg/m2    GENERAL APPEARANCE: healthy, alert and no distress     EYES: EOMI, PERRL     HENT: ear canals and TM's normal and nose and mouth without ulcers or lesions     NECK: no adenopathy, no asymmetry, masses, or scars and thyroid normal to palpation     RESP: lungs clear to auscultation - no rales, rhonchi or wheezes     CV: regular rates and rhythm, normal S1 S2, no S3 or S4 and no murmur, click or rub     ABDOMEN:  soft, nontender, no HSM or masses and bowel sounds normal     MS: extremities normal- no gross deformities noted, no evidence of inflammation in joints, FROM in all extremities.     SKIN: no suspicious lesions or rashes     NEURO: Normal strength and tone, sensory exam grossly normal, mentation intact and speech normal     PSYCH: mentation appears normal. and affect normal/bright     LYMPHATICS: No axillary, " cervical, or supraclavicular nodes    DIAGNOSTICS:                                                      EKG: appears normal, NSR, normal axis, normal intervals, no acute ST/T changes c/w ischemia, no LVH by voltage criteria  Labs Resulted Today:   Results for orders placed or performed in visit on 07/26/17   CBC with platelets   Result Value Ref Range    WBC 6.4 4.0 - 11.0 10e9/L    RBC Count 4.27 3.8 - 5.2 10e12/L    Hemoglobin 11.1 (L) 11.7 - 15.7 g/dL    Hematocrit 35.7 35.0 - 47.0 %    MCV 84 78 - 100 fl    MCH 26.0 (L) 26.5 - 33.0 pg    MCHC 31.1 (L) 31.5 - 36.5 g/dL    RDW 15.5 (H) 10.0 - 15.0 %    Platelet Count 260 150 - 450 10e9/L   Comprehensive metabolic panel   Result Value Ref Range    Sodium 139 133 - 144 mmol/L    Potassium 3.9 3.4 - 5.3 mmol/L    Chloride 107 94 - 109 mmol/L    Carbon Dioxide 26 20 - 32 mmol/L    Anion Gap 6 3 - 14 mmol/L    Glucose 90 70 - 99 mg/dL    Urea Nitrogen 15 7 - 30 mg/dL    Creatinine 0.74 0.52 - 1.04 mg/dL    GFR Estimate 75 >60 mL/min/1.7m2    GFR Estimate If Black >90   GFR Calc   >60 mL/min/1.7m2    Calcium 8.8 8.5 - 10.1 mg/dL    Bilirubin Total 0.6 0.2 - 1.3 mg/dL    Albumin 3.8 3.4 - 5.0 g/dL    Protein Total 7.2 6.8 - 8.8 g/dL    Alkaline Phosphatase 94 40 - 150 U/L    ALT 22 0 - 50 U/L    AST 20 0 - 45 U/L       Recent Labs   Lab Test  05/24/17   1225  01/26/17   0450  01/25/17   0447   01/17/17   0955   HGB   --   7.3*  8.1*   < >  11.9   PLT   --   174   --    --   263   NA   --   136   --    --   143   POTASSIUM   --   3.9   --    --   3.9   CR  0.65  0.68   --    --   0.83    < > = values in this interval not displayed.        IMPRESSION:                                                    Reason for surgery/procedure: left eye basal cell ca  Diagnosis/reason for consult: cardiopulmonary clearance    The proposed surgical procedure is considered LOW risk.    REVISED CARDIAC RISK INDEX  The patient has the following serious cardiovascular risks  for perioperative complications such as (MI, PE, VFib and 3  AV Block):  No serious cardiac risks  INTERPRETATION: 0 risks: Class I (very low risk - 0.4% complication rate)    The patient has the following additional risks for perioperative complications:  No identified additional risks  The ASCVD Risk score ([place] TRONCOSO Jr, et al., 2013) failed to calculate for the following reasons:    The 2013 ASCVD risk score is only valid for ages 40 to 79      ICD-10-CM    1. Pre-operative cardiovascular examination Z01.810 EKG 12-lead complete w/read - (Clinic Performed)     CBC with platelets     Comprehensive metabolic panel     EKG 12-lead complete w/read - (Clinic Performed)     CBC with platelets     Comprehensive metabolic panel   2. Benign essential hypertension I10 EKG 12-lead complete w/read - (Clinic Performed)     CBC with platelets     Comprehensive metabolic panel     EKG 12-lead complete w/read - (Clinic Performed)     CBC with platelets     Comprehensive metabolic panel   3. Diastolic dysfunction I51.9 EKG 12-lead complete w/read - (Clinic Performed)     CBC with platelets     Comprehensive metabolic panel     EKG 12-lead complete w/read - (Clinic Performed)     CBC with platelets     Comprehensive metabolic panel       RECOMMENDATIONS:                                                      --Patient is to take all scheduled medications on the day of surgery EXCEPT for modifications listed below.    APPROVAL GIVEN to proceed with proposed procedure, without further diagnostic evaluation       Signed Electronically by: Katarina Gutierrez MD    Copy of this evaluation report is provided to requesting physician.    [facility] Preop Guidelines

## 2017-07-27 ASSESSMENT — ANXIETY QUESTIONNAIRES: GAD7 TOTAL SCORE: 0

## 2017-07-27 ASSESSMENT — PATIENT HEALTH QUESTIONNAIRE - PHQ9: SUM OF ALL RESPONSES TO PHQ QUESTIONS 1-9: 0

## 2017-07-28 NOTE — H&P (VIEW-ONLY)
[facility] [place]  [address]  [place] [address]  [phone]  Dept: [phone]    PRE-OP EVALUATION:  Today's date: 2017    Claudine Bustos (: 1930) presents for pre-operative evaluation assessment as requested by Dr. Quinten Sotelo.  She requires evaluation and anesthesia risk assessment prior to undergoing surgery/procedure for treatment of basal cell carcinoma .  Proposed procedure: excision skin lesion on left eye    Date of Surgery/ Procedure: 17  Time of Surgery/ Procedure: 11:15 am  Hospital/Surgical Facility: [facility]  Fax number for surgical facility: [phone]  Primary Physician: Katarina Gutierrez  Type of Anesthesia Anticipated: to be determined    Patient has a Health Care Directive or Living Will:  NO    1. NO - Do you have a history of heart attack, stroke, stent, bypass or surgery on an artery in the head, neck, heart or legs?  2. NO - Do you ever have any pain or discomfort in your chest?  3. NO - Do you have a history of  Heart Failure?  4. NO - Are you troubled by shortness of breath when: walking on the level, up a slight hill or at night?  5. NO - Do you currently have a cold, bronchitis or other respiratory infection?  6. NO - Do you have a cough, shortness of breath or wheezing?  7. NO - Do you sometimes get pains in the calves of your legs when you walk?  8. NO - Do you or anyone in your family have previous history of blood clots?  9. NO - Do you or does anyone in your family have a serious bleeding problem such as prolonged bleeding following surgeries or cuts?  10. NO - Have you ever had problems with anemia or been told to take iron pills?  11. NO - Have you had any abnormal blood loss such as black, tarry or bloody stools, or abnormal vaginal bleeding?  12. NO - Have you ever had a blood transfusion?  13. NO - Have you or any of your relatives ever had problems with anesthesia?  14. NO - Do you have sleep apnea, excessive snoring or daytime  drowsiness?  15. NO - Do you have any prosthetic heart valves?  16. NO - Do you have prosthetic joints?  17. NO - Is there any chance that you may be pregnant?        HPI:                                                      Brief HPI related to upcoming procedure: left eye basal cell ca      See problem list for active medical problems.  Problems all longstanding and stable, except as noted/documented.  See ROS for pertinent symptoms related to these conditions.                                                                                                  .    MEDICAL HISTORY:                                                    Patient Active Problem List    Diagnosis Date Noted     Postoperative anemia due to acute blood loss 01/24/2017     Priority: Medium     Status post total replacement of right hip 01/23/2017     Priority: Medium     Status post total hip replacement, right 01/23/2017     Priority: Medium     Benign essential hypertension 01/16/2017     Priority: Medium     ACP (advance care planning) 06/09/2016     Priority: Medium     Advance Care Planning 6/9/2016: ACP Review of Chart / Resources Provided:  Reviewed chart for advance care plan.  Claudine Bustos has no plan or code status on file. Discussed available resources and provided with information. Confirmed code status reflects current choices pending further ACP discussions.  Confirmed/documented legally designated decision makers.  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 Provider to review and confirm.       Advanced care planning/counseling discussion 08/03/2012     Priority: Medium     Chronic kidney disease, unspecified 08/03/2012     Priority: Medium     Diastolic dysfunction  02/27/2012     Priority: Medium     echo 2/2012  EF 60%        Past Medical History:   Diagnosis Date     Chronic kidney disease, unspecified 8/3/2012     Diastolic dysfunction 2/27/2012    echo 2/2012  EF 60%     HTN (hypertension) 4/4/2000     Personal history of colonic polyps 6/8/2004     Past Surgical History:   Procedure Laterality Date     ARTHROPLASTY HIP Right 1/23/2017    Procedure: ARTHROPLASTY HIP;  Surgeon: Jose Manuel Muñoz MD;  Location: [facility]     ARTHROPLASTY KNEE Left 1/25/2016    Procedure: ARTHROPLASTY KNEE;  Surgeon: Jose Manuel Muñoz MD;  Location: [facility]     colonoscopy  2004, 2009     excision of rectal villus adenoma       knee replacement Right 1/2011     PHACOEMULSIFICATION WITH STANDARD INTRAOCULAR LENS IMPLANT  2/25/2014    Procedure: PHACOEMULSIFICATION WITH STANDARD INTRAOCULAR LENS IMPLANT;  CATARACT EXTRACTION WITH INTRA OCULAR LENS LEFT;  Surgeon: Jah Bee MD;  Location: [facility]     PHACOEMULSIFICATION WITH STANDARD INTRAOCULAR LENS IMPLANT Right 5/12/2015    Procedure: PHACOEMULSIFICATION WITH STANDARD INTRAOCULAR LENS IMPLANT;  Surgeon: Jah Bee MD;  Location: [facility]     thoracic schwannoma resection       tonsillectomy       urethral dilitation every 4 months       Current Outpatient Prescriptions   Medication Sig Dispense Refill     HYDROcodone-acetaminophen (NORCO) 5-325 MG per tablet Take 0.5-1 tablets by mouth every 4 hours as needed for moderate to severe pain maximum 4 tablet(s) per day 30 tablet 0     lisinopril (PRINIVIL/ZESTRIL) 5 MG tablet TAKE 1 TABLET BY MOUTH DAILY 30 tablet 9     ARTHRITIS PAIN 650 MG CR tablet TAKE 1 TABLET BY MOUTH EVERY 4 HOURS AS NEEDED 100 tablet 3     Calcium Carb-Cholecalciferol (CALCIUM + D3) 600-200 MG-UNIT TABS Take 1 tablet by mouth daily       Multiple Vitamin (MULTIVITAMIN) per tablet Take 1 tablet by mouth daily with food.       OTC products: None, except as noted above    No Known Allergies   Latex Allergy: NO    Social  "History   Substance Use Topics     Smoking status: Never Smoker     Smokeless tobacco: Never Used     Alcohol use Yes      Comment: Mixed, rarely     History   Drug Use No       REVIEW OF SYSTEMS:                                                    C: NEGATIVE for fever, chills, change in weight  I: NEGATIVE for worrisome rashes, moles or lesions  E: NEGATIVE for vision changes or irritation  E/M: NEGATIVE for ear, mouth and throat problems  R: NEGATIVE for significant cough or SOB  B: NEGATIVE for masses, tenderness or discharge  CV: NEGATIVE for chest pain, palpitations or peripheral edema  GI: NEGATIVE for nausea, abdominal pain, heartburn, or change in bowel habits  : NEGATIVE for frequency, dysuria, or hematuria  M: NEGATIVE for significant arthralgias or myalgia  N: NEGATIVE for weakness, dizziness or paresthesias  E: NEGATIVE for temperature intolerance, skin/hair changes  H: NEGATIVE for bleeding problems  P: NEGATIVE for changes in mood or affect    EXAM:                                                    /72  Pulse 64  Ht 4' 9\" (1.448 m)  Wt 127 lb (57.6 kg)  BMI 27.48 kg/m2    GENERAL APPEARANCE: healthy, alert and no distress     EYES: EOMI, PERRL     HENT: ear canals and TM's normal and nose and mouth without ulcers or lesions     NECK: no adenopathy, no asymmetry, masses, or scars and thyroid normal to palpation     RESP: lungs clear to auscultation - no rales, rhonchi or wheezes     CV: regular rates and rhythm, normal S1 S2, no S3 or S4 and no murmur, click or rub     ABDOMEN:  soft, nontender, no HSM or masses and bowel sounds normal     MS: extremities normal- no gross deformities noted, no evidence of inflammation in joints, FROM in all extremities.     SKIN: no suspicious lesions or rashes     NEURO: Normal strength and tone, sensory exam grossly normal, mentation intact and speech normal     PSYCH: mentation appears normal. and affect normal/bright     LYMPHATICS: No axillary, " cervical, or supraclavicular nodes    DIAGNOSTICS:                                                      EKG: appears normal, NSR, normal axis, normal intervals, no acute ST/T changes c/w ischemia, no LVH by voltage criteria  Labs Resulted Today:   Results for orders placed or performed in visit on 07/26/17   CBC with platelets   Result Value Ref Range    WBC 6.4 4.0 - 11.0 10e9/L    RBC Count 4.27 3.8 - 5.2 10e12/L    Hemoglobin 11.1 (L) 11.7 - 15.7 g/dL    Hematocrit 35.7 35.0 - 47.0 %    MCV 84 78 - 100 fl    MCH 26.0 (L) 26.5 - 33.0 pg    MCHC 31.1 (L) 31.5 - 36.5 g/dL    RDW 15.5 (H) 10.0 - 15.0 %    Platelet Count 260 150 - 450 10e9/L   Comprehensive metabolic panel   Result Value Ref Range    Sodium 139 133 - 144 mmol/L    Potassium 3.9 3.4 - 5.3 mmol/L    Chloride 107 94 - 109 mmol/L    Carbon Dioxide 26 20 - 32 mmol/L    Anion Gap 6 3 - 14 mmol/L    Glucose 90 70 - 99 mg/dL    Urea Nitrogen 15 7 - 30 mg/dL    Creatinine 0.74 0.52 - 1.04 mg/dL    GFR Estimate 75 >60 mL/min/1.7m2    GFR Estimate If Black >90   GFR Calc   >60 mL/min/1.7m2    Calcium 8.8 8.5 - 10.1 mg/dL    Bilirubin Total 0.6 0.2 - 1.3 mg/dL    Albumin 3.8 3.4 - 5.0 g/dL    Protein Total 7.2 6.8 - 8.8 g/dL    Alkaline Phosphatase 94 40 - 150 U/L    ALT 22 0 - 50 U/L    AST 20 0 - 45 U/L       Recent Labs   Lab Test  05/24/17   1225  01/26/17   0450  01/25/17   0447   01/17/17   0955   HGB   --   7.3*  8.1*   < >  11.9   PLT   --   174   --    --   263   NA   --   136   --    --   143   POTASSIUM   --   3.9   --    --   3.9   CR  0.65  0.68   --    --   0.83    < > = values in this interval not displayed.        IMPRESSION:                                                    Reason for surgery/procedure: left eye basal cell ca  Diagnosis/reason for consult: cardiopulmonary clearance    The proposed surgical procedure is considered LOW risk.    REVISED CARDIAC RISK INDEX  The patient has the following serious cardiovascular risks  for perioperative complications such as (MI, PE, VFib and 3  AV Block):  No serious cardiac risks  INTERPRETATION: 0 risks: Class I (very low risk - 0.4% complication rate)    The patient has the following additional risks for perioperative complications:  No identified additional risks  The ASCVD Risk score ([place] TRONCOSO Jr, et al., 2013) failed to calculate for the following reasons:    The 2013 ASCVD risk score is only valid for ages 40 to 79      ICD-10-CM    1. Pre-operative cardiovascular examination Z01.810 EKG 12-lead complete w/read - (Clinic Performed)     CBC with platelets     Comprehensive metabolic panel     EKG 12-lead complete w/read - (Clinic Performed)     CBC with platelets     Comprehensive metabolic panel   2. Benign essential hypertension I10 EKG 12-lead complete w/read - (Clinic Performed)     CBC with platelets     Comprehensive metabolic panel     EKG 12-lead complete w/read - (Clinic Performed)     CBC with platelets     Comprehensive metabolic panel   3. Diastolic dysfunction I51.9 EKG 12-lead complete w/read - (Clinic Performed)     CBC with platelets     Comprehensive metabolic panel     EKG 12-lead complete w/read - (Clinic Performed)     CBC with platelets     Comprehensive metabolic panel       RECOMMENDATIONS:                                                      --Patient is to take all scheduled medications on the day of surgery EXCEPT for modifications listed below.    APPROVAL GIVEN to proceed with proposed procedure, without further diagnostic evaluation       Signed Electronically by: Katarina Gutierrez MD    Copy of this evaluation report is provided to requesting physician.    [facility] Preop Guidelines

## 2017-08-01 ENCOUNTER — SURGERY (OUTPATIENT)
Age: 82
End: 2017-08-01

## 2017-08-01 ENCOUNTER — ANESTHESIA EVENT (OUTPATIENT)
Dept: SURGERY | Facility: CLINIC | Age: 82
End: 2017-08-01
Payer: MEDICARE

## 2017-08-01 ENCOUNTER — ANESTHESIA (OUTPATIENT)
Dept: SURGERY | Facility: CLINIC | Age: 82
End: 2017-08-01
Payer: MEDICARE

## 2017-08-01 ENCOUNTER — HOSPITAL ENCOUNTER (OUTPATIENT)
Facility: CLINIC | Age: 82
Discharge: HOME | End: 2017-08-01
Attending: OPHTHALMOLOGY | Admitting: OPHTHALMOLOGY
Payer: MEDICARE

## 2017-08-01 VITALS
OXYGEN SATURATION: 98 % | WEIGHT: 126.4 LBS | DIASTOLIC BLOOD PRESSURE: 80 MMHG | BODY MASS INDEX: 27.27 KG/M2 | SYSTOLIC BLOOD PRESSURE: 162 MMHG | RESPIRATION RATE: 18 BRPM | TEMPERATURE: 98.3 F | HEIGHT: 57 IN

## 2017-08-01 PROCEDURE — 71000012 ZZH RECOVERY PHASE 1 LEVEL 1 FIRST HR: Performed by: OPHTHALMOLOGY

## 2017-08-01 PROCEDURE — 88305 TISSUE EXAM BY PATHOLOGIST: CPT | Mod: 26 | Performed by: OPHTHALMOLOGY

## 2017-08-01 PROCEDURE — 25000128 H RX IP 250 OP 636: Performed by: NURSE ANESTHETIST, CERTIFIED REGISTERED

## 2017-08-01 PROCEDURE — 25000125 ZZHC RX 250: Performed by: NURSE ANESTHETIST, CERTIFIED REGISTERED

## 2017-08-01 PROCEDURE — 25000125 ZZHC RX 250: Performed by: OPHTHALMOLOGY

## 2017-08-01 PROCEDURE — 40000170 ZZH STATISTIC PRE-PROCEDURE ASSESSMENT II: Performed by: OPHTHALMOLOGY

## 2017-08-01 PROCEDURE — 37000009 ZZH ANESTHESIA TECHNICAL FEE, EACH ADDTL 15 MIN: Performed by: OPHTHALMOLOGY

## 2017-08-01 PROCEDURE — 88331 PATH CONSLTJ SURG 1 BLK 1SPC: CPT | Mod: 26 | Performed by: OPHTHALMOLOGY

## 2017-08-01 PROCEDURE — 71000027 ZZH RECOVERY PHASE 2 EACH 15 MINS: Performed by: OPHTHALMOLOGY

## 2017-08-01 PROCEDURE — 88305 TISSUE EXAM BY PATHOLOGIST: CPT | Performed by: OPHTHALMOLOGY

## 2017-08-01 PROCEDURE — 37000008 ZZH ANESTHESIA TECHNICAL FEE, 1ST 30 MIN: Performed by: OPHTHALMOLOGY

## 2017-08-01 PROCEDURE — 36000058 ZZH SURGERY LEVEL 3 EA 15 ADDTL MIN: Performed by: OPHTHALMOLOGY

## 2017-08-01 PROCEDURE — 27210794 ZZH OR GENERAL SUPPLY STERILE: Performed by: OPHTHALMOLOGY

## 2017-08-01 PROCEDURE — 88331 PATH CONSLTJ SURG 1 BLK 1SPC: CPT | Mod: 91 | Performed by: OPHTHALMOLOGY

## 2017-08-01 PROCEDURE — 36000056 ZZH SURGERY LEVEL 3 1ST 30 MIN: Performed by: OPHTHALMOLOGY

## 2017-08-01 RX ORDER — ONDANSETRON 4 MG/1
4 TABLET, ORALLY DISINTEGRATING ORAL EVERY 30 MIN PRN
Status: DISCONTINUED | OUTPATIENT
Start: 2017-08-01 | End: 2017-08-01 | Stop reason: HOSPADM

## 2017-08-01 RX ORDER — SODIUM CHLORIDE, SODIUM LACTATE, POTASSIUM CHLORIDE, CALCIUM CHLORIDE 600; 310; 30; 20 MG/100ML; MG/100ML; MG/100ML; MG/100ML
INJECTION, SOLUTION INTRAVENOUS CONTINUOUS PRN
Status: DISCONTINUED | OUTPATIENT
Start: 2017-08-01 | End: 2017-08-01

## 2017-08-01 RX ORDER — ONDANSETRON 2 MG/ML
4 INJECTION INTRAMUSCULAR; INTRAVENOUS EVERY 30 MIN PRN
Status: DISCONTINUED | OUTPATIENT
Start: 2017-08-01 | End: 2017-08-01 | Stop reason: HOSPADM

## 2017-08-01 RX ORDER — ERYTHROMYCIN 5 MG/G
OINTMENT OPHTHALMIC PRN
Status: DISCONTINUED | OUTPATIENT
Start: 2017-08-01 | End: 2017-08-01 | Stop reason: HOSPADM

## 2017-08-01 RX ORDER — TETRACAINE HYDROCHLORIDE 5 MG/ML
SOLUTION OPHTHALMIC PRN
Status: DISCONTINUED | OUTPATIENT
Start: 2017-08-01 | End: 2017-08-01 | Stop reason: HOSPADM

## 2017-08-01 RX ORDER — PROPOFOL 10 MG/ML
INJECTION, EMULSION INTRAVENOUS PRN
Status: DISCONTINUED | OUTPATIENT
Start: 2017-08-01 | End: 2017-08-01

## 2017-08-01 RX ORDER — FENTANYL CITRATE 50 UG/ML
25-50 INJECTION, SOLUTION INTRAMUSCULAR; INTRAVENOUS
Status: DISCONTINUED | OUTPATIENT
Start: 2017-08-01 | End: 2017-08-01 | Stop reason: HOSPADM

## 2017-08-01 RX ORDER — FENTANYL CITRATE 50 UG/ML
INJECTION, SOLUTION INTRAMUSCULAR; INTRAVENOUS PRN
Status: DISCONTINUED | OUTPATIENT
Start: 2017-08-01 | End: 2017-08-01

## 2017-08-01 RX ORDER — SODIUM CHLORIDE, SODIUM LACTATE, POTASSIUM CHLORIDE, CALCIUM CHLORIDE 600; 310; 30; 20 MG/100ML; MG/100ML; MG/100ML; MG/100ML
INJECTION, SOLUTION INTRAVENOUS CONTINUOUS
Status: DISCONTINUED | OUTPATIENT
Start: 2017-08-01 | End: 2017-08-01 | Stop reason: HOSPADM

## 2017-08-01 RX ORDER — TETRACAINE HCL 10 MG/ML
INJECTION SUBARACHNOID PRN
Status: DISCONTINUED | OUTPATIENT
Start: 2017-08-01 | End: 2017-08-01 | Stop reason: HOSPADM

## 2017-08-01 RX ORDER — NALOXONE HYDROCHLORIDE 0.4 MG/ML
.1-.4 INJECTION, SOLUTION INTRAMUSCULAR; INTRAVENOUS; SUBCUTANEOUS
Status: DISCONTINUED | OUTPATIENT
Start: 2017-08-01 | End: 2017-08-01 | Stop reason: HOSPADM

## 2017-08-01 RX ORDER — LIDOCAINE HCL/EPINEPHRINE/PF 2%-1:200K
VIAL (ML) INJECTION PRN
Status: DISCONTINUED | OUTPATIENT
Start: 2017-08-01 | End: 2017-08-01 | Stop reason: HOSPADM

## 2017-08-01 RX ADMIN — ERYTHROMYCIN 1 G: 5 OINTMENT OPHTHALMIC at 11:47

## 2017-08-01 RX ADMIN — DEXMEDETOMIDINE HYDROCHLORIDE 4 MCG: 100 INJECTION, SOLUTION INTRAVENOUS at 11:43

## 2017-08-01 RX ADMIN — TETRACAINE HYDROCHLORIDE 2 DROP: 5 SOLUTION OPHTHALMIC at 11:30

## 2017-08-01 RX ADMIN — FENTANYL CITRATE 25 MCG: 50 INJECTION, SOLUTION INTRAMUSCULAR; INTRAVENOUS at 11:36

## 2017-08-01 RX ADMIN — PROPOFOL 20 MG: 10 INJECTION, EMULSION INTRAVENOUS at 11:39

## 2017-08-01 RX ADMIN — LIDOCAINE HYDROCHLORIDE,EPINEPHRINE BITARTRATE 3 ML: 20; .005 INJECTION, SOLUTION EPIDURAL; INFILTRATION; INTRACAUDAL; PERINEURAL at 11:47

## 2017-08-01 RX ADMIN — DEXMEDETOMIDINE HYDROCHLORIDE 8 MCG: 100 INJECTION, SOLUTION INTRAVENOUS at 11:41

## 2017-08-01 RX ADMIN — ERYTHROMYCIN 1 G: 5 OINTMENT OPHTHALMIC at 11:54

## 2017-08-01 RX ADMIN — PROPOFOL 20 MG: 10 INJECTION, EMULSION INTRAVENOUS at 11:41

## 2017-08-01 RX ADMIN — DEXMEDETOMIDINE HYDROCHLORIDE 8 MCG: 100 INJECTION, SOLUTION INTRAVENOUS at 11:38

## 2017-08-01 RX ADMIN — SODIUM CHLORIDE, POTASSIUM CHLORIDE, SODIUM LACTATE AND CALCIUM CHLORIDE: 600; 310; 30; 20 INJECTION, SOLUTION INTRAVENOUS at 11:36

## 2017-08-01 NOTE — ANESTHESIA POSTPROCEDURE EVALUATION
Patient: Claudine Bustos    Procedure(s):  LEFT LOWER LID WEDGE EXCISION WITH FROZEN SECTION CONTROL - Wound Class: I-Clean    Diagnosis:LOWER LID LESION  Diagnosis Additional Information: No value filed.    Anesthesia Type:  MAC    Note:  Anesthesia Post Evaluation    Patient location during evaluation: PACU  Patient participation: Able to fully participate in evaluation  Level of consciousness: awake  Pain management: adequate  Airway patency: patent  Cardiovascular status: acceptable  Respiratory status: acceptable  Hydration status: acceptable  PONV: none     Anesthetic complications: None          Last vitals:  Vitals:    08/01/17 1230 08/01/17 1245 08/01/17 1335   BP: 132/69 142/65 162/80   Resp: 15 12 18   Temp:  36.8  C (98.3  F)    SpO2: 95% 97% 98%         Electronically Signed By: Amalia Hassan  August 1, 2017  1:58 PM

## 2017-08-01 NOTE — ANESTHESIA CARE TRANSFER NOTE
Patient: Claudine Bustos    Procedure(s):  LEFT LOWER LID WEDGE EXCISION WITH FROZEN SECTION CONTROL - Wound Class: I-Clean    Diagnosis: LOWER LID LESION  Diagnosis Additional Information: No value filed.    Anesthesia Type:   MAC     Note:  Airway :Room Air  Patient transferred to:PACU  Comments: VSS. Airway and IV patent. Patient comfortable. Report to RN. Stable care transfer.      Vitals: (Last set prior to Anesthesia Care Transfer)    CRNA VITALS  8/1/2017 1144 - 8/1/2017 1218      8/1/2017             Resp Rate (set): 10                Electronically Signed By: TATI Real CRNA  August 1, 2017  12:18 PM

## 2017-08-01 NOTE — IP AVS SNAPSHOT
MRN:[id number]                      After Visit Summary   8/1/2017    Claudine Bustos    MRN: [id number]           Thank you!     Thank you for choosing [facility] for your care. Our goal is always to provide you with excellent care. Hearing back from our patients is one way we can continue to improve our services. Please take a few minutes to complete the written survey that you may receive in the mail after you visit with us. Thank you!        Patient Information     Date Of Birth          4/11/1930        About your hospital stay     You were admitted on:  August 1, 2017 You last received care in the:  [facility] Same Day Surgery    You were discharged on:  August 1, 2017       Who to Call     For medical emergencies, please call 911.  For non-urgent questions about your medical care, please call your primary care provider or clinic, [phone]  For questions related to your surgery, please call your surgery clinic        Attending Provider     Provider Specialty    Quinten Sotelo MD Ophthalmology       Primary Care Provider Office Phone # Fax #    Katarina Gutierrez [phone]      Further instructions from your care team       Same Day Surgery Discharge Instructions for  Sedation and General Anesthesia       It's not unusual to feel dizzy, light-headed or faint for up to 24 hours after surgery or while taking pain medication.  If you have these symptoms: sit for a few minutes before standing and have someone assist you when you get up to walk or use the bathroom.      You should rest and relax for the next 24 hours. We recommend you make arrangements to have an adult stay with you for at least 24 hours after your discharge.  Avoid hazardous and strenuous activity.      DO NOT DRIVE any vehicle or operate mechanical equipment for 24 hours following the end of your surgery.  Even though you may feel normal, your reactions may be affected by the medication you  have received.      Do not drink alcoholic beverages for 24 hours following surgery.       Slowly progress to your regular diet as you feel able. It's not unusual to feel nauseated and/or vomit after receiving anesthesia.  If you develop these symptoms, drink clear liquids (apple juice, ginger ale, broth, 7-up, etc. ) until you feel better.  If your nausea and vomiting persists for 24 hours, please notify your surgeon.        All narcotic pain medications, along with inactivity and anesthesia, can cause constipation. Drinking plenty of liquids and increasing fiber intake will help.      For any questions of a medical nature, call your surgeon.      Do not make important decisions for 24 hours.      If you had general anesthesia, you may have a sore throat for a couple of days related to the breathing tube used during surgery.  You may use Cepacol lozenges to help with this discomfort.  If it worsens or if you develop a fever, contact your surgeon.       If you feel your pain is not well managed with the pain medications prescribed by your surgeon, please contact your surgeon's office to let them know so they can address your concerns.     You received a dose of your Erythromycin during surgery (at 11:55).      [facility]   Eyelid/Orbital Surgery Discharge Instructions                                                                                                                    Quinten Sotelo M.D.     ICE COMPRESSES  Immediately following surgery, you should begin to apply ice compresses.  Apply a cold gel pack or wrap a clean washcloth around a cup of crushed ice in a plastic bag (a bag of frozen peas also works well) and hold the cold compresses directly against the closed eyelid (s).Apply cold pack for a minimum of six times daily for no longer than 15 minutes at a time. Continue cold compresses every day until the bruising and swelling begin to subside.  This can vary for each patient, but  three 3 days may be common.    HOT COMPRESSES  After your swelling and bruising have begun to subside, hot compresses should be applied.  Take a clean washcloth and wring it out in hot water (as warm as you can tolerate comfortably).  Hold this warm compress against the closed eyelid(s) at least six times per day for 15 minutes.  This should be continued for about two weeks.    OINTMENT  You may be given some ointment when you leave the hospital.  Apply this ointment to the suture line(s) twice a day, 1/4 inch into the eye at bedtime for 7 days.  Expect some blurring of vision from the ointment.     ACTIVITY  Avoid heavy lifting or vigorous exercise for one week after surgery.  You may resume regular activities as tolerated.  You may shower and wash your hair on the day after surgery; be careful to avoid getting shampoo in your eyes. While your eyes are still swelling, it is recommended you sleep on your back and elevate your head with 2-3 pillows.    MEDICATION  If the doctor has given you some medications to take after surgery, please take these according to the instructions on the bottle.  Pain medications may make you drowsy so do not drive, operate heavy machinery, or use alcohol while taking it.  When you feel that you do not need the prescription pain medication, you may substitute Extra Strength Tylenol for mild pain by also following the directions on the bottle.    If you were taking Coumadin (warfarin) prior to your surgery, you may resume this medication with your next scheduled dose.    WHAT TO EXPECT  You should expect some slight oozing of blood from the incision site over the first two to three days after surgery.  Swelling and bruising will occur for one to two weeks or longer.  You may also experience itching and tearing during the first several weeks after surgery.  This is part of the normal healing process    QUESTIONS  Please feel free to contact the office, should you have any questions that  "are not answered above.  The phone number is [phone].  Please call immediately if you are unable to establish vision in the operative eye, you are experiencing heavy bleeding that will not stop with gentle pressure or you have any signs of an infection (greenish/yellow discharge or progressive redness).    [facility]  [facility]  [address]  [place] [address]  [phone]      **If you have questions or concerns about your procedure,  call Dr. Sotelo at [phone]**            Pending Results     Date and Time Order Name Status Description    2017 1149 Surgical pathology exam In process             Admission Information     Date & Time Provider Department Dept. Phone    2017 Quinten Sotelo MD [facility] Same Day Surgery [phone]      Your Vitals Were     Blood Pressure Temperature Respirations Height Weight Pulse Oximetry    132/69 98.7  F (37.1  C) (Temporal) 16 1.448 m (4' 9\") 57.3 kg (126 lb 6.4 oz) 97%    BMI (Body Mass Index)                   27.35 kg/m2           [employer] Information     [employer] lets you send messages to your doctor, view your test results, renew your prescriptions, schedule appointments and more. To sign up, go to [email or web address] . Click on \"Log in\" on the left side of the screen, which will take you to the Welcome page. Then click on \"Sign up Now\" on the right side of the page.     You will be asked to enter the access code listed below, as well as some personal information. Please follow the directions to create your username and password.     Your access code is: [email or web address]  Expires: 10/24/2017 12:12 PM     Your access code will  in 90 days. If you need help or a new code, please call your [facility] clinic or [phone].        Care EveryWhere ID     This is your Care EveryWhere ID. This could be used by other organizations to access your [facility] medical " records  [id number]        Equal Access to Services     SEAN ALISHA : Hadii jeimy alatorre suresh Socindyali, waaxda luqadaha, qaybta kaangelada arabellarosalbamaggie, waxyuniel miguel darlenejitendra belldemetris charitolizzrosalva baumann. So [facility] [phone].    ATENCIÓN: Si habla español, tiene a fulton disposición servicios gratuitos de asistencia lingüística. Llame al [phone].    We comply with applicable federal civil rights laws and [place] laws. We do not discriminate on the basis of race, color, national origin, age, disability sex, sexual orientation or gender identity.               Review of your medicines      UNREVIEWED medicines. Ask your doctor about these medicines        Dose / Directions    ARTHRITIS PAIN 650 MG CR tablet   Used for:  S/P total knee replacement using cement, left   Generic drug:  acetaminophen        TAKE 1 TABLET BY MOUTH EVERY 4 HOURS AS NEEDED   Quantity:  100 tablet   Refills:  3       Calcium + D3 600-200 MG-UNIT Tabs        Dose:  1 tablet   Take 1 tablet by mouth daily   Refills:  0       lisinopril 5 MG tablet   Commonly known as:  PRINIVIL/ZESTRIL   Used for:  Benign essential HTN        TAKE 1 TABLET BY MOUTH DAILY   Quantity:  30 tablet   Refills:  9       multivitamin per tablet        Dose:  1 tablet   Take 1 tablet by mouth daily with food.   Refills:  0                Protect others around you: Learn how to safely use, store and throw away your medicines at www.disposemymeds.org.             Medication List: This is a list of all your medications and when to take them. Check marks below indicate your daily home schedule. Keep this list as a reference.      Medications           Morning Afternoon Evening Bedtime As Needed    ARTHRITIS PAIN 650 MG CR tablet   TAKE 1 TABLET BY MOUTH EVERY 4 HOURS AS NEEDED   Generic drug:  acetaminophen                                Calcium + D3 600-200 MG-UNIT Tabs   Take 1 tablet by mouth daily                                lisinopril 5 MG tablet   Commonly known as:   PRINIVIL/ZESTRIL   TAKE 1 TABLET BY MOUTH DAILY                                multivitamin per tablet   Take 1 tablet by mouth daily with food.

## 2017-08-01 NOTE — IP AVS SNAPSHOT
[facility] [place] [facility] Same Day Surgery    [address]    [place] [address]    Phone:  [phone]    Fax:  [phone]                                       After Visit Summary   8/1/2017    Claudine Bustos    MRN: [id number]           After Visit Summary Signature Page     I have received my discharge instructions, and my questions have been answered. I have discussed any challenges I see with this plan with the nurse or doctor.    ..........................................................................................................................................  Patient/Patient Representative Signature      ..........................................................................................................................................  Patient Representative Print Name and Relationship to Patient    ..................................................               ................................................  Date                                            Time    ..........................................................................................................................................  Reviewed by Signature/Title    ...................................................              ..............................................  Date                                                            Time

## 2017-08-01 NOTE — ANESTHESIA PREPROCEDURE EVALUATION
Anesthesia Evaluation     . Pt has had prior anesthetic.     No history of anesthetic complications          ROS/MED HX    ENT/Pulmonary:      (-) sleep apnea   Neurologic:       Cardiovascular:     (+) hypertension----. : . CHF (diastolic dysfunction 60%) . . :. .       METS/Exercise Tolerance:     Hematologic:         Musculoskeletal:         GI/Hepatic:        (-) GERD   Renal/Genitourinary:     (+) chronic renal disease, type: CRI,       Endo:      (-) Type II DM and thyroid disease   Psychiatric:         Infectious Disease:         Malignancy:         Other:                     Physical Exam  Normal systems: dental    Airway   Mallampati: II  TM distance: >3 FB  Neck ROM: full    Dental     Cardiovascular   Rhythm and rate: regular      Pulmonary    breath sounds clear to auscultation                    Anesthesia Plan      History & Physical Review  History and physical reviewed and following examination; no interval change.    ASA Status:  3 .        Plan for MAC with Intravenous induction. Reason for MAC:  Procedure to face, neck, head or breast  PONV prophylaxis:  Ondansetron (or other 5HT-3)       Postoperative Care      Consents                          .

## 2017-08-01 NOTE — OP NOTE
Surgeon / Clinician: Quinten Sotelo MD    PROCEDURE:  Full-thickness wedge excision of left lower eyelid with frozen section control.    PREOPERATIVE DIAGNOSIS:  Full thickness lesion left lower eyelid.    SURGEON:  Dr. Quinten Sotelo.    ANESTHESIA:  Local monitored.    COMPLICATIONS:  None.    INDICATION FOR SURGERY:  The patient had a full thickness lesion involving the left lower eyelid.  Biopsy demonstrated basal cell carcinoma.  The patient presents for incision and margin control.  Procedure went as follows:  The left lower eyelid was injected with 2% lidocaine with 1:100,000 epinephrine.  The patient was prepped and draped in usual sterile fashion.  A full-thickness incision was made medial lateral to the area of previous scarring at the biopsy site.  An additional 1.5 millimeter margin was taken from the medial lateral aspect and submitted for frozen section control.  These proved to be negative for residual tumor.  The defect was closed in the following manner:  The tarsal plate was reapproximated with interrupted 5-0 Vicryl suture.  Eyelid margins closed with 6 0 chromic at the gray line, lash line, and pretarsal skin.  The patient tolerated the procedure well.  The total lesion defect measured approximately 20% of the lower eyelid.          Quinten Sotelo MD    D:  08/01/2017 13:48 T:  08/01/2017 15:02  Document:  [id number] EN\MB

## 2017-08-01 NOTE — DISCHARGE INSTRUCTIONS
Same Day Surgery Discharge Instructions for  Sedation and General Anesthesia       It's not unusual to feel dizzy, light-headed or faint for up to 24 hours after surgery or while taking pain medication.  If you have these symptoms: sit for a few minutes before standing and have someone assist you when you get up to walk or use the bathroom.      You should rest and relax for the next 24 hours. We recommend you make arrangements to have an adult stay with you for at least 24 hours after your discharge.  Avoid hazardous and strenuous activity.      DO NOT DRIVE any vehicle or operate mechanical equipment for 24 hours following the end of your surgery.  Even though you may feel normal, your reactions may be affected by the medication you have received.      Do not drink alcoholic beverages for 24 hours following surgery.       Slowly progress to your regular diet as you feel able. It's not unusual to feel nauseated and/or vomit after receiving anesthesia.  If you develop these symptoms, drink clear liquids (apple juice, ginger ale, broth, 7-up, etc. ) until you feel better.  If your nausea and vomiting persists for 24 hours, please notify your surgeon.        All narcotic pain medications, along with inactivity and anesthesia, can cause constipation. Drinking plenty of liquids and increasing fiber intake will help.      For any questions of a medical nature, call your surgeon.      Do not make important decisions for 24 hours.      If you had general anesthesia, you may have a sore throat for a couple of days related to the breathing tube used during surgery.  You may use Cepacol lozenges to help with this discomfort.  If it worsens or if you develop a fever, contact your surgeon.       If you feel your pain is not well managed with the pain medications prescribed by your surgeon, please contact your surgeon's office to let them know so they can address your concerns.     You received a dose of your Erythromycin during  surgery (at 11:55).      [facility]   Eyelid/Orbital Surgery Discharge Instructions                                                                                                                    Quinten Sotelo M.D.     ICE COMPRESSES  Immediately following surgery, you should begin to apply ice compresses.  Apply a cold gel pack or wrap a clean washcloth around a cup of crushed ice in a plastic bag (a bag of frozen peas also works well) and hold the cold compresses directly against the closed eyelid (s).Apply cold pack for a minimum of six times daily for no longer than 15 minutes at a time. Continue cold compresses every day until the bruising and swelling begin to subside.  This can vary for each patient, but three 3 days may be common.    HOT COMPRESSES  After your swelling and bruising have begun to subside, hot compresses should be applied.  Take a clean washcloth and wring it out in hot water (as warm as you can tolerate comfortably).  Hold this warm compress against the closed eyelid(s) at least six times per day for 15 minutes.  This should be continued for about two weeks.    OINTMENT  You may be given some ointment when you leave the hospital.  Apply this ointment to the suture line(s) twice a day, 1/4 inch into the eye at bedtime for 7 days.  Expect some blurring of vision from the ointment.     ACTIVITY  Avoid heavy lifting or vigorous exercise for one week after surgery.  You may resume regular activities as tolerated.  You may shower and wash your hair on the day after surgery; be careful to avoid getting shampoo in your eyes. While your eyes are still swelling, it is recommended you sleep on your back and elevate your head with 2-3 pillows.    MEDICATION  If the doctor has given you some medications to take after surgery, please take these according to the instructions on the bottle.  Pain medications may make you drowsy so do not drive, operate heavy machinery, or use alcohol  while taking it.  When you feel that you do not need the prescription pain medication, you may substitute Extra Strength Tylenol for mild pain by also following the directions on the bottle.    If you were taking Coumadin (warfarin) prior to your surgery, you may resume this medication with your next scheduled dose.    WHAT TO EXPECT  You should expect some slight oozing of blood from the incision site over the first two to three days after surgery.  Swelling and bruising will occur for one to two weeks or longer.  You may also experience itching and tearing during the first several weeks after surgery.  This is part of the normal healing process    QUESTIONS  Please feel free to contact the office, should you have any questions that are not answered above.  The phone number is [phone].  Please call immediately if you are unable to establish vision in the operative eye, you are experiencing heavy bleeding that will not stop with gentle pressure or you have any signs of an infection (greenish/yellow discharge or progressive redness).    [facility]  [place] [address]  [place], [place] [address] [phone]      **If you have questions or concerns about your procedure,  call Dr. Sotelo at [phone]**

## 2017-08-01 NOTE — BRIEF OP NOTE
[facility] Brief Operative Note    Pre-operative diagnosis: LOWER LID LESION   Post-operative diagnosis * No post-op diagnosis entered *     Procedure: Procedure(s):  LEFT LOWER LID WEDGE EXCISION WITH FROZEN SECTION CONTROL - Wound Class: I-Clean   Surgeon(s): Surgeon(s) and Role:     * Quinten Stoelo MD - Primary   Estimated blood loss: 1 mL    Specimens:   ID Type Source Tests Collected by Time Destination   A : LEFT LOWER LID WEDGE Tissue Lower Eyelid SURGICAL PATHOLOGY EXAM Quinten Sotelo MD 8/1/2017 11:28 AM    B : LEFT LOWER LID MEDIAL MARGIN Tissue Lower Eyelid SURGICAL PATHOLOGY EXAM Quinten Sotelo MD 8/1/2017 11:30 AM    C : LEFT LOWER LID LATERAL MARGIN Tissue Lower Eyelid SURGICAL PATHOLOGY EXAM Quinten Sotelo MD 8/1/2017 11:50 AM       Findings:

## 2017-08-04 LAB — COPATH REPORT: NORMAL

## 2017-08-17 ENCOUNTER — TELEPHONE (OUTPATIENT)
Dept: FAMILY MEDICINE | Facility: OTHER | Age: 82
End: 2017-08-17

## 2017-08-17 DIAGNOSIS — Z51.81 SBE (SUBACUTE BACTERIAL ENDOCARDITIS) PROPHYLAXIS MONITORING: Primary | ICD-10-CM

## 2017-08-17 RX ORDER — AMOXICILLIN 500 MG/1
2000 CAPSULE ORAL ONCE
Qty: 4 CAPSULE | Refills: 0 | Status: SHIPPED | OUTPATIENT
Start: 2017-08-17 | End: 2017-08-17

## 2017-08-17 NOTE — TELEPHONE ENCOUNTER
Patient requesting antibiotic to take before dentist appointment. Nothing on med list. Please advise. Thank you

## 2017-08-17 NOTE — TELEPHONE ENCOUNTER
Reason for call:  Medication    1. Medication Name? Amoxicillin   2. Is this request for a refill? Yes  3. What Pharmacy do you use? [facility]  4. Have you contacted your pharmacy? No    5. If yes, when?  (Please note that the turn-around-time for prescriptions is 72 business hours; I am sending your request at this time. SEND TO  [facility]  )  Description: Pt requesting a refill on this antibiotic to take before going to the dentist.   Was an appointment offered for this a call? No   Preferred method for responding to this messageTelephone Call: [phone]  If we cannot reach you directly, may we leave a detailed response at the number you provided? Yes  Can this message wait until your PCP/Provider returns if not available today? Not applicable, PCP Matt is in today

## 2017-09-25 ENCOUNTER — TELEPHONE (OUTPATIENT)
Dept: FAMILY MEDICINE | Facility: OTHER | Age: 82
End: 2017-09-25

## 2017-09-25 NOTE — TELEPHONE ENCOUNTER
4:29 PM    Reason for Call: OVERBOOK    Patient is having the following symptoms: patient would like to go over some things with Dr Gutierrez regarding the surgery she had for 5 days.    The patient is requesting an appointment for 10-2-17 with Dr Gutierrez.    Was an appointment offered for this call? Yes  If yes : Appointment type              Date 10-9-17    Preferred method for responding to this message: Telephone Call  What is your phone number ? [phone]    If we cannot reach you directly, may we leave a detailed response at the number you provided? Yes    Can this message wait until your PCP/provider returns, if unavailable today? YES    Tomasa Moran

## 2017-10-02 ENCOUNTER — OFFICE VISIT (OUTPATIENT)
Dept: FAMILY MEDICINE | Facility: OTHER | Age: 82
End: 2017-10-02
Attending: FAMILY MEDICINE
Payer: MEDICARE

## 2017-10-02 VITALS
TEMPERATURE: 97.2 F | DIASTOLIC BLOOD PRESSURE: 66 MMHG | BODY MASS INDEX: 28.26 KG/M2 | HEIGHT: 57 IN | WEIGHT: 131 LBS | HEART RATE: 92 BPM | SYSTOLIC BLOOD PRESSURE: 164 MMHG | OXYGEN SATURATION: 98 %

## 2017-10-02 DIAGNOSIS — M54.41 ACUTE RIGHT-SIDED LOW BACK PAIN WITH RIGHT-SIDED SCIATICA: Primary | ICD-10-CM

## 2017-10-02 PROCEDURE — 99212 OFFICE O/P EST SF 10 MIN: CPT

## 2017-10-02 PROCEDURE — 99214 OFFICE O/P EST MOD 30 MIN: CPT | Performed by: FAMILY MEDICINE

## 2017-10-02 RX ORDER — GABAPENTIN 100 MG/1
100 CAPSULE ORAL 3 TIMES DAILY PRN
Qty: 40 CAPSULE | Refills: 0 | Status: SHIPPED | OUTPATIENT
Start: 2017-10-02 | End: 2017-10-21

## 2017-10-02 ASSESSMENT — ANXIETY QUESTIONNAIRES
4. TROUBLE RELAXING: NOT AT ALL
2. NOT BEING ABLE TO STOP OR CONTROL WORRYING: NOT AT ALL
7. FEELING AFRAID AS IF SOMETHING AWFUL MIGHT HAPPEN: NOT AT ALL
1. FEELING NERVOUS, ANXIOUS, OR ON EDGE: NOT AT ALL
6. BECOMING EASILY ANNOYED OR IRRITABLE: NOT AT ALL
IF YOU CHECKED OFF ANY PROBLEMS ON THIS QUESTIONNAIRE, HOW DIFFICULT HAVE THESE PROBLEMS MADE IT FOR YOU TO DO YOUR WORK, TAKE CARE OF THINGS AT HOME, OR GET ALONG WITH OTHER PEOPLE: NOT DIFFICULT AT ALL
3. WORRYING TOO MUCH ABOUT DIFFERENT THINGS: NOT AT ALL
5. BEING SO RESTLESS THAT IT IS HARD TO SIT STILL: NOT AT ALL
GAD7 TOTAL SCORE: 0

## 2017-10-02 ASSESSMENT — PAIN SCALES - GENERAL: PAINLEVEL: MODERATE PAIN (5)

## 2017-10-02 ASSESSMENT — PATIENT HEALTH QUESTIONNAIRE - PHQ9: SUM OF ALL RESPONSES TO PHQ QUESTIONS 1-9: 0

## 2017-10-02 NOTE — PROGRESS NOTES
"  SUBJECTIVE:                                                    Claudine Bustos is a 87 year old female who presents to clinic today for the following health issues:      Musculoskeletal problem/pain      Duration: after a January hip surgery. Has not been right since    Description  Location: right sciatica area    Intensity:  5/10    Accompanying signs and symptoms: radiation of pain to thigh    History  Previous similar problem: YES- surgery for hip replacement  Previous evaluation:  x-ray and ultrasound    Precipitating or alleviating factors:  Trauma or overuse: YES    Aggravating factors include:  Walking    Therapies tried and outcome: massage, acetaminophen and arthritis medicine but only works for a short time      Problem list and histories reviewed & adjusted, as indicated.  Additional history: as documented    Current Outpatient Prescriptions   Medication Sig Dispense Refill     gabapentin (NEURONTIN) 100 MG capsule Take 1 capsule (100 mg) by mouth 3 times daily as needed May increase to 200mg PO TID prn after 3 days 40 capsule 0     lisinopril (PRINIVIL/ZESTRIL) 5 MG tablet TAKE 1 TABLET BY MOUTH DAILY 30 tablet 9     ARTHRITIS PAIN 650 MG CR tablet TAKE 1 TABLET BY MOUTH EVERY 4 HOURS AS NEEDED 100 tablet 3     Calcium Carb-Cholecalciferol (CALCIUM + D3) 600-200 MG-UNIT TABS Take 1 tablet by mouth daily       Multiple Vitamin (MULTIVITAMIN) per tablet Take 1 tablet by mouth daily with food.       Labs reviewed in EPIC    ROS:  Constitutional, HEENT, cardiovascular, pulmonary, gi and gu systems are negative, except as otherwise noted.      OBJECTIVE:                                                    /66 (BP Location: Right arm, Patient Position: Chair, Cuff Size: Adult Regular)  Pulse 92  Temp 97.2  F (36.2  C) (Tympanic)  Ht 4' 9\" (1.448 m)  Wt 131 lb (59.4 kg)  SpO2 98%  BMI 28.35 kg/m2  Body mass index is 28.35 kg/(m^2).  GENERAL APPEARANCE: healthy, alert and no distress  MS: " extremities normal- no gross deformities noted  ORTHO: Lumber/Thoracic Spine Exam: Tender:  right sciatic notch  Non-tender:  left para lumbar muscles, right para lumbar muscles, left SI joint, right SI joint, left sciatic notch  Range of Motion:  lumbar flexion  decreased, lumbar extension  decreased, left lateral lumbar bending  decreased, right lateral lumbar bending  decreased, left lateral lumbar rotation  decreased, right lateral lumbar rotation  decreased  Strength:  Not done  Special tests:  negative SI joint compression    Hip Exam: left greater trocanter non-tender, right greater trocanter non-tender      PSYCH: mentation appears normal and affect normal/bright       ASSESSMENT/PLAN:                                                    1. Acute right-sided low back pain with right-sided sciatica  She declines PT, narcotics  Recommend trial of ice  - gabapentin (NEURONTIN) 100 MG capsule; Take 1 capsule (100 mg) by mouth 3 times daily as needed May increase to 200mg PO TID prn after 3 days  Dispense: 40 capsule; Refill: 0    Patient was agreeable to this plan and had no further questions.  See Patient Instructions    Katarina Gutierrez MD  [facility]

## 2017-10-02 NOTE — NURSING NOTE
"Chief Complaint   Patient presents with     Hip Pain     right       Initial /64 (BP Location: Right arm, Patient Position: Chair, Cuff Size: Adult Regular)  Pulse 92  Temp 97.2  F (36.2  C) (Tympanic)  Ht 4' 9\" (1.448 m)  Wt 131 lb (59.4 kg)  SpO2 98%  BMI 28.35 kg/m2 Estimated body mass index is 28.35 kg/(m^2) as calculated from the following:    Height as of this encounter: 4' 9\" (1.448 m).    Weight as of this encounter: 131 lb (59.4 kg).  Medication Reconciliation: complete     Rina Peters    "

## 2017-10-02 NOTE — MR AVS SNAPSHOT
"              After Visit Summary   10/2/2017    Claudine Bustos    MRN: [id number]           Patient Information     Date Of Birth          1930        Visit Information        Provider Department      10/2/2017 2:45 PM [facility]        Today's Diagnoses     Acute right-sided low back pain with right-sided sciatica    -  1       Follow-ups after your visit        Who to contact     If you have questions or need follow up information about today's clinic visit or your schedule please contact [facility] directly at [phone].  Normal or non-critical lab and imaging results will be communicated to you by [employer], letter or phone within 4 business days after the clinic has received the results. If you do not hear from us within 7 days, please contact the clinic through [employer] or phone. If you have a critical or abnormal lab result, we will notify you by phone as soon as possible.  Submit refill requests through [employer] or call your pharmacy and they will forward the refill request to us. Please allow 3 business days for your refill to be completed.          Additional Information About Your Visit        MyChart Information     [employer] lets you send messages to your doctor, view your test results, renew your prescriptions, schedule appointments and more. To sign up, go to [email or web address] . Click on \"Log in\" on the left side of the screen, which will take you to the Welcome page. Then click on \"Sign up Now\" on the right side of the page.     You will be asked to enter the access code listed below, as well as some personal information. Please follow the directions to create your username and password.     Your access code is: [email or web address]  Expires: 10/24/2017 12:12 PM     Your access code will  in 90 days. If you need help or a new code, please call your [facility] or [phone].        Care EveryWhere ID     This is your Care EveryWhere " "ID. This could be used by other organizations to access your [place] medical records  [id number]        Your Vitals Were     Pulse Temperature Height Pulse Oximetry BMI (Body Mass Index)       92 97.2  F (36.2  C) (Tympanic) 4' 9\" (1.448 m) 98% 28.35 kg/m2        Blood Pressure from Last 3 Encounters:   10/02/17 164/66   08/01/17 162/80   07/26/17 162/72    Weight from Last 3 Encounters:   10/02/17 131 lb (59.4 kg)   08/01/17 126 lb 6.4 oz (57.3 kg)   07/26/17 127 lb (57.6 kg)              Today, you had the following     No orders found for display         Today's Medication Changes          These changes are accurate as of: 10/2/17  5:48 PM.  If you have any questions, ask your nurse or doctor.               Start taking these medicines.        Dose/Directions    gabapentin 100 MG capsule   Commonly known as:  NEURONTIN   Used for:  Acute right-sided low back pain with right-sided sciatica   Started by:  Katarina Gutierrez MD        Dose:  100 mg   Take 1 capsule (100 mg) by mouth 3 times daily as needed May increase to 200mg PO TID prn after 3 days   Quantity:  40 capsule   Refills:  0            Where to get your medicines      These medications were sent to [facility] [place] - [address]  [address] [place] [address]     Phone:  [phone]     gabapentin 100 MG capsule                Primary Care Provider Office Phone # Fax #    Katarina Gutierrez [phone]       [facility] [place] [address] [place] [address]        Equal Access to Services     [facility] : Hadii jeimy Madrigal, wasantoda lupauline, qaybta kaalguerita white. So [facility] [phone].    ATENCIÓN: Si habla español, tiene a fulton disposición servicios gratuitos de asistencia lingüística. Llame al [phone].    We comply with applicable federal civil rights laws and [place] laws. We do not discriminate on the basis of race, color, " national origin, age, disability, sex, sexual orientation, or gender identity.            Thank you!     Thank you for choosing [facility]  for your [facility]. Our goal is always to provide you with excellent care. Hearing back from our patients is one way we can continue to improve our services. Please take a few minutes to complete the written survey that you may receive in the mail after your visit with us. Thank you!             Your Updated Medication List - Protect others around you: Learn how to safely use, store and throw away your medicines at www.disposemymeds.org.          This list is accurate as of: 10/2/17  5:48 PM.  Always use your most recent med list.                   Brand Name Dispense Instructions for use Diagnosis    ARTHRITIS PAIN 650 MG CR tablet   Generic drug:  acetaminophen     100 tablet    TAKE 1 TABLET BY MOUTH EVERY 4 HOURS AS NEEDED    S/P total knee replacement using cement, left       Calcium + D3 600-200 MG-UNIT Tabs      Take 1 tablet by mouth daily        gabapentin 100 MG capsule    NEURONTIN    40 capsule    Take 1 capsule (100 mg) by mouth 3 times daily as needed May increase to 200mg PO TID prn after 3 days    Acute right-sided low back pain with right-sided sciatica       lisinopril 5 MG tablet    PRINIVIL/ZESTRIL    30 tablet    TAKE 1 TABLET BY MOUTH DAILY    Benign essential HTN       multivitamin per tablet      Take 1 tablet by mouth daily with food.

## 2017-10-03 ASSESSMENT — ANXIETY QUESTIONNAIRES: GAD7 TOTAL SCORE: 0

## 2017-10-21 DIAGNOSIS — M54.41 ACUTE RIGHT-SIDED LOW BACK PAIN WITH RIGHT-SIDED SCIATICA: ICD-10-CM

## 2017-10-23 NOTE — TELEPHONE ENCOUNTER
Last Written Prescription Date: 10/2/17  Last Fill Quantity: 40,  # refills: 0   Last Office Visit with [facility], [facility] or [facility] prescribing provider: 10/2/17

## 2017-10-24 RX ORDER — GABAPENTIN 100 MG/1
CAPSULE ORAL
Qty: 40 CAPSULE | Refills: 3 | Status: SHIPPED | OUTPATIENT
Start: 2017-10-24 | End: 2017-11-06

## 2017-11-06 ENCOUNTER — OFFICE VISIT (OUTPATIENT)
Dept: FAMILY MEDICINE | Facility: OTHER | Age: 82
End: 2017-11-06
Attending: FAMILY MEDICINE
Payer: MEDICARE

## 2017-11-06 VITALS
DIASTOLIC BLOOD PRESSURE: 68 MMHG | TEMPERATURE: 96.8 F | HEART RATE: 93 BPM | HEIGHT: 57 IN | OXYGEN SATURATION: 98 % | WEIGHT: 136 LBS | BODY MASS INDEX: 29.34 KG/M2 | SYSTOLIC BLOOD PRESSURE: 132 MMHG

## 2017-11-06 DIAGNOSIS — M54.41 ACUTE RIGHT-SIDED LOW BACK PAIN WITH RIGHT-SIDED SCIATICA: ICD-10-CM

## 2017-11-06 DIAGNOSIS — Z96.652 S/P TOTAL KNEE REPLACEMENT USING CEMENT, LEFT: ICD-10-CM

## 2017-11-06 PROCEDURE — 99213 OFFICE O/P EST LOW 20 MIN: CPT | Performed by: FAMILY MEDICINE

## 2017-11-06 PROCEDURE — 99212 OFFICE O/P EST SF 10 MIN: CPT

## 2017-11-06 RX ORDER — SENNOSIDES 8.6 MG
CAPSULE ORAL
Qty: 250 TABLET | Refills: 6 | Status: SHIPPED | OUTPATIENT
Start: 2017-11-06 | End: 2019-04-03

## 2017-11-06 RX ORDER — GABAPENTIN 100 MG/1
200 CAPSULE ORAL 3 TIMES DAILY
Qty: 360 CAPSULE | Refills: 3 | Status: SHIPPED | OUTPATIENT
Start: 2017-11-06 | End: 2018-10-05

## 2017-11-06 RX ORDER — ERGOCALCIFEROL 1.25 MG/1
50000 CAPSULE, LIQUID FILLED ORAL
Qty: 8 CAPSULE | Refills: 0 | Status: SHIPPED | OUTPATIENT
Start: 2017-11-06 | End: 2017-12-26

## 2017-11-06 ASSESSMENT — ANXIETY QUESTIONNAIRES
2. NOT BEING ABLE TO STOP OR CONTROL WORRYING: NOT AT ALL
1. FEELING NERVOUS, ANXIOUS, OR ON EDGE: NOT AT ALL
5. BEING SO RESTLESS THAT IT IS HARD TO SIT STILL: NOT AT ALL
IF YOU CHECKED OFF ANY PROBLEMS ON THIS QUESTIONNAIRE, HOW DIFFICULT HAVE THESE PROBLEMS MADE IT FOR YOU TO DO YOUR WORK, TAKE CARE OF THINGS AT HOME, OR GET ALONG WITH OTHER PEOPLE: NOT DIFFICULT AT ALL
3. WORRYING TOO MUCH ABOUT DIFFERENT THINGS: NOT AT ALL
6. BECOMING EASILY ANNOYED OR IRRITABLE: NOT AT ALL
GAD7 TOTAL SCORE: 0
4. TROUBLE RELAXING: NOT AT ALL
7. FEELING AFRAID AS IF SOMETHING AWFUL MIGHT HAPPEN: NOT AT ALL

## 2017-11-06 ASSESSMENT — PAIN SCALES - GENERAL: PAINLEVEL: MODERATE PAIN (5)

## 2017-11-06 ASSESSMENT — PATIENT HEALTH QUESTIONNAIRE - PHQ9: SUM OF ALL RESPONSES TO PHQ QUESTIONS 1-9: 0

## 2017-11-06 NOTE — PROGRESS NOTES
SUBJECTIVE:   Claudine Bustos is a 87 year old female who presents to clinic today for the following health issues:      Musculoskeletal problem/pain      Duration: Since January - since hip surgery.    Description  Location: Right sided low back - radiates down right leg    Intensity:  moderate    Accompanying signs and symptoms: radiation of pain to right leg    History  Previous similar problem: YES  Previous evaluation:  x-ray    Precipitating or alleviating factors:  Trauma or overuse: YES, hip surgery   Aggravating factors include: standing for too long    Therapies tried and outcome: gabapentin, arthritis medications      Problem list and histories reviewed & adjusted, as indicated.  Additional history: as documented    Current Outpatient Prescriptions   Medication Sig Dispense Refill     acetaminophen (ARTHRITIS PAIN) 650 MG CR tablet TAKE 1 TABLET BY MOUTH EVERY 4 HOURS AS NEEDED 250 tablet 6     gabapentin (NEURONTIN) 100 MG capsule Take 2 capsules (200 mg) by mouth 3 times daily After one week if still having pain, increase to 3 capsules  capsule 3     vitamin D (ERGOCALCIFEROL) 17333 UNIT capsule Take 1 capsule (50,000 Units) by mouth every 7 days for 8 doses 8 capsule 0     lisinopril (PRINIVIL/ZESTRIL) 5 MG tablet TAKE 1 TABLET BY MOUTH DAILY 30 tablet 9     Calcium Carb-Cholecalciferol (CALCIUM + D3) 600-200 MG-UNIT TABS Take 1 tablet by mouth daily       Multiple Vitamin (MULTIVITAMIN) per tablet Take 1 tablet by mouth daily with food.       [DISCONTINUED] gabapentin (NEURONTIN) 100 MG capsule TAKE 1 CAPSULE BY MOUTH 3 TIMES DAILY AS NEEDED; MAY INCREASE TO 2 CAPSULES 3 TIMES DAILY AS NEEDED AFTER 3 DAYS 40 capsule 3     Labs reviewed in EPIC    Reviewed and updated as needed this visit by clinical staffTobacco  Allergies  Meds  Med Hx  Surg Hx  Fam Hx  Soc Hx      Reviewed and updated as needed this visit by Provider         ROS:  Constitutional, HEENT, cardiovascular, pulmonary, gi  "and gu systems are negative, except as otherwise noted.      OBJECTIVE:                                                    /68 (BP Location: Right arm, Patient Position: Chair, Cuff Size: Adult Regular)  Pulse 93  Temp 96.8  F (36  C) (Tympanic)  Ht 4' 9\" (1.448 m)  Wt 136 lb (61.7 kg)  SpO2 98%  BMI 29.43 kg/m2  Body mass index is 29.43 kg/(m^2).  GENERAL APPEARANCE: healthy, alert and no distress  PSYCH: mentation appears normal and affect normal/bright       ASSESSMENT/PLAN:                                                    1. S/P total knee replacement using cement, left  refilled  - acetaminophen (ARTHRITIS PAIN) 650 MG CR tablet; TAKE 1 TABLET BY MOUTH EVERY 4 HOURS AS NEEDED  Dispense: 250 tablet; Refill: 6  - vitamin D (ERGOCALCIFEROL) 13171 UNIT capsule; Take 1 capsule (50,000 Units) by mouth every 7 days for 8 doses  Dispense: 8 capsule; Refill: 0    2. Acute right-sided low back pain with right-sided sciatica  Trial of gabapentin, discussed side effects, taper up if needed  - gabapentin (NEURONTIN) 100 MG capsule; Take 2 capsules (200 mg) by mouth 3 times daily After one week if still having pain, increase to 3 capsules TID  Dispense: 360 capsule; Refill: 3  - vitamin D (ERGOCALCIFEROL) 40590 UNIT capsule; Take 1 capsule (50,000 Units) by mouth every 7 days for 8 doses  Dispense: 8 capsule; Refill: 0    Patient was agreeable to this plan and had no further questions.  See Patient Instructions    Katarina Gutierrez MD  [facility] [place]  "

## 2017-11-06 NOTE — MR AVS SNAPSHOT
"              After Visit Summary   2017    Claudine Bustos    MRN: [id number]           Patient Information     Date Of Birth          1930        Visit Information        Provider Department      2017 2:00 PM Katarina Gutierrez MD [facility] [place]        Today's Diagnoses     S/P total knee replacement using cement, left        Acute right-sided low back pain with right-sided sciatica          Care Instructions    Fish Oil 2000mg           Follow-ups after your visit        Who to contact     If you have questions or need follow up information about today's clinic visit or your schedule please contact [facility] directly at [phone].  Normal or non-critical lab and imaging results will be communicated to you by MyChart, letter or phone within 4 business days after the clinic has received the results. If you do not hear from us within 7 days, please contact the clinic through MyChart or phone. If you have a critical or abnormal lab result, we will notify you by phone as soon as possible.  Submit refill requests through [employer] or call your pharmacy and they will forward the refill request to us. Please allow 3 business days for your refill to be completed.          Additional Information About Your Visit        MyChart Information     [employer] lets you send messages to your doctor, view your test results, renew your prescriptions, schedule appointments and more. To sign up, go to [email or web address] . Click on \"Log in\" on the left side of the screen, which will take you to the Welcome page. Then click on \"Sign up Now\" on the right side of the page.     You will be asked to enter the access code listed below, as well as some personal information. Please follow the directions to create your username and password.     Your access code is: [email or web address]  Expires: 2018  5:46 PM     Your access code will  in 90 days. If you need help or a new code, please call your " "[facility] or [phone].        Care EveryWhere ID     This is your Care EveryWhere ID. This could be used by other organizations to access your [place] medical records  [id number]        Your Vitals Were     Pulse Temperature Height Pulse Oximetry BMI (Body Mass Index)       93 96.8  F (36  C) (Tympanic) 4' 9\" (1.448 m) 98% 29.43 kg/m2        Blood Pressure from Last 3 Encounters:   11/06/17 132/68   10/02/17 164/66   08/01/17 162/80    Weight from Last 3 Encounters:   11/06/17 136 lb (61.7 kg)   10/02/17 131 lb (59.4 kg)   08/01/17 126 lb 6.4 oz (57.3 kg)              Today, you had the following     No orders found for display         Today's Medication Changes          These changes are accurate as of: 11/6/17  5:46 PM.  If you have any questions, ask your nurse or doctor.               Start taking these medicines.        Dose/Directions    vitamin D 67756 UNIT capsule   Commonly known as:  ERGOCALCIFEROL   Used for:  Acute right-sided low back pain with right-sided sciatica, S/P total knee replacement using cement, left   Started by:  Katarina Gutierrez MD        Dose:  21934 Units   Take 1 capsule (50,000 Units) by mouth every 7 days for 8 doses   Quantity:  8 capsule   Refills:  0         These medicines have changed or have updated prescriptions.        Dose/Directions    acetaminophen 650 MG CR tablet   Commonly known as:  ARTHRITIS PAIN   This may have changed:  See the new instructions.   Used for:  S/P total knee replacement using cement, left   Changed by:  Katarina Gutierrez MD        TAKE 1 TABLET BY MOUTH EVERY 4 HOURS AS NEEDED   Quantity:  250 tablet   Refills:  6       gabapentin 100 MG capsule   Commonly known as:  NEURONTIN   This may have changed:  See the new instructions.   Used for:  Acute right-sided low back pain with right-sided sciatica   Changed by:  Katarina Gutierrez MD        Dose:  200 mg   Take 2 capsules (200 mg) by mouth 3 times daily After one week if still " having pain, increase to 3 capsules TID   Quantity:  360 capsule   Refills:  3            Where to get your medicines      These medications were sent to [facility] [place] - [address]  [address] [facility] [place] [address]     Phone:  [phone]     acetaminophen 650 MG CR tablet    gabapentin 100 MG capsule    vitamin D 51011 UNIT capsule                Primary Care Provider Office Phone # Fax #    Katarina Gutierrez [phone]       [facility] [address]  [facility] [place] [address]        Equal Access to Services     [facility]: Hadii aad ku hadasho Soomaali, waaxda luqadaha, qaybta kaalmada adeegyada, guerita blevins . So [facility] [phone].    ATENCIÓN: Si habla español, tiene a fulton disposición servicios gratuitos de asistencia lingüística. [facility] [phone].    We comply with applicable federal civil rights laws and [place] laws. We do not discriminate on the basis of race, color, national origin, age, disability, sex, sexual orientation, or gender identity.            Thank you!     Thank you for choosing [facility]  for your care. Our goal is always to provide you with excellent care. Hearing back from our patients is one way we can continue to improve our services. Please take a few minutes to complete the written survey that you may receive in the mail after your visit with us. Thank you!             Your Updated Medication List - Protect others around you: Learn how to safely use, store and throw away your medicines at www.disposemymeds.org.          This list is accurate as of: 11/6/17  5:46 PM.  Always use your most recent med list.                   Brand Name Dispense Instructions for use Diagnosis    acetaminophen 650 MG CR tablet    ARTHRITIS PAIN    250 tablet    TAKE 1 TABLET BY MOUTH EVERY 4 HOURS AS NEEDED    S/P total knee replacement using cement, left       Calcium + D3 600-200 MG-UNIT Tabs       Take 1 tablet by mouth daily        gabapentin 100 MG capsule    NEURONTIN    360 capsule    Take 2 capsules (200 mg) by mouth 3 times daily After one week if still having pain, increase to 3 capsules TID    Acute right-sided low back pain with right-sided sciatica       lisinopril 5 MG tablet    PRINIVIL/ZESTRIL    30 tablet    TAKE 1 TABLET BY MOUTH DAILY    Benign essential HTN       multivitamin per tablet      Take 1 tablet by mouth daily with food.        vitamin D 91760 UNIT capsule    ERGOCALCIFEROL    8 capsule    Take 1 capsule (50,000 Units) by mouth every 7 days for 8 doses    Acute right-sided low back pain with right-sided sciatica, S/P total knee replacement using cement, left

## 2017-11-06 NOTE — NURSING NOTE
"Chief Complaint   Patient presents with     Back Pain       Initial /68 (BP Location: Right arm, Patient Position: Chair, Cuff Size: Adult Regular)  Pulse 93  Temp 96.8  F (36  C) (Tympanic)  Ht 4' 9\" (1.448 m)  Wt 136 lb (61.7 kg)  SpO2 98%  BMI 29.43 kg/m2 Estimated body mass index is 29.43 kg/(m^2) as calculated from the following:    Height as of this encounter: 4' 9\" (1.448 m).    Weight as of this encounter: 136 lb (61.7 kg).  Medication Reconciliation: complete     Obdulia Osullivan     "

## 2017-11-07 ASSESSMENT — ANXIETY QUESTIONNAIRES: GAD7 TOTAL SCORE: 0

## 2018-01-21 ENCOUNTER — HOSPITAL ENCOUNTER (INPATIENT)
Facility: HOSPITAL | Age: 83
LOS: 2 days | Discharge: HOME HEALTH | DRG: 195 | End: 2018-01-23
Attending: PHYSICIAN ASSISTANT | Admitting: INTERNAL MEDICINE
Payer: MEDICARE

## 2018-01-21 ENCOUNTER — APPOINTMENT (OUTPATIENT)
Dept: GENERAL RADIOLOGY | Facility: HOSPITAL | Age: 83
DRG: 195 | End: 2018-01-21
Attending: PHYSICIAN ASSISTANT
Payer: MEDICARE

## 2018-01-21 DIAGNOSIS — J96.21 ACUTE AND CHRONIC RESPIRATORY FAILURE WITH HYPOXIA (H): ICD-10-CM

## 2018-01-21 DIAGNOSIS — J18.9 COMMUNITY ACQUIRED PNEUMONIA OF LEFT LOWER LOBE OF LUNG: ICD-10-CM

## 2018-01-21 LAB
ALBUMIN SERPL-MCNC: 3.5 G/DL (ref 3.4–5)
ALBUMIN UR-MCNC: 100 MG/DL
ALP SERPL-CCNC: 160 U/L (ref 40–150)
ALT SERPL W P-5'-P-CCNC: 56 U/L (ref 0–50)
ANION GAP SERPL CALCULATED.3IONS-SCNC: 11 MMOL/L (ref 3–14)
APPEARANCE UR: ABNORMAL
AST SERPL W P-5'-P-CCNC: 64 U/L (ref 0–45)
BACTERIA #/AREA URNS HPF: ABNORMAL /HPF
BASOPHILS # BLD AUTO: 0 10E9/L (ref 0–0.2)
BASOPHILS NFR BLD AUTO: 0.2 %
BILIRUB SERPL-MCNC: 0.5 MG/DL (ref 0.2–1.3)
BILIRUB UR QL STRIP: NEGATIVE
BUN SERPL-MCNC: 22 MG/DL (ref 7–30)
CALCIUM SERPL-MCNC: 7.9 MG/DL (ref 8.5–10.1)
CHLORIDE SERPL-SCNC: 106 MMOL/L (ref 94–109)
CO2 SERPL-SCNC: 23 MMOL/L (ref 20–32)
COLOR UR AUTO: YELLOW
CREAT SERPL-MCNC: 0.86 MG/DL (ref 0.52–1.04)
DEPRECATED S PYO AG THROAT QL EIA: NORMAL
DIFFERENTIAL METHOD BLD: ABNORMAL
EOSINOPHIL # BLD AUTO: 0 10E9/L (ref 0–0.7)
EOSINOPHIL NFR BLD AUTO: 0 %
ERYTHROCYTE [DISTWIDTH] IN BLOOD BY AUTOMATED COUNT: 15.8 % (ref 10–15)
FLUAV+FLUBV AG SPEC QL: NEGATIVE
FLUAV+FLUBV AG SPEC QL: NEGATIVE
GFR SERPL CREATININE-BSD FRML MDRD: 62 ML/MIN/1.7M2
GLUCOSE SERPL-MCNC: 149 MG/DL (ref 70–99)
GLUCOSE UR STRIP-MCNC: NEGATIVE MG/DL
HCT VFR BLD AUTO: 30.3 % (ref 35–47)
HGB BLD-MCNC: 9.5 G/DL (ref 11.7–15.7)
HGB UR QL STRIP: ABNORMAL
IMM GRANULOCYTES # BLD: 0.1 10E9/L (ref 0–0.4)
IMM GRANULOCYTES NFR BLD: 1.2 %
KETONES UR STRIP-MCNC: NEGATIVE MG/DL
LACTATE SERPL-SCNC: 2.6 MMOL/L (ref 0.4–2)
LEUKOCYTE ESTERASE UR QL STRIP: NEGATIVE
LYMPHOCYTES # BLD AUTO: 0.2 10E9/L (ref 0.8–5.3)
LYMPHOCYTES NFR BLD AUTO: 1.6 %
MCH RBC QN AUTO: 24.7 PG (ref 26.5–33)
MCHC RBC AUTO-ENTMCNC: 31.4 G/DL (ref 31.5–36.5)
MCV RBC AUTO: 79 FL (ref 78–100)
MONOCYTES # BLD AUTO: 0.5 10E9/L (ref 0–1.3)
MONOCYTES NFR BLD AUTO: 5.3 %
MUCOUS THREADS #/AREA URNS LPF: PRESENT /LPF
NEUTROPHILS # BLD AUTO: 9.3 10E9/L (ref 1.6–8.3)
NEUTROPHILS NFR BLD AUTO: 91.7 %
NITRATE UR QL: NEGATIVE
NRBC # BLD AUTO: 0 10*3/UL
NRBC BLD AUTO-RTO: 0 /100
PH UR STRIP: 6.5 PH (ref 4.7–8)
PLATELET # BLD AUTO: 247 10E9/L (ref 150–450)
POTASSIUM SERPL-SCNC: 3.9 MMOL/L (ref 3.4–5.3)
PROT SERPL-MCNC: 7.3 G/DL (ref 6.8–8.8)
RBC # BLD AUTO: 3.84 10E12/L (ref 3.8–5.2)
RBC #/AREA URNS AUTO: 27 /HPF (ref 0–2)
SODIUM SERPL-SCNC: 140 MMOL/L (ref 133–144)
SOURCE: ABNORMAL
SP GR UR STRIP: 1.02 (ref 1–1.03)
SPECIMEN SOURCE: NORMAL
SPECIMEN SOURCE: NORMAL
SQUAMOUS #/AREA URNS AUTO: 14 /HPF (ref 0–1)
UROBILINOGEN UR STRIP-MCNC: NORMAL MG/DL (ref 0–2)
WBC # BLD AUTO: 10.2 10E9/L (ref 4–11)
WBC #/AREA URNS AUTO: 0 /HPF (ref 0–2)

## 2018-01-21 PROCEDURE — 94640 AIRWAY INHALATION TREATMENT: CPT

## 2018-01-21 PROCEDURE — 81001 URINALYSIS AUTO W/SCOPE: CPT | Performed by: PHYSICIAN ASSISTANT

## 2018-01-21 PROCEDURE — 87880 STREP A ASSAY W/OPTIC: CPT | Performed by: PHYSICIAN ASSISTANT

## 2018-01-21 PROCEDURE — 40000275 ZZH STATISTIC RCP TIME EA 10 MIN

## 2018-01-21 PROCEDURE — 71045 X-RAY EXAM CHEST 1 VIEW: CPT | Mod: TC

## 2018-01-21 PROCEDURE — 87081 CULTURE SCREEN ONLY: CPT | Performed by: PHYSICIAN ASSISTANT

## 2018-01-21 PROCEDURE — 80053 COMPREHEN METABOLIC PANEL: CPT | Performed by: PHYSICIAN ASSISTANT

## 2018-01-21 PROCEDURE — 87804 INFLUENZA ASSAY W/OPTIC: CPT | Performed by: PHYSICIAN ASSISTANT

## 2018-01-21 PROCEDURE — 25000132 ZZH RX MED GY IP 250 OP 250 PS 637: Mod: GY | Performed by: PHYSICIAN ASSISTANT

## 2018-01-21 PROCEDURE — 87040 BLOOD CULTURE FOR BACTERIA: CPT | Performed by: FAMILY MEDICINE

## 2018-01-21 PROCEDURE — 25000125 ZZHC RX 250: Performed by: PHYSICIAN ASSISTANT

## 2018-01-21 PROCEDURE — 99285 EMERGENCY DEPT VISIT HI MDM: CPT | Mod: 25

## 2018-01-21 PROCEDURE — A9270 NON-COVERED ITEM OR SERVICE: HCPCS | Mod: GY | Performed by: PHYSICIAN ASSISTANT

## 2018-01-21 PROCEDURE — 36415 COLL VENOUS BLD VENIPUNCTURE: CPT | Performed by: FAMILY MEDICINE

## 2018-01-21 PROCEDURE — 12000000 ZZH R&B MED SURG/OB

## 2018-01-21 PROCEDURE — 85025 COMPLETE CBC W/AUTO DIFF WBC: CPT | Performed by: PHYSICIAN ASSISTANT

## 2018-01-21 PROCEDURE — 83605 ASSAY OF LACTIC ACID: CPT | Performed by: PHYSICIAN ASSISTANT

## 2018-01-21 PROCEDURE — 99284 EMERGENCY DEPT VISIT MOD MDM: CPT | Performed by: PHYSICIAN ASSISTANT

## 2018-01-21 PROCEDURE — 99222 1ST HOSP IP/OBS MODERATE 55: CPT | Mod: AI | Performed by: INTERNAL MEDICINE

## 2018-01-21 RX ORDER — BISACODYL 10 MG
10 SUPPOSITORY, RECTAL RECTAL DAILY PRN
Status: DISCONTINUED | OUTPATIENT
Start: 2018-01-21 | End: 2018-01-23 | Stop reason: HOSPADM

## 2018-01-21 RX ORDER — LIDOCAINE 40 MG/G
CREAM TOPICAL
Status: DISCONTINUED | OUTPATIENT
Start: 2018-01-21 | End: 2018-01-23 | Stop reason: HOSPADM

## 2018-01-21 RX ORDER — ONDANSETRON 4 MG/1
4 TABLET, ORALLY DISINTEGRATING ORAL EVERY 6 HOURS PRN
Status: DISCONTINUED | OUTPATIENT
Start: 2018-01-21 | End: 2018-01-23 | Stop reason: HOSPADM

## 2018-01-21 RX ORDER — NALOXONE HYDROCHLORIDE 0.4 MG/ML
.1-.4 INJECTION, SOLUTION INTRAMUSCULAR; INTRAVENOUS; SUBCUTANEOUS
Status: DISCONTINUED | OUTPATIENT
Start: 2018-01-21 | End: 2018-01-23 | Stop reason: HOSPADM

## 2018-01-21 RX ORDER — SODIUM CHLORIDE 9 MG/ML
INJECTION, SOLUTION INTRAVENOUS CONTINUOUS
Status: DISCONTINUED | OUTPATIENT
Start: 2018-01-21 | End: 2018-01-22

## 2018-01-21 RX ORDER — ACETAMINOPHEN 325 MG/1
650 TABLET ORAL EVERY 4 HOURS PRN
Status: DISCONTINUED | OUTPATIENT
Start: 2018-01-21 | End: 2018-01-23 | Stop reason: HOSPADM

## 2018-01-21 RX ORDER — LEVOFLOXACIN 750 MG/1
750 TABLET, FILM COATED ORAL ONCE
Status: DISCONTINUED | OUTPATIENT
Start: 2018-01-21 | End: 2018-01-21

## 2018-01-21 RX ORDER — LEVOFLOXACIN 750 MG/1
750 TABLET, FILM COATED ORAL EVERY OTHER DAY
Status: DISCONTINUED | OUTPATIENT
Start: 2018-01-21 | End: 2018-01-23

## 2018-01-21 RX ORDER — GABAPENTIN 100 MG/1
200 CAPSULE ORAL 3 TIMES DAILY
Status: DISCONTINUED | OUTPATIENT
Start: 2018-01-22 | End: 2018-01-23 | Stop reason: HOSPADM

## 2018-01-21 RX ORDER — SENNOSIDES 8.6 MG
650 CAPSULE ORAL EVERY 4 HOURS PRN
Status: DISCONTINUED | OUTPATIENT
Start: 2018-01-21 | End: 2018-01-21

## 2018-01-21 RX ORDER — PROCHLORPERAZINE MALEATE 5 MG
5 TABLET ORAL EVERY 6 HOURS PRN
Status: DISCONTINUED | OUTPATIENT
Start: 2018-01-21 | End: 2018-01-23 | Stop reason: HOSPADM

## 2018-01-21 RX ORDER — ACETAMINOPHEN 325 MG/1
975 TABLET ORAL ONCE
Status: COMPLETED | OUTPATIENT
Start: 2018-01-21 | End: 2018-01-21

## 2018-01-21 RX ORDER — IPRATROPIUM BROMIDE AND ALBUTEROL SULFATE 2.5; .5 MG/3ML; MG/3ML
3 SOLUTION RESPIRATORY (INHALATION) ONCE
Status: COMPLETED | OUTPATIENT
Start: 2018-01-21 | End: 2018-01-21

## 2018-01-21 RX ORDER — GUAIFENESIN/DEXTROMETHORPHAN 100-10MG/5
5 SYRUP ORAL EVERY 4 HOURS PRN
Status: DISCONTINUED | OUTPATIENT
Start: 2018-01-21 | End: 2018-01-23 | Stop reason: HOSPADM

## 2018-01-21 RX ORDER — ONDANSETRON 2 MG/ML
4 INJECTION INTRAMUSCULAR; INTRAVENOUS EVERY 6 HOURS PRN
Status: DISCONTINUED | OUTPATIENT
Start: 2018-01-21 | End: 2018-01-23 | Stop reason: HOSPADM

## 2018-01-21 RX ORDER — LISINOPRIL 5 MG/1
5 TABLET ORAL DAILY
Status: DISCONTINUED | OUTPATIENT
Start: 2018-01-22 | End: 2018-01-23 | Stop reason: HOSPADM

## 2018-01-21 RX ORDER — PROCHLORPERAZINE 25 MG
12.5 SUPPOSITORY, RECTAL RECTAL EVERY 12 HOURS PRN
Status: DISCONTINUED | OUTPATIENT
Start: 2018-01-21 | End: 2018-01-23 | Stop reason: HOSPADM

## 2018-01-21 RX ADMIN — IPRATROPIUM BROMIDE AND ALBUTEROL SULFATE 3 ML: .5; 3 SOLUTION RESPIRATORY (INHALATION) at 21:16

## 2018-01-21 RX ADMIN — ACETAMINOPHEN 975 MG: 325 TABLET, FILM COATED ORAL at 22:21

## 2018-01-21 ASSESSMENT — ENCOUNTER SYMPTOMS
CHEST TIGHTNESS: 1
CHILLS: 1
SHORTNESS OF BREATH: 1
ABDOMINAL PAIN: 0
FATIGUE: 1
COUGH: 1
BACK PAIN: 0
FEVER: 1

## 2018-01-21 NOTE — IP AVS SNAPSHOT
[facility]    [address] [address]    Phone:  [phone]    Fax:  [phone]                                       After Visit Summary   1/21/2018    Claudine Bustos    MRN: [id number]           After Visit Summary Signature Page     I have received my discharge instructions, and my questions have been answered. I have discussed any challenges I see with this plan with the nurse or doctor.    ..........................................................................................................................................  Patient/Patient Representative Signature      ..........................................................................................................................................  Patient Representative Print Name and Relationship to Patient    ..................................................               ................................................  Date                                            Time    ..........................................................................................................................................  Reviewed by Signature/Title    ...................................................              ..............................................  Date                                                            Time

## 2018-01-21 NOTE — IP AVS SNAPSHOT
MRN:[id number]                      After Visit Summary   1/21/2018    Claudine Bustos    MRN: [id number]           Thank you!     Thank you for choosing [facility] for your care. Our goal is always to provide you with excellent care. Hearing back from our patients is one way we can continue to improve our services. Please take a few minutes to complete the written survey that you may receive in the mail after you visit with us. Thank you!        Patient Information     Date Of Birth          4/11/1930        Designated Caregiver       Most Recent Value    Caregiver    Will someone help with your care after discharge? yes    Name of designated caregiver ania Esparza    Phone number of caregiver [phone]    Caregiver address [place]      About your hospital stay     You were admitted on:  January 21, 2018 You last received care in the:  [facility]    You were discharged on:  January 23, 2018       Who to Call     For medical emergencies, please call 911.  For non-urgent questions about your medical care, please call your primary care provider or clinic, [phone]          Attending Provider     Provider Specialty    Christophe Ta PA-C Emergency Medicine    Lakesha Verma MD Internal Medicine    Benson Whitfield MD Internal Medicine    Darren Cordero MD Internal Medicine    Jonas Calvillo MD Internal Medicine       Primary Care Provider Office Phone # Fax #    Katarina Gutierrez [phone]      After Care Instructions     Activity       Your activity upon discharge: activity as tolerated            Diet       Follow this diet upon discharge: Orders Placed This Encounter      Combination Diet Low Saturated Fat Na <2400mg Diet, No Caffeine Diet                  Follow-up Appointments     Follow-up and recommended labs and tests        Follow up with primary care provider, Katarina Gutierrez, within 7-10 days for hospital follow- up.  No follow up labs  or test are needed.                  Your next 10 appointments already scheduled     Feb 01, 2018  9:45 AM CST   (Arrive by 9:30 AM)   SHORT with Katarina Gutierrez MD   [facility] ([facility] [place] )    [place] [address]   [phone]              Additional Services     Home Care PT Referral for Hospital Discharge       PT to eval and treat    Your provider has ordered home care - physical therapy. If you have not been contacted within 2 days of your discharge please call the department phone number listed on the top of this document.            Home care nursing referral       RN skilled nursing visit. RN to assess vital signs and weight, respiratory and cardiac status, hydration, nutrition and bowel status and home safety.    Your provider has ordered home care nursing services. If you have not been contacted within 2 days of your discharge please call the inpatient department phone number at [phone] .                  Further instructions from your care team       You have a follow up appointment with Dr. Gutierrez on Thursday, Feb. 1, 2018 at 09:30 a.m.  You do not need any labs beforehand.      You have a home care referral for physical therapy and for a RN visit.  [facility] and [facility] will call you to set this up.    Pending Results     Date and Time Order Name Status Description    1/21/2018 2220 Beta strep group A culture Preliminary     1/21/2018 2215 Blood culture Preliminary     1/21/2018 2215 Blood culture Preliminary             Statement of Approval     Ordered          01/23/18 0753  I have reviewed and agree with all the recommendations and orders detailed in this document.  EFFECTIVE NOW     Approved and electronically signed by:  Jonas Calvillo MD             Admission Information     Date & Time Provider Department Dept. Phone    1/21/2018 Jonas Calvillo MD [facility] [phone]      Your Vitals Were     Blood  "Pressure Pulse Temperature Respirations Weight Pulse Oximetry    170/68 90 100.6  F (38.1  C) (Tympanic) 18 68.3 kg (150 lb 9.2 oz) 92%    BMI (Body Mass Index)                   32.58 kg/m2           [employer] Information     [employer] lets you send messages to your doctor, view your test results, renew your prescriptions, schedule appointments and more. To sign up, go to [email or web address] . Click on \"Log in\" on the left side of the screen, which will take you to the Welcome page. Then click on \"Sign up Now\" on the right side of the page.     You will be asked to enter the access code listed below, as well as some personal information. Please follow the directions to create your username and password.     Your access code is: [email or web address]  Expires: 2018  5:46 PM     Your access code will  in 90 days. If you need help or a new code, please call your [facility] clinic or [phone].        Care EveryWhere ID     This is your Care EveryWhere ID. This could be used by other organizations to access your [facility] medical records  [id number]        Equal Access to Services     SEAN BRITO AH: Contreras Madrigal, ludwin zarco, álvaro kaalmada berlin, guerita baumann. So [facility] [phone].    ATENCIÓN: Si habla español, tiene a fulton disposición servicios gratuitos de asistencia lingüística. Llame al [phone].    We comply with applicable federal civil rights laws and [place] laws. We do not discriminate on the basis of race, color, national origin, age, disability, sex, sexual orientation, or gender identity.               Review of your medicines      START taking        Dose / Directions    levofloxacin 750 MG tablet   Commonly known as:  LEVAQUIN   Indication:  Community Acquired Pneumonia        Dose:  750 mg   Start taking on:  2018   Take 1 tablet (750 mg) by mouth every other day for 2 doses   Quantity:  2 tablet   Refills:  0         CONTINUE these " medicines which have NOT CHANGED        Dose / Directions    acetaminophen 650 MG CR tablet   Commonly known as:  ARTHRITIS PAIN   Used for:  S/P total knee replacement using cement, left        TAKE 1 TABLET BY MOUTH EVERY 4 HOURS AS NEEDED   Quantity:  250 tablet   Refills:  6       Calcium + D3 600-200 MG-UNIT Tabs        Dose:  1 tablet   Take 1 tablet by mouth daily   Refills:  0       gabapentin 100 MG capsule   Commonly known as:  NEURONTIN   Used for:  Acute right-sided low back pain with right-sided sciatica        Dose:  200 mg   Take 2 capsules (200 mg) by mouth 3 times daily After one week if still having pain, increase to 3 capsules TID   Quantity:  360 capsule   Refills:  3       lisinopril 5 MG tablet   Commonly known as:  PRINIVIL/ZESTRIL   Used for:  Benign essential HTN        TAKE 1 TABLET BY MOUTH DAILY   Quantity:  30 tablet   Refills:  9       multivitamin per tablet        Dose:  1 tablet   Take 1 tablet by mouth daily with food.   Refills:  0            Where to get your medicines      These medications were sent to [facility] - [address]  [address] [place] [address]     Phone:  [phone]     levofloxacin 750 MG tablet               ANTIBIOTIC INSTRUCTION     You've Been Prescribed an Antibiotic - Now What?  Your healthcare team thinks that you or your loved one might have an infection. Some infections can be treated with antibiotics, which are powerful, life-saving drugs. Like all medications, antibiotics have side effects and should only be used when necessary. There are some important things you should know about your antibiotic treatment.      Your healthcare team may run tests before you start taking an antibiotic.    Your team may take samples (e.g., from your blood, urine or other areas) to run tests to look for bacteria. These test can be important to determine if you need an antibiotic at all and, if you do, which antibiotic will work  best.      Within a few days, your healthcare team might change or even stop your antibiotic.    Your team may start you on an antibiotic while they are working to find out what is making you sick.    Your team might change your antibiotic because test results show that a different antibiotic would be better to treat your infection.    In some cases, once your team has more information, they learn that you do not need an antibiotic at all. They may find out that you don't have an infection, or that the antibiotic you're taking won't work against your infection. For example, an infection caused by a virus can't be treated with antibiotics. Staying on an antibiotic when you don't need it is more likely to be harmful than helpful.      You may experience side effects from your antibiotic.    Like all medications, antibiotics have side effects. Some of these can be serious.    Let you healthcare team know if you have any known allergies when you are admitted to the hospital.    One significant side effect of nearly all antibiotics is the risk of severe and sometimes deadly diarrhea caused by Clostridium difficile (C. Difficile). This occurs when a person takes antibiotics because some good germs are destroyed. Antibiotic use allows C. diificile to take over, putting patients at high risk for this serious infection.    As a patient or caregiver, it is important to understand your or your loved one's antibiotic treatment. It is especially important for caregivers to speak up when patients can't speak for themselves. Here are some important questions to ask your healthcare team.    What infection is this antibiotic treating and how do you know I have that infection?    What side effects might occur from this antibiotic?    How long will I need to take this antibiotic?    Is it safe to take this antibiotic with other medications or supplements (e.g., vitamins) that I am taking?     Are there any special directions I need to  know about taking this antibiotic? For example, should I take it with food?    How will I be monitored to know whether my infection is responding to the antibiotic?    What tests may help to make sure the right antibiotic is prescribed for me?      Information provided by:  www.cdc.gov/getsmart  U.S. Department of Health and Human Services  Centers for disease Control and Prevention  National Center for Emerging and Zoonotic Infectious Diseases  Division of Healthcare Quality Promotion         Protect others around you: Learn how to safely use, store and throw away your medicines at www.disposemymeds.org.             Medication List: This is a list of all your medications and when to take them. Check marks below indicate your daily home schedule. Keep this list as a reference.      Medications           Morning Afternoon Evening Bedtime As Needed    acetaminophen 650 MG CR tablet   Commonly known as:  ARTHRITIS PAIN   TAKE 1 TABLET BY MOUTH EVERY 4 HOURS AS NEEDED                                Calcium + D3 600-200 MG-UNIT Tabs   Take 1 tablet by mouth daily                                gabapentin 100 MG capsule   Commonly known as:  NEURONTIN   Take 2 capsules (200 mg) by mouth 3 times daily After one week if still having pain, increase to 3 capsules TID   Last time this was given:  200 mg on 1/23/2018  8:41 AM                                levofloxacin 750 MG tablet   Commonly known as:  LEVAQUIN   Take 1 tablet (750 mg) by mouth every other day for 2 doses   Start taking on:  1/24/2018   Last time this was given:  750 mg on 1/22/2018  1:04 AM                                lisinopril 5 MG tablet   Commonly known as:  PRINIVIL/ZESTRIL   TAKE 1 TABLET BY MOUTH DAILY   Last time this was given:  5 mg on 1/23/2018  8:42 AM                                multivitamin per tablet   Take 1 tablet by mouth daily with food.                                          More Information        Pneumonia (Adult)  Pneumonia is  an infection deep within the lungs. It is in the small air sacs (alveoli). Pneumonia may be caused by a virus or bacteria. Pneumonia caused by bacteria is usually treated with an antibiotic. Severe cases may need to be treated in the hospital. Milder cases can be treated at home. Symptoms usually start to get better during the first 2 days of treatment.    Home care  Follow these guidelines when caring for yourself at home:    Rest at home for the first 2 to 3 days, or until you feel stronger. Don t let yourself get overly tired when you go back to your activities.    Stay away from cigarette smoke - yours or other people s.    You may use acetaminophen or ibuprofen to control fever or pain, unless another medicine was prescribed. If you have chronic liver or kidney disease, talk with your healthcare provider before using these medicines. Also talk with your provider if you ve had a stomach ulcer or gastrointestinal bleeding. Don t give aspirin to anyone younger than 18 years of age who is ill with a fever. It may cause severe liver damage.    Your appetite may be poor, so a light diet is fine.    Drink 6 to 8 glasses of fluids every day to make sure you are getting enough fluids. Beverages can include water, sport drinks, sodas without caffeine, juices, tea, or soup. Fluids will help loosen secretions in the lung. This will make it easier for you to cough up the phlegm (sputum). If you also have heart or kidney disease, check with your healthcare provider before you drink extra fluids.    Take antibiotic medicine prescribed until it is all gone, even if you are feeling better after a few days.  Follow-up care  Follow up with your healthcare provider in the next 2 to 3 days, or as advised. This is to be sure the medicine is helping you get better.  If you are 65 or older, you should get a pneumococcal vaccine and a yearly flu (influenza) shot. You should also get these vaccines if you have chronic lung disease like  asthma, emphysema, or COPD. Recently, a second type of pneumonia vaccine has become available for everyone over 65 years old. This is in addition to the previous vaccine. Ask your provider about this.  When to seek medical advice  Call your healthcare provider right away if any of these occur:    You don t get better within the first 48 hours of treatment    Shortness of breath gets worse    Rapid breathing (more than 25 breaths per minute)    Coughing up blood    Chest pain gets worse with breathing    Fever of 100.4 F (38 C) or higher that doesn t get better with fever medicine    Weakness, dizziness, or fainting that gets worse    Thirst or dry mouth that gets worse    Sinus pain, headache, or a stiff neck    Chest pain not caused by coughing  Date Last Reviewed: 1/1/2017 2000-2017 [employer]. [address] [place] [address]. All rights reserved. This information is not intended as a substitute for professional medical care. Always follow your healthcare professional's instructions.                Preventing Pneumonia  Pneumonia is an infection in one or both of the lungs. Those most at risk include older adults, smokers, and people with chronic lung diseases. For example, those with conditions like chronic obstructive pulmonary disease (COPD), including emphysema or chronic bronchitis, asthma, and those with weak immune systems. There are some things you can do to lessen the chance that you will get pneumonia.    Avoid infection    Wash your hands often:    Use soap and warm water.    If soap and water aren't available, use a hand  with alcohol in it.    Avoid touching your face and mouth with your hands.    Use disposable tissues instead of a handkerchief. Throw used tissues away.    Avoid people who have a cold or the flu.    Try to avoid crowded places.  Get vaccinated  Talk with your healthcare provider about vaccinations and whether you should get a yearly flu shot. There  are now 2 different pneumonia vaccines. Both of these are needed if you have a chronic disease or fit into the higher risk category.      Get a flu shot every year as soon as it's available in your area. The flu shot helps prevent you from getting the flu and complications of the flu, such as pneumonia.    Get pneumococcal pneumonia vaccines. Talk with your healthcare provider about which pneumococcal vaccines are right for you.  Do suggested breathing exercises  Deep breathing and coughing exercises can help clear your lungs. Your healthcare provider may suggest them. If so, you will be shown how to do them. Do them as often as your healthcare provider instructs.  Take care of your body    Drink at least 6 to 8 glasses of water a day.    Eat well-balanced, healthy meals.    Avoid drinking alcohol.    Don t smoke. Avoid places where people are smoking.    Moving around helps keep your lungs clear. Ask your healthcare provider what type of activity is best for you. Walking is often a good choice.    Get enough rest. Sleep at least 8 hours each night. Rest or nap during the day as needed.    Ask your healthcare provider when you should schedule follow-up care to make sure the infection is gone.  Date Last Reviewed: 12/1/2016 2000-2017 [employer]. [address] [place] [address], [place] [address]. All rights reserved. This information is not intended as a substitute for professional medical care. Always follow your healthcare professional's instructions.                Treating Pneumonia  Pneumonia is an infection of one or both of the lungs. Pneumonia:    Is usually caused by either a virus or a bacteria    Can be very serious, especially in infants, young children, and older adults. It s also serious for those with other long-term health problems or weakened immune systems.    Is sometimes treated at home and sometimes in the hospital    Antibiotic medicines  Antibiotics may be prescribed for pneumonia  caused by bacteria. They may be pills (oral medicines), or shots (injections). Or they may be given by IV (intravenously) into a vein. If you are taking oral medicines at home:    Fill your prescription and start taking your medicine as soon as you can.    You will likely start to feel better in a day or 2, but don t stop taking the antibiotic.    Use a pill organizer to help you remember to take your medicine.    Let your healthcare provider know if you have side effects.    Take your medicine exactly as directed on the label. Talk to your provider or pharmacist if you have any questions.  Antiviral medicines  Antiviral medicine may be prescribed for pneumonia caused by a virus. For example, antiviral medicine may be prescribed for pneumonia caused by the flu virus. Antibiotics do not work against viruses. If you are taking antiviral medicine at home:    Fill your prescription and start taking your medicine as soon as you can.    Talk with your provider or pharmacist about possible side effects.    Take the medicine exactly as instructed.  To relieve symptoms  There are many medicines that can help relieve symptoms of pneumonia. Some are prescription and some are over-the-counter.  Your healthcare provider may recommend:    Acetaminophen or ibuprofen to lower your fever and to lessen headache or other pain    Cough medicine to loosen mucus or to reduce coughing  Make sure you check with your healthcare provider or pharmacist before taking any over-the-counter medicines.  Special treatments  If you are hospitalized for pneumonia, you may have other therapies, including:    Inhaled medicines to help with breathing or chest congestion    Supplemental oxygen to increase low oxygen levels  Drink fluids and eat healthy  You should eat healthy to help your body fight the infection. Drinking a lot of fluids helps to replace fluids lost from fever and to loosen mucus in your chest.    Diet. Make healthy food choices,  including fruits and vegetables, lean meats and other proteins, 100% whole grain and low- or no-fat dairy products.    Fluids. Drink at least 6 to 8 tall glasses a day. Water and 100% fruit or vegetable juice are best.  Get plenty of rest and sleep  You may be more tired than usual for a while. It is important to get enough sleep at night. It s also important to rest during the day. Talk with your healthcare provider if coughing or other symptoms are interfering with your sleep.  Preventing the spread of germs  The best thing you can do to prevent spreading germs is to wash your hands often. You should:    Rub your hand with soap and water for 20 to 30 seconds.    Clean in between your fingers, the backs of your hands, and around your nails.    Dry your hands on a separate towel or use paper towels.  You should also:    Keep alcohol-based hand  nearby.    Make sure you also clean surfaces that you touch. Use a product that kills all types of germs.    Stay away from others until you are feeling better.  When to call your healthcare provider  Call your healthcare provider if you have any of the following:    Symptoms get worse    Fever continues    Shortness of breath gets worse    Increased mucus or mucus that is darker in color    Coughing gets worse    Lips or fingers are bluish in color    Side effects from your medicine   Date Last Reviewed: 12/1/2016 2000-2017 [employer]. [address] [place], [place] [id number]. All rights reserved. This information is not intended as a substitute for professional medical care. Always follow your healthcare professional's instructions.                What is Pneumonia?    Pneumonia is a serious lung infection. Many cases of pneumonia are caused by bacteria or viruses. Fungi may also cause pneumonia, but this is less common.  You may also get pneumonia after another illness, such as a cold, flu, or bronchitis. Those most at risk include older adults,  smokers, and people with long-term (chronic) health problems or weak immune systems.  Healthy lungs    Air travels in and out of the lungs through tubes called airways.    The tubes branch into smaller passages called bronchioles. These end in tiny sacs called alveoli.    Blood vessels surrounding the alveoli take oxygen into the bloodstream. At the same time, the alveoli remove carbon dioxide (a waste gas) from the blood. The carbon dioxide is then exhaled.  When you have pneumonia    Pneumonia causes the bronchioles and the alveoli to fill with excess mucus and become inflamed.    Your body s response may be to cough. This can help clear out the fluid.    The fluid (or mucus) you cough up may appear green or dark yellow.    The excess mucus may make you feel short of breath.    The inflammation and infection may give you a fever.  What are the symptoms?  Symptoms of pneumonia can come without warning. At first, you may think you have a cold or flu. But symptoms may get worse quickly, turning into pneumonia. Symptoms can be different for bacterial and viral pneumonia. Common symptoms may include the following:    Severe cough with green or yellow mucus that doesn't improve or that gets worse    Fever and chills    Nausea, vomiting or diarrhea    Shortness of breath with normal daily activities    Increased heart rate    Chest pain or discomfort when breathing in or coughing    Headache    Excessive sweating and clammy skin  Date Last Reviewed: 12/1/2016 2000-2017 [employer]. [address] [place] [address]. All rights reserved. This information is not intended as a substitute for professional medical care. Always follow your healthcare professional's instructions.                Patient Education    Levofloxacin Ophthalmic drops, solution    Levofloxacin Oral solution    Levofloxacin Oral tablet    Levofloxacin Solution for injection    Levofloxacin, Dextrose Solution for  injection  Levofloxacin Oral tablet  What is this medicine?  LEVOFLOXACIN (jordyn mccarthy FARIBA ty sin) is a quinolone antibiotic. It is used to treat certain kinds of bacterial infections. It will not work for colds, flu, or other viral infections.  This medicine may be used for other purposes; ask your health care provider or pharmacist if you have questions.  What should I tell my health care provider before I take this medicine?  They need to know if you have any of these conditions:    cerebral disease    irregular heartbeat    kidney disease    seizure disorder    an unusual or allergic reaction to levofloxacin, other antibiotics or medicines, foods, dyes, or preservatives    pregnant or trying to get pregnant    breast-feeding  How should I use this medicine?  Take this medicine by mouth with a full glass of water. Follow the directions on the prescription label. This medicine can be taken with or without food. Take your medicine at regular intervals. Do not take your medicine more often than directed. Do not skip doses or stop your medicine early even if you feel better. Do not stop taking except on your doctor's advice.  A special MedGuide will be given to you by the pharmacist with each prescription and refill. Be sure to read this information carefully each time.  Talk to your pediatrician regarding the use of this medicine in children. While this drug may be prescribed for children as young as 6 months for selected conditions, precautions do apply.  Overdosage: If you think you have taken too much of this medicine contact a poison control center or emergency room at once.  NOTE: This medicine is only for you. Do not share this medicine with others.  What if I miss a dose?  If you miss a dose, take it as soon as you remember. If it is almost time for your next dose, take only that dose. Do not take double or extra doses.  What may interact with this medicine?  Do not take this medicine with any of the following  medications:    arsenic trioxide    chloroquine    droperidol    medicines for irregular heart rhythm like amiodarone, disopyramide, dofetilide, flecainide, quinidine, procainamide, sotalol    some medicines for depression or mental problems like phenothiazines, pimozide, and ziprasidone  This medicine may also interact with the following medications:    amoxapine    antacids    cisapride    dairy products    didanosine (ddI) buffered tablets or powder    haloperidol    multivitamins    NSAIDS, medicines for pain and inflammation, like ibuprofen or naproxen    retinoid products like tretinoin or isotretinoin    risperidone    some other antibiotics like clarithromycin or erythromycin    sucralfate    theophylline    warfarin  This list may not describe all possible interactions. Give your health care provider a list of all the medicines, herbs, non-prescription drugs, or dietary supplements you use. Also tell them if you smoke, drink alcohol, or use illegal drugs. Some items may interact with your medicine.  What should I watch for while using this medicine?  Tell your doctor or health care professional if your symptoms do not improve or if they get worse. Drink several glasses of water a day and cut down on drinks that contain caffeine. You must not get dehydrated while taking this medicine.  You may get drowsy or dizzy. Do not drive, use machinery, or do anything that needs mental alertness until you know how this medicine affects you. Do not sit or stand up quickly, especially if you are an older patient. This reduces the risk of dizzy or fainting spells.  This medicine can make you more sensitive to the sun. Keep out of the sun. If you cannot avoid being in the sun, wear protective clothing and use a sunscreen. Do not use sun lamps or tanning beds/booths. Contact your doctor if you get a sunburn.  If you are a diabetic monitor your blood glucose carefully. If you get an unusual reading stop taking this medicine  and call your doctor right away.  Do not treat diarrhea with over-the-counter products. Contact your doctor if you have diarrhea that lasts more than 2 days or if the diarrhea is severe and watery.  Avoid antacids, calcium, iron, and zinc products for 2 hours before and 2 hours after taking a dose of this medicine.  What side effects may I notice from receiving this medicine?  Side effects that you should report to your doctor or health care professional as soon as possible:    allergic reactions like skin rash or hives, swelling of the face, lips, or tongue    changes in vision    confusion, nightmares or hallucinations    difficulty breathing    irregular heartbeat, chest pain    joint, muscle or tendon pain    pain or difficulty passing urine    persistent headache with or without blurred vision    redness, blistering, peeling or loosening of the skin, including inside the mouth    seizures    unusual pain, numbness, tingling, or weakness    vaginal irritation, discharge  Side effects that usually do not require medical attention (report to your doctor or health care professional if they continue or are bothersome):    diarrhea    dry mouth    headache    stomach upset, nausea    trouble sleeping  This list may not describe all possible side effects. Call your doctor for medical advice about side effects. You may report side effects to FDA at 7-896-FDA-0745.  Where should I keep my medicine?  Keep out of the reach of children.  Store at room temperature between 15 and 30 degrees C (59 and 86 degrees F). Keep in a tightly closed container. Throw away any unused medicine after the expiration date.  NOTE:This sheet is a summary. It may not cover all possible information. If you have questions about this medicine, talk to your doctor, pharmacist, or health care provider. Copyright  2016 [employer]

## 2018-01-22 ENCOUNTER — APPOINTMENT (OUTPATIENT)
Dept: PHYSICAL THERAPY | Facility: HOSPITAL | Age: 83
DRG: 195 | End: 2018-01-22
Payer: MEDICARE

## 2018-01-22 LAB
ANION GAP SERPL CALCULATED.3IONS-SCNC: 9 MMOL/L (ref 3–14)
BASOPHILS # BLD AUTO: 0 10E9/L (ref 0–0.2)
BASOPHILS NFR BLD AUTO: 0.3 %
BUN SERPL-MCNC: 19 MG/DL (ref 7–30)
CALCIUM SERPL-MCNC: 7.8 MG/DL (ref 8.5–10.1)
CHLORIDE SERPL-SCNC: 109 MMOL/L (ref 94–109)
CO2 SERPL-SCNC: 24 MMOL/L (ref 20–32)
CREAT SERPL-MCNC: 0.72 MG/DL (ref 0.52–1.04)
DIFFERENTIAL METHOD BLD: ABNORMAL
EOSINOPHIL # BLD AUTO: 0 10E9/L (ref 0–0.7)
EOSINOPHIL NFR BLD AUTO: 0 %
ERYTHROCYTE [DISTWIDTH] IN BLOOD BY AUTOMATED COUNT: 16 % (ref 10–15)
GFR SERPL CREATININE-BSD FRML MDRD: 77 ML/MIN/1.7M2
GLUCOSE SERPL-MCNC: 103 MG/DL (ref 70–99)
HCT VFR BLD AUTO: 28.6 % (ref 35–47)
HGB BLD-MCNC: 8.9 G/DL (ref 11.7–15.7)
IMM GRANULOCYTES # BLD: 0.1 10E9/L (ref 0–0.4)
IMM GRANULOCYTES NFR BLD: 0.6 %
LACTATE SERPL-SCNC: 1.1 MMOL/L (ref 0.4–2)
LYMPHOCYTES # BLD AUTO: 1 10E9/L (ref 0.8–5.3)
LYMPHOCYTES NFR BLD AUTO: 11 %
MCH RBC QN AUTO: 24.5 PG (ref 26.5–33)
MCHC RBC AUTO-ENTMCNC: 31.1 G/DL (ref 31.5–36.5)
MCV RBC AUTO: 79 FL (ref 78–100)
MONOCYTES # BLD AUTO: 0.8 10E9/L (ref 0–1.3)
MONOCYTES NFR BLD AUTO: 9.3 %
NEUTROPHILS # BLD AUTO: 7 10E9/L (ref 1.6–8.3)
NEUTROPHILS NFR BLD AUTO: 78.8 %
NRBC # BLD AUTO: 0 10*3/UL
NRBC BLD AUTO-RTO: 0 /100
PLATELET # BLD AUTO: 231 10E9/L (ref 150–450)
POTASSIUM SERPL-SCNC: 3.6 MMOL/L (ref 3.4–5.3)
PROCALCITONIN SERPL-MCNC: 3.74 NG/ML
RBC # BLD AUTO: 3.63 10E12/L (ref 3.8–5.2)
SODIUM SERPL-SCNC: 142 MMOL/L (ref 133–144)
WBC # BLD AUTO: 8.9 10E9/L (ref 4–11)

## 2018-01-22 PROCEDURE — 80048 BASIC METABOLIC PNL TOTAL CA: CPT | Performed by: INTERNAL MEDICINE

## 2018-01-22 PROCEDURE — 25000132 ZZH RX MED GY IP 250 OP 250 PS 637: Mod: GY | Performed by: INTERNAL MEDICINE

## 2018-01-22 PROCEDURE — 25000128 H RX IP 250 OP 636: Performed by: INTERNAL MEDICINE

## 2018-01-22 PROCEDURE — 97530 THERAPEUTIC ACTIVITIES: CPT | Mod: GP

## 2018-01-22 PROCEDURE — 83605 ASSAY OF LACTIC ACID: CPT | Performed by: INTERNAL MEDICINE

## 2018-01-22 PROCEDURE — 85025 COMPLETE CBC W/AUTO DIFF WBC: CPT | Performed by: INTERNAL MEDICINE

## 2018-01-22 PROCEDURE — 84145 PROCALCITONIN (PCT): CPT | Performed by: INTERNAL MEDICINE

## 2018-01-22 PROCEDURE — 97161 PT EVAL LOW COMPLEX 20 MIN: CPT | Mod: GP

## 2018-01-22 PROCEDURE — 12000000 ZZH R&B MED SURG/OB

## 2018-01-22 PROCEDURE — 40000193 ZZH STATISTIC PT WARD VISIT

## 2018-01-22 PROCEDURE — 99232 SBSQ HOSP IP/OBS MODERATE 35: CPT | Performed by: INTERNAL MEDICINE

## 2018-01-22 PROCEDURE — 36415 COLL VENOUS BLD VENIPUNCTURE: CPT | Performed by: INTERNAL MEDICINE

## 2018-01-22 PROCEDURE — A9270 NON-COVERED ITEM OR SERVICE: HCPCS | Mod: GY | Performed by: INTERNAL MEDICINE

## 2018-01-22 RX ADMIN — ENOXAPARIN SODIUM 40 MG: 40 INJECTION SUBCUTANEOUS at 21:47

## 2018-01-22 RX ADMIN — ACETAMINOPHEN 650 MG: 325 TABLET, FILM COATED ORAL at 16:39

## 2018-01-22 RX ADMIN — ACETAMINOPHEN 650 MG: 325 TABLET, FILM COATED ORAL at 11:53

## 2018-01-22 RX ADMIN — LISINOPRIL 5 MG: 5 TABLET ORAL at 08:20

## 2018-01-22 RX ADMIN — LEVOFLOXACIN 750 MG: 750 TABLET, FILM COATED ORAL at 01:04

## 2018-01-22 RX ADMIN — GABAPENTIN 200 MG: 100 CAPSULE ORAL at 21:47

## 2018-01-22 RX ADMIN — SODIUM CHLORIDE: 9 INJECTION, SOLUTION INTRAVENOUS at 01:03

## 2018-01-22 RX ADMIN — SODIUM CHLORIDE: 9 INJECTION, SOLUTION INTRAVENOUS at 09:12

## 2018-01-22 RX ADMIN — OYSTER SHELL CALCIUM WITH VITAMIN D 1 TABLET: 500; 200 TABLET, FILM COATED ORAL at 08:20

## 2018-01-22 RX ADMIN — GABAPENTIN 200 MG: 100 CAPSULE ORAL at 13:42

## 2018-01-22 RX ADMIN — ENOXAPARIN SODIUM 40 MG: 40 INJECTION SUBCUTANEOUS at 01:03

## 2018-01-22 RX ADMIN — ACETAMINOPHEN 650 MG: 325 TABLET, FILM COATED ORAL at 06:51

## 2018-01-22 RX ADMIN — GABAPENTIN 200 MG: 100 CAPSULE ORAL at 08:19

## 2018-01-22 ASSESSMENT — ACTIVITIES OF DAILY LIVING (ADL): WHICH_OF_THE_ABOVE_FUNCTIONAL_RISKS_HAD_A_RECENT_ONSET_OR_CHANGE?: AMBULATION;TRANSFERRING

## 2018-01-22 ASSESSMENT — PAIN DESCRIPTION - DESCRIPTORS
DESCRIPTORS: ACHING
DESCRIPTORS: DISCOMFORT

## 2018-01-22 NOTE — PHARMACY
[facility]    Pharmacy      Antimicrobial Stewardship Note     Current antimicrobial therapy:  Anti-infectives (Future)    Start     Dose/Rate Route Frequency Ordered Stop    01/21/18 2309  levofloxacin (LEVAQUIN) tablet 750 mg      750 mg Oral EVERY OTHER DAY 01/21/18 2302            Indication: CAP     Pertinent labs:  Creatinine   Creatinine   Date Value Ref Range Status   01/22/2018 0.72 0.52 - 1.04 mg/dL Final   01/21/2018 0.86 0.52 - 1.04 mg/dL Final   07/26/2017 0.74 0.52 - 1.04 mg/dL Final     WBC   WBC   Date Value Ref Range Status   01/22/2018 8.9 4.0 - 11.0 10e9/L Final   01/21/2018 10.2 4.0 - 11.0 10e9/L Final   07/26/2017 6.4 4.0 - 11.0 10e9/L Final     Procalcitonin No results found for: PCAL  CRP No results found for: CRP    Culture Results:   Beta strep group A culture [[id number]] Collected: 01/21/18 2220        Order Status: Completed Specimen: Throat Updated: 01/22/18 0743        Specimen Description Throat        Culture Micro Culture in progress       Blood culture [[id number]] Collected: 01/21/18 2315       Order Status: Completed Specimen: Blood Updated: 01/22/18 0732        Specimen Description Blood Right Hand        Culture Micro No growth after 8 hours       Blood culture [[id number]] Collected: 01/21/18 2112       Order Status: Completed Specimen: Blood Updated: 01/22/18 0732        Specimen Description Blood        Special Requests AER ONLY, IV START        Culture Micro No growth after 8 hours       Rapid strep screen [[id number]] Collected: 01/21/18 2220       Order Status: Completed Specimen: Throat Updated: 01/21/18 2257        Specimen Description Throat        Rapid Strep A Screen NEGATIVE: No Group A streptococcal antigen detected by immunoassay, await culture report.       Blood culture [[id number]]        Order Status: Canceled Specimen: Blood        Blood culture [[id number]]        Order Status: Canceled Specimen: Blood        Influenza A/B antigen [[id number]] Collected:  01/21/18 2058       Order Status: Completed Specimen: Nares Updated: 01/21/18 2122        Influenza A/B Agn Specimen Nares        Influenza A Negative        Influenza B Negative         Test results must be correlated with clinical data. If necessary, results   should be confirmed by a molecular assay or viral culture.               Recommendations/Interventions:  1. No recommendations at this time    Lara Rivera, [facility]  January 22, 2018

## 2018-01-22 NOTE — PROGRESS NOTES
[facility]    Hospitalist Progress Note    Date of Service (when I saw the patient): 01/22/2018    Assessment & Plan   Claudine Bustos is a 87 year old female who was admitted on 1/21/2018.      1.  Community acquired pneumonia: Patient 87-year-old female who developed rather acute onset of weakness cough congestion.  She slipped to the floor while sitting in a rocker.  She was brought to our ER for evaluation.  She had a fever of 102.7.  Had tachycardia 120.  Blood pressure was stable.  O2 sats are 89% on room air.  Chest x-ray was performed shows a very minimal left lower lobe infiltrate.  She had blood cultures performed.  No sputum was obtained.  She was started on levofloxacin.  She continues to actually relatively well.  She has normal sats on room air.  Still has some cough and congestion.  Is feeling stronger.  Her exam shows a few bibasilar crackles.  No obvious consolidation.  Will continue with the oral Levaquin.  She is eating well we will discontinue her IV fluids.  If she continues to improve likely could be discharged back home tomorrow.      2.  Cardiovascular status: Blood pressures been stable.  She continues on her usual lisinopril dosing.  No tachycardia.  No signs of any congestive heart failure.    3.  GI: Stable eating well no abdominal pain no diarrhea.    4.  Anemia: This is been present for some time.  She denies any bleeding black stools.  At this point stable we will not investigate we will leave this for outpatient workup if indicated.    DVT Prophylaxis: Enoxaparin (Lovenox) SQ  Code Status: DNR/DNI    Disposition: Expected discharge in 1-2 days once afebrile.    Darren Cordero    Interval History   Did well overnight.  Some low-grade fevers.  Oxygen continues to improve currently she is on room air.  Denies any nausea vomiting.  Feels stronger.  She was at home with her .  Does get around with a cane.    -Data reviewed today: I reviewed all new labs and imaging  results over the last 24 hours. I personally reviewed no images or EKG's today.    Physical Exam   Temp: 100.4  F (38  C) Temp src: Tympanic BP: 138/59 Pulse: 90 Heart Rate: 97 Resp: 20 SpO2: 92 % O2 Device: None (Room air) Oxygen Delivery: 1 LPM  Vitals:    01/21/18 2058 01/22/18 0536   Weight: 58.1 kg (128 lb) 68.3 kg (150 lb 9.2 oz)     Vital Signs with Ranges  Temp:  [99.9  F (37.7  C)-102.7  F (39.3  C)] 100.4  F (38  C)  Pulse:  [] 90  Heart Rate:  [] 97  Resp:  [18-24] 20  BP: (135-151)/(54-81) 138/59  SpO2:  [89 %-98 %] 92 %  I/O last 3 completed shifts:  In: -   Out: 200 [Urine:200]    Peripheral IV 01/21/18 Left Upper arm (Active)   Site Assessment WDL 1/22/2018  8:08 AM   Line Status Infusing;Checked every 1-2 hour 1/22/2018  8:08 AM   Phlebitis Scale 0-->no symptoms 1/22/2018  8:08 AM   Infiltration Scale 0 1/22/2018  8:08 AM   Number of days:1       Incision/Surgical Site 01/25/16 Left Knee (Active)   Number of days:728       Incision/Surgical Site 01/23/17 Right Hip (Active)   Number of days:364       Incision/Surgical Site 08/01/17 Left;Lower Eye (Active)   Number of days:174     No line/device    Constitutional: Alert and oriented x3. No distress    HEENT: NC/AT, PERRL, EOMI, mouth moist, neck supple, no LN.     Cardiovascular: RRR. No  Murmur, no  rubs, or gallops.  JVD flat.    Pulses 2+    Respiratory: She has some bilateral basilar crackles.  No areas of consolidation.  No wheezing noted today.      Abdomen: Soft, NTND, no organomegaly. Bowel sounds present    Extremities: Warm/dry. 1 + edema    Neuro:   Non focal, cranial nerves intact, Moves all extremities.    Medications        Calcium carb-Vitamin D 500 mg [place]-200 units  1 tablet Oral Daily     gabapentin  200 mg Oral TID     lisinopril  5 mg Oral Daily     sodium chloride (PF)  3 mL Intracatheter Q8H     enoxaparin  40 mg Subcutaneous Q24H     levofloxacin  750 mg Oral Every Other Day       Data     Recent Labs  Lab  01/22/18  0531 01/21/18  2112   WBC 8.9 10.2   HGB 8.9* 9.5*   MCV 79 79    247    140   POTASSIUM 3.6 3.9   CHLORIDE 109 106   CO2 24 23   BUN 19 22   CR 0.72 0.86   ANIONGAP 9 11   LUZMARIA 7.8* 7.9*   * 149*   ALBUMIN  --  3.5   PROTTOTAL  --  7.3   BILITOTAL  --  0.5   ALKPHOS  --  160*   ALT  --  56*   AST  --  64*     Lactic Acid   Date Value Ref Range Status   01/22/2018 1.1 0.4 - 2.0 mmol/L Final   01/21/2018 2.6 (H) 0.4 - 2.0 mmol/L Final     Comment:     Significant value called to and read back by  NORMA PERALTA AT 2140 ON 1/21/18 BY AALIYAH         Recent Results (from the past 24 hour(s))   XR Chest Port 1 View    Narrative    PROCEDURE:  XR CHEST PORT 1 VW    HISTORY:  cough and fever; .     COMPARISON:  2011    FINDINGS:   The cardiac silhouette is normal in size. The pulmonary vasculature is  normal.  Is abnormal increased density at the left lung base  consistent with atelectasis or pneumonia. No pleural effusion or  pneumothorax.      Impression    IMPRESSION:  There is abnormal increased density at the left lung base  representing atelectasis or pneumonia. .      ALESHIA FITZGERALD MD

## 2018-01-22 NOTE — PLAN OF CARE
Face to face report given with opportunity to observe patient.    Report given to DIVYA Maldonado   1/21/2018  11:42 PM

## 2018-01-22 NOTE — ED NOTES
DATE:  1/21/2018   TIME OF RECEIPT FROM LAB:  2142  LAB TEST:  Lactic  LAB VALUE:  2.6 (significant value)  RESULTS GIVEN WITH READ-BACK TO (PROVIDER):  KARLI Sanchez  TIME LAB VALUE REPORTED TO PROVIDER:   2142

## 2018-01-22 NOTE — ED NOTES
"Pt to ED room 6 with [facility] with c/o pain bilaterally to her arms and bilaterally to her legs. EMS Was paged for a lift assist, but pt denies falling or stating that she hurt anything when she was on the floor. Pt denies n/v/d but c/o a fever and states \"I think I have that cold.\" Son has a recent dx of influenza   "

## 2018-01-22 NOTE — PLAN OF CARE
Problem: Patient Care Overview  Goal: Plan of Care/Patient Progress Review  Outcome: Improving  Reason for hospital stay:  Pneumonia    Most recent vitals: /59  Pulse 90  Temp 99.7  F (37.6  C) (Tympanic)  Resp 20  Wt 68.3 kg (150 lb 9.2 oz)  SpO2 92%  BMI 32.58 kg/m2  Pain Management:  C/o low back pain, administered Tylenol with relief  Orientation:  A&O  Cardiac:  HR reg  Respiratory:  LS diminished. Denies sob. Nonproductive cough noted. No use of O2.  GI:  BS active. Denies nausea.  :  Voids without difficulty  Diet: Cardiac Diet. No caffeine, 2gm NA, low fat  Skin Issues:  Scabs, bruises, petechiae to face  IVF:  Saline locked.  ABX:  Levaquin po  Mobility:  Stand by assist with walker or Pt's personal cane  Safety:  Call light within reach. Chair alarm active    Comments:    1/22/2018  1:57 PM  Swapna Mack    Face to face report given with opportunity to observe patient.    Report given to DIVYA Hinton   1/22/2018  3:34 PM

## 2018-01-22 NOTE — PROGRESS NOTES
01/22/18 1200   Quick Adds   Type of Visit Initial PT Evaluation   Living Environment   Lives With spouse   Living Arrangements house   Home Accessibility no concerns   Transportation Available family or friend will provide   Self-Care   Usual Activity Tolerance moderate   Current Activity Tolerance fair   Regular Exercise no   Equipment Currently Used at Home cane, straight   Functional Level Prior   Ambulation 1-->assistive equipment   Transferring 1-->assistive equipment   Toileting 0-->independent   Bathing 0-->independent   Dressing 0-->independent   Fall history within last six months yes   Number of times patient has fallen within last six months 1   Which of the above functional risks had a recent onset or change? ambulation;transferring;fall history   General Information   Onset of Illness/Injury or Date of Surgery - Date 01/21/18   Referring Physician Dr. Cordero   Pertinent History of Current Problem (include personal factors and/or comorbidities that impact the POC) Pt reports she was feeling really weak at home. Hospitalized c pneumonia   Precautions/Limitations fall precautions   Weight-Bearing Status - LLE full weight-bearing   Weight-Bearing Status - RLE full weight-bearing   Cognitive Status Examination   Orientation orientation to person, place and time   Level of Consciousness alert   Follows Commands and Answers Questions 100% of the time   Personal Safety and Judgment intact   Memory intact   Pain Assessment   Patient Currently in Pain No   Posture    Posture Kyphosis   Strength   Strength Comments BLE strength impaired 3+ to 4-/5 grossly   Transfer Skills   Transfer Transfer Skill: Sit to Stand   Transfer Skill:  Sit to Stand   Level of [place]: Sit/Stand minimum assist (75% patients effort)   Physical Assist/Nonphysical Assist: Sit/Stand 1 person assist   Weightbearing Restrictions: Sit/Stand full weight-bearing   Assistive Device for Transfer: Sit/Stand rolling walker   Gait   Gait  "Gait Skill   Gait Skills   Level of [place]: Gait minimum assist (75% patients effort)   Physical Assist/Nonphysical Assist: Gait 1 person + 1 person to manage equipment   Weight-Bearing Restrictions: Gait full weight-bearing   Assistive Device for Transfer: Gait rolling walker   Gait Distance 100 feet   Balance   Balance other (describe)   Standing Balance: Static poor balance   Standing Balance: Dynamic poor balance   Balance Quick Add Standing balance: static;Standing balance: dynamic   Sensory Examination   Sensory Perception no deficits were identified   Coordination   Coordination no deficits were identified   Muscle Tone   Muscle Tone no deficits were identified   General Therapy Interventions   Planned Therapy Interventions gait training;balance training;bed mobility training;strengthening;transfer training;progressive activity/exercise   Clinical Impression   Criteria for Skilled Therapeutic Intervention yes, treatment indicated   PT Diagnosis Impaired gait, weakness, decreased activity tolerance   Influenced by the following impairments Impaired gait, weakness, decreased activity tolerance, high fall risk   Functional limitations due to impairments Decreased safety and independence c functional mobility tasks   Clinical Presentation Stable/Uncomplicated   Clinical Presentation Rationale Currently stable   Clinical Decision Making (Complexity) Low complexity   Therapy Frequency` 5 times/week   Predicted Duration of Therapy Intervention (days/wks) 7 days   Anticipated Discharge Disposition Transitional Care Facility   Risk & Benefits of therapy have been explained Yes   Patient, Family & other staff in agreement with plan of care Yes   Clinical Impression Comments Pt currently not safe to return home and would be at high risk for falls. Recommend short term rehab placement   [facility] AM-PAC TM \"6 Clicks\"   2016, Trustees of [facility], under license to [employer].  All rights reserved. " "  6 Clicks Short Forms Basic Mobility Inpatient Short Form   [facility] AM-PAC  \"6 Clicks\" V.2 Basic Mobility Inpatient Short Form   1. Turning from your back to your side while in a flat bed without using bedrails? 4 - None   2. Moving from lying on your back to sitting on the side of a flat bed without using bedrails? 4 - None   3. Moving to and from a bed to a chair (including a wheelchair)? 3 - A Little   4. Standing up from a chair using your arms (e.g., wheelchair, or bedside chair)? 3 - A Little   5. To walk in hospital room? 3 - A Little   6. Climbing 3-5 steps with a railing? 2 - A Lot   Basic Mobility Raw Score (Score out of 24.Lower scores equate to lower levels of function) 19   Total Evaluation Time   Total Evaluation Time (Minutes) 15     "

## 2018-01-22 NOTE — ED PROVIDER NOTES
"  History     Chief Complaint   Patient presents with     Fall     \"sat down on floor from falling\"      The history is provided by the patient.     Claudine Bustos is a 87 year old female who presented to the emergency department via EMS for evaluation of cough and fever.  She reported that she slid out of her rocking chair earlier today as well but was not injured.  Respiratory symptoms have been present for approximately last 3 days.  Feel short of breath.  Son recently had a flu.    Problem List:    Patient Active Problem List    Diagnosis Date Noted     Postoperative anemia due to acute blood loss 01/24/2017     Priority: Medium     Status post total replacement of right hip 01/23/2017     Priority: Medium     Status post total hip replacement, right 01/23/2017     Priority: Medium     Benign essential hypertension 01/16/2017     Priority: Medium     ACP (advance care planning) 06/09/2016     Priority: Medium     Advance Care Planning 6/9/2016: ACP Review of Chart / Resources Provided:  Reviewed chart for advance care plan.  Claudine Bustos has no plan or code status on file. Discussed available resources and provided with information. Confirmed code status reflects current choices pending further ACP discussions.  Confirmed/documented legally designated decision makers.  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 Provider to review and confirm.       Advanced care planning/counseling discussion 08/03/2012     Priority: Medium     Chronic kidney disease, unspecified 08/03/2012     Priority: Medium     Diastolic dysfunction 02/27/2012     Priority: Medium     echo 2/2012  EF 60%          Past Medical History:    Past Medical History:   Diagnosis Date     " Chronic kidney disease, unspecified 8/3/2012     Diastolic dysfunction 2/27/2012     HTN (hypertension) 4/4/2000     Personal history of colonic polyps 6/8/2004     Skin cancer        Past Surgical History:    Past Surgical History:   Procedure Laterality Date     ARTHROPLASTY HIP Right 1/23/2017    Procedure: ARTHROPLASTY HIP;  Surgeon: Jose Manuel Muñoz MD;  Location: [facility]     ARTHROPLASTY KNEE Left 1/25/2016    Procedure: ARTHROPLASTY KNEE;  Surgeon: Jose Manuel Muñoz MD;  Location: [facility]     colonoscopy  2004, 2009     EXCISE LESION EYELID Left 8/1/2017    Procedure: EXCISE LESION EYELID;  LEFT LOWER LID WEDGE EXCISION WITH FROZEN SECTION CONTROL;  Surgeon: Quinten Sotelo MD;  [facility]     excision of rectal villus adenoma       knee replacement Right 1/2011     PHACOEMULSIFICATION WITH STANDARD INTRAOCULAR LENS IMPLANT  2/25/2014    Procedure: PHACOEMULSIFICATION WITH STANDARD INTRAOCULAR LENS IMPLANT;  CATARACT EXTRACTION WITH INTRA OCULAR LENS LEFT;  Surgeon: Jah Bee MD;  Location: [facility]     PHACOEMULSIFICATION WITH STANDARD INTRAOCULAR LENS IMPLANT Right 5/12/2015    Procedure: PHACOEMULSIFICATION WITH STANDARD INTRAOCULAR LENS IMPLANT;  Surgeon: Jah Bee MD;  Location: [facility]     thoracic schwannoma resection       tonsillectomy       urethral dilitation every 4 months         Family History:    Family History   Problem Relation Age of Onset     Alcohol/Drug Father      alcoholism     CANCER Father 51     Esophageal, cause of death     Other - See Comments Father      Tobacco use     CEREBROVASCULAR DISEASE Mother 51     Cause of death     CANCER Sister      Breast     CANCER Brother      Leukemia     CANCER Brother      Lung     Chronic Obstructive Pulmonary Disease Brother        Social History:  Marital Status:   [2]  Social History   Substance Use Topics     Smoking status: Never Smoker     Smokeless tobacco: Never Used     Alcohol use Yes      Comment: Mixed,  rarely        Medications:      acetaminophen (ARTHRITIS PAIN) 650 MG CR tablet   gabapentin (NEURONTIN) 100 MG capsule   lisinopril (PRINIVIL/ZESTRIL) 5 MG tablet   Calcium Carb-Cholecalciferol (CALCIUM + D3) 600-200 MG-UNIT TABS   Multiple Vitamin (MULTIVITAMIN) per tablet         Review of Systems   Constitutional: Positive for chills, fatigue and fever.   HENT: Positive for congestion.    Respiratory: Positive for cough, chest tightness and shortness of breath.    Cardiovascular: Negative for chest pain.   Gastrointestinal: Negative for abdominal pain.   Genitourinary: Negative.    Musculoskeletal: Negative for back pain.   Skin: Negative.        Physical Exam   BP: 135/66  Pulse: (!) 122  Heart Rate: (!) 122  Temp: (!) 102.7  F (39.3  C)  Resp: 24  Weight: 58.1 kg (128 lb)  SpO2: (!) 89 %      Physical Exam   Constitutional: She is oriented to person, place, and time. She appears well-developed and well-nourished. No distress.   Eyes:   Injected conjunctiva    Cardiovascular: Regular rhythm.    Tachycardia    Pulmonary/Chest: Effort normal.   Scattered rhonchi and wheezes    Neurological: She is alert and oriented to person, place, and time.   Skin: Skin is warm and dry.   Psychiatric: She has a normal mood and affect.   Nursing note and vitals reviewed.      ED Course     ED Course     Procedures          Chest x-ray shows a focal left-sided lower opacity.    Critical Care time:  none  Lactate is greater than 2 due to increased work of breathing , at this time there is no sign of severe sepsis or septic shock.         Labs Ordered and Resulted from Time of ED Arrival Up to the Time of Departure from the ED   CBC WITH PLATELETS DIFFERENTIAL - Abnormal; Notable for the following:        Result Value    Hemoglobin 9.5 (*)     Hematocrit 30.3 (*)     MCH 24.7 (*)     MCHC 31.4 (*)     RDW 15.8 (*)     Absolute Neutrophil 9.3 (*)     Absolute Lymphocytes 0.2 (*)     All other components within normal limits    COMPREHENSIVE METABOLIC PANEL - Abnormal; Notable for the following:     Glucose 149 (*)     Calcium 7.9 (*)     Alkaline Phosphatase 160 (*)     ALT 56 (*)     AST 64 (*)     All other components within normal limits   LACTIC ACID - Abnormal; Notable for the following:     Lactic Acid 2.6 (*)     All other components within normal limits   URINE MACROSCOPIC WITH REFLEX TO MICRO   PERIPHERAL IV CATHETER   INFLUENZA A/B ANTIGEN       Assessments & Plan (with Medical Decision Making)   Weakness, fatigue, cough, shortness of breath, wheezing, fever, and x-ray findings are consistent with community acquired pneumonia.  Treated with oral Levaquin.  Nebulizers.  Tylenol.  Discussed with Dr. Verma, who graciously accepted her care.     I have reviewed the nursing notes.    I have reviewed the findings, diagnosis, plan and need for follow up with the patient.       New Prescriptions    No medications on file       Final diagnoses:   Community acquired pneumonia of left lower lobe of lung (H)   Acute and chronic respiratory failure with hypoxia (H)       1/21/2018   [facility]     Christophe Ta PA-C  01/21/18 6001

## 2018-01-22 NOTE — PLAN OF CARE
"Problem: Patient Care Overview  Goal: Plan of Care/Patient Progress Review  Outcome: Improving   01/22/18 0628   OTHER   Plan Of Care Reviewed With patient   Plan of Care Review   Progress improving   \  Pt admitted to floor for pneumonia, pt on 1L throughout majority of shift, pt weaned to room air, tolerating well.  Pt is alert oriented, but quite hard of hearing.  Pt started on levaquin PO for treatment of pneumonia.  Pt given tylenol in ER for temp, again given tylenol for temp 101 in am.  Pt reports \"being weak\", assist of one to commode, uses cane at home for ambulation.  No cough noted, or shortness of breath.      Problem: Pneumonia (Adult)  Goal: Signs and Symptoms of Listed Potential Problems Will be Absent, Minimized or Managed (Pneumonia)  Signs and symptoms of listed potential problems will be absent, minimized or managed by discharge/transition of care (reference Pneumonia (Adult) CPG).  Outcome: Improving   01/22/18 0628   Pneumonia   Problems Assessed (Pneumonia) all   Problems Present (Pneumonia) respiratory compromise       Face to face report given with opportunity to observe patient.    Report given to Swapna Cortes   1/22/2018  7:50 AM      "

## 2018-01-22 NOTE — PLAN OF CARE
Problem: Patient Care Overview  Goal: Plan of Care/Patient Progress Review  Discharge Planner PT   Patient plan for discharge: SNF for short term rehab  Current status: Ambulated 100ft c FWW needing min A to prevent loss of balance and WC follow for safety limited by fatigue and LE weakness. Transferred c min A   Barriers to return to prior living situation: High fall risk, weakness, impaired gait  Recommendations for discharge: SNF, pt agreeable  Rationale for recommendations: Pt currently not safe to return home and would be at high risk for falls. Recommend short term rehab placement       Entered by: Rebkea Clifton 01/22/2018 1:05 PM

## 2018-01-22 NOTE — H&P
[facility]    History and Physical  Hospitalist       Date of Admission:  1/21/2018  Date of Service (when I saw the patient): 01/21/18    Assessment & Plan   Claudine Bustos is a 87 year old female who presents with fever and cough.    1. Likely viral URI- CXR with mild blunting of R CP angle. Started on oral Levofloxacin. Meets inpatient criteria with fever and left shift for sepsis due to pneumonia. Flu negative. On O2. Ambulate with O2 in AM. Supportive mgmt.   2. Dehydration- IVFs.   3. Physiologic sinus tachycardia- Tylenol, IVFs.   4. HTN- Continue ACE- I.   5. Mild lactic acidosis- Repeat in AM.     Active Problems:    * No active hospital problems. *    DVT Prophylaxis: Enoxaparin (Lovenox) SQ  Code Status: DNR / DNI    Disposition: Expected discharge in 1-2 days once clinically improved.     [facility]    Primary Care Physician   Katarina Gutierrez    Chief Complaint   Fever cough.     History is obtained from the patient    History of Present Illness   Claudine Bustos is a 87 year old female who presents with fever and cough that started about a couple of days ago. Prior to this she has been exposed to a family member suffering from a viral URI. She has been feeling weak, tired, poor appeitite, cough with occ white phlegm and fever/chills and a sore throat. She lives alone and has no chronic lung disease.     Past Medical History    I have reviewed this patient's medical history and updated it with pertinent information if needed.   Past Medical History:   Diagnosis Date     Chronic kidney disease, unspecified 8/3/2012     Diastolic dysfunction 2/27/2012    echo 2/2012  EF 60%     HTN (hypertension) 4/4/2000     Personal history of colonic polyps 6/8/2004     Skin cancer        Past Surgical History   I have reviewed this patient's surgical history and updated it with pertinent information if needed.  Past Surgical History:   Procedure Laterality Date     ARTHROPLASTY HIP Right 1/23/2017     Procedure: ARTHROPLASTY HIP;  Surgeon: Jose Manuel Muñoz MD;  [facility]: [facility]     ARTHROPLASTY KNEE Left 1/25/2016    Procedure: ARTHROPLASTY KNEE;  Surgeon: Jose Manuel Muñoz MD;  Location: [facility]     colonoscopy  2004, 2009     EXCISE LESION EYELID Left 8/1/2017    Procedure: EXCISE LESION EYELID;  LEFT LOWER LID WEDGE EXCISION WITH FROZEN SECTION CONTROL;  Surgeon: Quinten Sotelo MD;  [facility]     excision of rectal villus adenoma       knee replacement Right 1/2011     PHACOEMULSIFICATION WITH STANDARD INTRAOCULAR LENS IMPLANT  2/25/2014    Procedure: PHACOEMULSIFICATION WITH STANDARD INTRAOCULAR LENS IMPLANT;  CATARACT EXTRACTION WITH INTRA OCULAR LENS LEFT;  Surgeon: Jah Bee MD;  [facility]     PHACOEMULSIFICATION WITH STANDARD INTRAOCULAR LENS IMPLANT Right 5/12/2015    Procedure: PHACOEMULSIFICATION WITH STANDARD INTRAOCULAR LENS IMPLANT;  Surgeon: Jah Bee MD;  Location: [facility]     thoracic schwannoma resection       tonsillectomy       urethral dilitation every 4 months         Prior to Admission Medications   Prior to Admission Medications   Prescriptions Last Dose Informant Patient Reported? Taking?   Calcium Carb-Cholecalciferol (CALCIUM + D3) 600-200 MG-UNIT TABS   Yes No   Sig: Take 1 tablet by mouth daily   Multiple Vitamin (MULTIVITAMIN) per tablet   Yes No   Sig: Take 1 tablet by mouth daily with food.   acetaminophen (ARTHRITIS PAIN) 650 MG CR tablet   No No   Sig: TAKE 1 TABLET BY MOUTH EVERY 4 HOURS AS NEEDED   gabapentin (NEURONTIN) 100 MG capsule   No No   Sig: Take 2 capsules (200 mg) by mouth 3 times daily After one week if still having pain, increase to 3 capsules TID   lisinopril (PRINIVIL/ZESTRIL) 5 MG tablet   No No   Sig: TAKE 1 TABLET BY MOUTH DAILY      Facility-Administered Medications: None     Allergies   No Known Allergies    Social History   I have reviewed this patient's social history and updated it with pertinent information if needed.  Claudine MURRAY Akash  reports that she has never smoked. She has never used smokeless tobacco. She reports that she drinks alcohol. She reports that she does not use illicit drugs.    Family History   I have reviewed this patient's family history and updated it with pertinent information if needed.   Family History   Problem Relation Age of Onset     Alcohol/Drug Father      alcoholism     CANCER Father 51     Esophageal, cause of death     Other - See Comments Father      Tobacco use     CEREBROVASCULAR DISEASE Mother 51     Cause of death     CANCER Sister      Breast     CANCER Brother      Leukemia     CANCER Brother      Lung     Chronic Obstructive Pulmonary Disease Brother        Review of Systems   The 10 point Review of Systems is negative other than noted in the HPI or here.     Physical Exam   Temp: (!) 102.7  F (39.3  C) Temp src: Oral BP: 140/54 Pulse: (!) 122 Heart Rate: 111 Resp: 24 SpO2: 95 % O2 Device: Nasal cannula Oxygen Delivery: 1.5 LPM  Vital Signs with Ranges  Temp:  [102.7  F (39.3  C)] 102.7  F (39.3  C)  Pulse:  [122] 122  Heart Rate:  [111-122] 111  Resp:  [24] 24  BP: (135-151)/(54-77) 140/54  SpO2:  [89 %-96 %] 95 %  128 lbs 0 oz    Constitutional: In NAD. NOT toxic looking.  Eyes: Unremarkable.  HEENT: No JVD. Dry MM.  Respiratory: Clear B.  Cardiovascular: S1 + S2.   GI: Soft, NT, ND.  Skin: Warm and dry.  Musculoskeletal: No pedal edema.  Neurologic: Grossly non-focal.  Psychiatric: Affect normal.     Data   Data reviewed today:  I personally reviewed all labs and CXR film.    Recent Labs  Lab 01/21/18  2112   WBC 10.2   HGB 9.5*   MCV 79         POTASSIUM 3.9   CHLORIDE 106   CO2 23   BUN 22   CR 0.86   ANIONGAP 11   LUZMARIA 7.9*   *   ALBUMIN 3.5   PROTTOTAL 7.3   BILITOTAL 0.5   ALKPHOS 160*   ALT 56*   AST 64*       Lactic Acid   Date Value Ref Range Status   01/21/2018 2.6 (H) 0.4 - 2.0 mmol/L Final     Comment:     Significant value called to and read back by  NORMA  KATHRYN AT 2140 ON 1/21/18 BY AALIYAH         No results found for this or any previous visit (from the past 24 hour(s)).

## 2018-01-22 NOTE — PLAN OF CARE
Pt up from Ed via cart, transferred with SBA 2 and gait belt, voided in bathroom and walked to bed, sv charted, next shift to assume care

## 2018-01-22 NOTE — PROGRESS NOTES
Met with Alex this morning.  She was sitting up in her chair.  Alex lives at home in [place] with her  Dharmesh.  She is independent with adl's, medications and driving.  She uses a cane for mobility.  She does the cooking.  Her daughter, Latonia comes in to do the cleaning.  She identifies her family as her support system.      Alex identifies that she has been weak.  Denies falls.  PT evaluation identifies snf as a disposition.  Alex agreeable.  Per discharge planning, the choice of vendor list has been provided to the patient/family for a skilled nursing facility.    First Choice : [facility] [place]    [phone]- [phone] referral    Second Choice: Skilled Nursing Facility [place]: [facility]     [phone]      Assessment completed see flowsheet.      LOC: alert    Others present: Patient    Dx: community acquired pneumonia      Lives with: Lives With: spouse    Living at: Living Arrangements: house    Equipment used: cane     Support System: Description of Support System: Involved, Supportive        Primary PCP: Katarina Gutierrez    POA/Guardian: No      Health Care Directive: NO      Pharmacy: [facility]     :  Not connected     Homecare/[place] [facility]:   Not connected      Adequate Resources for needs (housing, utilities, food/med): YES    Meds and appointments management: YES    Work: NO    Transportation: YES       ADLs: independent     Falls: No    Able to Return to Prior Living Arrangements: to be determined     [place]: NO            Goals: snf vs home with home care     Barriers: no barriers     Needs: Demonstrates Competency    JOYCE: none    Discharge Plan: snf vs home with home care

## 2018-01-23 ENCOUNTER — APPOINTMENT (OUTPATIENT)
Dept: PHYSICAL THERAPY | Facility: HOSPITAL | Age: 83
DRG: 195 | End: 2018-01-23
Payer: MEDICARE

## 2018-01-23 VITALS
WEIGHT: 150.57 LBS | TEMPERATURE: 98.5 F | RESPIRATION RATE: 18 BRPM | OXYGEN SATURATION: 93 % | DIASTOLIC BLOOD PRESSURE: 69 MMHG | HEART RATE: 90 BPM | SYSTOLIC BLOOD PRESSURE: 149 MMHG | BODY MASS INDEX: 32.58 KG/M2

## 2018-01-23 LAB
ANION GAP SERPL CALCULATED.3IONS-SCNC: 8 MMOL/L (ref 3–14)
BACTERIA SPEC CULT: NORMAL
BASOPHILS # BLD AUTO: 0 10E9/L (ref 0–0.2)
BASOPHILS NFR BLD AUTO: 0.3 %
BUN SERPL-MCNC: 19 MG/DL (ref 7–30)
CALCIUM SERPL-MCNC: 7.9 MG/DL (ref 8.5–10.1)
CHLORIDE SERPL-SCNC: 109 MMOL/L (ref 94–109)
CO2 SERPL-SCNC: 24 MMOL/L (ref 20–32)
CREAT SERPL-MCNC: 0.71 MG/DL (ref 0.52–1.04)
DIFFERENTIAL METHOD BLD: ABNORMAL
EOSINOPHIL # BLD AUTO: 0 10E9/L (ref 0–0.7)
EOSINOPHIL NFR BLD AUTO: 0.2 %
ERYTHROCYTE [DISTWIDTH] IN BLOOD BY AUTOMATED COUNT: 16 % (ref 10–15)
GFR SERPL CREATININE-BSD FRML MDRD: 77 ML/MIN/1.7M2
GLUCOSE SERPL-MCNC: 88 MG/DL (ref 70–99)
HCT VFR BLD AUTO: 28.7 % (ref 35–47)
HGB BLD-MCNC: 8.8 G/DL (ref 11.7–15.7)
IMM GRANULOCYTES # BLD: 0 10E9/L (ref 0–0.4)
IMM GRANULOCYTES NFR BLD: 0.3 %
LACTATE SERPL-SCNC: 0.8 MMOL/L (ref 0.4–2)
LYMPHOCYTES # BLD AUTO: 1.2 10E9/L (ref 0.8–5.3)
LYMPHOCYTES NFR BLD AUTO: 18.7 %
MCH RBC QN AUTO: 24 PG (ref 26.5–33)
MCHC RBC AUTO-ENTMCNC: 30.7 G/DL (ref 31.5–36.5)
MCV RBC AUTO: 78 FL (ref 78–100)
MONOCYTES # BLD AUTO: 0.8 10E9/L (ref 0–1.3)
MONOCYTES NFR BLD AUTO: 11.4 %
NEUTROPHILS # BLD AUTO: 4.6 10E9/L (ref 1.6–8.3)
NEUTROPHILS NFR BLD AUTO: 69.1 %
NRBC # BLD AUTO: 0 10*3/UL
NRBC BLD AUTO-RTO: 0 /100
PLATELET # BLD AUTO: 226 10E9/L (ref 150–450)
POTASSIUM SERPL-SCNC: 3.5 MMOL/L (ref 3.4–5.3)
RBC # BLD AUTO: 3.67 10E12/L (ref 3.8–5.2)
SODIUM SERPL-SCNC: 141 MMOL/L (ref 133–144)
SPECIMEN SOURCE: NORMAL
WBC # BLD AUTO: 6.6 10E9/L (ref 4–11)

## 2018-01-23 PROCEDURE — 36415 COLL VENOUS BLD VENIPUNCTURE: CPT | Performed by: INTERNAL MEDICINE

## 2018-01-23 PROCEDURE — 25000132 ZZH RX MED GY IP 250 OP 250 PS 637: Mod: GY | Performed by: INTERNAL MEDICINE

## 2018-01-23 PROCEDURE — 99239 HOSP IP/OBS DSCHRG MGMT >30: CPT | Performed by: INTERNAL MEDICINE

## 2018-01-23 PROCEDURE — 80048 BASIC METABOLIC PNL TOTAL CA: CPT | Performed by: INTERNAL MEDICINE

## 2018-01-23 PROCEDURE — 97530 THERAPEUTIC ACTIVITIES: CPT | Mod: GP

## 2018-01-23 PROCEDURE — 40000193 ZZH STATISTIC PT WARD VISIT

## 2018-01-23 PROCEDURE — 83605 ASSAY OF LACTIC ACID: CPT | Performed by: INTERNAL MEDICINE

## 2018-01-23 PROCEDURE — A9270 NON-COVERED ITEM OR SERVICE: HCPCS | Mod: GY | Performed by: INTERNAL MEDICINE

## 2018-01-23 PROCEDURE — 85025 COMPLETE CBC W/AUTO DIFF WBC: CPT | Performed by: INTERNAL MEDICINE

## 2018-01-23 RX ORDER — LEVOFLOXACIN 750 MG/1
750 TABLET, FILM COATED ORAL EVERY OTHER DAY
Qty: 2 TABLET | Refills: 0 | Status: SHIPPED | OUTPATIENT
Start: 2018-01-24 | End: 2018-01-27

## 2018-01-23 RX ADMIN — OYSTER SHELL CALCIUM WITH VITAMIN D 1 TABLET: 500; 200 TABLET, FILM COATED ORAL at 08:42

## 2018-01-23 RX ADMIN — ACETAMINOPHEN 650 MG: 325 TABLET, FILM COATED ORAL at 05:10

## 2018-01-23 RX ADMIN — GABAPENTIN 200 MG: 100 CAPSULE ORAL at 08:41

## 2018-01-23 RX ADMIN — LISINOPRIL 5 MG: 5 TABLET ORAL at 08:42

## 2018-01-23 RX ADMIN — ACETAMINOPHEN 650 MG: 325 TABLET, FILM COATED ORAL at 00:29

## 2018-01-23 NOTE — PLAN OF CARE
Problem: Patient Care Overview  Goal: Plan of Care/Patient Progress Review  Outcome: Adequate for Discharge Date Met: 01/23/18  VSS  Afebrile.  Denies pain..  BBS clear, O2 sats 93% on room air.  Waiting for daughter to arrive from [place] for ride home.

## 2018-01-23 NOTE — PLAN OF CARE
Problem: Patient Care Overview  Goal: Plan of Care/Patient Progress Review  Outcome: No Change  A&O, highest temp 102.5, came down to 100.6 throughout the shift. Patient received PRN Tylenol twice this shift per MAR for back pain/fever. Patient had Aqua K heating pad on at start of shift, removed due to high temp. Tachy pulse 103, sepsis BPA fired, lactic acid 0.8. Patient's Sats 92-93% RA. Lungs clear. SBA with walker to bathroom. IV saline locked. Patient drinking fluids and voiding without difficulty. Call light within reach, makes needs known, free from falls this shift.     Face to face report given with opportunity to observe patient.    Report given to Ny Zhong   1/23/2018  7:15 AM

## 2018-01-23 NOTE — PLAN OF CARE
Problem: Patient Care Overview  Goal: Plan of Care/Patient Progress Review  Outcome: Therapy, progress toward functional goals as expected  Discharge Planner PT Discharge home today  Patient plan for discharge: Home  Current status: FWW CGA1  Barriers to return to prior living situation: N/A  Recommendations for discharge: N/A  Rationale for recommendations: N/A       Entered by: Hawa Romero 01/23/2018 8:26 AM

## 2018-01-23 NOTE — PLAN OF CARE
Problem: Patient Care Overview  Goal: Plan of Care/Patient Progress Review  Outcome: Improving  No falls or injuries this shift. Uses call light approp. Up in chair for dinner. Good appetite. SL intact. Anticipates discharge to home tomorrow. Has chronic low back pain. Aqua K started and pt does get relief of pain with aqua K and pl tylenol    Face to face report given with opportunity to observe patient.    Report given to Alejandra Ochoa   1/22/2018  11:29 PM

## 2018-01-23 NOTE — PROGRESS NOTES
Checked in with Alex, she is agreeable to adding home care upon discharge.  Preference is [facility].  Spoke with Anjelica at [facility], they will be able to do admission.  Sent demographics.  Alex states that her daughter, Latonia is on her way up from [place] to pick her up.  Dr. Calvillo aware of need for home care orders.         Name: Claudine Bustos    MRN#: [id number]    Reason for Hospitalization: Acute and chronic respiratory failure with hypoxia (H) [J96.21]  Community acquired pneumonia of left lower lobe of lung (H) [J18.1]    JOYCE: katalina    Discharge Date: 1/23/2018    Patient / Family response to discharge plan: agreeable    Follow-Up Appt: No future appointments.    Other Providers (Care Coordinator, County Services, PCA services etc): Yes: [facility]    Discharge Disposition: home    Swapna Iraheta

## 2018-01-23 NOTE — DISCHARGE SUMMARY
[facility]    Discharge Summary  Hospitalist    Date of Admission:  1/21/2018  Date of Discharge:  1/23/2018 11:15 AM  Discharging Provider: Jonas Calvillo  Date of Service (when I saw the patient): 1/23/18    Discharge Diagnoses   1. Community acquired pneumonia  2. Acute respiratory distress with hypoxia, secondary to #1  3.Chronic normocytic anemia, unclear etiology    History of Present Illness   Claudine Bustos is a 87 year old female who presents with fever and cough that started about a couple of days ago. Prior to this she has been exposed to a family member suffering from a viral URI. She has been feeling weak, tired, poor appeitite, cough with occ white phlegm and fever/chills and a sore throat. She lives alone and has no chronic lung disease.     Hospital Course   Claudine Bustos was admitted on 1/21/2018.  The following problems were addressed during her hospitalization:     1.  Community acquired pneumonia:   Patient is an 87-year-old female who developed rather acute onset of weakness, cough, and congestion.  She slipped to the floor while sitting in a rocker.  She was brought to our ER for evaluation.  She had a fever of 102.7.  Had tachycardia 120.  Blood pressure was stable.  She presented with some acute mild respiratory distress with O2 sats at 89% on room air. Was placed on 1-1.5 L nasal cannula with sats to mid 90s, but relatively quickly was able to wean to room air.  Chest x-ray was performed showing a very minimal left lower lobe infiltrate. She had blood cultures performed with no growth to date.  No sputum was obtained.  Although she had initial elevation in lactic acid (2.6), fever, tachycardia and mild hypoxia, she did not show any evidence of end organ dysfunction to indicate underlying sepsis. She was started on levofloxacin and has shown relatively rapid improvement.  She has progressed relatively well.  She has normal sats on room air.  Still has some cough and  congestion.  Is feeling stronger.  Her exam shows a few bibasilar crackles.  No obvious consolidation.  Will continue with the oral Levaquin for total of 5 day course.  She is eating well and taking oral fluids. A bit on the weak side, so will return to home with some home PT and home health/nursing for cardiopulmonary assessment.    2.  Cardiovascular status: Blood pressures been stable.  She continues on her usual lisinopril dosing. Initial tachycardia rapidly resolved with abx and IVF.  No signs of any congestive heart failure.     3.  Anemia: This is been present for some time.  She denies any bleeding black stools.  At this point stable we will not investigate and will leave this for outpatient workup if indicated.      Pending Results   Unresulted Labs Ordered in the Past 30 Days of this Admission     Date and Time Order Name Status Description    1/21/2018 2220 Beta strep group A culture Preliminary     1/21/2018 2215 Blood culture Preliminary     1/21/2018 2215 Blood culture Preliminary           Code Status   Full Code       Primary Care Physician   Katarina Gutierrez    Physical Exam   Temp: 100.6  F (38.1  C) Temp src: Tympanic BP: 170/68   Heart Rate: 102 Resp: 18 SpO2: 92 % O2 Device: None (Room air)    Vitals:    01/21/18 2058 01/22/18 0536   Weight: 58.1 kg (128 lb) 68.3 kg (150 lb 9.2 oz)     Vital Signs with Ranges  Temp:  [99.4  F (37.4  C)-102.5  F (39.2  C)] 100.6  F (38.1  C)  Heart Rate:  [] 102  Resp:  [18-20] 18  BP: (153-170)/(62-70) 170/68  SpO2:  [85 %-95 %] 92 %  I/O last 3 completed shifts:  In: 1594 [P.O.:1140; I.V.:454]  Out: 475 [Urine:475]    Constitutional: Alert and oriented X 3. No distress   Respiratory: CTA bilaterally with exception a few bibasilar crackles. No wheezes or ronchi.    Cardiovascular: RRR. No murmurs, rubs, gallops. Normal S1/S2   GI: Soft, NTND, no organomegaly. Bowel sounds present   Integument: Warm, dry   Extremities:  No edema       Discharge  Disposition   Discharged to home  Condition at discharge: Stable    Consultations This Hospital Stay   PHYSICAL THERAPY ADULT IP CONSULT    Time Spent on this Encounter   I, Jonas Calvillo, personally saw the patient today and spent greater than 30 minutes discharging this patient.    Discharge Orders     Home care nursing referral     Home Care PT Referral for Hospital Discharge     Follow-up and recommended labs and tests    Follow up with primary care provider, Katarina Gutierrez, within 7-10 days for hospital follow- up.  No follow up labs or test are needed.     Activity   Your activity upon discharge: activity as tolerated     MD face to face encounter   Documentation of Face to Face and Certification for Home Health Services    I certify that patient: Claudine Bustos is under my care and that I, or a nurse practitioner or physician's assistant working with me, had a face-to-face encounter that meets the physician face-to-face encounter requirements with this patient on: 1/23/2018.    This encounter with the patient was in whole, or in part, for the following medical condition, which is the primary reason for home health care: LLL community acquired pneumonia.    I certify that, based on my findings, the following services are medically necessary home health services: Nursing and Physical Therapy.    My clinical findings support the need for the above services because: Nurse is needed: To assess cardiopulmonary status after changes in medications or other medical regimen.. and Physical Therapy Services are needed to assess and treat the following functional impairments: LLL .    Further, I certify that my clinical findings support that this patient is homebound (i.e. absences from home require considerable and taxing effort and are for medical reasons or [employer] or infrequently or of short duration when for other reasons) because: Requires assistance of another person or specialized equipment to  access medical services because patient: Is unable to exit home safely on own due to: decreased functional capacity and weakness in setting of LLL pneumonia...    Based on the above findings. I certify that this patient is confined to the home and needs intermittent skilled nursing care, physical therapy and/or speech therapy.  The patient is under my care, and I have initiated the establishment of the plan of care.  This patient will be followed by a physician who will periodically review the plan of care.  Physician/Provider to provide follow up care: Katarina Gutierrez    Attending hospital physician (the Medicare certified [place] provider): Jonas Calvillo  Physician Signature: See electronic signature associated with these discharge orders.  Date: 1/23/2018     Full Code     Diet   Follow this diet upon discharge: Orders Placed This Encounter     Combination Diet Low Saturated Fat Na <2400mg Diet, No Caffeine Diet       Discharge Medications   Current Discharge Medication List      START taking these medications    Details   levofloxacin (LEVAQUIN) 750 MG tablet Take 1 tablet (750 mg) by mouth every other day for 2 doses  Qty: 2 tablet, Refills: 0    Associated Diagnoses: Community acquired pneumonia of left lower lobe of lung (H)         CONTINUE these medications which have NOT CHANGED    Details   acetaminophen (ARTHRITIS PAIN) 650 MG CR tablet TAKE 1 TABLET BY MOUTH EVERY 4 HOURS AS NEEDED  Qty: 250 tablet, Refills: 6    Associated Diagnoses: S/P total knee replacement using cement, left      gabapentin (NEURONTIN) 100 MG capsule Take 2 capsules (200 mg) by mouth 3 times daily After one week if still having pain, increase to 3 capsules TID  Qty: 360 capsule, Refills: 3    Associated Diagnoses: Acute right-sided low back pain with right-sided sciatica      lisinopril (PRINIVIL/ZESTRIL) 5 MG tablet TAKE 1 TABLET BY MOUTH DAILY  Qty: 30 tablet, Refills: 9    Associated Diagnoses: Benign essential HTN       Calcium Carb-Cholecalciferol (CALCIUM + D3) 600-200 MG-UNIT TABS Take 1 tablet by mouth daily      Multiple Vitamin (MULTIVITAMIN) per tablet Take 1 tablet by mouth daily with food.           Allergies   No Known Allergies  Data   Most Recent 3 CBC's:  Recent Labs   Lab Test  01/23/18   0531  01/22/18   0531  01/21/18 2112   WBC  6.6  8.9  10.2   HGB  8.8*  8.9*  9.5*   MCV  78  79  79   PLT  226  231  247      Most Recent 3 BMP's:  Recent Labs   Lab Test  01/23/18   0531  01/22/18   0531  01/21/18 2112   NA  141  142  140   POTASSIUM  3.5  3.6  3.9   CHLORIDE  109  109  106   CO2  24  24  23   BUN  19  19  22   CR  0.71  0.72  0.86   ANIONGAP  8  9  11   LUZMARIA  7.9*  7.8*  7.9*   GLC  88  103*  149*     Most Recent 2 LFT's:  Recent Labs   Lab Test  01/21/18 2112 07/26/17   1051   AST  64*  20   ALT  56*  22   ALKPHOS  160*  94   BILITOTAL  0.5  0.6     Most Recent INR's and Anticoagulation Dosing History:  Anticoagulation Dose History     There is no flowsheet data to display.        Most Recent 3 Troponin's:No lab results found.  Most Recent Cholesterol Panel:  Recent Labs   Lab Test  03/30/15   1226   CHOL  245*   LDL  127   HDL  91   TRIG  134     Most Recent 6 Bacteria Isolates From Any Culture (See EPIC Reports for Culture Details):  Recent Labs   Lab Test  01/21/18   2315  01/21/18   2220  01/21/18   2112  05/12/17   1252  01/23/17   1113  01/23/17   0919   CULT  No growth after 3 days  No beta hemolytic Streptococcus Group A isolated  No growth after 3 days  No growth  No anaerobes isolated  No growth after 7 days  No MRSA isolated     Most Recent TSH, T4 and A1c Labs:No lab results found.

## 2018-01-23 NOTE — PROGRESS NOTES
Pt was in the recliner waiting for her breakfast but was willing to perform PT. Pt is going home this afternoon with her . Pt walked 200 feet with FWW CGA1 wheelchair behind to difficulties or unsafe maneuvers,Seated exercises 10 reps: LAQ'S, marching, toe and heel raises, glut sets, VMO, Iso abd.

## 2018-01-23 NOTE — PLAN OF CARE
Problem: Patient Care Overview  Goal: Plan of Care/Patient Progress Review  Physical Therapy Discharge Summary    Reason for therapy discharge:    Discharged to home with home therapy.    Progress towards therapy goal(s). See goals on Care Plan in [facility] health record for goal details.  Goals partially met.  Barriers to achieving goals:   discharge from facility.    Therapy recommendation(s):    Continued therapy is recommended.  Rationale/Recommendations:  Recommend SNF during evaluation but pt is going home with home PT.

## 2018-01-23 NOTE — DISCHARGE INSTRUCTIONS
You have a follow up appointment with Dr. Gutierrez on Thursday, Feb. 1, 2018 at 09:30 a.m.  You do not need any labs beforehand.      You have a home care referral for physical therapy and for a RN visit.  This is with [facility] and they will call you to set this up.

## 2018-01-23 NOTE — PLAN OF CARE
Patient discharged at 11:15 AM via wheel chair accompanied by daughter and staff. Prescriptions sent to patients preferred pharmacy. All belongings sent with patient.     Discharge instructions reviewed with patient and daughter.. Listed belongings gathered and returned to patient. Clothing, cane glasses    Patient discharged to home.   Report called to n/a    Core Measures and Vaccines  Core Measures applicable during stay: Yes. If yes, state diagnosis: pneumonia  Pneumonia and Influenza given prior to discharge, if indicated: N/A    Surgical Patient   Surgical Procedures during stay: none  Did patient receive discharge instruction on wound care and recognition of infection symptoms? N/A    MISC  Follow up appointment made:  Yes  Home and hospital aquired medications returned to patient: Yes  Patient reports pain was well managed at discharge: Yes

## 2018-01-25 ENCOUNTER — TELEPHONE (OUTPATIENT)
Dept: CASE MANAGEMENT | Facility: HOSPITAL | Age: 83
End: 2018-01-25

## 2018-01-25 DIAGNOSIS — Z53.9 PERSONS ENCOUNTERING HEALTH SERVICES FOR SPECIFIC PROCEDURES, NOT CARRIED OUT: Primary | ICD-10-CM

## 2018-01-25 NOTE — TELEPHONE ENCOUNTER
Claudine Bustos, was discharged to home on 1/23/18   from [facility]. I spoke today with her regarding her  discharge.   She indicates she has receive(d) sufficient information upon discharge. Medications were reviewed in full on discharge, including: Medications to be started; medications to be continued from preadmission and any side effects. Prescriptions were received at discharge and were able to be filled. Medications are being taken as prescribed.   She indicates they have an appointment scheduled for Feb 1  and has  transportation available. She was  able to confirm her  appointment time and has  it written down.   She did not have any questions regarding discharge instructions or condition.  Per their request, the following employee (s) can be recognized for their outstanding services delivered:  Care was very good.  Suggestions to improve service: Nothing indicated.  She was informed they may receive a survey in the mail from the hospital. Asked if they would kindly complete the survey in order for [facility] to know if services fully met patient needs.

## 2018-01-27 LAB
BACTERIA SPEC CULT: NORMAL
BACTERIA SPEC CULT: NORMAL
Lab: NORMAL
SPECIMEN SOURCE: NORMAL
SPECIMEN SOURCE: NORMAL

## 2018-02-01 ENCOUNTER — RADIANT APPOINTMENT (OUTPATIENT)
Dept: GENERAL RADIOLOGY | Facility: OTHER | Age: 83
End: 2018-02-01
Attending: FAMILY MEDICINE
Payer: MEDICARE

## 2018-02-01 ENCOUNTER — OFFICE VISIT (OUTPATIENT)
Dept: FAMILY MEDICINE | Facility: OTHER | Age: 83
End: 2018-02-01
Attending: FAMILY MEDICINE
Payer: MEDICARE

## 2018-02-01 VITALS
WEIGHT: 149.25 LBS | OXYGEN SATURATION: 94 % | BODY MASS INDEX: 32.3 KG/M2 | HEART RATE: 93 BPM | TEMPERATURE: 98.6 F | DIASTOLIC BLOOD PRESSURE: 76 MMHG | SYSTOLIC BLOOD PRESSURE: 156 MMHG

## 2018-02-01 DIAGNOSIS — M54.50 ACUTE BILATERAL LOW BACK PAIN WITHOUT SCIATICA: ICD-10-CM

## 2018-02-01 DIAGNOSIS — M25.531 RIGHT WRIST PAIN: ICD-10-CM

## 2018-02-01 DIAGNOSIS — Z23 NEED FOR VACCINATION: Primary | ICD-10-CM

## 2018-02-01 DIAGNOSIS — J18.9 PNEUMONIA OF LEFT LOWER LOBE DUE TO INFECTIOUS ORGANISM: ICD-10-CM

## 2018-02-01 DIAGNOSIS — I10 BENIGN ESSENTIAL HYPERTENSION: ICD-10-CM

## 2018-02-01 PROCEDURE — 72100 X-RAY EXAM L-S SPINE 2/3 VWS: CPT | Mod: TC

## 2018-02-01 PROCEDURE — 99214 OFFICE O/P EST MOD 30 MIN: CPT | Performed by: FAMILY MEDICINE

## 2018-02-01 PROCEDURE — 73110 X-RAY EXAM OF WRIST: CPT | Mod: TC,RT

## 2018-02-01 PROCEDURE — 90471 IMMUNIZATION ADMIN: CPT | Performed by: FAMILY MEDICINE

## 2018-02-01 PROCEDURE — G0463 HOSPITAL OUTPT CLINIC VISIT: HCPCS | Mod: 25

## 2018-02-01 RX ORDER — HYDROCODONE BITARTRATE AND ACETAMINOPHEN 5; 325 MG/1; MG/1
.5-1 TABLET ORAL EVERY 8 HOURS PRN
Qty: 40 TABLET | Refills: 0 | Status: SHIPPED | OUTPATIENT
Start: 2018-02-01 | End: 2018-04-09

## 2018-02-01 RX ORDER — LISINOPRIL 10 MG/1
10 TABLET ORAL DAILY
Qty: 30 TABLET | Refills: 1 | Status: SHIPPED | OUTPATIENT
Start: 2018-02-01 | End: 2018-02-26

## 2018-02-01 ASSESSMENT — ANXIETY QUESTIONNAIRES
1. FEELING NERVOUS, ANXIOUS, OR ON EDGE: NOT AT ALL
GAD7 TOTAL SCORE: 0
7. FEELING AFRAID AS IF SOMETHING AWFUL MIGHT HAPPEN: NOT AT ALL
2. NOT BEING ABLE TO STOP OR CONTROL WORRYING: NOT AT ALL
3. WORRYING TOO MUCH ABOUT DIFFERENT THINGS: NOT AT ALL
6. BECOMING EASILY ANNOYED OR IRRITABLE: NOT AT ALL
5. BEING SO RESTLESS THAT IT IS HARD TO SIT STILL: NOT AT ALL
4. TROUBLE RELAXING: NOT AT ALL

## 2018-02-01 ASSESSMENT — PAIN SCALES - GENERAL: PAINLEVEL: EXTREME PAIN (8)

## 2018-02-01 NOTE — PROGRESS NOTES
SUBJECTIVE:                                                    Claudine Bustos is a 87 year old female who presents to clinic today for the following health issues:        ED/UC Followup:    Facility:  [facility]  Date of visit: 1-21-18  Reason for visit: pneumonia  Current Status: feels better since coming. Has back pain and some wrist pain from falling before going to ER.         Musculoskeletal problem/pain      Duration: 10 days    Description  Location: low back and right wrist    Intensity:  moderate, severe    Accompanying signs and symptoms: none    History  Previous similar problem: no   Previous evaluation:  x-ray    Precipitating or alleviating factors:  Trauma or overuse: YES- fell out of her bed  Aggravating factors include: none    Therapies tried and outcome: rest/inactivity, heat and immobilization      Problem list and histories reviewed & adjusted, as indicated.  Additional history: as documented    Current Outpatient Prescriptions   Medication Sig Dispense Refill     lisinopril (PRINIVIL/ZESTRIL) 10 MG tablet Take 1 tablet (10 mg) by mouth daily 30 tablet 1     HYDROcodone-acetaminophen (NORCO) 5-325 MG per tablet Take 0.5-1 tablets by mouth every 8 hours as needed for moderate to severe pain maximum 3 tablet(s) per day 40 tablet 0     acetaminophen (ARTHRITIS PAIN) 650 MG CR tablet TAKE 1 TABLET BY MOUTH EVERY 4 HOURS AS NEEDED 250 tablet 6     gabapentin (NEURONTIN) 100 MG capsule Take 2 capsules (200 mg) by mouth 3 times daily After one week if still having pain, increase to 3 capsules  capsule 3     Calcium Carb-Cholecalciferol (CALCIUM + D3) 600-200 MG-UNIT TABS Take 1 tablet by mouth daily       Multiple Vitamin (MULTIVITAMIN) per tablet Take 1 tablet by mouth daily with food.       [DISCONTINUED] lisinopril (PRINIVIL/ZESTRIL) 5 MG tablet TAKE 1 TABLET BY MOUTH DAILY 30 tablet 9     Labs reviewed in EPIC    ROS:  Constitutional, HEENT, cardiovascular, pulmonary, gi and gu systems are  negative, except as otherwise noted.    OBJECTIVE:                                                    /76  Pulse 93  Temp 98.6  F (37  C) (Tympanic)  Wt 149 lb 4 oz (67.7 kg)  SpO2 94%  BMI 32.3 kg/m2  Body mass index is 32.3 kg/(m^2).  GENERAL APPEARANCE: alert, no distress and fatigued  RESP: lungs clear to auscultation - no rales, rhonchi or wheezes  CV: regular rates and rhythm, normal S1 S2, no S3 or S4 and grade 4/6 systolic murmur heard best over the RUSB  MS: extremities normal- no gross deformities noted  PSYCH: mentation appears normal and affect normal/bright       ASSESSMENT/PLAN:                                                    1. Need for vaccination  Defers second to medicare not covering  - TDAP VACCINE (ADACEL) [20210.002]  - 1st  Administration  [94991]    2. Acute bilateral low back pain without sciatica  DDD and DJD, use pain medication and ice prn  - XR LUMBAR SPINE 2/3 VIEWS (Clinic Performed); Future  - HYDROcodone-acetaminophen (NORCO) 5-325 MG per tablet; Take 0.5-1 tablets by mouth every 8 hours as needed for moderate to severe pain maximum 3 tablet(s) per day  Dispense: 40 tablet; Refill: 0    3. Right wrist pain    - XR Wrist Right G/E 3 Views; Future  - HYDROcodone-acetaminophen (NORCO) 5-325 MG per tablet; Take 0.5-1 tablets by mouth every 8 hours as needed for moderate to severe pain maximum 3 tablet(s) per day  Dispense: 40 tablet; Refill: 0    4. Benign essential hypertension  Increase to 10 mg and f/u 3-4 wks  - lisinopril (PRINIVIL/ZESTRIL) 10 MG tablet; Take 1 tablet (10 mg) by mouth daily  Dispense: 30 tablet; Refill: 1    5.  Pneumonia -- resolving she is doing well otherwise    Patient was agreeable to this plan and had no further questions.  See Patient Instructions    Katarina Gutierrez MD  [facility]

## 2018-02-01 NOTE — NURSING NOTE
"Chief Complaint   Patient presents with     Hospital F/U       Initial /76  Pulse 93  Temp 98.6  F (37  C) (Tympanic)  Wt 149 lb 4 oz (67.7 kg)  SpO2 94%  BMI 32.3 kg/m2 Estimated body mass index is 32.3 kg/(m^2) as calculated from the following:    Height as of 11/6/17: 4' 9\" (1.448 m).    Weight as of this encounter: 149 lb 4 oz (67.7 kg).  Medication Reconciliation: complete     Rina Peters    "

## 2018-02-01 NOTE — MR AVS SNAPSHOT
"              After Visit Summary   2018    Claudine Bustos    MRN: [id number]           Patient Information     Date Of Birth          1930        Visit Information        Provider Department      2018 9:45 AM Katarina Gutierrez MD [facility]        Today's Diagnoses     Need for vaccination    -  1    Acute bilateral low back pain without sciatica        Right wrist pain        Benign essential hypertension           Follow-ups after your visit        Who to contact     If you have questions or need follow up information about today's clinic visit or your schedule please contact [facility] directly at [phone].  Normal or non-critical lab and imaging results will be communicated to you by MyChart, letter or phone within 4 business days after the clinic has received the results. If you do not hear from us within 7 days, please contact the clinic through [employer] or phone. If you have a critical or abnormal lab result, we will notify you by phone as soon as possible.  Submit refill requests through [employer] or call your pharmacy and they will forward the refill request to us. Please allow 3 business days for your refill to be completed.          Additional Information About Your Visit        MyChart Information     [employer] lets you send messages to your doctor, view your test results, renew your prescriptions, schedule appointments and more. To sign up, go to [email or web address] . Click on \"Log in\" on the left side of the screen, which will take you to the Welcome page. Then click on \"Sign up Now\" on the right side of the page.     You will be asked to enter the access code listed below, as well as some personal information. Please follow the directions to create your username and password.     Your access code is: [email or web address]  Expires: 2018  5:46 PM     Your access code will  in 90 days. If you need help or a new code, please call your [facility] or " [phone].        Care EveryWhere ID     This is your Care EveryWhere ID. This could be used by other organizations to access your [facility] medical records  [id number]        Your Vitals Were     Pulse Temperature Pulse Oximetry BMI (Body Mass Index)          93 98.6  F (37  C) (Tympanic) 94% 32.3 kg/m2         Blood Pressure from Last 3 Encounters:   02/01/18 156/76   01/23/18 149/69   11/06/17 132/68    Weight from Last 3 Encounters:   02/01/18 149 lb 4 oz (67.7 kg)   01/22/18 150 lb 9.2 oz (68.3 kg)   11/06/17 136 lb (61.7 kg)              We Performed the Following     1st  Administration  [19068]     TDAP VACCINE (ADACEL) [90580.002]          Today's Medication Changes          These changes are accurate as of 2/1/18 11:15 AM.  If you have any questions, ask your nurse or doctor.               Start taking these medicines.        Dose/Directions    HYDROcodone-acetaminophen 5-325 MG per tablet   Commonly known as:  NORCO   Used for:  Acute bilateral low back pain without sciatica, Right wrist pain   Started by:  Katarina Gutierrez MD        Dose:  0.5-1 tablet   Take 0.5-1 tablets by mouth every 8 hours as needed for moderate to severe pain maximum 3 tablet(s) per day   Quantity:  40 tablet   Refills:  0         These medicines have changed or have updated prescriptions.        Dose/Directions    lisinopril 10 MG tablet   Commonly known as:  PRINIVIL/ZESTRIL   This may have changed:  See the new instructions.   Used for:  Benign essential hypertension   Changed by:  Katarina Gutierrez MD        Dose:  10 mg   Take 1 tablet (10 mg) by mouth daily   Quantity:  30 tablet   Refills:  1            Where to get your medicines      These medications were sent to [facility] - [address]  [address] [address]     Phone:  [phone]     lisinopril 10 MG tablet         Some of these will need a paper prescription and others can be bought over the counter.  Ask your  nurse if you have questions.     Bring a paper prescription for each of these medications     HYDROcodone-acetaminophen 5-325 MG per tablet                Primary Care Provider Office Phone # Fax #    Katarina Gutierrez [phone]       [facility] [address]  [facility] [address]        Equal Access to Services     [facility] ALISHA : Hadii aad ku hadasho Soomaali, waaxda luqadaha, qaybta kaalmada adeegyada, waxay idiin hayaan adedemetris kharash laocn . So [facility] [phone].    ATENCIÓN: Si habla español, tiene a fulton disposición servicios gratuitos de asistencia lingüística. Kaylieame al [phone].    We comply with applicable federal civil rights laws and [place] laws. We do not discriminate on the basis of race, color, national origin, age, disability, sex, sexual orientation, or gender identity.            Thank you!     Thank you for choosing [facility]  for your care. Our goal is always to provide you with excellent care. Hearing back from our patients is one way we can continue to improve our services. Please take a few minutes to complete the written survey that you may receive in the mail after your visit with us. Thank you!             Your Updated Medication List - Protect others around you: Learn how to safely use, store and throw away your medicines at www.disposemymeds.org.          This list is accurate as of 2/1/18 11:15 AM.  Always use your most recent med list.                   Brand Name Dispense Instructions for use Diagnosis    acetaminophen 650 MG CR tablet    ARTHRITIS PAIN    250 tablet    TAKE 1 TABLET BY MOUTH EVERY 4 HOURS AS NEEDED    S/P total knee replacement using cement, left       Calcium + D3 600-200 MG-UNIT Tabs      Take 1 tablet by mouth daily        gabapentin 100 MG capsule    NEURONTIN    360 capsule    Take 2 capsules (200 mg) by mouth 3 times daily After one week if still having pain, increase to 3 capsules TID    Acute right-sided low  back pain with right-sided sciatica       HYDROcodone-acetaminophen 5-325 MG per tablet    NORCO    40 tablet    Take 0.5-1 tablets by mouth every 8 hours as needed for moderate to severe pain maximum 3 tablet(s) per day    Acute bilateral low back pain without sciatica, Right wrist pain       lisinopril 10 MG tablet    PRINIVIL/ZESTRIL    30 tablet    Take 1 tablet (10 mg) by mouth daily    Benign essential hypertension       multivitamin per tablet      Take 1 tablet by mouth daily with food.

## 2018-02-02 ASSESSMENT — PATIENT HEALTH QUESTIONNAIRE - PHQ9: SUM OF ALL RESPONSES TO PHQ QUESTIONS 1-9: 6

## 2018-02-02 ASSESSMENT — ANXIETY QUESTIONNAIRES: GAD7 TOTAL SCORE: 0

## 2018-02-05 PROCEDURE — G0180 MD CERTIFICATION HHA PATIENT: HCPCS | Performed by: FAMILY MEDICINE

## 2018-02-26 ENCOUNTER — OFFICE VISIT (OUTPATIENT)
Dept: FAMILY MEDICINE | Facility: OTHER | Age: 83
End: 2018-02-26
Attending: FAMILY MEDICINE
Payer: MEDICARE

## 2018-02-26 VITALS
WEIGHT: 147.4 LBS | TEMPERATURE: 98.4 F | HEART RATE: 86 BPM | HEIGHT: 57 IN | DIASTOLIC BLOOD PRESSURE: 74 MMHG | RESPIRATION RATE: 16 BRPM | OXYGEN SATURATION: 96 % | SYSTOLIC BLOOD PRESSURE: 142 MMHG | BODY MASS INDEX: 31.8 KG/M2

## 2018-02-26 DIAGNOSIS — I10 BENIGN ESSENTIAL HYPERTENSION: ICD-10-CM

## 2018-02-26 DIAGNOSIS — M25.431 WRIST SWELLING, RIGHT: Primary | ICD-10-CM

## 2018-02-26 PROCEDURE — G0463 HOSPITAL OUTPT CLINIC VISIT: HCPCS

## 2018-02-26 PROCEDURE — 99213 OFFICE O/P EST LOW 20 MIN: CPT | Performed by: FAMILY MEDICINE

## 2018-02-26 ASSESSMENT — ANXIETY QUESTIONNAIRES
1. FEELING NERVOUS, ANXIOUS, OR ON EDGE: NOT AT ALL
5. BEING SO RESTLESS THAT IT IS HARD TO SIT STILL: NOT AT ALL
4. TROUBLE RELAXING: NOT AT ALL
GAD7 TOTAL SCORE: 0
6. BECOMING EASILY ANNOYED OR IRRITABLE: NOT AT ALL
2. NOT BEING ABLE TO STOP OR CONTROL WORRYING: NOT AT ALL
7. FEELING AFRAID AS IF SOMETHING AWFUL MIGHT HAPPEN: NOT AT ALL
3. WORRYING TOO MUCH ABOUT DIFFERENT THINGS: NOT AT ALL

## 2018-02-26 ASSESSMENT — PAIN SCALES - GENERAL: PAINLEVEL: SEVERE PAIN (6)

## 2018-02-26 NOTE — PROGRESS NOTES
SUBJECTIVE:   Claudine Bustos is a 87 year old female who presents to clinic today for the following health issues:      Hypertension Follow-up      Outpatient blood pressures are not being checked.    Low Salt Diet: no added salt    BP Readings from Last 2 Encounters:   02/26/18 142/74   02/01/18 156/76     LDL Cholesterol Calculated (mg/dL)   Date Value   03/30/2015 127   05/06/2013 145 (H)       Amount of exercise or physical activity: 6-7 days/week for an average of 30-45 minutes    Problems taking medications regularly: No    Medication side effects: none    Diet: low salt    Right wrist is still swollen    Problem list and histories reviewed & adjusted, as indicated.  Additional history: as documented    Patient Active Problem List   Diagnosis     Advanced care planning/counseling discussion     Hyperlipidemia with target LDL less than 130     S/P total knee replacement using cement, left     Nursing Home Visit     ACP (advance care planning)     Benign essential hypertension     Status post total replacement of right hip     Diastolic dysfunction     Chronic kidney disease, unspecified     Status post total hip replacement, right     Postoperative anemia due to acute blood loss     CAP (community acquired pneumonia)     Past Surgical History:   Procedure Laterality Date     ARTHROPLASTY HIP Right 1/23/2017    Procedure: ARTHROPLASTY HIP;  Surgeon: Jose Manuel Muñoz MD;  [facility]: [facility]     ARTHROPLASTY KNEE Left 1/25/2016    Procedure: ARTHROPLASTY KNEE;  Surgeon: Jose Manuel Muñoz MD;  Location: [facility]     colonoscopy  2004, 2009     EXCISE LESION EYELID Left 8/1/2017    Procedure: EXCISE LESION EYELID;  LEFT LOWER LID WEDGE EXCISION WITH FROZEN SECTION CONTROL;  Surgeon: Quinten Sotelo MD;  Location: [facility]     excision of rectal villus adenoma       knee replacement Right 1/2011     PHACOEMULSIFICATION WITH STANDARD INTRAOCULAR LENS IMPLANT  2/25/2014    Procedure: PHACOEMULSIFICATION WITH  STANDARD INTRAOCULAR LENS IMPLANT;  CATARACT EXTRACTION WITH INTRA OCULAR LENS LEFT;  Surgeon: Jah Bee MD;  Location: [facility]     PHACOEMULSIFICATION WITH STANDARD INTRAOCULAR LENS IMPLANT Right 5/12/2015    Procedure: PHACOEMULSIFICATION WITH STANDARD INTRAOCULAR LENS IMPLANT;  Surgeon: Jah Bee MD;  Location: [facility]     thoracic schwannoma resection       tonsillectomy       urethral dilitation every 4 months         Social History   Substance Use Topics     Smoking status: Never Smoker     Smokeless tobacco: Never Used     Alcohol use Yes      Comment: Mixed, rarely     Family History   Problem Relation Age of Onset     Alcohol/Drug Father      alcoholism     CANCER Father 51     Esophageal, cause of death     Other - See Comments Father      Tobacco use     CEREBROVASCULAR DISEASE Mother 51     Cause of death     CANCER Sister      Breast     CANCER Brother      Leukemia     CANCER Brother      Lung     Chronic Obstructive Pulmonary Disease Brother          Current Outpatient Prescriptions   Medication Sig Dispense Refill     lisinopril (PRINIVIL/ZESTRIL) 10 MG tablet Take 1 tablet (10 mg) by mouth daily 30 tablet 1     HYDROcodone-acetaminophen (NORCO) 5-325 MG per tablet Take 0.5-1 tablets by mouth every 8 hours as needed for moderate to severe pain maximum 3 tablet(s) per day 40 tablet 0     acetaminophen (ARTHRITIS PAIN) 650 MG CR tablet TAKE 1 TABLET BY MOUTH EVERY 4 HOURS AS NEEDED 250 tablet 6     gabapentin (NEURONTIN) 100 MG capsule Take 2 capsules (200 mg) by mouth 3 times daily After one week if still having pain, increase to 3 capsules  capsule 3     Calcium Carb-Cholecalciferol (CALCIUM + D3) 600-200 MG-UNIT TABS Take 1 tablet by mouth daily       Multiple Vitamin (MULTIVITAMIN) per tablet Take 1 tablet by mouth daily with food.       No Known Allergies  Labs reviewed in EPIC    Reviewed and updated as needed this visit by clinical staff  Tobacco  Allergies  Meds  Med Hx   "Surg Hx  Fam Hx  Soc Hx      Reviewed and updated as needed this visit by Provider         ROS:  Constitutional, HEENT, cardiovascular, pulmonary, gi and gu systems are negative, except as otherwise noted.    OBJECTIVE:                                                    /74 (BP Location: Left arm, Patient Position: Sitting, Cuff Size: Adult Regular)  Pulse 86  Temp 98.4  F (36.9  C) (Tympanic)  Resp 16  Ht 4' 9\" (1.448 m)  Wt 147 lb 6.4 oz (66.9 kg)  SpO2 96%  BMI 31.9 kg/m2  Body mass index is 31.9 kg/(m^2).  GENERAL APPEARANCE: healthy, alert and no distress  RESP: lungs clear to auscultation - no rales, rhonchi or wheezes  CV: regular rates and rhythm, normal S1 S2, no S3 or S4 and no murmur, click or rub  MS: swelling right wrist  PSYCH: mentation appears normal and affect normal/bright       ASSESSMENT/PLAN:                                                    1. Wrist swelling, right  Follow with them to see if need for splint, etc  - ORTHOPEDICS ADULT REFERRAL    2. Benign essential hypertension  Improved, no changes in meds today    Patient was agreeable to this plan and had no further questions.  See Patient Instructions    Katarina Gutierrez MD  [facility] [place]  "

## 2018-02-26 NOTE — NURSING NOTE
"Chief Complaint   Patient presents with     Hypertension     F/U       Initial /74 (BP Location: Left arm, Patient Position: Sitting, Cuff Size: Adult Regular)  Pulse 86  Temp 98.4  F (36.9  C) (Tympanic)  Resp 16  Ht 4' 9\" (1.448 m)  Wt 147 lb 6.4 oz (66.9 kg)  SpO2 96%  BMI 31.9 kg/m2 Estimated body mass index is 31.9 kg/(m^2) as calculated from the following:    Height as of this encounter: 4' 9\" (1.448 m).    Weight as of this encounter: 147 lb 6.4 oz (66.9 kg).  Medication Reconciliation: complete   Carla Araya    "

## 2018-02-26 NOTE — MR AVS SNAPSHOT
After Visit Summary   2/26/2018    Claudine Bustos    MRN: [id number]           Patient Information     Date Of Birth          4/11/1930        Visit Information        Provider Department      2/26/2018 11:15 AM Katarina Gutierrez MD [facility]        Today's Diagnoses     Wrist swelling, right    -  1    Benign essential hypertension           Follow-ups after your visit        Additional Services     ORTHOPEDICS ADULT REFERRAL       Your provider has referred you to: Dr Muñoz    Please be aware that coverage of these services is subject to the terms and limitations of your health insurance plan.  Call member services at your health plan with any benefit or coverage questions.      Please bring the following to your appointment:    >>   Any x-rays, CTs or MRIs which have been performed.  Contact the facility where they were done to arrange for  prior to your scheduled appointment.  Any new CT, MRI or other procedures ordered by your specialist must be performed at a [place] facility or coordinated by your clinic's referral office.    >>   List of current medications   >>   This referral request   >>   Any documents/labs given to you for this referral                  Your next 10 appointments already scheduled     Mar 22, 2018  3:40 PM CDT   (Arrive by 3:25 PM)   New Visit with Jose Manuel Muñoz MD   [facility] ([facility] )    [address] [facility] [address] [phone]              Who to contact     If you have questions or need follow up information about today's clinic visit or your schedule please contact [facility] directly at [phone].  Normal or non-critical lab and imaging results will be communicated to you by MyChart, letter or phone within 4 business days after the clinic has received the results. If you do not hear from us within 7 days, please contact the clinic through MyChart or phone. If you have a  "critical or abnormal lab result, we will notify you by phone as soon as possible.  Submit refill requests through Paice or call your pharmacy and they will forward the refill request to us. Please allow 3 business days for your refill to be completed.          Additional Information About Your Visit        Openfinancehart Information     Paice lets you send messages to your doctor, view your test results, renew your prescriptions, schedule appointments and more. To sign up, go to [email or web address] . Click on \"Log in\" on the left side of the screen, which will take you to the Welcome page. Then click on \"Sign up Now\" on the right side of the page.     You will be asked to enter the access code listed below, as well as some personal information. Please follow the directions to create your username and password.     Your access code is: [email or web address]  Expires: 2018 12:16 PM     Your access code will  in 90 days. If you need help or a new code, please call your [place] clinic or [phone].        Care EveryWhere ID     This is your Care EveryWhere ID. This could be used by other organizations to access your [place] medical records  [id number]        Your Vitals Were     Pulse Temperature Respirations Height Pulse Oximetry BMI (Body Mass Index)    86 98.4  F (36.9  C) (Tympanic) 16 4' 9\" (1.448 m) 96% 31.9 kg/m2       Blood Pressure from Last 3 Encounters:   18 142/74   18 156/76   18 149/69    Weight from Last 3 Encounters:   18 147 lb 6.4 oz (66.9 kg)   18 149 lb 4 oz (67.7 kg)   18 150 lb 9.2 oz (68.3 kg)              We Performed the Following     ORTHOPEDICS ADULT REFERRAL        Primary Care Provider Office Phone # Fax #    Katarina Gutierrez [phone]       [facility] [place] [address]  [place] [address]        Equal Access to Services     SEAN BRITO AH: Hadii jeimy Madrigal, ludwin zarco, álvaro slade, " guerita griffin raisa mcdermott'aan ah. So [facility] [phone].    ATENCIÓN: Si tingla megan, tiene a fulton disposición servicios gratuitos de asistencia lingüística. Demetrio cohen [phone].    We comply with applicable federal civil rights laws and [place] laws. We do not discriminate on the basis of race, color, national origin, age, disability, sex, sexual orientation, or gender identity.            Thank you!     Thank you for choosing [facility]  for your care. Our goal is always to provide you with excellent care. Hearing back from our patients is one way we can continue to improve our services. Please take a few minutes to complete the written survey that you may receive in the mail after your visit with us. Thank you!             Your Updated Medication List - Protect others around you: Learn how to safely use, store and throw away your medicines at www.disposemymeds.org.          This list is accurate as of 2/26/18 12:16 PM.  Always use your most recent med list.                   Brand Name Dispense Instructions for use Diagnosis    acetaminophen 650 MG CR tablet    ARTHRITIS PAIN    250 tablet    TAKE 1 TABLET BY MOUTH EVERY 4 HOURS AS NEEDED    S/P total knee replacement using cement, left       Calcium + D3 600-200 MG-UNIT Tabs      Take 1 tablet by mouth daily        gabapentin 100 MG capsule    NEURONTIN    360 capsule    Take 2 capsules (200 mg) by mouth 3 times daily After one week if still having pain, increase to 3 capsules TID    Acute right-sided low back pain with right-sided sciatica       HYDROcodone-acetaminophen 5-325 MG per tablet    NORCO    40 tablet    Take 0.5-1 tablets by mouth every 8 hours as needed for moderate to severe pain maximum 3 tablet(s) per day    Acute bilateral low back pain without sciatica, Right wrist pain       lisinopril 10 MG tablet    PRINIVIL/ZESTRIL    30 tablet    Take 1 tablet (10 mg) by mouth daily    Benign essential hypertension        multivitamin per tablet      Take 1 tablet by mouth daily with food.

## 2018-02-27 ASSESSMENT — PATIENT HEALTH QUESTIONNAIRE - PHQ9: SUM OF ALL RESPONSES TO PHQ QUESTIONS 1-9: 1

## 2018-02-27 ASSESSMENT — ANXIETY QUESTIONNAIRES: GAD7 TOTAL SCORE: 0

## 2018-02-28 RX ORDER — LISINOPRIL 10 MG/1
TABLET ORAL
Qty: 30 TABLET | Refills: 5 | Status: SHIPPED | OUTPATIENT
Start: 2018-02-28 | End: 2018-10-05

## 2018-02-28 NOTE — TELEPHONE ENCOUNTER
Lisinopril      Last Written Prescription Date:  2/01/2018  Last Fill Quantity: 30,   # refills: 1  Last Office Visit: 2/26/2018  Future Office visit:

## 2018-04-02 ENCOUNTER — OFFICE VISIT (OUTPATIENT)
Dept: FAMILY MEDICINE | Facility: OTHER | Age: 83
End: 2018-04-02
Attending: FAMILY MEDICINE
Payer: MEDICARE

## 2018-04-02 VITALS
TEMPERATURE: 97.9 F | HEART RATE: 98 BPM | OXYGEN SATURATION: 94 % | BODY MASS INDEX: 34.73 KG/M2 | SYSTOLIC BLOOD PRESSURE: 148 MMHG | DIASTOLIC BLOOD PRESSURE: 64 MMHG | WEIGHT: 160.5 LBS

## 2018-04-02 DIAGNOSIS — M79.661 PAIN AND SWELLING OF LOWER LEG, RIGHT: ICD-10-CM

## 2018-04-02 DIAGNOSIS — R30.0 DYSURIA: Primary | ICD-10-CM

## 2018-04-02 DIAGNOSIS — R82.90 ABNORMAL URINE FINDINGS: ICD-10-CM

## 2018-04-02 DIAGNOSIS — M79.89 PAIN AND SWELLING OF LOWER LEG, RIGHT: ICD-10-CM

## 2018-04-02 LAB
ALBUMIN UR-MCNC: 10 MG/DL
APPEARANCE UR: ABNORMAL
BACTERIA #/AREA URNS HPF: ABNORMAL /HPF
BILIRUB UR QL STRIP: NEGATIVE
COLOR UR AUTO: ABNORMAL
GLUCOSE UR STRIP-MCNC: NEGATIVE MG/DL
HGB UR QL STRIP: NEGATIVE
KETONES UR STRIP-MCNC: NEGATIVE MG/DL
LEUKOCYTE ESTERASE UR QL STRIP: ABNORMAL
MUCOUS THREADS #/AREA URNS LPF: PRESENT /LPF
NITRATE UR QL: POSITIVE
PH UR STRIP: 5.5 PH (ref 4.7–8)
RBC #/AREA URNS AUTO: 9 /HPF (ref 0–2)
SOURCE: ABNORMAL
SP GR UR STRIP: 1.01 (ref 1–1.03)
SQUAMOUS #/AREA URNS AUTO: <1 /HPF (ref 0–1)
UROBILINOGEN UR STRIP-MCNC: NORMAL MG/DL (ref 0–2)
WBC #/AREA URNS AUTO: 86 /HPF (ref 0–5)

## 2018-04-02 PROCEDURE — 87088 URINE BACTERIA CULTURE: CPT | Mod: ZL | Performed by: FAMILY MEDICINE

## 2018-04-02 PROCEDURE — 87186 SC STD MICRODIL/AGAR DIL: CPT | Mod: ZL | Performed by: FAMILY MEDICINE

## 2018-04-02 PROCEDURE — 81001 URINALYSIS AUTO W/SCOPE: CPT | Mod: ZL | Performed by: FAMILY MEDICINE

## 2018-04-02 PROCEDURE — 87086 URINE CULTURE/COLONY COUNT: CPT | Mod: ZL | Performed by: FAMILY MEDICINE

## 2018-04-02 PROCEDURE — G0463 HOSPITAL OUTPT CLINIC VISIT: HCPCS

## 2018-04-02 PROCEDURE — 99213 OFFICE O/P EST LOW 20 MIN: CPT | Performed by: FAMILY MEDICINE

## 2018-04-02 RX ORDER — CIPROFLOXACIN 250 MG/1
250 TABLET, FILM COATED ORAL 2 TIMES DAILY
Qty: 14 TABLET | Refills: 0 | Status: SHIPPED | OUTPATIENT
Start: 2018-04-02 | End: 2018-04-09

## 2018-04-02 ASSESSMENT — PAIN SCALES - GENERAL: PAINLEVEL: MODERATE PAIN (5)

## 2018-04-02 NOTE — NURSING NOTE
"Chief Complaint   Patient presents with     Musculoskeletal Problem     UTI       Initial /64  Pulse 98  Temp 97.9  F (36.6  C) (Tympanic)  Wt 160 lb 8 oz (72.8 kg)  SpO2 94%  BMI 34.73 kg/m2 Estimated body mass index is 34.73 kg/(m^2) as calculated from the following:    Height as of 2/26/18: 4' 9\" (1.448 m).    Weight as of this encounter: 160 lb 8 oz (72.8 kg).  Medication Reconciliation: complete     Rina Peters    "

## 2018-04-02 NOTE — MR AVS SNAPSHOT
"              After Visit Summary   2018    Claudine Bustos    MRN: [id number]           Patient Information     Date Of Birth          1930        Visit Information        Provider Department      2018 2:00 PM [facility]        Today's Diagnoses     Dysuria    -  1    Abnormal urine findings        Pain and swelling of lower leg, right           Follow-ups after your visit        Who to contact     If you have questions or need follow up information about today's clinic visit or your schedule please contact [facility] directly at [phone].  Normal or non-critical lab and imaging results will be communicated to you by Allegorithmichart, letter or phone within 4 business days after the clinic has received the results. If you do not hear from us within 7 days, please contact the clinic through Allegorithmichart or phone. If you have a critical or abnormal lab result, we will notify you by phone as soon as possible.  Submit refill requests through MENABANQER or call your pharmacy and they will forward the refill request to us. Please allow 3 business days for your refill to be completed.          Additional Information About Your Visit        MyChart Information     MENABANQER lets you send messages to your doctor, view your test results, renew your prescriptions, schedule appointments and more. To sign up, go to [email or web address] . Click on \"Log in\" on the left side of the screen, which will take you to the Welcome page. Then click on \"Sign up Now\" on the right side of the page.     You will be asked to enter the access code listed below, as well as some personal information. Please follow the directions to create your username and password.     Your access code is: [email or web address]  Expires: 2018  1:16 PM     Your access code will  in 90 days. If you need help or a new code, please call your [facility] or [phone].        Care EveryWhere ID     This is " your Care EveryWhere ID. This could be used by other organizations to access your [facility] medical records  [id number]        Your Vitals Were     Pulse Temperature Pulse Oximetry BMI (Body Mass Index)          98 97.9  F (36.6  C) (Tympanic) 94% 34.73 kg/m2         Blood Pressure from Last 3 Encounters:   04/02/18 148/64   02/26/18 142/74   02/01/18 156/76    Weight from Last 3 Encounters:   04/02/18 160 lb 8 oz (72.8 kg)   02/26/18 147 lb 6.4 oz (66.9 kg)   02/01/18 149 lb 4 oz (67.7 kg)              We Performed the Following     UA reflex to Microscopic and Culture - [place]     Urine Culture Aerobic Bacterial          Today's Medication Changes          These changes are accurate as of 4/2/18  5:30 PM.  If you have any questions, ask your nurse or doctor.               Start taking these medicines.        Dose/Directions    ciprofloxacin 250 MG tablet   Commonly known as:  CIPRO   Used for:  Dysuria   Started by:  Katarina Gutierrez MD        Dose:  250 mg   Take 1 tablet (250 mg) by mouth 2 times daily   Quantity:  14 tablet   Refills:  0            Where to get your medicines      These medications were sent to [facility] - [address]  [address] [address]     Phone:  [phone]     ciprofloxacin 250 MG tablet                Primary Care Provider Office Phone # Fax #    Katarina Gutierrez [phone]       [facility] [place] [address]  [place] [address]        Equal Access to Services     TIAN BRITO : Hadraheem prince Sola nena, waaxda luqadaha, qaybta kaalmada berlin, guerita baumann. So [facility] [phone].    ATENCIÓN: Si habla español, tiene a fulton disposición servicios gratuitos de asistencia lingüística. Llame al [phone].    We comply with applicable federal civil rights laws and [place] laws. We do not discriminate on the basis of race, color, national origin, age, disability, sex,  sexual orientation, or gender identity.            Thank you!     Thank you for choosing [facility]  for your care. Our goal is always to provide you with excellent care. Hearing back from our patients is one way we can continue to improve our services. Please take a few minutes to complete the written survey that you may receive in the mail after your visit with us. Thank you!             Your Updated Medication List - Protect others around you: Learn how to safely use, store and throw away your medicines at www.disposemymeds.org.          This list is accurate as of 4/2/18  5:30 PM.  Always use your most recent med list.                   Brand Name Dispense Instructions for use Diagnosis    acetaminophen 650 MG CR tablet    ARTHRITIS PAIN    250 tablet    TAKE 1 TABLET BY MOUTH EVERY 4 HOURS AS NEEDED    S/P total knee replacement using cement, left       Calcium + D3 600-200 MG-UNIT Tabs      Take 1 tablet by mouth daily        ciprofloxacin 250 MG tablet    CIPRO    14 tablet    Take 1 tablet (250 mg) by mouth 2 times daily    Dysuria       gabapentin 100 MG capsule    NEURONTIN    360 capsule    Take 2 capsules (200 mg) by mouth 3 times daily After one week if still having pain, increase to 3 capsules TID    Acute right-sided low back pain with right-sided sciatica       HYDROcodone-acetaminophen 5-325 MG per tablet    NORCO    40 tablet    Take 0.5-1 tablets by mouth every 8 hours as needed for moderate to severe pain maximum 3 tablet(s) per day    Acute bilateral low back pain without sciatica, Right wrist pain       lisinopril 10 MG tablet    PRINIVIL/ZESTRIL    30 tablet    TAKE 1 TABLET BY MOUTH DAILY    Benign essential hypertension       multivitamin per tablet      Take 1 tablet by mouth daily with food.

## 2018-04-02 NOTE — PROGRESS NOTES
SUBJECTIVE:                                                    Claudine Bustos is a 87 year old female who presents to clinic today for the following health issues:      Musculoskeletal problem/pain      Duration: started a couple weeks    Description  Location: right ankle    Intensity:  moderate    Accompanying signs and symptoms: swelling    History  Previous similar problem: YES- sprained the ankle a while back  Previous evaluation:  Thinks she had an xray done a couple weeks ago, but I am not seeing anything    Precipitating or alleviating factors:  Trauma or overuse: YES- sprained many years ago  Aggravating factors include: standing, walking, climbing stairs, exercise and overuse    Therapies tried and outcome: rest/inactivity, heat, ice, stretching, acetaminophen and Ibuprofen    URINARY TRACT SYMPTOMS      Duration: last week    Description  dysuria and itching, frequency    Intensity:  mild    Accompanying signs and symptoms:  Fever/chills: no   Flank pain no   Nausea and vomiting: no   Vaginal symptoms: itching and burning  Abdominal/Pelvic Pain: YES    History  History of frequent UTI's: yes  History of kidney stones: no   Sexually Active: no   Possibility of pregnancy: No    Precipitating or alleviating factors: None    Therapies tried and outcome: increase fluid intake, ibuprofen and Tylenol   Outcome: not effective    Problem list and histories reviewed & adjusted, as indicated.  Additional history: as documented    Patient Active Problem List   Diagnosis     Advanced care planning/counseling discussion     Hyperlipidemia with target LDL less than 130     S/P total knee replacement using cement, left     Nursing Home Visit     ACP (advance care planning)     Benign essential hypertension     Status post total replacement of right hip     Diastolic dysfunction     Chronic kidney disease, unspecified     Status post total hip replacement, right     Postoperative anemia due to acute blood loss     CAP  (community acquired pneumonia)     Past Surgical History:   Procedure Laterality Date     ARTHROPLASTY HIP Right 1/23/2017    Procedure: ARTHROPLASTY HIP;  Surgeon: Jose Manuel Muñoz MD;  Location: [facility]     ARTHROPLASTY KNEE Left 1/25/2016    Procedure: ARTHROPLASTY KNEE;  Surgeon: Jose Manuel Muñoz MD;  Location: [facility]     colonoscopy  2004, 2009     EXCISE LESION EYELID Left 8/1/2017    Procedure: EXCISE LESION EYELID;  LEFT LOWER LID WEDGE EXCISION WITH FROZEN SECTION CONTROL;  Surgeon: Quinten Sotelo MD;  [facility]     excision of rectal villus adenoma       knee replacement Right 1/2011     PHACOEMULSIFICATION WITH STANDARD INTRAOCULAR LENS IMPLANT  2/25/2014    Procedure: PHACOEMULSIFICATION WITH STANDARD INTRAOCULAR LENS IMPLANT;  CATARACT EXTRACTION WITH INTRA OCULAR LENS LEFT;  Surgeon: Jah Bee MD;  Location: [facility]     PHACOEMULSIFICATION WITH STANDARD INTRAOCULAR LENS IMPLANT Right 5/12/2015    Procedure: PHACOEMULSIFICATION WITH STANDARD INTRAOCULAR LENS IMPLANT;  Surgeon: Jah Bee MD;  Location: [facility]     thoracic schwannoma resection       tonsillectomy       urethral dilitation every 4 months         Social History   Substance Use Topics     Smoking status: Never Smoker     Smokeless tobacco: Never Used     Alcohol use Yes      Comment: Mixed, rarely     Family History   Problem Relation Age of Onset     Alcohol/Drug Father      alcoholism     CANCER Father 51     Esophageal, cause of death     Other - See Comments Father      Tobacco use     CEREBROVASCULAR DISEASE Mother 51     Cause of death     CANCER Sister      Breast     CANCER Brother      Leukemia     CANCER Brother      Lung     Chronic Obstructive Pulmonary Disease Brother          Current Outpatient Prescriptions   Medication Sig Dispense Refill     ciprofloxacin (CIPRO) 250 MG tablet Take 1 tablet (250 mg) by mouth 2 times daily 14 tablet 0     lisinopril (PRINIVIL/ZESTRIL) 10 MG tablet TAKE 1 TABLET BY  MOUTH DAILY 30 tablet 5     HYDROcodone-acetaminophen (NORCO) 5-325 MG per tablet Take 0.5-1 tablets by mouth every 8 hours as needed for moderate to severe pain maximum 3 tablet(s) per day 40 tablet 0     acetaminophen (ARTHRITIS PAIN) 650 MG CR tablet TAKE 1 TABLET BY MOUTH EVERY 4 HOURS AS NEEDED 250 tablet 6     gabapentin (NEURONTIN) 100 MG capsule Take 2 capsules (200 mg) by mouth 3 times daily After one week if still having pain, increase to 3 capsules  capsule 3     Calcium Carb-Cholecalciferol (CALCIUM + D3) 600-200 MG-UNIT TABS Take 1 tablet by mouth daily       Multiple Vitamin (MULTIVITAMIN) per tablet Take 1 tablet by mouth daily with food.       No Known Allergies  Labs reviewed in EPIC    ROS:  Constitutional, HEENT, cardiovascular, pulmonary, gi and gu systems are negative, except as otherwise noted.    OBJECTIVE:                                                    /64  Pulse 98  Temp 97.9  F (36.6  C) (Tympanic)  Wt 160 lb 8 oz (72.8 kg)  SpO2 94%  BMI 34.73 kg/m2  Body mass index is 34.73 kg/(m^2).  GENERAL APPEARANCE: healthy, alert and no distress  PSYCH: mentation appears normal and affect normal/bright       ASSESSMENT/PLAN:                                                    1. Dysuria    - UA reflex to Microscopic and Culture - [place]  - ciprofloxacin (CIPRO) 250 MG tablet; Take 1 tablet (250 mg) by mouth 2 times daily  Dispense: 14 tablet; Refill: 0    2. Abnormal urine findings    - Urine Culture Aerobic Bacterial    3. Pain and swelling of lower leg, right  Wear IVONNE stockings, this is a chronic problem for her and worse the past few days    Patient was agreeable to this plan and had no further questions.  See Patient Instructions    Katarina Gutierrez MD  [facility] [place]

## 2018-04-04 ENCOUNTER — TELEPHONE (OUTPATIENT)
Dept: FAMILY MEDICINE | Facility: OTHER | Age: 83
End: 2018-04-04

## 2018-04-04 LAB
BACTERIA SPEC CULT: ABNORMAL
SPECIMEN SOURCE: ABNORMAL

## 2018-04-04 NOTE — TELEPHONE ENCOUNTER
Spoke with pt.  She states that her foot is swollen and she can't bear weight on it.  It is not warm to the touch and she is otherwise feeling fine.  Pt would like to come in tomorrow afternoon.  States her daughter would be able to bring her in then.  I was thinking the 2:45 if that is ok with you.  Don't want it to get to late, but needed to make a time that would work for her daughter to drive her.  Sound good?

## 2018-04-04 NOTE — TELEPHONE ENCOUNTER
10:56 AM    Reason for Call: OVERBOOK    Patient is having the following symptoms: foot pain can not stand on foot  for 2 days.    The patient is requesting an appointment for 04/05/18 in the afternoon with .    Was an appointment offered for this call? No  If yes : Appointment type              Date    Preferred method for responding to this message: Telephone Call  What is your phone number ?[phone]    If we cannot reach you directly, may we leave a detailed response at the number you provided? Yes    Can this message wait until your PCP/provider returns, if unavailable today? Not applicable, PCP is in     [facility]

## 2018-04-05 ENCOUNTER — OFFICE VISIT (OUTPATIENT)
Dept: FAMILY MEDICINE | Facility: OTHER | Age: 83
End: 2018-04-05
Attending: FAMILY MEDICINE
Payer: MEDICARE

## 2018-04-05 ENCOUNTER — RADIANT APPOINTMENT (OUTPATIENT)
Dept: GENERAL RADIOLOGY | Facility: OTHER | Age: 83
End: 2018-04-05
Attending: FAMILY MEDICINE
Payer: MEDICARE

## 2018-04-05 VITALS
SYSTOLIC BLOOD PRESSURE: 164 MMHG | BODY MASS INDEX: 34.62 KG/M2 | DIASTOLIC BLOOD PRESSURE: 62 MMHG | WEIGHT: 160 LBS | TEMPERATURE: 97 F | OXYGEN SATURATION: 93 % | HEART RATE: 93 BPM

## 2018-04-05 DIAGNOSIS — R60.0 LOWER EXTREMITY EDEMA: ICD-10-CM

## 2018-04-05 DIAGNOSIS — M25.471 RIGHT ANKLE SWELLING: ICD-10-CM

## 2018-04-05 DIAGNOSIS — M25.471 RIGHT ANKLE SWELLING: Primary | ICD-10-CM

## 2018-04-05 PROCEDURE — 99213 OFFICE O/P EST LOW 20 MIN: CPT | Performed by: FAMILY MEDICINE

## 2018-04-05 PROCEDURE — 73610 X-RAY EXAM OF ANKLE: CPT | Mod: TC,RT

## 2018-04-05 PROCEDURE — G0463 HOSPITAL OUTPT CLINIC VISIT: HCPCS

## 2018-04-05 RX ORDER — FUROSEMIDE 20 MG
20 TABLET ORAL DAILY
Qty: 30 TABLET | Refills: 1 | Status: SHIPPED | OUTPATIENT
Start: 2018-04-05 | End: 2018-05-07

## 2018-04-05 ASSESSMENT — ANXIETY QUESTIONNAIRES
2. NOT BEING ABLE TO STOP OR CONTROL WORRYING: NOT AT ALL
1. FEELING NERVOUS, ANXIOUS, OR ON EDGE: NOT AT ALL
7. FEELING AFRAID AS IF SOMETHING AWFUL MIGHT HAPPEN: NOT AT ALL
6. BECOMING EASILY ANNOYED OR IRRITABLE: NOT AT ALL
5. BEING SO RESTLESS THAT IT IS HARD TO SIT STILL: NOT AT ALL
GAD7 TOTAL SCORE: 0
3. WORRYING TOO MUCH ABOUT DIFFERENT THINGS: NOT AT ALL
4. TROUBLE RELAXING: NOT AT ALL

## 2018-04-05 ASSESSMENT — PAIN SCALES - GENERAL: PAINLEVEL: EXTREME PAIN (8)

## 2018-04-05 NOTE — NURSING NOTE
"Chief Complaint   Patient presents with     Musculoskeletal Problem       Initial /62  Pulse 93  Temp 97  F (36.1  C) (Tympanic)  Wt 160 lb (72.6 kg)  SpO2 93%  BMI 34.62 kg/m2 Estimated body mass index is 34.62 kg/(m^2) as calculated from the following:    Height as of 2/26/18: 4' 9\" (1.448 m).    Weight as of this encounter: 160 lb (72.6 kg).  Medication Reconciliation: complete     Rina Peters    "

## 2018-04-05 NOTE — MR AVS SNAPSHOT
"              After Visit Summary   4/5/2018    Claudine Bustos    MRN: [id number]           Patient Information     Date Of Birth          4/11/1930        Visit Information        Provider Department      4/5/2018 2:45 PM Katarina Gutierrez MD [facility]        Today's Diagnoses     Right ankle swelling    -  1       Follow-ups after your visit        Your next 10 appointments already scheduled     Apr 09, 2018 11:45 AM CDT   (Arrive by 11:30 AM)   SHORT with Katarina Gutierrez MD   [facility] [place] ([facility] [place] )    [address]  [place] [address]   [phone]              Who to contact     If you have questions or need follow up information about today's clinic visit or your schedule please contact [facility] directly at [phone].  Normal or non-critical lab and imaging results will be communicated to you by MyChart, letter or phone within 4 business days after the clinic has received the results. If you do not hear from us within 7 days, please contact the clinic through MyChart or phone. If you have a critical or abnormal lab result, we will notify you by phone as soon as possible.  Submit refill requests through [employer] or call your pharmacy and they will forward the refill request to us. Please allow 3 business days for your refill to be completed.          Additional Information About Your Visit        MyChart Information     [employer] lets you send messages to your doctor, view your test results, renew your prescriptions, schedule appointments and more. To sign up, go to [email or web address] . Click on \"Log in\" on the left side of the screen, which will take you to the Welcome page. Then click on \"Sign up Now\" on the right side of the page.     You will be asked to enter the access code listed below, as well as some personal information. Please follow the directions to create your username and password.     Your access code is: " [email or web address]  Expires: 2018  1:16 PM     Your access code will  in 90 days. If you need help or a new code, please call your [facility] or [phone].        Care EveryWhere ID     This is your Care EveryWhere ID. This could be used by other organizations to access your [place] medical records  [id number]        Your Vitals Were     Pulse Temperature Pulse Oximetry BMI (Body Mass Index)          93 97  F (36.1  C) (Tympanic) 93% 34.62 kg/m2         Blood Pressure from Last 3 Encounters:   18 164/62   18 148/64   18 142/74    Weight from Last 3 Encounters:   18 160 lb (72.6 kg)   18 160 lb 8 oz (72.8 kg)   18 147 lb 6.4 oz (66.9 kg)               Primary Care Provider Office Phone # Fax #    Katarina ANGY Gutierrez [phone]       [facility] [place] [address] [address]        Equal Access to Services     [facility]: Hadii aad ku hadasho Sola nena, waaxda luqadaha, qaybta kaalmada berlin, guerita blevins . So [facility] [phone].    ATENCIÓN: Si habla español, tiene a fulton disposición servicios gratuitos de asistencia lingüística. Ll[facility] [phone].    We comply with applicable federal civil rights laws and [place] laws. We do not discriminate on the basis of race, color, national origin, age, disability, sex, sexual orientation, or gender identity.            Thank you!     Thank you for choosing [facility] [place]  for your care. Our goal is always to provide you with excellent care. Hearing back from our patients is one way we can continue to improve our services. Please take a few minutes to complete the written survey that you may receive in the mail after your visit with us. Thank you!             Your Updated Medication List - Protect others around you: Learn how to safely use, store and throw away your medicines at www.disposemymeds.org.          This list is accurate as of 18   3:35 PM.  Always use your most recent med list.                   Brand Name Dispense Instructions for use Diagnosis    acetaminophen 650 MG CR tablet    ARTHRITIS PAIN    250 tablet    TAKE 1 TABLET BY MOUTH EVERY 4 HOURS AS NEEDED    S/P total knee replacement using cement, left       Calcium + D3 600-200 MG-UNIT Tabs      Take 1 tablet by mouth daily        ciprofloxacin 250 MG tablet    CIPRO    14 tablet    Take 1 tablet (250 mg) by mouth 2 times daily    Dysuria       gabapentin 100 MG capsule    NEURONTIN    360 capsule    Take 2 capsules (200 mg) by mouth 3 times daily After one week if still having pain, increase to 3 capsules TID    Acute right-sided low back pain with right-sided sciatica       HYDROcodone-acetaminophen 5-325 MG per tablet    NORCO    40 tablet    Take 0.5-1 tablets by mouth every 8 hours as needed for moderate to severe pain maximum 3 tablet(s) per day    Acute bilateral low back pain without sciatica, Right wrist pain       lisinopril 10 MG tablet    PRINIVIL/ZESTRIL    30 tablet    TAKE 1 TABLET BY MOUTH DAILY    Benign essential hypertension       multivitamin per tablet      Take 1 tablet by mouth daily with food.

## 2018-04-05 NOTE — PROGRESS NOTES
SUBJECTIVE:                                                    Claudine Bustos is a 87 year old female who presents to clinic today for the following health issues:      Musculoskeletal problem/pain      Duration:couple weeks    Description  Location:  right leg and ankle    Intensity:  moderate    Accompanying signs and symptoms: swelling    History  Previous similar problem: no   Previous evaluation:  none    Precipitating or alleviating factors:  Trauma or overuse: YES- sprained years ago  Aggravating factors include: standing, walking, climbing stairs and overuse    Therapies tried and outcome: ice and elevated it    She has no trouble sleeping, no breathing difficulties, she lays flat at night  No falls, no twisting her ankle recently  Bilateral lower extremity swelling R>L    Problem list and histories reviewed & adjusted, as indicated.  Additional history: as documented    Patient Active Problem List   Diagnosis     Advanced care planning/counseling discussion     Hyperlipidemia with target LDL less than 130     S/P total knee replacement using cement, left     Nursing Home Visit     ACP (advance care planning)     Benign essential hypertension     Status post total replacement of right hip     Diastolic dysfunction     Chronic kidney disease, unspecified     Status post total hip replacement, right     Postoperative anemia due to acute blood loss     CAP (community acquired pneumonia)     Past Surgical History:   Procedure Laterality Date     ARTHROPLASTY HIP Right 1/23/2017    Procedure: ARTHROPLASTY HIP;  Surgeon: Jose Manuel Muñoz MD;  [facility]     ARTHROPLASTY KNEE Left 1/25/2016    Procedure: ARTHROPLASTY KNEE;  Surgeon: Jose Manuel Muñoz MD;  Location: [facility]     colonoscopy  2004, 2009     EXCISE LESION EYELID Left 8/1/2017    Procedure: EXCISE LESION EYELID;  LEFT LOWER LID WEDGE EXCISION WITH FROZEN SECTION CONTROL;  Surgeon: Quinten Sotelo MD;  Location: [facility]     excision of  rectal villus adenoma       knee replacement Right 1/2011     PHACOEMULSIFICATION WITH STANDARD INTRAOCULAR LENS IMPLANT  2/25/2014    Procedure: PHACOEMULSIFICATION WITH STANDARD INTRAOCULAR LENS IMPLANT;  CATARACT EXTRACTION WITH INTRA OCULAR LENS LEFT;  Surgeon: Jah Bee MD;  Location: [facility]     PHACOEMULSIFICATION WITH STANDARD INTRAOCULAR LENS IMPLANT Right 5/12/2015    Procedure: PHACOEMULSIFICATION WITH STANDARD INTRAOCULAR LENS IMPLANT;  Surgeon: Jah Bee MD;  [facility]: [facility]     thoracic schwannoma resection       tonsillectomy       urethral dilitation every 4 months         Social History   Substance Use Topics     Smoking status: Never Smoker     Smokeless tobacco: Never Used     Alcohol use Yes      Comment: Mixed, rarely     Family History   Problem Relation Age of Onset     Alcohol/Drug Father      alcoholism     CANCER Father 51     Esophageal, cause of death     Other - See Comments Father      Tobacco use     CEREBROVASCULAR DISEASE Mother 51     Cause of death     CANCER Sister      Breast     CANCER Brother      Leukemia     CANCER Brother      Lung     Chronic Obstructive Pulmonary Disease Brother          Current Outpatient Prescriptions   Medication Sig Dispense Refill     furosemide (LASIX) 20 MG tablet Take 1 tablet (20 mg) by mouth daily 30 tablet 1     ciprofloxacin (CIPRO) 250 MG tablet Take 1 tablet (250 mg) by mouth 2 times daily 14 tablet 0     lisinopril (PRINIVIL/ZESTRIL) 10 MG tablet TAKE 1 TABLET BY MOUTH DAILY 30 tablet 5     HYDROcodone-acetaminophen (NORCO) 5-325 MG per tablet Take 0.5-1 tablets by mouth every 8 hours as needed for moderate to severe pain maximum 3 tablet(s) per day 40 tablet 0     acetaminophen (ARTHRITIS PAIN) 650 MG CR tablet TAKE 1 TABLET BY MOUTH EVERY 4 HOURS AS NEEDED 250 tablet 6     gabapentin (NEURONTIN) 100 MG capsule Take 2 capsules (200 mg) by mouth 3 times daily After one week if still having pain, increase to 3 capsules   capsule 3     Calcium Carb-Cholecalciferol (CALCIUM + D3) 600-200 MG-UNIT TABS Take 1 tablet by mouth daily       Multiple Vitamin (MULTIVITAMIN) per tablet Take 1 tablet by mouth daily with food.       No Known Allergies  Labs reviewed in EPIC    ROS:  Constitutional, HEENT, cardiovascular, pulmonary, gi and gu systems are negative, except as otherwise noted.    OBJECTIVE:                                                    /62  Pulse 93  Temp 97  F (36.1  C) (Tympanic)  Wt 160 lb (72.6 kg)  SpO2 93%  BMI 34.62 kg/m2  Body mass index is 34.62 kg/(m^2).  GENERAL APPEARANCE: healthy, alert and no distress  RESP: lungs clear to auscultation - no rales, rhonchi or wheezes  CV: regular rates and rhythm, normal S1 S2, no S3 or S4 and no murmur, click or rub  MS: pitting 2-3+ lower extremity edema bilaterally  PSYCH: mentation appears normal and affect normal/bright       ASSESSMENT/PLAN:                                                    1. Right ankle swelling  Start lasix today and f/u 5 days, will check BMP then  - XR ANKLE RT G/E 3 VW (Clinic Performed); Future  - furosemide (LASIX) 20 MG tablet; Take 1 tablet (20 mg) by mouth daily  Dispense: 30 tablet; Refill: 1    2. Lower extremity edema  Start lasix    Patient was agreeable to this plan and had no further questions.  See Patient Instructions    Katarina Gutierrez MD  [facility]

## 2018-04-06 ASSESSMENT — ANXIETY QUESTIONNAIRES: GAD7 TOTAL SCORE: 0

## 2018-04-06 ASSESSMENT — PATIENT HEALTH QUESTIONNAIRE - PHQ9: SUM OF ALL RESPONSES TO PHQ QUESTIONS 1-9: 0

## 2018-04-09 ENCOUNTER — OFFICE VISIT (OUTPATIENT)
Dept: FAMILY MEDICINE | Facility: OTHER | Age: 83
End: 2018-04-09
Attending: FAMILY MEDICINE
Payer: MEDICARE

## 2018-04-09 VITALS
HEART RATE: 80 BPM | RESPIRATION RATE: 16 BRPM | OXYGEN SATURATION: 99 % | WEIGHT: 158.2 LBS | TEMPERATURE: 97.1 F | SYSTOLIC BLOOD PRESSURE: 138 MMHG | BODY MASS INDEX: 34.23 KG/M2 | DIASTOLIC BLOOD PRESSURE: 64 MMHG

## 2018-04-09 DIAGNOSIS — I89.0 LYMPHEDEMA: Primary | ICD-10-CM

## 2018-04-09 PROCEDURE — 99213 OFFICE O/P EST LOW 20 MIN: CPT | Performed by: FAMILY MEDICINE

## 2018-04-09 PROCEDURE — G0463 HOSPITAL OUTPT CLINIC VISIT: HCPCS

## 2018-04-09 ASSESSMENT — ANXIETY QUESTIONNAIRES
3. WORRYING TOO MUCH ABOUT DIFFERENT THINGS: NOT AT ALL
5. BEING SO RESTLESS THAT IT IS HARD TO SIT STILL: NOT AT ALL
2. NOT BEING ABLE TO STOP OR CONTROL WORRYING: NOT AT ALL
1. FEELING NERVOUS, ANXIOUS, OR ON EDGE: NOT AT ALL
IF YOU CHECKED OFF ANY PROBLEMS ON THIS QUESTIONNAIRE, HOW DIFFICULT HAVE THESE PROBLEMS MADE IT FOR YOU TO DO YOUR WORK, TAKE CARE OF THINGS AT HOME, OR GET ALONG WITH OTHER PEOPLE: NOT DIFFICULT AT ALL
6. BECOMING EASILY ANNOYED OR IRRITABLE: NOT AT ALL
GAD7 TOTAL SCORE: 0
7. FEELING AFRAID AS IF SOMETHING AWFUL MIGHT HAPPEN: NOT AT ALL

## 2018-04-09 ASSESSMENT — PATIENT HEALTH QUESTIONNAIRE - PHQ9: 5. POOR APPETITE OR OVEREATING: NOT AT ALL

## 2018-04-09 ASSESSMENT — PAIN SCALES - GENERAL: PAINLEVEL: NO PAIN (0)

## 2018-04-09 NOTE — PROGRESS NOTES
SUBJECTIVE:                                                    Claudine Bustos is a 87 year old female who presents to clinic today for the following health issues:      Musculoskeletal problem/pain      Duration: Rt foot swelling for a few weeks    Description  Location: Rt foot    Intensity:  moderate, severe    Accompanying signs and symptoms: feels achy    History  Previous similar problem: no   Previous evaluation:  x-ray    Precipitating or alleviating factors:  Trauma or overuse: no   Aggravating factors include: walking and climbing stairs    Therapies tried and outcome: rest/inactivity, ice, acetaminophen and Ibuprofen    She had minimal improvement from lasix, she has refused to wear her IVONNE stockings    Problem list and histories reviewed & adjusted, as indicated.  Additional history: as documented    Patient Active Problem List   Diagnosis     Advanced care planning/counseling discussion     Hyperlipidemia with target LDL less than 130     S/P total knee replacement using cement, left     Nursing Home Visit     ACP (advance care planning)     Benign essential hypertension     Status post total replacement of right hip     Diastolic dysfunction     Chronic kidney disease, unspecified     Status post total hip replacement, right     Postoperative anemia due to acute blood loss     CAP (community acquired pneumonia)     Past Surgical History:   Procedure Laterality Date     ARTHROPLASTY HIP Right 1/23/2017    Procedure: ARTHROPLASTY HIP;  Surgeon: Jose Manuel Muñoz MD;  [facility]     ARTHROPLASTY KNEE Left 1/25/2016    Procedure: ARTHROPLASTY KNEE;  Surgeon: Jose Manuel Muñoz MD;  [facility]: [facility]     colonoscopy  2004, 2009     EXCISE LESION EYELID Left 8/1/2017    Procedure: EXCISE LESION EYELID;  LEFT LOWER LID WEDGE EXCISION WITH FROZEN SECTION CONTROL;  Surgeon: Quinten Sotelo MD;  Location: [facility]     excision of rectal villus adenoma       knee replacement Right 1/2011      PHACOEMULSIFICATION WITH STANDARD INTRAOCULAR LENS IMPLANT  2/25/2014    Procedure: PHACOEMULSIFICATION WITH STANDARD INTRAOCULAR LENS IMPLANT;  CATARACT EXTRACTION WITH INTRA OCULAR LENS LEFT;  Surgeon: Jah Bee MD;  [facility]     PHACOEMULSIFICATION WITH STANDARD INTRAOCULAR LENS IMPLANT Right 5/12/2015    Procedure: PHACOEMULSIFICATION WITH STANDARD INTRAOCULAR LENS IMPLANT;  Surgeon: Jah Bee MD;  [facility]: [facility]     thoracic schwannoma resection       tonsillectomy       urethral dilitation every 4 months         Social History   Substance Use Topics     Smoking status: Never Smoker     Smokeless tobacco: Never Used     Alcohol use Yes      Comment: Mixed, rarely     Family History   Problem Relation Age of Onset     Alcohol/Drug Father      alcoholism     CANCER Father 51     Esophageal, cause of death     Other - See Comments Father      Tobacco use     CEREBROVASCULAR DISEASE Mother 51     Cause of death     CANCER Sister      Breast     CANCER Brother      Leukemia     CANCER Brother      Lung     Chronic Obstructive Pulmonary Disease Brother          Current Outpatient Prescriptions   Medication Sig Dispense Refill     furosemide (LASIX) 20 MG tablet Take 1 tablet (20 mg) by mouth daily 30 tablet 1     lisinopril (PRINIVIL/ZESTRIL) 10 MG tablet TAKE 1 TABLET BY MOUTH DAILY 30 tablet 5     acetaminophen (ARTHRITIS PAIN) 650 MG CR tablet TAKE 1 TABLET BY MOUTH EVERY 4 HOURS AS NEEDED 250 tablet 6     gabapentin (NEURONTIN) 100 MG capsule Take 2 capsules (200 mg) by mouth 3 times daily After one week if still having pain, increase to 3 capsules  capsule 3     Calcium Carb-Cholecalciferol (CALCIUM + D3) 600-200 MG-UNIT TABS Take 1 tablet by mouth daily       Multiple Vitamin (MULTIVITAMIN) per tablet Take 1 tablet by mouth daily with food.       No Known Allergies  Labs reviewed in EPIC    ROS:  Constitutional, HEENT, cardiovascular, pulmonary, gi and gu systems are negative,  except as otherwise noted.    OBJECTIVE:                                                    /64 (BP Location: Left arm, Patient Position: Sitting, Cuff Size: Adult Regular)  Pulse 80  Temp 97.1  F (36.2  C) (Tympanic)  Resp 16  Wt 158 lb 3.2 oz (71.8 kg)  SpO2 99%  BMI 34.23 kg/m2  Body mass index is 34.23 kg/(m^2).  GENERAL APPEARANCE: healthy, alert and no distress  RESP: lungs clear to auscultation - no rales, rhonchi or wheezes  CV: regular rates and rhythm, normal S1 S2, no S3 or S4 and no murmur, click or rub  MS: pitting 2+ lower extremity edema bilaterally  PSYCH: mentation appears normal and affect normal/bright       ASSESSMENT/PLAN:                                                    1. Lymphedema  Lasix really did nothing as I suspected and her daughter is present today so will enforce the IVONNE stockings daily and elevating her legs, she can use the lasix 2x/wk if she feels it helps  F/u 1 months    Patient was agreeable to this plan and had no further questions.  See Patient Instructions    Katarina Gutierrez MD  [facility]

## 2018-04-09 NOTE — MR AVS SNAPSHOT
"              After Visit Summary   2018    Claudine Bustos    MRN: [id number]           Patient Information     Date Of Birth          1930        Visit Information        Provider Department      2018 11:45 AM Katarina Gutierrez MD [facility]        Today's Diagnoses     Lymphedema    -  1       Follow-ups after your visit        Who to contact     If you have questions or need follow up information about today's clinic visit or your schedule please contact [facility] directly at [phone].  Normal or non-critical lab and imaging results will be communicated to you by Play4testhart, letter or phone within 4 business days after the clinic has received the results. If you do not hear from us within 7 days, please contact the clinic through Play[employer] or phone. If you have a critical or abnormal lab result, we will notify you by phone as soon as possible.  Submit refill requests through [employer] or call your pharmacy and they will forward the refill request to us. Please allow 3 business days for your refill to be completed.          Additional Information About Your Visit        MyChart Information     [employer] lets you send messages to your doctor, view your test results, renew your prescriptions, schedule appointments and more. To sign up, go to [email or web address] . Click on \"Log in\" on the left side of the screen, which will take you to the Welcome page. Then click on \"Sign up Now\" on the right side of the page.     You will be asked to enter the access code listed below, as well as some personal information. Please follow the directions to create your username and password.     Your access code is: [email or web address]  Expires: 2018  1:16 PM     Your access code will  in 90 days. If you need help or a new code, please call your [facility] or [phone].        Care EveryWhere ID     This is your Care EveryWhere ID. This could be used by other organizations to " access your [place] medical records  [id number]        Your Vitals Were     Pulse Temperature Respirations Pulse Oximetry BMI (Body Mass Index)       80 97.1  F (36.2  C) (Tympanic) 16 99% 34.23 kg/m2        Blood Pressure from Last 3 Encounters:   04/09/18 138/64   04/05/18 164/62   04/02/18 148/64    Weight from Last 3 Encounters:   04/09/18 158 lb 3.2 oz (71.8 kg)   04/05/18 160 lb (72.6 kg)   04/02/18 160 lb 8 oz (72.8 kg)              Today, you had the following     No orders found for display       Primary Care Provider Office Phone # Fax #    Katarina Gutierrez [phone]       [facility] [place] [address]  [facility] [address]        Equal Access to Services     [facility]: Hadii aad ku hadasho Soomaali, waaxda luqadaha, qaybta kaalmada adeegyada, guerita rivera hayandry blevins . So [facility] [phone].    ATENCIÓN: Si habla español, tiene a fulton disposición servicios gratuitos de asistencia lingüística. Llame al [phone].    We comply with applicable federal civil rights laws and [place] laws. We do not discriminate on the basis of race, color, national origin, age, disability, sex, sexual orientation, or gender identity.            Thank you!     Thank you for choosing [facility]  for your care. Our goal is always to provide you with excellent care. Hearing back from our patients is one way we can continue to improve our services. Please take a few minutes to complete the written survey that you may receive in the mail after your visit with us. Thank you!             Your Updated Medication List - Protect others around you: Learn how to safely use, store and throw away your medicines at www.disposemymeds.org.          This list is accurate as of 4/9/18 12:54 PM.  Always use your most recent med list.                   Brand Name Dispense Instructions for use Diagnosis    acetaminophen 650 MG CR tablet    ARTHRITIS PAIN    250 tablet    TAKE 1 TABLET  BY MOUTH EVERY 4 HOURS AS NEEDED    S/P total knee replacement using cement, left       Calcium + D3 600-200 MG-UNIT Tabs      Take 1 tablet by mouth daily        furosemide 20 MG tablet    LASIX    30 tablet    Take 1 tablet (20 mg) by mouth daily    Right ankle swelling       gabapentin 100 MG capsule    NEURONTIN    360 capsule    Take 2 capsules (200 mg) by mouth 3 times daily After one week if still having pain, increase to 3 capsules TID    Acute right-sided low back pain with right-sided sciatica       lisinopril 10 MG tablet    PRINIVIL/ZESTRIL    30 tablet    TAKE 1 TABLET BY MOUTH DAILY    Benign essential hypertension       multivitamin per tablet      Take 1 tablet by mouth daily with food.

## 2018-04-09 NOTE — NURSING NOTE
"Chief Complaint   Patient presents with     Musculoskeletal Problem     Rt foot swelling       Initial /64 (BP Location: Left arm, Patient Position: Sitting, Cuff Size: Adult Regular)  Pulse 80  Temp 97.1  F (36.2  C) (Tympanic)  Resp 16  Wt 158 lb 3.2 oz (71.8 kg)  SpO2 99%  BMI 34.23 kg/m2 Estimated body mass index is 34.23 kg/(m^2) as calculated from the following:    Height as of 2/26/18: 4' 9\" (1.448 m).    Weight as of this encounter: 158 lb 3.2 oz (71.8 kg).  Medication Reconciliation: complete   Mary Fuentes MA  "

## 2018-04-10 ASSESSMENT — ANXIETY QUESTIONNAIRES: GAD7 TOTAL SCORE: 0

## 2018-04-10 ASSESSMENT — PATIENT HEALTH QUESTIONNAIRE - PHQ9: SUM OF ALL RESPONSES TO PHQ QUESTIONS 1-9: 1

## 2018-05-07 ENCOUNTER — TELEPHONE (OUTPATIENT)
Dept: FAMILY MEDICINE | Facility: OTHER | Age: 83
End: 2018-05-07

## 2018-05-07 ENCOUNTER — OFFICE VISIT (OUTPATIENT)
Dept: FAMILY MEDICINE | Facility: OTHER | Age: 83
End: 2018-05-07
Attending: FAMILY MEDICINE
Payer: MEDICARE

## 2018-05-07 VITALS
SYSTOLIC BLOOD PRESSURE: 144 MMHG | TEMPERATURE: 98.6 F | HEART RATE: 91 BPM | DIASTOLIC BLOOD PRESSURE: 66 MMHG | OXYGEN SATURATION: 95 %

## 2018-05-07 DIAGNOSIS — Z74.09 DECREASED AMBULATION STATUS: ICD-10-CM

## 2018-05-07 DIAGNOSIS — R35.0 URINARY FREQUENCY: Primary | ICD-10-CM

## 2018-05-07 DIAGNOSIS — R82.90 ABNORMAL URINE FINDINGS: ICD-10-CM

## 2018-05-07 LAB
ALBUMIN UR-MCNC: NEGATIVE MG/DL
APPEARANCE UR: ABNORMAL
BACTERIA #/AREA URNS HPF: ABNORMAL /HPF
BILIRUB UR QL STRIP: NEGATIVE
COLOR UR AUTO: ABNORMAL
GLUCOSE UR STRIP-MCNC: NEGATIVE MG/DL
HGB UR QL STRIP: NEGATIVE
KETONES UR STRIP-MCNC: NEGATIVE MG/DL
LEUKOCYTE ESTERASE UR QL STRIP: ABNORMAL
MUCOUS THREADS #/AREA URNS LPF: PRESENT /LPF
NITRATE UR QL: NEGATIVE
PH UR STRIP: 5.5 PH (ref 4.7–8)
RBC #/AREA URNS AUTO: 24 /HPF (ref 0–2)
SOURCE: ABNORMAL
SP GR UR STRIP: 1.01 (ref 1–1.03)
UROBILINOGEN UR STRIP-MCNC: NORMAL MG/DL (ref 0–2)
WBC #/AREA URNS AUTO: 9 /HPF (ref 0–5)

## 2018-05-07 PROCEDURE — 99213 OFFICE O/P EST LOW 20 MIN: CPT | Performed by: FAMILY MEDICINE

## 2018-05-07 PROCEDURE — 87086 URINE CULTURE/COLONY COUNT: CPT | Mod: ZL | Performed by: FAMILY MEDICINE

## 2018-05-07 PROCEDURE — G0463 HOSPITAL OUTPT CLINIC VISIT: HCPCS

## 2018-05-07 PROCEDURE — 81001 URINALYSIS AUTO W/SCOPE: CPT | Mod: ZL | Performed by: FAMILY MEDICINE

## 2018-05-07 RX ORDER — CIPROFLOXACIN 250 MG/1
250 TABLET, FILM COATED ORAL 2 TIMES DAILY
Qty: 14 TABLET | Refills: 0 | Status: SHIPPED | OUTPATIENT
Start: 2018-05-07 | End: 2018-05-14

## 2018-05-07 ASSESSMENT — ANXIETY QUESTIONNAIRES
GAD7 TOTAL SCORE: 0
5. BEING SO RESTLESS THAT IT IS HARD TO SIT STILL: NOT AT ALL
6. BECOMING EASILY ANNOYED OR IRRITABLE: NOT AT ALL
3. WORRYING TOO MUCH ABOUT DIFFERENT THINGS: NOT AT ALL
4. TROUBLE RELAXING: NOT AT ALL
2. NOT BEING ABLE TO STOP OR CONTROL WORRYING: NOT AT ALL
1. FEELING NERVOUS, ANXIOUS, OR ON EDGE: NOT AT ALL
7. FEELING AFRAID AS IF SOMETHING AWFUL MIGHT HAPPEN: NOT AT ALL

## 2018-05-07 ASSESSMENT — PAIN SCALES - GENERAL: PAINLEVEL: EXTREME PAIN (8)

## 2018-05-07 NOTE — MR AVS SNAPSHOT
"              After Visit Summary   2018    Claudine Bustos    MRN: [id number]           Patient Information     Date Of Birth          1930        Visit Information        Provider Department      2018 3:00 PM Katarina Gutierrez MD [facility]        Today's Diagnoses     Urinary frequency    -  1    Abnormal urine findings        Decreased ambulation status           Follow-ups after your visit        Who to contact     If you have questions or need follow up information about today's clinic visit or your schedule please [facility] at [phone].  Normal or non-critical lab and imaging results will be communicated to you by PRXhart, letter or phone within 4 business days after the clinic has received the results. If you do not hear from us within 7 days, please contact the clinic through PRXhart or phone. If you have a critical or abnormal lab result, we will notify you by phone as soon as possible.  Submit refill requests through Briteseed or call your pharmacy and they will forward the refill request to us. Please allow 3 business days for your refill to be completed.          Additional Information About Your Visit        MyChart Information     Briteseed lets you send messages to your doctor, view your test results, renew your prescriptions, schedule appointments and more. To sign up, go to [email or web address] . Click on \"Log in\" on the left side of the screen, which will take you to the Welcome page. Then click on \"Sign up Now\" on the right side of the page.     You will be asked to enter the access code listed below, as well as some personal information. Please follow the directions to create your username and password.     Your access code is: [email or web address]  Expires: 2018  1:16 PM     Your access code will  in 90 days. If you need help or a new code, please call your [facility] [place] or [phone].        Care EveryWhere ID     This is " your Care EveryWhere ID. This could be used by other organizations to access your [place] medical records  [id number]        Your Vitals Were     Pulse Temperature Pulse Oximetry             91 98.6  F (37  C) (Tympanic) 95%          Blood Pressure from Last 3 Encounters:   05/07/18 144/66   04/09/18 138/64   04/05/18 164/62    Weight from Last 3 Encounters:   04/09/18 158 lb 3.2 oz (71.8 kg)   04/05/18 160 lb (72.6 kg)   04/02/18 160 lb 8 oz (72.8 kg)              We Performed the Following     UA reflex to Microscopic and Culture - [place]     Urine Culture Aerobic Bacterial          Today's Medication Changes          These changes are accurate as of 5/7/18  3:48 PM.  If you have any questions, ask your nurse or doctor.               Start taking these medicines.        Dose/Directions    ciprofloxacin 250 MG tablet   Commonly known as:  CIPRO   Used for:  Urinary frequency, Abnormal urine findings   Started by:  Katarina Gutierrez MD        Dose:  250 mg   Take 1 tablet (250 mg) by mouth 2 times daily for 7 days   Quantity:  14 tablet   Refills:  0       order for DME   Used for:  Decreased ambulation status   Started by:  Katarina Gutierrez MD        Equipment being ordered: wheeled walker with seat   Quantity:  1 Device   Refills:  0            Where to get your medicines      These medications were sent to [facility] - [address]  [address] [place] [address]     Phone:  [phone]     ciprofloxacin 250 MG tablet         Some of these will need a paper prescription and others can be bought over the counter.  Ask your nurse if you have questions.     Bring a paper prescription for each of these medications     order for DME                Primary Care Provider Office Phone # Fax #    Katarina Gutierrez [phone]       [facility] [address] [address]        Equal Access to Services     SEAN BRITO AH: Contreras alatorre  suresh Madrigal, wasantoda luqadaha, qaybta kaalmada berlin, guerita ianin hayaan arabellademetris charitobette laocjitendra ibis. So [facility] [phone].    ATENCIÓN: Si tingla megan, tiene a fulton disposición servicios gratuitos de asistencia lingüística. Demetrio al [phone].    We comply with applicable federal civil rights laws and [place] laws. We do not discriminate on the basis of race, color, national origin, age, disability, sex, sexual orientation, or gender identity.            Thank you!     Thank you for choosing [facility]  for your care. Our goal is always to provide you with excellent care. Hearing back from our patients is one way we can continue to improve our services. Please take a few minutes to complete the written survey that you may receive in the mail after your visit with us. Thank you!             Your Updated Medication List - Protect others around you: Learn how to safely use, store and throw away your medicines at www.disposemymeds.org.          This list is accurate as of 5/7/18  3:48 PM.  Always use your most recent med list.                   Brand Name Dispense Instructions for use Diagnosis    acetaminophen 650 MG CR tablet    ARTHRITIS PAIN    250 tablet    TAKE 1 TABLET BY MOUTH EVERY 4 HOURS AS NEEDED    S/P total knee replacement using cement, left       Calcium + D3 600-200 MG-UNIT Tabs      Take 1 tablet by mouth daily        ciprofloxacin 250 MG tablet    CIPRO    14 tablet    Take 1 tablet (250 mg) by mouth 2 times daily for 7 days    Urinary frequency, Abnormal urine findings       gabapentin 100 MG capsule    NEURONTIN    360 capsule    Take 2 capsules (200 mg) by mouth 3 times daily After one week if still having pain, increase to 3 capsules TID    Acute right-sided low back pain with right-sided sciatica       lisinopril 10 MG tablet    PRINIVIL/ZESTRIL    30 tablet    TAKE 1 TABLET BY MOUTH DAILY    Benign essential hypertension       multivitamin per tablet      Take 1 tablet by  mouth daily with food.        order for DME     1 Device    Equipment being ordered: wheeled walker with seat    Decreased ambulation status

## 2018-05-07 NOTE — TELEPHONE ENCOUNTER
Lets double book this one at 3 since I have used those two other slots.  Three ob pts follow so we should have enough time.  If you are good with this let me know and I will call her back, or we can put it in Wednesday?

## 2018-05-07 NOTE — PROGRESS NOTES
SUBJECTIVE:   Claudine Bustos is a 88 year old female who presents to clinic today for the following health issues:      URINARY TRACT SYMPTOMS      Duration: 4     Description  dysuria, frequency and urgency    Intensity:  moderate    Accompanying signs and symptoms:  Fever/chills: no   Flank pain no   Nausea and vomiting: no   Vaginal symptoms: itching  Abdominal/Pelvic Pain: no     History  History of frequent UTI's: YES  History of kidney stones: no   Sexually Active: YES  Possibility of pregnancy: No    Precipitating or alleviating factors: None    Therapies tried and outcome: vagasil    Outcome: helpful      Problem list and histories reviewed & adjusted, as indicated.  Additional history: as documented    Patient Active Problem List   Diagnosis     Advanced care planning/counseling discussion     Hyperlipidemia with target LDL less than 130     S/P total knee replacement using cement, left     Nursing Home Visit     ACP (advance care planning)     Benign essential hypertension     Status post total replacement of right hip     Diastolic dysfunction     Chronic kidney disease, unspecified     Status post total hip replacement, right     Postoperative anemia due to acute blood loss     CAP (community acquired pneumonia)     Past Surgical History:   Procedure Laterality Date     ARTHROPLASTY HIP Right 1/23/2017    Procedure: ARTHROPLASTY HIP;  Surgeon: Jose Manuel Muñoz MD;  Location: [facility]     ARTHROPLASTY KNEE Left 1/25/2016    Procedure: ARTHROPLASTY KNEE;  Surgeon: Jose Manuel Muñoz MD;  Location: [facility]     colonoscopy  2004, 2009     EXCISE LESION EYELID Left 8/1/2017    Procedure: EXCISE LESION EYELID;  LEFT LOWER LID WEDGE EXCISION WITH FROZEN SECTION CONTROL;  Surgeon: Quinten Sotelo MD;  Location: [facility]     excision of rectal villus adenoma       knee replacement Right 1/2011     PHACOEMULSIFICATION WITH STANDARD INTRAOCULAR LENS IMPLANT  2/25/2014    Procedure: PHACOEMULSIFICATION WITH  STANDARD INTRAOCULAR LENS IMPLANT;  CATARACT EXTRACTION WITH INTRA OCULAR LENS LEFT;  Surgeon: Jah Bee MD;  Location: [facility]     PHACOEMULSIFICATION WITH STANDARD INTRAOCULAR LENS IMPLANT Right 5/12/2015    Procedure: PHACOEMULSIFICATION WITH STANDARD INTRAOCULAR LENS IMPLANT;  Surgeon: Jah Bee MD;  Location: [facility]     thoracic schwannoma resection       tonsillectomy       urethral dilitation every 4 months         Social History   Substance Use Topics     Smoking status: Never Smoker     Smokeless tobacco: Never Used     Alcohol use Yes      Comment: Mixed, rarely     Family History   Problem Relation Age of Onset     Alcohol/Drug Father      alcoholism     CANCER Father 51     Esophageal, cause of death     Other - See Comments Father      Tobacco use     CEREBROVASCULAR DISEASE Mother 51     Cause of death     CANCER Sister      Breast     CANCER Brother      Leukemia     CANCER Brother      Lung     Chronic Obstructive Pulmonary Disease Brother          Current Outpatient Prescriptions   Medication Sig Dispense Refill     acetaminophen (ARTHRITIS PAIN) 650 MG CR tablet TAKE 1 TABLET BY MOUTH EVERY 4 HOURS AS NEEDED 250 tablet 6     Calcium Carb-Cholecalciferol (CALCIUM + D3) 600-200 MG-UNIT TABS Take 1 tablet by mouth daily       ciprofloxacin (CIPRO) 250 MG tablet Take 1 tablet (250 mg) by mouth 2 times daily for 7 days 14 tablet 0     gabapentin (NEURONTIN) 100 MG capsule Take 2 capsules (200 mg) by mouth 3 times daily After one week if still having pain, increase to 3 capsules  capsule 3     lisinopril (PRINIVIL/ZESTRIL) 10 MG tablet TAKE 1 TABLET BY MOUTH DAILY 30 tablet 5     Multiple Vitamin (MULTIVITAMIN) per tablet Take 1 tablet by mouth daily with food.       order for DME Equipment being ordered: wheeled walker with seat 1 Device 0     No Known Allergies  Labs reviewed in EPIC    Reviewed and updated as needed this visit by clinical staff  Tobacco  Allergies  Meds  Med Hx   Surg Hx  Fam Hx  Soc Hx      Reviewed and updated as needed this visit by Provider         ROS:  Constitutional, HEENT, cardiovascular, pulmonary, gi and gu systems are negative, except as otherwise noted.    OBJECTIVE:                                                    /66 (BP Location: Right arm, Patient Position: Chair, Cuff Size: Adult Regular)  Pulse 91  Temp 98.6  F (37  C) (Tympanic)  SpO2 95%  There is no height or weight on file to calculate BMI.  GENERAL APPEARANCE: healthy, alert and no distress  PSYCH: affect normal/bright and mentation appears abnormal told a few stories I've known for 20 yrs, and repeated a few questions.       ASSESSMENT/PLAN:                                                    1. Urinary frequency    - UA reflex to Microscopic and Culture - [place]  - ciprofloxacin (CIPRO) 250 MG tablet; Take 1 tablet (250 mg) by mouth 2 times daily for 7 days  Dispense: 14 tablet; Refill: 0    2. Abnormal urine findings    - Urine Culture Aerobic Bacterial  - ciprofloxacin (CIPRO) 250 MG tablet; Take 1 tablet (250 mg) by mouth 2 times daily for 7 days  Dispense: 14 tablet; Refill: 0    3. Decreased ambulation status    - order for DME; Equipment being ordered: wheeled walker with seat  Dispense: 1 Device; Refill: 0    Patient was agreeable to this plan and had no further questions.  See Patient Instructions    Katarina Gutierrez MD  [facility]

## 2018-05-07 NOTE — NURSING NOTE
"Chief Complaint   Patient presents with     UTI       Initial /66 (BP Location: Right arm, Patient Position: Chair, Cuff Size: Adult Regular)  Pulse 91  Temp 98.6  F (37  C) (Tympanic)  SpO2 95% Estimated body mass index is 34.23 kg/(m^2) as calculated from the following:    Height as of 2/26/18: 4' 9\" (1.448 m).    Weight as of 4/9/18: 158 lb 3.2 oz (71.8 kg).  Medication Reconciliation: completecomplete    RUSS ROSALES, JASON    "

## 2018-05-07 NOTE — TELEPHONE ENCOUNTER
8:18 AM    Reason for Call: OVERBOOK    Patient is having the following symptoms: patient believes she has uti or yeast infection for 3 days.    The patient is requesting an appointment for  with Dr. Gutierrez.    Was an appointment offered for this call? No  If yes : Appointment type              Date    Preferred method for responding to this message: Telephone Call  What is your phone number ?    If we cannot reach you directly, may we leave a detailed response at the number you provided? Yes    Can this message wait until your PCP/provider returns, if unavailable today? Not applicable,     Latonia Ngo

## 2018-05-08 LAB
BACTERIA SPEC CULT: ABNORMAL
SPECIMEN SOURCE: ABNORMAL

## 2018-05-08 ASSESSMENT — PATIENT HEALTH QUESTIONNAIRE - PHQ9: SUM OF ALL RESPONSES TO PHQ QUESTIONS 1-9: 0

## 2018-05-08 ASSESSMENT — ANXIETY QUESTIONNAIRES: GAD7 TOTAL SCORE: 0

## 2018-06-25 ENCOUNTER — TELEPHONE (OUTPATIENT)
Dept: FAMILY MEDICINE | Facility: OTHER | Age: 83
End: 2018-06-25

## 2018-06-25 ENCOUNTER — OFFICE VISIT (OUTPATIENT)
Dept: FAMILY MEDICINE | Facility: OTHER | Age: 83
End: 2018-06-25
Attending: FAMILY MEDICINE
Payer: MEDICARE

## 2018-06-25 VITALS
TEMPERATURE: 97.1 F | BODY MASS INDEX: 33.54 KG/M2 | WEIGHT: 155 LBS | HEART RATE: 97 BPM | OXYGEN SATURATION: 97 % | DIASTOLIC BLOOD PRESSURE: 72 MMHG | SYSTOLIC BLOOD PRESSURE: 132 MMHG | RESPIRATION RATE: 16 BRPM

## 2018-06-25 DIAGNOSIS — R30.0 DYSURIA: Primary | ICD-10-CM

## 2018-06-25 DIAGNOSIS — R82.90 ABNORMAL URINE FINDINGS: ICD-10-CM

## 2018-06-25 DIAGNOSIS — B37.31 CANDIDIASIS OF VULVA AND VAGINA: ICD-10-CM

## 2018-06-25 LAB
ALBUMIN UR-MCNC: 30 MG/DL
APPEARANCE UR: CLEAR
BACTERIA #/AREA URNS HPF: ABNORMAL /HPF
BILIRUB UR QL STRIP: NEGATIVE
COLOR UR AUTO: YELLOW
GLUCOSE UR STRIP-MCNC: NEGATIVE MG/DL
HGB UR QL STRIP: NEGATIVE
KETONES UR STRIP-MCNC: NEGATIVE MG/DL
LEUKOCYTE ESTERASE UR QL STRIP: ABNORMAL
MUCOUS THREADS #/AREA URNS LPF: PRESENT /LPF
NITRATE UR QL: NEGATIVE
PH UR STRIP: 5.5 PH (ref 4.7–8)
RBC #/AREA URNS AUTO: 2 /HPF (ref 0–2)
SOURCE: ABNORMAL
SP GR UR STRIP: 1.02 (ref 1–1.03)
SQUAMOUS #/AREA URNS AUTO: 1 /HPF (ref 0–1)
UROBILINOGEN UR STRIP-MCNC: 2 MG/DL (ref 0–2)
WBC #/AREA URNS AUTO: 11 /HPF (ref 0–5)

## 2018-06-25 PROCEDURE — G0463 HOSPITAL OUTPT CLINIC VISIT: HCPCS

## 2018-06-25 PROCEDURE — 81001 URINALYSIS AUTO W/SCOPE: CPT | Mod: ZL | Performed by: FAMILY MEDICINE

## 2018-06-25 PROCEDURE — 87086 URINE CULTURE/COLONY COUNT: CPT | Mod: ZL | Performed by: FAMILY MEDICINE

## 2018-06-25 PROCEDURE — 99213 OFFICE O/P EST LOW 20 MIN: CPT | Performed by: FAMILY MEDICINE

## 2018-06-25 RX ORDER — FLUCONAZOLE 100 MG/1
100 TABLET ORAL DAILY
Qty: 14 TABLET | Refills: 0 | Status: SHIPPED | OUTPATIENT
Start: 2018-06-25 | End: 2018-07-09

## 2018-06-25 ASSESSMENT — ANXIETY QUESTIONNAIRES
IF YOU CHECKED OFF ANY PROBLEMS ON THIS QUESTIONNAIRE, HOW DIFFICULT HAVE THESE PROBLEMS MADE IT FOR YOU TO DO YOUR WORK, TAKE CARE OF THINGS AT HOME, OR GET ALONG WITH OTHER PEOPLE: NOT DIFFICULT AT ALL
7. FEELING AFRAID AS IF SOMETHING AWFUL MIGHT HAPPEN: NOT AT ALL
GAD7 TOTAL SCORE: 0
3. WORRYING TOO MUCH ABOUT DIFFERENT THINGS: NOT AT ALL
5. BEING SO RESTLESS THAT IT IS HARD TO SIT STILL: NOT AT ALL
2. NOT BEING ABLE TO STOP OR CONTROL WORRYING: NOT AT ALL
1. FEELING NERVOUS, ANXIOUS, OR ON EDGE: NOT AT ALL
6. BECOMING EASILY ANNOYED OR IRRITABLE: NOT AT ALL

## 2018-06-25 ASSESSMENT — PATIENT HEALTH QUESTIONNAIRE - PHQ9: 5. POOR APPETITE OR OVEREATING: NOT AT ALL

## 2018-06-25 ASSESSMENT — PAIN SCALES - GENERAL: PAINLEVEL: SEVERE PAIN (7)

## 2018-06-25 NOTE — NURSING NOTE
"Chief Complaint   Patient presents with     UTI       Initial /72 (BP Location: Right arm, Patient Position: Sitting, Cuff Size: Adult Regular)  Pulse 97  Temp 97.1  F (36.2  C) (Tympanic)  Resp 16  Wt 155 lb (70.3 kg)  SpO2 97%  BMI 33.54 kg/m2 Estimated body mass index is 33.54 kg/(m^2) as calculated from the following:    Height as of 2/26/18: 4' 9\" (1.448 m).    Weight as of this encounter: 155 lb (70.3 kg).  Medication Reconciliation:     Mary Fuentes MA  "

## 2018-06-25 NOTE — PROGRESS NOTES
SUBJECTIVE:                                                    Claudine Bustos is a 88 year old female who presents to clinic today for the following health issues:      URINARY TRACT SYMPTOMS      Duration: 4 days    Description  frequency, abdominal pain and burning when urinating    Intensity:  moderate    Accompanying signs and symptoms:  Fever/chills: no   Flank pain no   Nausea and vomiting: no   Vaginal symptoms: burning when urinating  Abdominal/Pelvic Pain: YES    History  History of frequent UTI's: YES  History of kidney stones: no   Sexually Active: no   Possibility of pregnancy: No    Precipitating or alleviating factors: None    Therapies tried and outcome: cranberry juice increase fluid intake and ibuprofen   Outcome: no help    She denies back pain but does report itching vaginally      Problem list and histories reviewed & adjusted, as indicated.  Additional history: as documented    Patient Active Problem List   Diagnosis     Advanced care planning/counseling discussion     Hyperlipidemia with target LDL less than 130     S/P total knee replacement using cement, left     Nursing Home Visit     ACP (advance care planning)     Benign essential hypertension     Status post total replacement of right hip     Diastolic dysfunction     Chronic kidney disease, unspecified     Status post total hip replacement, right     Postoperative anemia due to acute blood loss     CAP (community acquired pneumonia)     Past Surgical History:   Procedure Laterality Date     ARTHROPLASTY HIP Right 1/23/2017    Procedure: ARTHROPLASTY HIP;  Surgeon: Jose Manuel Muñoz MD;  [facility]: [facility]     ARTHROPLASTY KNEE Left 1/25/2016    Procedure: ARTHROPLASTY KNEE;  Surgeon: Jose Manuel Muñoz MD;  Location: [facility]     colonoscopy  2004, 2009     EXCISE LESION EYELID Left 8/1/2017    Procedure: EXCISE LESION EYELID;  LEFT LOWER LID WEDGE EXCISION WITH FROZEN SECTION CONTROL;  Surgeon: Quinten Sotelo MD;  [facility]     excision of rectal villus adenoma       knee replacement Right 1/2011     PHACOEMULSIFICATION WITH STANDARD INTRAOCULAR LENS IMPLANT  2/25/2014    Procedure: PHACOEMULSIFICATION WITH STANDARD INTRAOCULAR LENS IMPLANT;  CATARACT EXTRACTION WITH INTRA OCULAR LENS LEFT;  Surgeon: Jah Bee MD;  [facility]: [facility]     PHACOEMULSIFICATION WITH STANDARD INTRAOCULAR LENS IMPLANT Right 5/12/2015    Procedure: PHACOEMULSIFICATION WITH STANDARD INTRAOCULAR LENS IMPLANT;  Surgeon: Jah Bee MD;  Location: [facility]     thoracic schwannoma resection       tonsillectomy       urethral dilitation every 4 months         Social History   Substance Use Topics     Smoking status: Never Smoker     Smokeless tobacco: Never Used     Alcohol use Yes      Comment: Mixed, rarely     Family History   Problem Relation Age of Onset     Alcohol/Drug Father      alcoholism     Cancer Father 51     Esophageal, cause of death     Other - See Comments Father      Tobacco use     Cerebrovascular Disease Mother 51     Cause of death     Cancer Sister      Breast     Cancer Brother      Leukemia     Cancer Brother      Lung     Chronic Obstructive Pulmonary Disease Brother          Current Outpatient Prescriptions   Medication Sig Dispense Refill     acetaminophen (ARTHRITIS PAIN) 650 MG CR tablet TAKE 1 TABLET BY MOUTH EVERY 4 HOURS AS NEEDED 250 tablet 6     Calcium Carb-Cholecalciferol (CALCIUM + D3) 600-200 MG-UNIT TABS Take 1 tablet by mouth daily       COMPOUNDED NON-CONTROLLED SUBSTANCE (CMPD RX) - PHARMACY TO MIX COMPOUNDED MEDICATION Apply BID to affected area 210 g 1     fluconazole (DIFLUCAN) 100 MG tablet Take 1 tablet (100 mg) by mouth daily for 14 days 14 tablet 0     gabapentin (NEURONTIN) 100 MG capsule Take 2 capsules (200 mg) by mouth 3 times daily After one week if still having pain, increase to 3 capsules  capsule 3     lisinopril (PRINIVIL/ZESTRIL) 10 MG tablet TAKE 1 TABLET BY MOUTH DAILY 30 tablet 5      Multiple Vitamin (MULTIVITAMIN) per tablet Take 1 tablet by mouth daily with food.       order for DME Equipment being ordered: wheeled walker with seat 1 Device 0     No Known Allergies  Labs reviewed in EPIC    ROS:  Constitutional, HEENT, cardiovascular, pulmonary, gi and gu systems are negative, except as otherwise noted.    OBJECTIVE:                                                    /72 (BP Location: Right arm, Patient Position: Sitting, Cuff Size: Adult Regular)  Pulse 97  Temp 97.1  F (36.2  C) (Tympanic)  Resp 16  Wt 155 lb (70.3 kg)  SpO2 97%  BMI 33.54 kg/m2  Body mass index is 33.54 kg/(m^2).  GENERAL APPEARANCE: healthy, alert and no distress   (female): external female genitalia reveal erythema, excoriations throughout extending to buttocks  MS: extremities normal- no gross deformities noted and no CVA tenderness  PSYCH: mentation appears normal and affect normal/bright       ASSESSMENT/PLAN:                                                    1. Dysuria  wnl  - UA reflex to Microscopic and Culture - [place]    2. Abnormal urine findings  She does get some leaking  - Urine Culture Aerobic Bacterial    3. Candidiasis of vulva and vagina  Suspect wet and warm from urinary incontinence, will treat yeast  - fluconazole (DIFLUCAN) 100 MG tablet; Take 1 tablet (100 mg) by mouth daily for 14 days  Dispense: 14 tablet; Refill: 0  - COMPOUNDED NON-CONTROLLED SUBSTANCE (CMPD RX) - PHARMACY TO MIX COMPOUNDED MEDICATION; Apply BID to affected area  Dispense: 210 g; Refill: 1    Patient was agreeable to this plan and had no further questions.  See Patient Instructions    Katarina Gutierrez MD  [facility] [place]

## 2018-06-25 NOTE — MR AVS SNAPSHOT
"              After Visit Summary   2018    Claudine Bustos    MRN: [id number]           Patient Information     Date Of Birth          1930        Visit Information        Provider Department      2018 11:00 AM Katarina Gutierrez MD [facility]        Today's Diagnoses     Dysuria    -  1    Abnormal urine findings        Candidiasis of vulva and vagina           Follow-ups after your visit        Who to contact     If you have questions or need follow up information about today's clinic visit or your schedule please contact [facility] directly at [phone].  Normal or non-critical lab and imaging results will be communicated to you by [employer], letter or phone within 4 business days after the clinic has received the results. If you do not hear from us within 7 days, please contact the clinic through [employer] or phone. If you have a critical or abnormal lab result, we will notify you by phone as soon as possible.  Submit refill requests through ThisNext or call your pharmacy and they will forward the refill request to us. Please allow 3 business days for your refill to be completed.          Additional Information About Your Visit        MyChart Information     ThisNext lets you send messages to your doctor, view your test results, renew your prescriptions, schedule appointments and more. To sign up, go to [email or web address] . Click on \"Log in\" on the left side of the screen, which will take you to the Welcome page. Then click on \"Sign up Now\" on the right side of the page.     You will be asked to enter the access code listed below, as well as some personal information. Please follow the directions to create your username and password.     Your access code is: [email or web address]  Expires: 2018 12:06 PM     Your access code will  in 90 days. If you need help or a new code, please call your [facility] or [phone].        Care EveryWhere ID     This is " your Care EveryWhere ID. This could be used by other organizations to access your [place] medical records  [id number]        Your Vitals Were     Pulse Temperature Respirations Pulse Oximetry BMI (Body Mass Index)       97 97.1  F (36.2  C) (Tympanic) 16 97% 33.54 kg/m2        Blood Pressure from Last 3 Encounters:   06/25/18 132/72   05/07/18 144/66   04/09/18 138/64    Weight from Last 3 Encounters:   06/25/18 155 lb (70.3 kg)   04/09/18 158 lb 3.2 oz (71.8 kg)   04/05/18 160 lb (72.6 kg)              We Performed the Following     UA reflex to Microscopic and Culture - [place]     Urine Culture Aerobic Bacterial          Today's Medication Changes          These changes are accurate as of 6/25/18 12:06 PM.  If you have any questions, ask your nurse or doctor.               Start taking these medicines.        Dose/Directions    COMPOUNDED NON-CONTROLLED SUBSTANCE - PHARMACY TO MIX COMPOUNDED MEDICATION   Commonly known as:  CMPD RX   Used for:  Candidiasis of vulva and vagina   Started by:  Katarina Gutierrez MD        Apply BID to affected area   Quantity:  210 g   Refills:  1       fluconazole 100 MG tablet   Commonly known as:  DIFLUCAN   Used for:  Candidiasis of vulva and vagina   Started by:  Katarina Gutierrez MD        Dose:  100 mg   Take 1 tablet (100 mg) by mouth daily for 14 days   Quantity:  14 tablet   Refills:  0            Where to get your medicines      These medications were sent to [facility] - [address]  [address] [address]     Phone:  [phone]     COMPOUNDED NON-CONTROLLED SUBSTANCE - PHARMACY TO MIX COMPOUNDED MEDICATION    fluconazole 100 MG tablet                Primary Care Provider Office Phone # Fax #    Katarian Gutierrez [phone]       [facility] [place] [address]  [place] [address]        Equal Access to Services     SEAN BRITO AH: Contreras Madrigal, ludwin zarco, álvaro stone  guerita sladedemetris metzaan ah. So [facility] [phone].    ATENCIÓN: Si tingla megan, tiene a fulton disposición servicios gratuitos de asistencia lingüística. Demetrio al [phone].    We comply with applicable federal civil rights laws and [place] laws. We do not discriminate on the basis of race, color, national origin, age, disability, sex, sexual orientation, or gender identity.            Thank you!     Thank you for choosing [facility]  for your care. Our goal is always to provide you with excellent care. Hearing back from our patients is one way we can continue to improve our services. Please take a few minutes to complete the written survey that you may receive in the mail after your visit with us. Thank you!             Your Updated Medication List - Protect others around you: Learn how to safely use, store and throw away your medicines at www.disposemymeds.org.          This list is accurate as of 6/25/18 12:06 PM.  Always use your most recent med list.                   Brand Name Dispense Instructions for use Diagnosis    acetaminophen 650 MG CR tablet    ARTHRITIS PAIN    250 tablet    TAKE 1 TABLET BY MOUTH EVERY 4 HOURS AS NEEDED    S/P total knee replacement using cement, left       Calcium + D3 600-200 MG-UNIT Tabs      Take 1 tablet by mouth daily        COMPOUNDED NON-CONTROLLED SUBSTANCE - PHARMACY TO MIX COMPOUNDED MEDICATION    CMPD RX    210 g    Apply BID to affected area    Candidiasis of vulva and vagina       fluconazole 100 MG tablet    DIFLUCAN    14 tablet    Take 1 tablet (100 mg) by mouth daily for 14 days    Candidiasis of vulva and vagina       gabapentin 100 MG capsule    NEURONTIN    360 capsule    Take 2 capsules (200 mg) by mouth 3 times daily After one week if still having pain, increase to 3 capsules TID    Acute right-sided low back pain with right-sided sciatica       lisinopril 10 MG tablet    PRINIVIL/ZESTRIL    30 tablet    TAKE 1 TABLET  BY MOUTH DAILY    Benign essential hypertension       multivitamin per tablet      Take 1 tablet by mouth daily with food.        order for DME     1 Device    Equipment being ordered: wheeled walker with seat    Decreased ambulation status

## 2018-06-26 LAB
BACTERIA SPEC CULT: ABNORMAL
SPECIMEN SOURCE: ABNORMAL

## 2018-06-26 ASSESSMENT — PATIENT HEALTH QUESTIONNAIRE - PHQ9: SUM OF ALL RESPONSES TO PHQ QUESTIONS 1-9: 0

## 2018-06-26 ASSESSMENT — ANXIETY QUESTIONNAIRES: GAD7 TOTAL SCORE: 0

## 2018-08-16 ENCOUNTER — TELEPHONE (OUTPATIENT)
Dept: FAMILY MEDICINE | Facility: OTHER | Age: 83
End: 2018-08-16

## 2018-08-16 NOTE — TELEPHONE ENCOUNTER
8:28 AM    Reason for Call: OVERBOOK    Patient is having the following symptoms: Possible UTI for 1 days.    The patient is requesting an appointment for Possible UTI with Dr Gutierrez.    Was an appointment offered for this call? No  If yes : Appointment type              Date    Preferred method for responding to this message: Telephone Call  What is your phone number ? [phone]    If we cannot reach you directly, may we leave a detailed response at the number you provided? Yes    Can this message wait until your PCP/provider returns, if unavailable today? Not applicable, Provider is in today.    Kelly Shoen

## 2018-10-05 DIAGNOSIS — M54.41 ACUTE RIGHT-SIDED LOW BACK PAIN WITH RIGHT-SIDED SCIATICA: ICD-10-CM

## 2018-10-05 DIAGNOSIS — I10 BENIGN ESSENTIAL HYPERTENSION: ICD-10-CM

## 2018-10-05 RX ORDER — GABAPENTIN 100 MG/1
CAPSULE ORAL
Qty: 360 CAPSULE | Refills: 0 | Status: SHIPPED | OUTPATIENT
Start: 2018-10-05 | End: 2019-06-05

## 2018-10-05 RX ORDER — LISINOPRIL 10 MG/1
TABLET ORAL
Qty: 30 TABLET | Refills: 5 | Status: SHIPPED | OUTPATIENT
Start: 2018-10-05 | End: 2019-06-05

## 2018-10-05 NOTE — TELEPHONE ENCOUNTER
Neurontin 100mg      Last Written Prescription Date:  11/6/17  Last Fill Quantity: 360,   # refills: 3  Last Office Visit: 6/25/18  Future Office visit:       Routing refill request to provider for review/approval because:  Drug not on the [facility], [facility] or [facility] refill protocol or controlled substance

## 2018-10-22 ENCOUNTER — TELEPHONE (OUTPATIENT)
Dept: FAMILY MEDICINE | Facility: OTHER | Age: 83
End: 2018-10-22

## 2018-10-22 ENCOUNTER — OFFICE VISIT (OUTPATIENT)
Dept: FAMILY MEDICINE | Facility: OTHER | Age: 83
End: 2018-10-22
Attending: FAMILY MEDICINE
Payer: MEDICARE

## 2018-10-22 VITALS
OXYGEN SATURATION: 94 % | TEMPERATURE: 99.2 F | DIASTOLIC BLOOD PRESSURE: 72 MMHG | BODY MASS INDEX: 33.54 KG/M2 | SYSTOLIC BLOOD PRESSURE: 128 MMHG | WEIGHT: 155 LBS | HEART RATE: 89 BPM

## 2018-10-22 DIAGNOSIS — R82.90 ABNORMAL URINE FINDINGS: Primary | ICD-10-CM

## 2018-10-22 DIAGNOSIS — R31.9 URINARY TRACT INFECTION WITH HEMATURIA, SITE UNSPECIFIED: Primary | ICD-10-CM

## 2018-10-22 DIAGNOSIS — R30.0 DYSURIA: ICD-10-CM

## 2018-10-22 DIAGNOSIS — R30.0 DYSURIA: Primary | ICD-10-CM

## 2018-10-22 DIAGNOSIS — N39.0 URINARY TRACT INFECTION WITH HEMATURIA, SITE UNSPECIFIED: Primary | ICD-10-CM

## 2018-10-22 LAB
ALBUMIN UR-MCNC: 30 MG/DL
APPEARANCE UR: ABNORMAL
BACTERIA #/AREA URNS HPF: ABNORMAL /HPF
BILIRUB UR QL STRIP: NEGATIVE
COLOR UR AUTO: YELLOW
GLUCOSE UR STRIP-MCNC: NEGATIVE MG/DL
HGB UR QL STRIP: ABNORMAL
KETONES UR STRIP-MCNC: NEGATIVE MG/DL
LEUKOCYTE ESTERASE UR QL STRIP: ABNORMAL
MUCOUS THREADS #/AREA URNS LPF: PRESENT /LPF
NITRATE UR QL: POSITIVE
PH UR STRIP: 6 PH (ref 4.7–8)
RBC #/AREA URNS AUTO: 8 /HPF (ref 0–2)
SOURCE: ABNORMAL
SP GR UR STRIP: 1.01 (ref 1–1.03)
SQUAMOUS #/AREA URNS AUTO: 8 /HPF (ref 0–1)
UROBILINOGEN UR STRIP-MCNC: NORMAL MG/DL (ref 0–2)
WBC #/AREA URNS AUTO: >182 /HPF (ref 0–5)
WBC CLUMPS #/AREA URNS HPF: PRESENT /HPF

## 2018-10-22 PROCEDURE — 81001 URINALYSIS AUTO W/SCOPE: CPT | Mod: ZL | Performed by: FAMILY MEDICINE

## 2018-10-22 PROCEDURE — 99213 OFFICE O/P EST LOW 20 MIN: CPT | Performed by: FAMILY MEDICINE

## 2018-10-22 PROCEDURE — 87086 URINE CULTURE/COLONY COUNT: CPT | Mod: ZL | Performed by: FAMILY MEDICINE

## 2018-10-22 PROCEDURE — 87186 SC STD MICRODIL/AGAR DIL: CPT | Mod: ZL | Performed by: FAMILY MEDICINE

## 2018-10-22 PROCEDURE — G0463 HOSPITAL OUTPT CLINIC VISIT: HCPCS

## 2018-10-22 PROCEDURE — 87088 URINE BACTERIA CULTURE: CPT | Mod: ZL | Performed by: FAMILY MEDICINE

## 2018-10-22 RX ORDER — CIPROFLOXACIN 250 MG/1
250 TABLET, FILM COATED ORAL 2 TIMES DAILY
Qty: 20 TABLET | Refills: 0 | Status: SHIPPED | OUTPATIENT
Start: 2018-10-22 | End: 2018-11-01

## 2018-10-22 ASSESSMENT — PAIN SCALES - GENERAL: PAINLEVEL: MILD PAIN (3)

## 2018-10-22 NOTE — NURSING NOTE
"Chief Complaint   Patient presents with     UTI       Initial /72 (BP Location: Right arm, Patient Position: Chair, Cuff Size: Adult Regular)  Pulse 89  Temp 99.2  F (37.3  C) (Tympanic)  Wt 155 lb (70.3 kg)  SpO2 94%  BMI 33.54 kg/m2 Estimated body mass index is 33.54 kg/(m^2) as calculated from the following:    Height as of 2/26/18: 4' 9\" (1.448 m).    Weight as of this encounter: 155 lb (70.3 kg).  Medication Reconciliation: complete    Bree Alston    "

## 2018-10-22 NOTE — MR AVS SNAPSHOT
"              After Visit Summary   10/22/2018    Claudine Bustos    MRN: [id number]           Patient Information     Date Of Birth          1930        Visit Information        Provider Department      10/22/2018 3:00 PM Katarina Gutierrez MD [facility]        Today's Diagnoses     Urinary tract infection with hematuria, site unspecified    -  1       Follow-ups after your visit        Who to contact     If you have questions or need follow up information about today's clinic visit or your schedule please contact [facility] directly at [phone].  Normal or non-critical lab and imaging results will be communicated to you by [employer], letter or phone within 4 business days after the clinic has received the results. If you do not hear from us within 7 days, please contact the clinic through [employer] or phone. If you have a critical or abnormal lab result, we will notify you by phone as soon as possible.  Submit refill requests through MEDOP or call your pharmacy and they will forward the refill request to us. Please allow 3 business days for your refill to be completed.          Additional Information About Your Visit        MyChart Information     MEDOP lets you send messages to your doctor, view your test results, renew your prescriptions, schedule appointments and more. To sign up, go to [email or web address] . Click on \"Log in\" on the left side of the screen, which will take you to the Welcome page. Then click on \"Sign up Now\" on the right side of the page.     You will be asked to enter the access code listed below, as well as some personal information. Please follow the directions to create your username and password.     Your access code is: [email or web address]  Expires: 2019  3:57 PM     Your access code will  in 90 days. If you need help or a new code, please call your [facility] or [phone].        Care EveryWhere ID     This is your " Care EveryWhere ID. This could be used by other organizations to access your [place] medical records  [id number]        Your Vitals Were     Pulse Temperature Pulse Oximetry BMI (Body Mass Index)          89 99.2  F (37.3  C) (Tympanic) 94% 33.54 kg/m2         Blood Pressure from Last 3 Encounters:   10/22/18 128/72   06/25/18 132/72   05/07/18 144/66    Weight from Last 3 Encounters:   10/22/18 155 lb (70.3 kg)   06/25/18 155 lb (70.3 kg)   04/09/18 158 lb 3.2 oz (71.8 kg)              Today, you had the following     No orders found for display         Today's Medication Changes          These changes are accurate as of 10/22/18  3:57 PM.  If you have any questions, ask your nurse or doctor.               Start taking these medicines.        Dose/Directions    ciprofloxacin 250 MG tablet   Commonly known as:  CIPRO   Used for:  Urinary tract infection with hematuria, site unspecified   Started by:  Katarina Gutierrez MD        Dose:  250 mg   Take 1 tablet (250 mg) by mouth 2 times daily for 10 days   Quantity:  20 tablet   Refills:  0            Where to get your medicines      These medications were sent to [facility]  [address]  [address] [address]     Phone:  [phone]     ciprofloxacin 250 MG tablet                Primary Care Provider Office Phone # Fax #    Katarina Gutierrez [phone]       [facility] [place] [address]  [place] [address]        Equal Access to Services     TIAN BRITO : Hadraheem prince Sola nena, waaxda luqadaha, qaybta kaalmada adedemetrisyamaggie, guerita baumann. So [facility] [phone].    ATENCIÓN: Si habla español, tiene a fulton disposición servicios gratuitos de asistencia lingüística. Llame al [phone].    We comply with applicable federal civil rights laws and [place] laws. We do not discriminate on the basis of race, color, national origin, age, disability, sex, sexual  orientation, or gender identity.            Thank you!     Thank you for choosing [facility]  for your care. Our goal is always to provide you with excellent care. Hearing back from our patients is one way we can continue to improve our services. Please take a few minutes to complete the written survey that you may receive in the mail after your visit with us. Thank you!             Your Updated Medication List - Protect others around you: Learn how to safely use, store and throw away your medicines at www.disposemymeds.org.          This list is accurate as of 10/22/18  3:58 PM.  Always use your most recent med list.                   Brand Name Dispense Instructions for use Diagnosis    acetaminophen 650 MG CR tablet    ARTHRITIS PAIN    250 tablet    TAKE 1 TABLET BY MOUTH EVERY 4 HOURS AS NEEDED    S/P total knee replacement using cement, left       Calcium + D3 600-200 MG-UNIT Tabs      Take 1 tablet by mouth daily        ciprofloxacin 250 MG tablet    CIPRO    20 tablet    Take 1 tablet (250 mg) by mouth 2 times daily for 10 days    Urinary tract infection with hematuria, site unspecified       COMPOUNDED NON-CONTROLLED SUBSTANCE - PHARMACY TO MIX COMPOUNDED MEDICATION    CMPD RX    210 g    Apply BID to affected area    Candidiasis of vulva and vagina       gabapentin 100 MG capsule    NEURONTIN    360 capsule    TAKE 2 CAPSULES BY MOUTH 3 TIMES DAILY AFTER ONE WEEK IF STILL HAVINGPAIN INCREASE TO 3 CAPSULES 3 TIMES DAILY    Acute right-sided low back pain with right-sided sciatica       lisinopril 10 MG tablet    PRINIVIL/ZESTRIL    30 tablet    TAKE 1 TABLET BY MOUTH DAILY    Benign essential hypertension       multivitamin per tablet      Take 1 tablet by mouth daily with food.        order for DME     1 Device    Equipment being ordered: wheeled walker with seat    Decreased ambulation status

## 2018-10-22 NOTE — PROGRESS NOTES
SUBJECTIVE:   Claudine Bustos is a 88 year old female who presents to clinic today for the following health issues:    URINARY TRACT SYMPTOMS      Duration: Ongoing for 2 days     Description  dysuria, frequency, urgency, nocturia x 2, hesitancy, retention, incontinence and back pain    Intensity:  moderate, 4/10    Accompanying signs and symptoms:  Fever/chills: No (temp currently is at 99.2)  Flank pain no   Nausea and vomiting: no   Vaginal symptoms: none and itching  Abdominal/Pelvic Pain: YES    History  History of frequent UTI's: no   History of kidney stones: no     Precipitating or alleviating factors: None    Therapies tried and outcome: ibuprofen   Outcome: helps some         Problem list and histories reviewed & adjusted, as indicated.  Additional history: as documented    Patient Active Problem List   Diagnosis     Advanced care planning/counseling discussion     Hyperlipidemia with target LDL less than 130     S/P total knee replacement using cement, left     Nursing Home Visit     ACP (advance care planning)     Benign essential hypertension     Status post total replacement of right hip     Diastolic dysfunction     Chronic kidney disease, unspecified     Status post total hip replacement, right     Postoperative anemia due to acute blood loss     CAP (community acquired pneumonia)     Past Surgical History:   Procedure Laterality Date     ARTHROPLASTY HIP Right 1/23/2017    Procedure: ARTHROPLASTY HIP;  Surgeon: Jose Manuel Muñoz MD;  Location: [facility]     ARTHROPLASTY KNEE Left 1/25/2016    Procedure: ARTHROPLASTY KNEE;  Surgeon: Jose Manuel Muñoz MD;  Location: [facility]     colonoscopy  2004, 2009     EXCISE LESION EYELID Left 8/1/2017    Procedure: EXCISE LESION EYELID;  LEFT LOWER LID WEDGE EXCISION WITH FROZEN SECTION CONTROL;  Surgeon: Quinten Sotelo MD;  Location: [facility]     excision of rectal villus adenoma       knee replacement Right 1/2011     PHACOEMULSIFICATION WITH STANDARD  INTRAOCULAR LENS IMPLANT  2/25/2014    Procedure: PHACOEMULSIFICATION WITH STANDARD INTRAOCULAR LENS IMPLANT;  CATARACT EXTRACTION WITH INTRA OCULAR LENS LEFT;  Surgeon: Jah Bee MD;  Location: [facility] OR     PHACOEMULSIFICATION WITH STANDARD INTRAOCULAR LENS IMPLANT Right 5/12/2015    Procedure: PHACOEMULSIFICATION WITH STANDARD INTRAOCULAR LENS IMPLANT;  Surgeon: Jah Bee MD;  Location: [facility]     thoracic schwannoma resection       tonsillectomy       urethral dilitation every 4 months         Social History   Substance Use Topics     Smoking status: Never Smoker     Smokeless tobacco: Never Used     Alcohol use Yes      Comment: Mixed, rarely     Family History   Problem Relation Age of Onset     Alcohol/Drug Father      alcoholism     Cancer Father 51     Esophageal, cause of death     Other - See Comments Father      Tobacco use     Cerebrovascular Disease Mother 51     Cause of death     Cancer Sister      Breast     Cancer Brother      Leukemia     Cancer Brother      Lung     Chronic Obstructive Pulmonary Disease Brother          Current Outpatient Prescriptions   Medication Sig Dispense Refill     acetaminophen (ARTHRITIS PAIN) 650 MG CR tablet TAKE 1 TABLET BY MOUTH EVERY 4 HOURS AS NEEDED 250 tablet 6     Calcium Carb-Cholecalciferol (CALCIUM + D3) 600-200 MG-UNIT TABS Take 1 tablet by mouth daily       ciprofloxacin (CIPRO) 250 MG tablet Take 1 tablet (250 mg) by mouth 2 times daily for 10 days 20 tablet 0     COMPOUNDED NON-CONTROLLED SUBSTANCE (CMPD RX) - PHARMACY TO MIX COMPOUNDED MEDICATION Apply BID to affected area 210 g 1     gabapentin (NEURONTIN) 100 MG capsule TAKE 2 CAPSULES BY MOUTH 3 TIMES DAILY AFTER ONE WEEK IF STILL HAVINGPAIN INCREASE TO 3 CAPSULES 3 TIMES DAILY 360 capsule 0     lisinopril (PRINIVIL/ZESTRIL) 10 MG tablet TAKE 1 TABLET BY MOUTH DAILY 30 tablet 5     Multiple Vitamin (MULTIVITAMIN) per tablet Take 1 tablet by mouth daily with food.       order for DME Equipment  being ordered: wheeled walker with seat 1 Device 0     No Known Allergies  BP Readings from Last 3 Encounters:   10/22/18 128/72   06/25/18 132/72   05/07/18 144/66    Wt Readings from Last 3 Encounters:   10/22/18 155 lb (70.3 kg)   06/25/18 155 lb (70.3 kg)   04/09/18 158 lb 3.2 oz (71.8 kg)                  Labs reviewed in EPIC    Reviewed and updated as needed this visit by clinical staff  Tobacco  Allergies  Meds  Med Hx  Surg Hx  Fam Hx  Soc Hx      Reviewed and updated as needed this visit by Provider         ROS:  Constitutional, HEENT, cardiovascular, pulmonary, gi and gu systems are negative, except as otherwise noted.    OBJECTIVE:                                                    /72 (BP Location: Right arm, Patient Position: Chair, Cuff Size: Adult Regular)  Pulse 89  Temp 99.2  F (37.3  C) (Tympanic)  Wt 155 lb (70.3 kg)  SpO2 94%  BMI 33.54 kg/m2  Body mass index is 33.54 kg/(m^2).  GENERAL APPEARANCE: healthy, alert and no distress  PSYCH: mentation appears normal and affect normal/bright       ASSESSMENT/PLAN:                                                    1. Urinary tract infection with hematuria, site unspecified  Follow-up if not improving, discussed using probiotics  - ciprofloxacin (CIPRO) 250 MG tablet; Take 1 tablet (250 mg) by mouth 2 times daily for 10 days  Dispense: 20 tablet; Refill: 0    Patient was agreeable to this plan and had no further questions.  See Patient Instructions    Katarina Gutierrez MD  [facility]

## 2018-10-22 NOTE — TELEPHONE ENCOUNTER
8:34 AM    Reason for Call: OVERBOOK    Patient is having the following symptoms: burning sensation possible yeast infection or bladder infection for 2-3  days.    The patient is requesting an appointment for today 10/22/2018 with JOSE Gutierrez.    Was an appointment offered for this call? No  If yes : Appointment type              Date    Preferred method for responding to this message: Telephone Call  What is your phone number ?[phone]      If we cannot reach you directly, may we leave a detailed response at the number you provided? Yes    Can this message wait until your PCP/provider returns, if unavailable today? Not applicable, PCP is in     Hien Griffin

## 2018-10-24 LAB
BACTERIA SPEC CULT: ABNORMAL
SPECIMEN SOURCE: ABNORMAL

## 2019-01-30 DIAGNOSIS — G89.29 CHRONIC BILATERAL LOW BACK PAIN WITHOUT SCIATICA: Primary | ICD-10-CM

## 2019-01-30 DIAGNOSIS — M54.50 CHRONIC BILATERAL LOW BACK PAIN WITHOUT SCIATICA: Primary | ICD-10-CM

## 2019-01-30 RX ORDER — GABAPENTIN 100 MG/1
CAPSULE ORAL
Qty: 360 CAPSULE | Refills: 0 | Status: SHIPPED | OUTPATIENT
Start: 2019-01-30 | End: 2019-06-03

## 2019-04-03 DIAGNOSIS — M54.50 CHRONIC BILATERAL LOW BACK PAIN WITHOUT SCIATICA: Primary | ICD-10-CM

## 2019-04-03 DIAGNOSIS — G89.29 CHRONIC BILATERAL LOW BACK PAIN WITHOUT SCIATICA: Primary | ICD-10-CM

## 2019-04-03 DIAGNOSIS — Z96.652 S/P TOTAL KNEE REPLACEMENT USING CEMENT, LEFT: ICD-10-CM

## 2019-04-03 DIAGNOSIS — I10 BENIGN ESSENTIAL HYPERTENSION: ICD-10-CM

## 2019-04-03 NOTE — LETTER
April 8, 2019      Claudine MURRAY Mlakar  [address] [place] [id number]        Dear Claudine,     APPOINTMENT REMINDER:   Our records indicates that it is time for you to be seen for your yearly follow-up, blood pressure check,  and medication review.      You will need to be seen before any additional refills can be approved. Taking care of your health is important to us, and ongoing visits with your provider are vital to your care.    We look forward to seeing you in the near future.  You may call our office at [phone] to schedule a visit.     Please disregard this notice if you have already made an appointment.        Sincerely,  Katarina Gutierrez MD

## 2019-04-08 RX ORDER — GABAPENTIN 100 MG/1
CAPSULE ORAL
Qty: 360 CAPSULE | Refills: 0 | Status: SHIPPED | OUTPATIENT
Start: 2019-04-08 | End: 2019-06-03

## 2019-04-08 RX ORDER — LISINOPRIL 10 MG/1
TABLET ORAL
Qty: 30 TABLET | Refills: 0 | Status: SHIPPED | OUTPATIENT
Start: 2019-04-08 | End: 2019-06-03

## 2019-04-08 RX ORDER — ACETAMINOPHEN 650 MG/1
TABLET, FILM COATED, EXTENDED RELEASE ORAL
Qty: 250 TABLET | Refills: 0 | Status: SHIPPED | OUTPATIENT
Start: 2019-04-08 | End: 2019-10-23

## 2019-04-08 NOTE — TELEPHONE ENCOUNTER
Arthritis 650 mg      Last Written Prescription Date:  3/16/17  Last Fill Quantity: 100,   # refills: 3  Last Office Visit: 10/22/18  Future Office visit:       Routing refill request to provider for review/approval because:  pcp to advise.     Gabapentin       Last Written Prescription Date:  1/30/19  Last Fill Quantity: 360,   # refills: 0  Last Office Visit: 10/22/19  Future Office visit:       Routing refill request to provider for review/approval because:  Drug not on the [facility] or [facility] refill protocol or controlled substance    Lisinopril       Last Written Prescription Date:  10/5/18  Last Fill Quantity: 30,   # refills: 5  Last Office Visit: 10/22/19  Future Office visit:       Routing refill request to provider for review/approval because:  Recent Labs   Lab Test 01/23/18  0531   CR 0.71            Normal serum potassium on file in past 12 months          Recent Labs   Lab Test 01/23/18  0531   POTASSIUM 3.5           Pt due for lab work. Would you like f/u letter sent as well?

## 2019-05-06 DIAGNOSIS — I10 ESSENTIAL HYPERTENSION: Primary | ICD-10-CM

## 2019-05-06 RX ORDER — LISINOPRIL 10 MG/1
TABLET ORAL
Qty: 30 TABLET | Refills: 0 | Status: SHIPPED | OUTPATIENT
Start: 2019-05-06 | End: 2019-06-03

## 2019-05-06 NOTE — TELEPHONE ENCOUNTER
Due for labs.  Letter sent on 4/8/19    Creatinine   Date Value Ref Range Status   01/23/2018 0.71 0.52 - 1.04 mg/dL Final     Potassium   Date Value Ref Range Status   01/23/2018 3.5 3.4 - 5.3 mmol/L Final       lisinopril (PRINIVIL/ZESTRIL) 10 MG tablet      Last Written Prescription Date:  4/8/19  Last Fill Quantity: 30,   # refills: 0  Last Office Visit: 10/22/18  Future Office visit:    Next 5 appointments (look out 90 days)    Jun 05, 2019 11:15 AM CDT  (Arrive by 11:00 AM)  SHORT with Katarina Gutierrez MD  [facility] ([facility] ) [address]  [place] [address]  [phone]           Routing refill request to provider for review/approval because:  Drug not on the G, P or [facility] refill protocol or controlled substance         Patient Name:  Fred Alba  YOB: 1971      Chief Complaint:  Left-sided lower back pain and hip pain. History of Chief Complaint:  Mr. Tatiana Wilkinson is a 39 y.o male being seen for left-sided lower back pain and hip pain. He is having pain across the lower back, right worse than left. There is difficulty bending and turning, standing for any length of time, or walking for any length of time. He says it has been going on since June 2013, but it seems to be getting much worse. He has a history of having thoracic surgery in September 2011 for cauda equina syndrome. He has been on Lyrica as well as gabapentin. He has done a physical therapy program and psychiatric training. The epidural seemed to help for about four days, and then the pain returned again. He still has pain on the left side going down the left leg, buttock, and hip. He has difficulty laying and turning on that side. He has a history of PTSD. He has had numbness in the left lateral thigh since 2008  at times. He has weakness in the left lower extremity which is progressing. He has a neurogenic bladder and self-catheters two three times a day. His balance is poor, and he uses a cane occasionally. He finds that driving, walking, and standing makes the pain worse. Ice and elevating his feet while lying on his back makes it better. He uses a Rollator as needed. . He has had deep tissue massage therapy since November 2015. He has had RFA and medial branch blocks and had pain management with Dr. Aracelis Ding until May 2015 and then his weight loss of 100 pounds helped. He has a history of a detached retina during staley.        Past Medical/Surgical History:    Disease/Disorder Type Date Side Surgery Date Side Comment   Arthritis          Osteoarthritis          Depression          Headache, migraine          PTSD          Traumatic brain injury       DL 03/23/2017 -frontal lobe   Nerve disorder          Spinal stenosis          Thyroid disease Seizure disorder              Ankle surgery  left    IBS - Irritable bowel syndrome          Mental illness              ACL repair 1988 left        Spinal fusion, cervical 2009  DL 03/23/2017 -C5-6       Spinal fusion, thoracic 2011  DL 03/23/2017 -T10-12       Gastric sleeve 2013         Spinal fusion, cervical 2016  DL 03/23/2017 -C6-7     Allergies:    Ingredient Reaction Medication Name Comment   ZIPRASIDONE MESYLATE Tardive dyskinesia Geodon    ZIPRASIDONE HCL Tardive dyskinesia Geodon    AMOXICILLIN TRIHYDRATE Hives Augmentin    ACETAMINOPHEN Hives Vicodin    HYDROCODONE BITARTRATE Hives Vicodin    QUETIAPINE FUMARATE  Seroquel    MORPHINE      TAPE, OCCLUSIVE ADHESIVE      POTASSIUM CLAVULANATE      TRAZODONE        Current Medications:    Medication Directions   dicyclomine 10 mg capsule    Brintellix 20 mg tablet    Xanax 0.5 mg tablet    Mobic 7.5 mg tablet    Lyrica 300 mg capsule    clonazepam 2 mg tablet    Cialis 5 mg tablet take 1 tablet by oral route  every day   Synthroid 75 mcg tablet    Prilosec 20 mg capsule,delayed release take 1 capsule by oral route  every day before a meal   Flomax 0.4 mg capsule take 1 capsule by oral route  every day   gabapentin 100 mg capsule take 1 (100MG)  by oral route 3 times every day   Tenex take 5mg tablet by oral route  every day at bedtime   Percocet 5 mg-325 mg tablet take 1 tablet by oral route  every 6 hours as needed     Social History:      SMOKING  Status Tobacco Type Units Per Day Yrs Used   Former smoker Cigarette       ALCOHOL  There is no current alcohol usage   Type: Beer and liquor. 6 pk of beer consumed weekly. Patient quit drinking in 7.       Family History:    Disease Detail Family Member Age Cause of Death Comments   Family history of Arthritis   N    Family history of Cardiovascular disease   N    Family history of Diabetes mellitus   N    Family history of Hypertension   N    Family history of Stroke   N    Family history of Tuberculosis N    Family history of Alcoholism       Family history of Cancer       Family history of Heart disease         Vitals:  Date BP Pulse Temp (F) Resp. (per min.) Height (Total in.) Weight (lbs.) BMI   01/07/2016     72.00  34.58     Physical Examination:    General:  The patient appears healthy. Cardiovascular:  Arterial pulses are normal.  Skin:  The skin is normal appearing with no contusions or wounds noted. Heart- RRR  Lungs-CTA sadaf  Abdomen- +BS,soft,nontender  Musculoskeletal:  The thoracolumbar spine has normal kyphosis, a normal appearance, and no scoliosis. There is full range of motion of the cervical, thoracic, and lumbar spine and of the hips, knees, and ankles. He has mildly positive straight leg raising on the left. Neurological:  There is no weakness in the thoracic, lumbar, or sacral spine or in the lower extremities or hips. Deep tendon reflexes are present and normal bilaterally. Ankle and knee jerks are normal with no clonus. Radiograph Examination: AP, lateral, bilateral oblique, flexion and extension views of the lumbar spine were obtained and interpreted in the office 3/23/17 and reveal T10 to T12 pedicle screws in place and degenerative disc disease at L5-S1. An AP view of the pelvis was obtained and interpreted in the office and reveals no periosteal reaction, no medullary lesions, no osteopenia, well-aligned joint spaces, no chondrolysis, and no fractures. Review of his MRI scan  [facility] 4/6/17 reveals foraminal stenosis at the left-sided L3-4 and L4-5. Impression:  Mr. Sherryle Plowman and I had a long discussion about the treatments for his foraminal stenosis and left lower extremity radiculopathy including surgical intervention, the risks, and benefits as well as the different surgical approaches and decision making.   We also discussed nonsurgical care for this condition including medications, injections, physical therapy, rehabilitation, activity modification, and brace utilization. It did get better with the epidural steroid injection. At this point, he would like to proceed with operative intervention. We will plan for left-sided L3-4 and L4-5 microdiscectomy. The risks versus the benefits as well as the alternatives were fully explained to the patient. Risks include, but are not limited to, paralysis, death, heart attack, stroke, pulmonary embolism, deep vein thrombosis, infection, failure to relieve pain, increase in back or leg pain, reherniation of disc material, need for revision surgery, scarring, spinal fluid leak, bowel or bladder dysfunction, disease transmission, chronic graft site pain, instrumentation failure, pseudarthrosis, and the need for chronic ambulatory assist devices. The patient states full understanding of the risks and benefits and wishes to proceed.

## 2019-06-03 PROBLEM — Z96.641 STATUS POST TOTAL HIP REPLACEMENT, RIGHT: Status: RESOLVED | Noted: 2017-01-23 | Resolved: 2019-06-03

## 2019-06-03 NOTE — PROGRESS NOTES
Subjective     Claudine Bustos is a 89 year old female who presents to clinic today for the following health issues:    HPI   Hyperlipidemia Follow-Up      Are you having any of the following symptoms? (Select all that apply)  Shortness of breath    Are you regularly taking any medication or supplement to lower your cholesterol?   No    Are you having muscle aches or other side effects that you think could be caused by your cholesterol lowering medication?  No      Hypertension Follow-up      Do you check your blood pressure regularly outside of the clinic? No     Are you following a low salt diet? Yes    Are your blood pressures ever more than 140 on the top number (systolic) OR more   than 90 on the bottom number (diastolic), for example 140/90? Yes  Chronic Kidney Disease Follow-up      Current NSAID use?  Yes:   ibuprofen (Motrin)  Frequency: few times a week      Amount of exercise or physical activity: None    Problems taking medications regularly: No    Medication side effects: none    Diet: regular (no restrictions)      Memory loss      Duration: 6 mos or more    Description (location/character/radiation): brain    Intensity:  mild    Accompanying signs and symptoms: getting forgetful about some things    History (similar episodes/previous evaluation): None    Precipitating or alleviating factors: age    Therapies tried and outcome: None       Patient Active Problem List   Diagnosis     Advanced care planning/counseling discussion     Hyperlipidemia with target LDL less than 130     S/P total knee replacement using cement, left     Benign essential hypertension     Status post total replacement of right hip     Diastolic dysfunction     Chronic kidney disease, unspecified     Postoperative anemia due to acute blood loss     CAP (community acquired pneumonia)     Acute respiratory failure with hypoxia (H)     Renal benign neoplasm, left     Obesity (BMI 35.0-39.9) with comorbidity (H)     Memory loss      Acute right-sided low back pain with right-sided sciatica     Past Surgical History:   Procedure Laterality Date     ARTHROPLASTY HIP Right 1/23/2017    Procedure: ARTHROPLASTY HIP;  Surgeon: Jose Manuel Muñoz MD;  Location: [facility] OR     ARTHROPLASTY KNEE Left 1/25/2016    Procedure: ARTHROPLASTY KNEE;  Surgeon: Jose Manuel Muñoz MD;  Location: [facility] OR     colonoscopy  2004, 2009     EXCISE LESION EYELID Left 8/1/2017    Procedure: EXCISE LESION EYELID;  LEFT LOWER LID WEDGE EXCISION WITH FROZEN SECTION CONTROL;  Surgeon: Quinten Sotelo MD;  Location:  [facility]     excision of rectal villus adenoma       knee replacement Right 1/2011     PHACOEMULSIFICATION WITH STANDARD INTRAOCULAR LENS IMPLANT  2/25/2014    Procedure: PHACOEMULSIFICATION WITH STANDARD INTRAOCULAR LENS IMPLANT;  CATARACT EXTRACTION WITH INTRA OCULAR LENS LEFT;  Surgeon: Jah Bee MD;  Location: [facility] OR     PHACOEMULSIFICATION WITH STANDARD INTRAOCULAR LENS IMPLANT Right 5/12/2015    Procedure: PHACOEMULSIFICATION WITH STANDARD INTRAOCULAR LENS IMPLANT;  Surgeon: Jah Bee MD;  Location: [facility] OR     thoracic schwannoma resection       tonsillectomy       urethral dilitation every 4 months         Social History     Tobacco Use     Smoking status: Never Smoker     Smokeless tobacco: Never Used   Substance Use Topics     Alcohol use: Yes     Frequency: Monthly or less     Drinks per session: 1 or 2     Comment: Mixed, rarely     Family History   Problem Relation Age of Onset     Alcohol/Drug Father         alcoholism     Cancer Father 51        Esophageal, cause of death     Other - See Comments Father         Tobacco use     Cerebrovascular Disease Mother 51        Cause of death     Other - See Comments Maternal Grandmother      Other - See Comments Maternal Grandfather      Other - See Comments Paternal Grandmother      Other - See Comments Paternal Grandfather      Cancer Sister         Breast     Cancer Brother         Leukemia      Cancer Brother         Lung     Chronic Obstructive Pulmonary Disease Brother          Current Outpatient Medications   Medication Sig Dispense Refill     Calcium Carb-Cholecalciferol (CALCIUM + D3) 600-200 MG-UNIT TABS Take 1 tablet by mouth daily       donepezil (ARICEPT) 5 MG tablet Take 1 tablet (5 mg) by mouth At Bedtime 30 tablet 1     gabapentin (NEURONTIN) 100 MG capsule TAKE 2 CAPSULES BY MOUTH 3 TIMES DAILY AFTER ONE WEEK IF STILL HAVINGPAIN INCREASE TO 3 CAPSULES 3 TIMES DAILY 360 capsule 1     GOODSENSE ARTHRITIS PAIN 650 MG CR tablet TAKE 1 TABLET BY MOUTH EVERY 4 HOURS AS NEEDED 250 tablet 0     lisinopril (PRINIVIL/ZESTRIL) 10 MG tablet Take 1 tablet (10 mg) by mouth daily 90 tablet 3     Multiple Vitamin (MULTIVITAMIN) per tablet Take 1 tablet by mouth daily with food.       No Known Allergies  BP Readings from Last 3 Encounters:   06/05/19 128/72   10/22/18 128/72   06/25/18 132/72    Wt Readings from Last 3 Encounters:   06/05/19 76.2 kg (168 lb)   10/22/18 70.3 kg (155 lb)   06/25/18 70.3 kg (155 lb)                  Reviewed and updated as needed this visit by Provider         Review of Systems   ROS COMP: Constitutional, HEENT, cardiovascular, pulmonary, GI, , musculoskeletal, neuro, skin, endocrine and psych systems are negative, except as otherwise noted.      Objective    /72 (BP Location: Right arm, Patient Position: Sitting, Cuff Size: Adult Regular)   Pulse 84   Temp 97.6  F (36.4  C) (Tympanic)   Wt 76.2 kg (168 lb)   SpO2 95%   BMI 36.35 kg/m    Body mass index is 36.35 kg/m .  Physical Exam   GENERAL: healthy, alert and no distress  EYES: Eyes grossly normal to inspection, PERRL and conjunctivae and sclerae normal  HENT: ear canals and TM's normal, nose and mouth without ulcers or lesions  NECK: no adenopathy, no asymmetry, masses, or scars and thyroid normal to palpation  RESP: lungs clear to auscultation - no rales, rhonchi or wheezes  CV: regular rate and rhythm, normal  S1 S2, no S3 or S4, no murmur, click or rub, no peripheral edema and peripheral pulses strong  ABDOMEN: soft, nontender, no hepatosplenomegaly, no masses and bowel sounds normal  MS: no gross musculoskeletal defects noted, no edema  PSYCH: mentation appears abnormal in that she repeated herself a few times, otherwise unable to come up with names of medications or why she didn't have a walker last year but able to answer most questions, affect normal/bright    Diagnostic Test Results:  Labs reviewed in Epic  Results for orders placed or performed in visit on 06/05/19   Lipid Profile (Chol, Trig, HDL, LDL calc)   Result Value Ref Range    Cholesterol 262 (H) <200 mg/dL    Triglycerides 127 <150 mg/dL    HDL Cholesterol 88 >49 mg/dL    LDL Cholesterol Calculated 149 (H) <100 mg/dL    Non HDL Cholesterol 174 (H) <130 mg/dL   Comprehensive metabolic panel   Result Value Ref Range    Sodium 141 133 - 144 mmol/L    Potassium 4.0 3.4 - 5.3 mmol/L    Chloride 109 94 - 109 mmol/L    Carbon Dioxide 23 20 - 32 mmol/L    Anion Gap 9 3 - 14 mmol/L    Glucose 100 (H) 70 - 99 mg/dL    Urea Nitrogen 22 7 - 30 mg/dL    Creatinine 0.85 0.52 - 1.04 mg/dL    GFR Estimate 61 >60 mL/min/[1.73_m2]    GFR Estimate If Black 70 >60 mL/min/[1.73_m2]    Calcium 8.6 8.5 - 10.1 mg/dL    Bilirubin Total 0.6 0.2 - 1.3 mg/dL    Albumin 3.6 3.4 - 5.0 g/dL    Protein Total 7.1 6.8 - 8.8 g/dL    Alkaline Phosphatase 121 40 - 150 U/L    ALT 27 0 - 50 U/L    AST 23 0 - 45 U/L           Assessment & Plan     (E78.5) Hyperlipidemia with target LDL less than 130  (primary encounter diagnosis)  Comment:   Plan: Comprehensive metabolic panel, Lipid Profile         (Chol, Trig, HDL, LDL calc)        Non-fasting today    (I10) Benign essential hypertension  Comment:   Plan: Comprehensive metabolic panel, lisinopril         (PRINIVIL/ZESTRIL) 10 MG tablet        Stable, refill meds    (I51.9) Diastolic dysfunction  Comment:   Plan: stable    (D30.02) Renal  benign neoplasm, left  Comment:   Plan: follows yearly with urology    (R41.3) Memory loss  Comment:   Plan: donepezil (ARICEPT) 5 MG tablet        Will try low dose and follow-up 6 wks to see if noticeable difference    (M54.41) Acute right-sided low back pain with right-sided sciatica  Comment:   Plan: gabapentin (NEURONTIN) 100 MG capsule        Refilled, use BID and then ibuprofen if needed    (E66.01) Morbid obesity (H)  Comment:   Plan: sedentary      Patient was agreeable to this plan and had no further questions.  See Patient Instructions    No follow-ups on file.    Katarina Gutierrez MD  [facility] [place]

## 2019-06-04 NOTE — TELEPHONE ENCOUNTER
FMLA is for daughter in law Shakira.  Gave family fax number to have it faxed to us.   85 yo female w/ pmh HTN, HLD, Colitis in distant past, Glaucoma, cataract LT eye admitted from ED to ICU due to angioedema, likely sec to ACE-I, downgraded to med floors 6/2.        #Angioedema- resolved    concerning for ACE-I allergy     sp benadryl, decadron, pepcid    hold ACEI indefinitely    epipen on dc        #htn. controlled    holding acei    resume bumex on dc        #Chronic lymphedema    bumex as above    f/u with vascular as outpt for ZOILA boots        #Gait instability    ambulates with walker at home    PT eval appreciated, home w/ home PT        #outpt fu. pmd Dr Sanchez        All electrolyte abnormalities were monitored carefully and repleted as necessary during this hospitalization. At the time of discharge patient was hemodynamically stable and amenable to all terms of discharge. The patient has received verbal instructions from myself regarding discharge plans.         Length of Discharge: 45MIN        Vital Signs Last 24 Hrs    T(C): 36.8 (04 Jun 2019 15:00), Max: 36.8 (04 Jun 2019 15:00)    T(F): 98.3 (04 Jun 2019 15:00), Max: 98.3 (04 Jun 2019 15:00)    HR: 61 (04 Jun 2019 15:00) (60 - 79)    BP: 133/81 (04 Jun 2019 15:00) (117/65 - 137/68)    BP(mean): --    RR: 17 (04 Jun 2019 15:00) (16 - 17)    SpO2: 96% (04 Jun 2019 15:00) (92% - 99%)        PHYSICAL EXAM.        GEN - appears age appropriate. well nourished. pleasant. no distress.     HEENT - NCAT, EOMI, ASA. no facial/lingual/oral edema    RESP - CTA BL, no wheeze/stridor/rhonchi/crackles. not on supplemental O2.    CARDIO - NS1S2, RRR. No murmurs/rubs/gallops.    ABD - Soft/Non tender/Non distended. Normal BS x4 quadrants.     Ext - BL JOSE    MSK - BL 5/5 strength on upper and lower extremities.     Neuro - AAOx3.

## 2019-06-05 ENCOUNTER — OFFICE VISIT (OUTPATIENT)
Dept: FAMILY MEDICINE | Facility: OTHER | Age: 84
End: 2019-06-05
Attending: FAMILY MEDICINE
Payer: MEDICARE

## 2019-06-05 VITALS
WEIGHT: 168 LBS | TEMPERATURE: 97.6 F | DIASTOLIC BLOOD PRESSURE: 72 MMHG | HEART RATE: 84 BPM | SYSTOLIC BLOOD PRESSURE: 128 MMHG | BODY MASS INDEX: 36.35 KG/M2 | OXYGEN SATURATION: 95 %

## 2019-06-05 DIAGNOSIS — I51.9 HEART DISEASE, UNSPECIFIED: ICD-10-CM

## 2019-06-05 DIAGNOSIS — M54.41 ACUTE RIGHT-SIDED LOW BACK PAIN WITH RIGHT-SIDED SCIATICA: ICD-10-CM

## 2019-06-05 DIAGNOSIS — E66.01 MORBID OBESITY (H): ICD-10-CM

## 2019-06-05 DIAGNOSIS — D30.02: ICD-10-CM

## 2019-06-05 DIAGNOSIS — E78.5 HYPERLIPIDEMIA WITH TARGET LDL LESS THAN 130: Primary | ICD-10-CM

## 2019-06-05 DIAGNOSIS — I10 BENIGN ESSENTIAL HYPERTENSION: ICD-10-CM

## 2019-06-05 DIAGNOSIS — R41.3 MEMORY LOSS: ICD-10-CM

## 2019-06-05 PROBLEM — J96.01 ACUTE RESPIRATORY FAILURE WITH HYPOXIA (H): Status: ACTIVE | Noted: 2019-06-05

## 2019-06-05 LAB
ALBUMIN SERPL-MCNC: 3.6 G/DL (ref 3.4–5)
ALP SERPL-CCNC: 121 U/L (ref 40–150)
ALT SERPL W P-5'-P-CCNC: 27 U/L (ref 0–50)
ANION GAP SERPL CALCULATED.3IONS-SCNC: 9 MMOL/L (ref 3–14)
AST SERPL W P-5'-P-CCNC: 23 U/L (ref 0–45)
BILIRUB SERPL-MCNC: 0.6 MG/DL (ref 0.2–1.3)
BUN SERPL-MCNC: 22 MG/DL (ref 7–30)
CALCIUM SERPL-MCNC: 8.6 MG/DL (ref 8.5–10.1)
CHLORIDE SERPL-SCNC: 109 MMOL/L (ref 94–109)
CHOLEST SERPL-MCNC: 262 MG/DL
CO2 SERPL-SCNC: 23 MMOL/L (ref 20–32)
CREAT SERPL-MCNC: 0.85 MG/DL (ref 0.52–1.04)
GFR SERPL CREATININE-BSD FRML MDRD: 61 ML/MIN/{1.73_M2}
GLUCOSE SERPL-MCNC: 100 MG/DL (ref 70–99)
HDLC SERPL-MCNC: 88 MG/DL
LDLC SERPL CALC-MCNC: 149 MG/DL
NONHDLC SERPL-MCNC: 174 MG/DL
POTASSIUM SERPL-SCNC: 4 MMOL/L (ref 3.4–5.3)
PROT SERPL-MCNC: 7.1 G/DL (ref 6.8–8.8)
SODIUM SERPL-SCNC: 141 MMOL/L (ref 133–144)
TRIGL SERPL-MCNC: 127 MG/DL

## 2019-06-05 PROCEDURE — 80053 COMPREHEN METABOLIC PANEL: CPT | Mod: ZL | Performed by: FAMILY MEDICINE

## 2019-06-05 PROCEDURE — G0463 HOSPITAL OUTPT CLINIC VISIT: HCPCS

## 2019-06-05 PROCEDURE — 99214 OFFICE O/P EST MOD 30 MIN: CPT | Performed by: FAMILY MEDICINE

## 2019-06-05 PROCEDURE — 80061 LIPID PANEL: CPT | Mod: ZL | Performed by: FAMILY MEDICINE

## 2019-06-05 PROCEDURE — 36415 COLL VENOUS BLD VENIPUNCTURE: CPT | Mod: ZL | Performed by: FAMILY MEDICINE

## 2019-06-05 RX ORDER — LISINOPRIL 10 MG/1
10 TABLET ORAL DAILY
Qty: 90 TABLET | Refills: 3 | Status: SHIPPED | OUTPATIENT
Start: 2019-06-05 | End: 2020-06-30

## 2019-06-05 RX ORDER — DONEPEZIL HYDROCHLORIDE 5 MG/1
5 TABLET, FILM COATED ORAL AT BEDTIME
Qty: 30 TABLET | Refills: 1 | Status: SHIPPED | OUTPATIENT
Start: 2019-06-05 | End: 2019-08-29

## 2019-06-05 RX ORDER — GABAPENTIN 100 MG/1
CAPSULE ORAL
Qty: 360 CAPSULE | Refills: 1 | Status: SHIPPED | OUTPATIENT
Start: 2019-06-05 | End: 2020-07-13

## 2019-06-05 SDOH — HEALTH STABILITY: PHYSICAL HEALTH: ON AVERAGE, HOW MANY MINUTES DO YOU ENGAGE IN EXERCISE AT THIS LEVEL?: 0 MIN

## 2019-06-05 SDOH — HEALTH STABILITY: PHYSICAL HEALTH: ON AVERAGE, HOW MANY DAYS PER WEEK DO YOU ENGAGE IN MODERATE TO STRENUOUS EXERCISE (LIKE A BRISK WALK)?: 0 DAYS

## 2019-06-05 SDOH — HEALTH STABILITY: MENTAL HEALTH: HOW MANY STANDARD DRINKS CONTAINING ALCOHOL DO YOU HAVE ON A TYPICAL DAY?: 1 OR 2

## 2019-06-05 SDOH — HEALTH STABILITY: MENTAL HEALTH: HOW OFTEN DO YOU HAVE A DRINK CONTAINING ALCOHOL?: MONTHLY OR LESS

## 2019-06-05 SDOH — SOCIAL STABILITY: SOCIAL INSECURITY
WITHIN THE LAST YEAR, HAVE YOU BEEN KICKED, HIT, SLAPPED, OR OTHERWISE PHYSICALLY HURT BY YOUR PARTNER OR EX-PARTNER?: NO

## 2019-06-05 SDOH — SOCIAL STABILITY: SOCIAL INSECURITY
WITHIN THE LAST YEAR, HAVE TO BEEN RAPED OR FORCED TO HAVE ANY KIND OF SEXUAL ACTIVITY BY YOUR PARTNER OR EX-PARTNER?: NO

## 2019-06-05 SDOH — SOCIAL STABILITY: SOCIAL INSECURITY: WITHIN THE LAST YEAR, HAVE YOU BEEN HUMILIATED OR EMOTIONALLY ABUSED IN OTHER WAYS BY YOUR PARTNER OR EX-PARTNER?: NO

## 2019-06-05 SDOH — SOCIAL STABILITY: SOCIAL INSECURITY: WITHIN THE LAST YEAR, HAVE YOU BEEN AFRAID OF YOUR PARTNER OR EX-PARTNER?: NO

## 2019-06-05 ASSESSMENT — ANXIETY QUESTIONNAIRES
5. BEING SO RESTLESS THAT IT IS HARD TO SIT STILL: NOT AT ALL
4. TROUBLE RELAXING: NOT AT ALL
IF YOU CHECKED OFF ANY PROBLEMS ON THIS QUESTIONNAIRE, HOW DIFFICULT HAVE THESE PROBLEMS MADE IT FOR YOU TO DO YOUR WORK, TAKE CARE OF THINGS AT HOME, OR GET ALONG WITH OTHER PEOPLE: NOT DIFFICULT AT ALL
7. FEELING AFRAID AS IF SOMETHING AWFUL MIGHT HAPPEN: NOT AT ALL
1. FEELING NERVOUS, ANXIOUS, OR ON EDGE: NOT AT ALL
GAD7 TOTAL SCORE: 0
6. BECOMING EASILY ANNOYED OR IRRITABLE: NOT AT ALL
3. WORRYING TOO MUCH ABOUT DIFFERENT THINGS: NOT AT ALL
2. NOT BEING ABLE TO STOP OR CONTROL WORRYING: NOT AT ALL

## 2019-06-05 ASSESSMENT — PAIN SCALES - GENERAL: PAINLEVEL: NO PAIN (0)

## 2019-06-05 ASSESSMENT — PATIENT HEALTH QUESTIONNAIRE - PHQ9: SUM OF ALL RESPONSES TO PHQ QUESTIONS 1-9: 5

## 2019-06-05 NOTE — NURSING NOTE
"Chief Complaint   Patient presents with     Recheck Medication       Initial /72 (BP Location: Right arm, Patient Position: Sitting, Cuff Size: Adult Regular)   Pulse 84   Temp 97.6  F (36.4  C) (Tympanic)   Wt 76.2 kg (168 lb)   SpO2 95%   BMI 36.35 kg/m   Estimated body mass index is 36.35 kg/m  as calculated from the following:    Height as of 2/26/18: 1.448 m (4' 9\").    Weight as of this encounter: 76.2 kg (168 lb).  Medication Reconciliation: complete    Ashley A. Lechevalier, LPN  "

## 2019-06-06 ASSESSMENT — ANXIETY QUESTIONNAIRES: GAD7 TOTAL SCORE: 0

## 2019-07-05 NOTE — PROGRESS NOTES
Subjective     Claudine Bustos is a 89 year old female who presents to clinic today for the following health issues:    HPI   Memory      Duration: 7 months or more    Description (location/character/radiation): forgetfullness    Intensity:  severe    Accompanying signs and symptoms: none    History (similar episodes/previous evaluation): YES    Precipitating or alleviating factors: None    Therapies tried and outcome: Aricept 5mg at bedtime     Rash/ Sore      Duration: over a month    Description  Location: tip of nose  Itching: no    Intensity:  moderate    Accompanying signs and symptoms: Bleeding    History (similar episodes/previous evaluation): None    Precipitating or alleviating factors:  New exposures:  medication Aricept  Recent travel: no      Therapies tried and outcome: Neosporin     Hypertension Follow-up      Do you check your blood pressure regularly outside of the clinic? No     Are you following a low salt diet? Yes    Are your blood pressures ever more than 140 on the top number (systolic) OR more   than 90 on the bottom number (diastolic), for example 140/90? Yes    Patient Active Problem List   Diagnosis     Advanced care planning/counseling discussion     Hyperlipidemia with target LDL less than 130     S/P total knee replacement using cement, left     Benign essential hypertension     Status post total replacement of right hip     Diastolic dysfunction     Chronic kidney disease, unspecified     Postoperative anemia due to acute blood loss     CAP (community acquired pneumonia)     Acute respiratory failure with hypoxia (H)     Renal benign neoplasm, left     Obesity (BMI 35.0-39.9) with comorbidity (H)     Memory loss     Acute right-sided low back pain with right-sided sciatica     Nasal lesion     Past Surgical History:   Procedure Laterality Date     ARTHROPLASTY HIP Right 1/23/2017    Procedure: ARTHROPLASTY HIP;  Surgeon: Jose Manuel Muñoz MD;  Location: [facility]     ARTHROPLASTY KNEE  Left 1/25/2016    Procedure: ARTHROPLASTY KNEE;  Surgeon: Jose Manuel Muñoz MD;  Location: [facility]     colonoscopy  2004, 2009     EXCISE LESION EYELID Left 8/1/2017    Procedure: EXCISE LESION EYELID;  LEFT LOWER LID WEDGE EXCISION WITH FROZEN SECTION CONTROL;  Surgeon: Quinten Sotelo MD;  [facility]     excision of rectal villus adenoma       knee replacement Right 1/2011     PHACOEMULSIFICATION WITH STANDARD INTRAOCULAR LENS IMPLANT  2/25/2014    Procedure: PHACOEMULSIFICATION WITH STANDARD INTRAOCULAR LENS IMPLANT;  CATARACT EXTRACTION WITH INTRA OCULAR LENS LEFT;  Surgeon: Jah Bee MD;  Location: [facility]     PHACOEMULSIFICATION WITH STANDARD INTRAOCULAR LENS IMPLANT Right 5/12/2015    Procedure: PHACOEMULSIFICATION WITH STANDARD INTRAOCULAR LENS IMPLANT;  Surgeon: Jah Bee MD;  Location: [facility]     thoracic schwannoma resection       tonsillectomy       urethral dilitation every 4 months         Social History     Tobacco Use     Smoking status: Never Smoker     Smokeless tobacco: Never Used   Substance Use Topics     Alcohol use: Yes     Frequency: Monthly or less     Drinks per session: 1 or 2     Comment: Mixed, rarely     Family History   Problem Relation Age of Onset     Alcohol/Drug Father         alcoholism     Cancer Father 51        Esophageal, cause of death     Other - See Comments Father         Tobacco use     Cerebrovascular Disease Mother 51        Cause of death     Other - See Comments Maternal Grandmother      Other - See Comments Maternal Grandfather      Other - See Comments Paternal Grandmother      Other - See Comments Paternal Grandfather      Cancer Sister         Breast     Cancer Brother         Leukemia     Cancer Brother         Lung     Chronic Obstructive Pulmonary Disease Brother          Current Outpatient Medications   Medication Sig Dispense Refill     Calcium Carb-Cholecalciferol (CALCIUM + D3) 600-200 MG-UNIT TABS Take 1 tablet by mouth daily        donepezil (ARICEPT) 10 MG tablet Take 1 tablet (10 mg) by mouth At Bedtime 30 tablet 1     donepezil (ARICEPT) 5 MG tablet Take 1 tablet (5 mg) by mouth At Bedtime 30 tablet 1     gabapentin (NEURONTIN) 100 MG capsule TAKE 2 CAPSULES BY MOUTH 3 TIMES DAILY AFTER ONE WEEK IF STILL HAVINGPAIN INCREASE TO 3 CAPSULES 3 TIMES DAILY 360 capsule 1     GOODSENSE ARTHRITIS PAIN 650 MG CR tablet TAKE 1 TABLET BY MOUTH EVERY 4 HOURS AS NEEDED 250 tablet 0     lisinopril (PRINIVIL/ZESTRIL) 10 MG tablet Take 1 tablet (10 mg) by mouth daily 90 tablet 3     Multiple Vitamin (MULTIVITAMIN) per tablet Take 1 tablet by mouth daily with food.       mupirocin (BACTROBAN) 2 % external ointment Apply topically 3 times daily 15 g 0     order for DME Equipment being ordered: Digital home blood pressure monitor kit 1 Device 0     No Known Allergies  BP Readings from Last 3 Encounters:   07/24/19 172/80   06/05/19 128/72   10/22/18 128/72    Wt Readings from Last 3 Encounters:   07/24/19 75.7 kg (166 lb 12.8 oz)   06/05/19 76.2 kg (168 lb)   10/22/18 70.3 kg (155 lb)      Reviewed and updated as needed this visit by Provider         Review of Systems   ROS COMP: Constitutional, HEENT, cardiovascular, pulmonary, gi and gu systems are negative, except as otherwise noted.      Objective    /80 (BP Location: Right arm, Patient Position: Chair, Cuff Size: Adult Large)   Pulse 81   Temp 98  F (36.7  C) (Tympanic)   Wt 75.7 kg (166 lb 12.8 oz)   SpO2 97%   BMI 36.10 kg/m    Body mass index is 36.1 kg/m .  Physical Exam   GENERAL: healthy, alert and no distress  NECK: no adenopathy, no asymmetry, masses, or scars and thyroid normal to palpation  RESP: lungs clear to auscultation - no rales, rhonchi or wheezes  CV: regular rate and rhythm, normal S1 S2, no S3 or S4, no murmur, click or rub, no peripheral edema and peripheral pulses strong  ABDOMEN: soft, nontender, no hepatosplenomegaly, no masses and bowel sounds normal  MS: no gross  musculoskeletal defects noted, no edema  SKIN: erythema - tip of nares excoriation into left nares  PSYCH: mentation appears normal, affect normal/bright    Diagnostic Test Results:  Labs reviewed in Epic  Results for orders placed or performed in visit on 06/05/19   Lipid Profile (Chol, Trig, HDL, LDL calc)   Result Value Ref Range    Cholesterol 262 (H) <200 mg/dL    Triglycerides 127 <150 mg/dL    HDL Cholesterol 88 >49 mg/dL    LDL Cholesterol Calculated 149 (H) <100 mg/dL    Non HDL Cholesterol 174 (H) <130 mg/dL   Comprehensive metabolic panel   Result Value Ref Range    Sodium 141 133 - 144 mmol/L    Potassium 4.0 3.4 - 5.3 mmol/L    Chloride 109 94 - 109 mmol/L    Carbon Dioxide 23 20 - 32 mmol/L    Anion Gap 9 3 - 14 mmol/L    Glucose 100 (H) 70 - 99 mg/dL    Urea Nitrogen 22 7 - 30 mg/dL    Creatinine 0.85 0.52 - 1.04 mg/dL    GFR Estimate 61 >60 mL/min/[1.73_m2]    GFR Estimate If Black 70 >60 mL/min/[1.73_m2]    Calcium 8.6 8.5 - 10.1 mg/dL    Bilirubin Total 0.6 0.2 - 1.3 mg/dL    Albumin 3.6 3.4 - 5.0 g/dL    Protein Total 7.1 6.8 - 8.8 g/dL    Alkaline Phosphatase 121 40 - 150 U/L    ALT 27 0 - 50 U/L    AST 23 0 - 45 U/L           Assessment & Plan     (E78.5) Hyperlipidemia with target LDL less than 130  (primary encounter diagnosis)  Comment:   Plan: stable    (I10) Benign essential hypertension  Comment:   Plan: order for DME        Follow-up 1 mo, has gained some weight, may be cause for her high BP     (R41.3) Memory loss  Comment:   Plan: donepezil (ARICEPT) 10 MG tablet        Tolerated, will increase to 10 mg    (J34.89) Nasal lesion  Comment:   Plan: mupirocin (BACTROBAN) 2 % external ointment        Use 1% hydrocortisone in between those two       Patient was agreeable to this plan and had no further questions.  Patient Instructions   1.  Start taking the aricept 10 mg every bedtime  2.  Start bactroban ointment 3x/day (and use cortisone cream in between)  3.  Take BP 2-3 x/week at home and  if higher than 160/85 -- let me know        No follow-ups on file.    Katarina Gutierrez MD  [facility]

## 2019-07-24 ENCOUNTER — OFFICE VISIT (OUTPATIENT)
Dept: FAMILY MEDICINE | Facility: OTHER | Age: 84
End: 2019-07-24
Attending: FAMILY MEDICINE
Payer: MEDICARE

## 2019-07-24 VITALS
HEART RATE: 81 BPM | OXYGEN SATURATION: 97 % | SYSTOLIC BLOOD PRESSURE: 172 MMHG | WEIGHT: 166.8 LBS | TEMPERATURE: 98 F | DIASTOLIC BLOOD PRESSURE: 80 MMHG | BODY MASS INDEX: 36.1 KG/M2

## 2019-07-24 DIAGNOSIS — R41.3 MEMORY LOSS: ICD-10-CM

## 2019-07-24 DIAGNOSIS — I10 BENIGN ESSENTIAL HYPERTENSION: ICD-10-CM

## 2019-07-24 DIAGNOSIS — J34.89 NASAL LESION: ICD-10-CM

## 2019-07-24 DIAGNOSIS — E78.5 HYPERLIPIDEMIA WITH TARGET LDL LESS THAN 130: Primary | ICD-10-CM

## 2019-07-24 PROCEDURE — G0463 HOSPITAL OUTPT CLINIC VISIT: HCPCS

## 2019-07-24 PROCEDURE — 99214 OFFICE O/P EST MOD 30 MIN: CPT | Performed by: FAMILY MEDICINE

## 2019-07-24 RX ORDER — MUPIROCIN 20 MG/G
OINTMENT TOPICAL 3 TIMES DAILY
Qty: 15 G | Refills: 0 | Status: ON HOLD | OUTPATIENT
Start: 2019-07-24 | End: 2020-02-01

## 2019-07-24 RX ORDER — DONEPEZIL HYDROCHLORIDE 10 MG/1
10 TABLET, FILM COATED ORAL AT BEDTIME
Qty: 30 TABLET | Refills: 1 | Status: SHIPPED | OUTPATIENT
Start: 2019-07-24 | End: 2020-02-19

## 2019-07-24 ASSESSMENT — PAIN SCALES - GENERAL: PAINLEVEL: NO PAIN (0)

## 2019-07-24 NOTE — NURSING NOTE
"Chief Complaint   Patient presents with     Memory Loss     Sore     Hypertension       Initial /80 (BP Location: Right arm, Patient Position: Chair, Cuff Size: Adult Large)   Pulse 81   Temp 98  F (36.7  C) (Tympanic)   Wt 75.7 kg (166 lb 12.8 oz)   SpO2 97%   BMI 36.10 kg/m   Estimated body mass index is 36.1 kg/m  as calculated from the following:    Height as of 2/26/18: 1.448 m (4' 9\").    Weight as of this encounter: 75.7 kg (166 lb 12.8 oz).  Medication Reconciliation: complete   Brie Simms LPN      "

## 2019-07-24 NOTE — PATIENT INSTRUCTIONS
1.  Start taking the aricept 10 mg every bedtime  2.  Start bactroban ointment 3x/day (and use cortisone cream in between)  3.  Take BP 2-3 x/week at home and if higher than 160/85 -- let me know

## 2019-08-21 NOTE — PROGRESS NOTES
Subjective     Claudine Bustos is a 89 year old female who presents to clinic today for the following health issues:    HPI   Hypertension Follow-up      Do you check your blood pressure regularly outside of the clinic? No     Are you following a low salt diet? Yes    Are your blood pressures ever more than 140 on the top number (systolic) OR more   than 90 on the bottom number (diastolic), for example 140/90? Yes      How many servings of fruits and vegetables do you eat daily?  2-3    On average, how many sweetened beverages do you drink each day (soda, juice, sweet tea, etc)?   0-1    How many days per week do you miss taking your medication? 0        Memory      Duration: 8 months or more    Description (location/character/radiation): forgetfullness    Intensity:  severe    Accompanying signs and symptoms: None    History (similar episodes/previous evaluation): YES    Precipitating or alleviating factors: None    Therapies tried and outcome: Aricept 10mg at HS     Rash/Sore      Duration: 2 months    Description  Location: tip of nose  Itching: no    Accompanying signs and symptoms: Bleeding    History (similar episodes/previous evaluation): None    Precipitating or alleviating factors:  New exposures:  medication Aricept  Recent travel: no      Therapies tried and outcome: Neosporin, bactroban puts on at night and is helping      Patient Active Problem List   Diagnosis     Advanced care planning/counseling discussion     Hyperlipidemia with target LDL less than 130     S/P total knee replacement using cement, left     Benign essential hypertension     Status post total replacement of right hip     Diastolic dysfunction     Chronic kidney disease, unspecified     Postoperative anemia due to acute blood loss     CAP (community acquired pneumonia)     Renal benign neoplasm, left     Obesity (BMI 35.0-39.9) with comorbidity (H)     Memory loss     Acute right-sided low back pain with right-sided sciatica     Nasal  lesion     Past Surgical History:   Procedure Laterality Date     ARTHROPLASTY HIP Right 1/23/2017    Procedure: ARTHROPLASTY HIP;  Surgeon: Jose Manuel Muñoz MD;  [facility]: [facility]     ARTHROPLASTY KNEE Left 1/25/2016    Procedure: ARTHROPLASTY KNEE;  Surgeon: Jose Manuel Muñoz MD;  [facility]: [facility]     colonoscopy  2004, 2009     EXCISE LESION EYELID Left 8/1/2017    Procedure: EXCISE LESION EYELID;  LEFT LOWER LID WEDGE EXCISION WITH FROZEN SECTION CONTROL;  Surgeon: Quinten Sotelo MD;  [facility]     excision of rectal villus adenoma       knee replacement Right 1/2011     PHACOEMULSIFICATION WITH STANDARD INTRAOCULAR LENS IMPLANT  2/25/2014    Procedure: PHACOEMULSIFICATION WITH STANDARD INTRAOCULAR LENS IMPLANT;  CATARACT EXTRACTION WITH INTRA OCULAR LENS LEFT;  Surgeon: Jah Bee MD;  [facility]: [facility]     PHACOEMULSIFICATION WITH STANDARD INTRAOCULAR LENS IMPLANT Right 5/12/2015    Procedure: PHACOEMULSIFICATION WITH STANDARD INTRAOCULAR LENS IMPLANT;  Surgeon: Jah Bee MD;  [facility]: [facility]     thoracic schwannoma resection       tonsillectomy       urethral dilitation every 4 months         Social History     Tobacco Use     Smoking status: Never Smoker     Smokeless tobacco: Never Used   Substance Use Topics     Alcohol use: Yes     Frequency: Monthly or less     Drinks per session: 1 or 2     Comment: Mixed, rarely     Family History   Problem Relation Age of Onset     Alcohol/Drug Father         alcoholism     Cancer Father 51        Esophageal, cause of death     Other - See Comments Father         Tobacco use     Cerebrovascular Disease Mother 51        Cause of death     Other - See Comments Maternal Grandmother      Other - See Comments Maternal Grandfather      Other - See Comments Paternal Grandmother      Other - See Comments Paternal Grandfather      Cancer Sister         Breast     Cancer Brother         Leukemia     Cancer Brother         Lung     Chronic Obstructive  Pulmonary Disease Brother          Current Outpatient Medications   Medication Sig Dispense Refill     Calcium Carb-Cholecalciferol (CALCIUM + D3) 600-200 MG-UNIT TABS Take 1 tablet by mouth daily       donepezil (ARICEPT) 10 MG tablet Take 1 tablet (10 mg) by mouth At Bedtime 30 tablet 1     gabapentin (NEURONTIN) 100 MG capsule TAKE 2 CAPSULES BY MOUTH 3 TIMES DAILY AFTER ONE WEEK IF STILL HAVINGPAIN INCREASE TO 3 CAPSULES 3 TIMES DAILY 360 capsule 1     GOODSENSE ARTHRITIS PAIN 650 MG CR tablet TAKE 1 TABLET BY MOUTH EVERY 4 HOURS AS NEEDED 250 tablet 0     lisinopril (PRINIVIL/ZESTRIL) 10 MG tablet Take 1 tablet (10 mg) by mouth daily 90 tablet 3     Multiple Vitamin (MULTIVITAMIN) per tablet Take 1 tablet by mouth daily with food.       mupirocin (BACTROBAN) 2 % external ointment Apply topically 3 times daily 15 g 0     order for DME Equipment being ordered: Digital home blood pressure monitor kit 1 Device 0     No Known Allergies  BP Readings from Last 3 Encounters:   08/29/19 (!) 154/80   07/24/19 172/80   06/05/19 128/72    Wt Readings from Last 3 Encounters:   08/29/19 76.6 kg (168 lb 12.8 oz)   07/24/19 75.7 kg (166 lb 12.8 oz)   06/05/19 76.2 kg (168 lb)           Reviewed and updated as needed this visit by Provider         Review of Systems   ROS COMP: Constitutional, HEENT, cardiovascular, pulmonary, gi and gu systems are negative, except as otherwise noted.      Objective    BP (!) 154/80 (BP Location: Right arm, Patient Position: Chair, Cuff Size: Adult Regular)   Pulse 94   Temp 97.5  F (36.4  C) (Tympanic)   Resp 20   Wt 76.6 kg (168 lb 12.8 oz)   SpO2 96%   BMI 36.53 kg/m    Body mass index is 36.53 kg/m .  Physical Exam   GENERAL: healthy, alert and no distress  NECK: no adenopathy, no asymmetry, masses, or scars and thyroid normal to palpation  RESP: lungs clear to auscultation - no rales, rhonchi or wheezes  CV: regular rate and rhythm, normal S1 S2, no S3 or S4, no murmur, click or rub,  no peripheral edema and peripheral pulses strong  MS: no gross musculoskeletal defects noted, no edema  PSYCH: mentation appears normal, affect normal/bright    Diagnostic Test Results:  Labs reviewed in Epic  Results for orders placed or performed in visit on 08/29/19   CBC with platelets   Result Value Ref Range    WBC 8.0 4.0 - 11.0 10e9/L    RBC Count 4.61 3.8 - 5.2 10e12/L    Hemoglobin 10.5 (L) 11.7 - 15.7 g/dL    Hematocrit 34.6 (L) 35.0 - 47.0 %    MCV 75 (L) 78 - 100 fl    MCH 22.8 (L) 26.5 - 33.0 pg    MCHC 30.3 (L) 31.5 - 36.5 g/dL    RDW 16.9 (H) 10.0 - 15.0 %    Platelet Count 367 150 - 450 10e9/L           Assessment & Plan     (I10) Benign essential hypertension  (primary encounter diagnosis)  Comment:   Plan: CBC with platelets        Follow-up 6-8 mos for med reviewe    (R41.3) Memory loss  Comment:   Plan: TSH with free T4 reflex        Continue with aricept follow-up in the spring    Nasal lesion is healing nicely       Patient was agreeable to this plan and had no further questions.  There are no Patient Instructions on file for this visit.    No follow-ups on file.    Katarina Gutierrez MD  [facility] [place]

## 2019-08-29 ENCOUNTER — OFFICE VISIT (OUTPATIENT)
Dept: FAMILY MEDICINE | Facility: OTHER | Age: 84
End: 2019-08-29
Attending: FAMILY MEDICINE
Payer: MEDICARE

## 2019-08-29 VITALS
TEMPERATURE: 97.5 F | WEIGHT: 168.8 LBS | BODY MASS INDEX: 36.53 KG/M2 | OXYGEN SATURATION: 96 % | HEART RATE: 94 BPM | SYSTOLIC BLOOD PRESSURE: 154 MMHG | DIASTOLIC BLOOD PRESSURE: 80 MMHG | RESPIRATION RATE: 20 BRPM

## 2019-08-29 DIAGNOSIS — I10 BENIGN ESSENTIAL HYPERTENSION: Primary | ICD-10-CM

## 2019-08-29 DIAGNOSIS — R41.3 MEMORY LOSS: ICD-10-CM

## 2019-08-29 PROBLEM — J96.01 ACUTE RESPIRATORY FAILURE WITH HYPOXIA (H): Status: RESOLVED | Noted: 2019-06-05 | Resolved: 2019-08-29

## 2019-08-29 LAB
ERYTHROCYTE [DISTWIDTH] IN BLOOD BY AUTOMATED COUNT: 16.9 % (ref 10–15)
HCT VFR BLD AUTO: 34.6 % (ref 35–47)
HGB BLD-MCNC: 10.5 G/DL (ref 11.7–15.7)
MCH RBC QN AUTO: 22.8 PG (ref 26.5–33)
MCHC RBC AUTO-ENTMCNC: 30.3 G/DL (ref 31.5–36.5)
MCV RBC AUTO: 75 FL (ref 78–100)
PLATELET # BLD AUTO: 367 10E9/L (ref 150–450)
RBC # BLD AUTO: 4.61 10E12/L (ref 3.8–5.2)
TSH SERPL DL<=0.005 MIU/L-ACNC: 1 MU/L (ref 0.4–4)
WBC # BLD AUTO: 8 10E9/L (ref 4–11)

## 2019-08-29 PROCEDURE — 36415 COLL VENOUS BLD VENIPUNCTURE: CPT | Mod: ZL | Performed by: FAMILY MEDICINE

## 2019-08-29 PROCEDURE — 84443 ASSAY THYROID STIM HORMONE: CPT | Mod: ZL | Performed by: FAMILY MEDICINE

## 2019-08-29 PROCEDURE — G0463 HOSPITAL OUTPT CLINIC VISIT: HCPCS

## 2019-08-29 PROCEDURE — 85027 COMPLETE CBC AUTOMATED: CPT | Mod: ZL | Performed by: FAMILY MEDICINE

## 2019-08-29 PROCEDURE — 99213 OFFICE O/P EST LOW 20 MIN: CPT | Performed by: FAMILY MEDICINE

## 2019-08-29 ASSESSMENT — PAIN SCALES - GENERAL: PAINLEVEL: NO PAIN (0)

## 2019-08-29 NOTE — NURSING NOTE
"Chief Complaint   Patient presents with     Hypertension     Memory Loss     Nose Problem       Initial BP (!) 154/80 (BP Location: Right arm, Patient Position: Chair, Cuff Size: Adult Regular)   Pulse 94   Temp 97.5  F (36.4  C) (Tympanic)   Resp 20   Wt 76.6 kg (168 lb 12.8 oz)   SpO2 96%   BMI 36.53 kg/m   Estimated body mass index is 36.53 kg/m  as calculated from the following:    Height as of 2/26/18: 1.448 m (4' 9\").    Weight as of this encounter: 76.6 kg (168 lb 12.8 oz).  Medication Reconciliation: complete   Brie Simms LPN      "

## 2019-10-23 DIAGNOSIS — Z96.652 S/P TOTAL KNEE REPLACEMENT USING CEMENT, LEFT: ICD-10-CM

## 2019-10-25 RX ORDER — SENNOSIDES 8.6 MG
CAPSULE ORAL
Qty: 250 TABLET | Refills: 0 | Status: SHIPPED | OUTPATIENT
Start: 2019-10-25 | End: 2020-02-01

## 2020-02-01 ENCOUNTER — APPOINTMENT (OUTPATIENT)
Dept: CT IMAGING | Facility: HOSPITAL | Age: 85
End: 2020-02-01
Attending: NURSE PRACTITIONER
Payer: MEDICARE

## 2020-02-01 ENCOUNTER — APPOINTMENT (OUTPATIENT)
Dept: GENERAL RADIOLOGY | Facility: HOSPITAL | Age: 85
End: 2020-02-01
Attending: NURSE PRACTITIONER
Payer: MEDICARE

## 2020-02-01 ENCOUNTER — HOSPITAL ENCOUNTER (OUTPATIENT)
Facility: HOSPITAL | Age: 85
Setting detail: OBSERVATION
Discharge: HOME | End: 2020-02-03
Attending: NURSE PRACTITIONER | Admitting: INTERNAL MEDICINE
Payer: MEDICARE

## 2020-02-01 DIAGNOSIS — D64.9 ANEMIA: ICD-10-CM

## 2020-02-01 DIAGNOSIS — R09.02 HYPOXIA: ICD-10-CM

## 2020-02-01 DIAGNOSIS — R29.6 FALLS FREQUENTLY: Primary | ICD-10-CM

## 2020-02-01 DIAGNOSIS — S22.080A COMPRESSION FRACTURE OF T11 VERTEBRA, INITIAL ENCOUNTER (H): ICD-10-CM

## 2020-02-01 DIAGNOSIS — M62.81 GENERALIZED MUSCLE WEAKNESS: ICD-10-CM
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
## 2020-02-01 PROBLEM — E87.6 HYPOKALEMIA: Status: ACTIVE | Noted: 2020-02-01

## 2020-02-01 PROBLEM — S22.089A: Status: ACTIVE | Noted: 2020-02-01

## 2020-02-01 PROBLEM — N28.89 RIGHT RENAL MASS: Status: ACTIVE | Noted: 2017-06-08

## 2020-02-01 PROBLEM — E87.1 HYPONATREMIA: Status: ACTIVE | Noted: 2020-02-01

## 2020-02-01 PROBLEM — R01.1 SYSTOLIC MURMUR: Status: ACTIVE | Noted: 2020-02-01

## 2020-02-01 PROBLEM — M62.82 RHABDOMYOLYSIS: Status: ACTIVE | Noted: 2020-02-01

## 2020-02-01 PROBLEM — M54.9 BACK PAIN: Status: ACTIVE | Noted: 2020-02-01

## 2020-02-01 LAB
ALBUMIN SERPL-MCNC: 3.2 G/DL (ref 3.4–5)
ALBUMIN UR-MCNC: NEGATIVE MG/DL
ALP SERPL-CCNC: 85 U/L (ref 40–150)
ALT SERPL W P-5'-P-CCNC: 25 U/L (ref 0–50)
ANION GAP SERPL CALCULATED.3IONS-SCNC: 9 MMOL/L (ref 3–14)
APPEARANCE UR: CLEAR
AST SERPL W P-5'-P-CCNC: 26 U/L (ref 0–45)
BACTERIA #/AREA URNS HPF: ABNORMAL /HPF
BASOPHILS # BLD AUTO: 0 10E9/L (ref 0–0.2)
BASOPHILS NFR BLD AUTO: 0.3 %
BILIRUB SERPL-MCNC: 0.5 MG/DL (ref 0.2–1.3)
BILIRUB UR QL STRIP: NEGATIVE
BUN SERPL-MCNC: 13 MG/DL (ref 7–30)
CALCIUM SERPL-MCNC: 8.1 MG/DL (ref 8.5–10.1)
CHLORIDE SERPL-SCNC: 101 MMOL/L (ref 94–109)
CK SERPL-CCNC: 657 U/L (ref 30–225)
CO2 SERPL-SCNC: 20 MMOL/L (ref 20–32)
COLOR UR AUTO: ABNORMAL
CREAT SERPL-MCNC: 0.79 MG/DL (ref 0.52–1.04)
DIFFERENTIAL METHOD BLD: ABNORMAL
EOSINOPHIL # BLD AUTO: 0 10E9/L (ref 0–0.7)
EOSINOPHIL NFR BLD AUTO: 0.2 %
ERYTHROCYTE [DISTWIDTH] IN BLOOD BY AUTOMATED COUNT: 16.2 % (ref 10–15)
GFR SERPL CREATININE-BSD FRML MDRD: 66 ML/MIN/{1.73_M2}
GLUCOSE SERPL-MCNC: 104 MG/DL (ref 70–99)
GLUCOSE UR STRIP-MCNC: NEGATIVE MG/DL
HCT VFR BLD AUTO: 28.4 % (ref 35–47)
HGB BLD-MCNC: 9.1 G/DL (ref 11.7–15.7)
HGB UR QL STRIP: ABNORMAL
HYALINE CASTS #/AREA URNS LPF: 1 /LPF
IMM GRANULOCYTES # BLD: 0.1 10E9/L (ref 0–0.4)
IMM GRANULOCYTES NFR BLD: 0.7 %
KETONES UR STRIP-MCNC: NEGATIVE MG/DL
LACTATE BLD-SCNC: 1.3 MMOL/L (ref 0.7–2)
LEUKOCYTE ESTERASE UR QL STRIP: ABNORMAL
LYMPHOCYTES # BLD AUTO: 1 10E9/L (ref 0.8–5.3)
LYMPHOCYTES NFR BLD AUTO: 7.9 %
MCH RBC QN AUTO: 23.8 PG (ref 26.5–33)
MCHC RBC AUTO-ENTMCNC: 32 G/DL (ref 31.5–36.5)
MCV RBC AUTO: 74 FL (ref 78–100)
MONOCYTES # BLD AUTO: 0.7 10E9/L (ref 0–1.3)
MONOCYTES NFR BLD AUTO: 5.6 %
MUCOUS THREADS #/AREA URNS LPF: PRESENT /LPF
NEUTROPHILS # BLD AUTO: 10.7 10E9/L (ref 1.6–8.3)
NEUTROPHILS NFR BLD AUTO: 85.3 %
NITRATE UR QL: NEGATIVE
NRBC # BLD AUTO: 0 10*3/UL
NRBC BLD AUTO-RTO: 0 /100
NT-PROBNP SERPL-MCNC: 1116 PG/ML (ref 0–1800)
PH UR STRIP: 6 PH (ref 4.7–8)
PLATELET # BLD AUTO: 325 10E9/L (ref 150–450)
POTASSIUM SERPL-SCNC: 3.1 MMOL/L (ref 3.4–5.3)
PROT SERPL-MCNC: 6.1 G/DL (ref 6.8–8.8)
RBC # BLD AUTO: 3.83 10E12/L (ref 3.8–5.2)
RBC #/AREA URNS AUTO: 2 /HPF (ref 0–2)
SODIUM SERPL-SCNC: 130 MMOL/L (ref 133–144)
SOURCE: ABNORMAL
SP GR UR STRIP: 1.01 (ref 1–1.03)
UROBILINOGEN UR STRIP-MCNC: NORMAL MG/DL (ref 0–2)
WBC # BLD AUTO: 12.6 10E9/L (ref 4–11)
WBC #/AREA URNS AUTO: 4 /HPF (ref 0–5)

## 2020-02-01 PROCEDURE — 72131 CT LUMBAR SPINE W/O DYE: CPT | Mod: TC

## 2020-02-01 PROCEDURE — 93005 ELECTROCARDIOGRAM TRACING: CPT

## 2020-02-01 PROCEDURE — 80053 COMPREHEN METABOLIC PANEL: CPT | Performed by: NURSE PRACTITIONER

## 2020-02-01 PROCEDURE — 85025 COMPLETE CBC W/AUTO DIFF WBC: CPT | Performed by: NURSE PRACTITIONER

## 2020-02-01 PROCEDURE — 73562 X-RAY EXAM OF KNEE 3: CPT | Mod: TC,50

## 2020-02-01 PROCEDURE — 25800030 ZZH RX IP 258 OP 636: Performed by: INTERNAL MEDICINE

## 2020-02-01 PROCEDURE — 25000128 H RX IP 250 OP 636: Performed by: NURSE PRACTITIONER

## 2020-02-01 PROCEDURE — 70450 CT HEAD/BRAIN W/O DYE: CPT | Mod: TC

## 2020-02-01 PROCEDURE — 99220 ZZC INITIAL OBSERVATION CARE,LEVL III: CPT | Performed by: INTERNAL MEDICINE

## 2020-02-01 PROCEDURE — 72070 X-RAY EXAM THORAC SPINE 2VWS: CPT | Mod: TC

## 2020-02-01 PROCEDURE — 40000786 ZZHCL STATISTIC ACTIVE MRSA SURVEILLANCE CULTURE: Performed by: INTERNAL MEDICINE

## 2020-02-01 PROCEDURE — 81001 URINALYSIS AUTO W/SCOPE: CPT | Performed by: NURSE PRACTITIONER

## 2020-02-01 PROCEDURE — 25000128 H RX IP 250 OP 636: Performed by: INTERNAL MEDICINE

## 2020-02-01 PROCEDURE — 96375 TX/PRO/DX INJ NEW DRUG ADDON: CPT | Mod: 59

## 2020-02-01 PROCEDURE — 99285 EMERGENCY DEPT VISIT HI MDM: CPT | Mod: 25

## 2020-02-01 PROCEDURE — 82550 ASSAY OF CK (CPK): CPT | Performed by: NURSE PRACTITIONER

## 2020-02-01 PROCEDURE — 99284 EMERGENCY DEPT VISIT MOD MDM: CPT | Mod: Z6 | Performed by: NURSE PRACTITIONER

## 2020-02-01 PROCEDURE — 73523 X-RAY EXAM HIPS BI 5/> VIEWS: CPT | Mod: TC

## 2020-02-01 PROCEDURE — 71046 X-RAY EXAM CHEST 2 VIEWS: CPT | Mod: TC

## 2020-02-01 PROCEDURE — 83605 ASSAY OF LACTIC ACID: CPT | Performed by: NURSE PRACTITIONER

## 2020-02-01 PROCEDURE — 25800030 ZZH RX IP 258 OP 636: Performed by: NURSE PRACTITIONER

## 2020-02-01 PROCEDURE — 83880 ASSAY OF NATRIURETIC PEPTIDE: CPT | Performed by: NURSE PRACTITIONER

## 2020-02-01 PROCEDURE — 72125 CT NECK SPINE W/O DYE: CPT | Mod: TC

## 2020-02-01 PROCEDURE — G0378 HOSPITAL OBSERVATION PER HR: HCPCS

## 2020-02-01 PROCEDURE — 87086 URINE CULTURE/COLONY COUNT: CPT | Performed by: NURSE PRACTITIONER

## 2020-02-01 PROCEDURE — 74177 CT ABD & PELVIS W/CONTRAST: CPT | Mod: TC

## 2020-02-01 PROCEDURE — 96374 THER/PROPH/DIAG INJ IV PUSH: CPT

## 2020-02-01 PROCEDURE — 2894A VOIDCORRECT: CPT | Performed by: SURGERY

## 2020-02-01 PROCEDURE — 93010 ELECTROCARDIOGRAM REPORT: CPT | Mod: 76 | Performed by: INTERNAL MEDICINE

## 2020-02-01 PROCEDURE — 25500064 ZZH RX 255 OP 636: Performed by: NURSE PRACTITIONER

## 2020-02-01 RX ORDER — HYDROMORPHONE HYDROCHLORIDE 1 MG/ML
0.5 INJECTION, SOLUTION INTRAMUSCULAR; INTRAVENOUS; SUBCUTANEOUS
Status: COMPLETED | OUTPATIENT
Start: 2020-02-01 | End: 2020-02-01

## 2020-02-01 RX ORDER — PROCHLORPERAZINE 25 MG
12.5 SUPPOSITORY, RECTAL RECTAL EVERY 12 HOURS PRN
Status: DISCONTINUED | OUTPATIENT
Start: 2020-02-01 | End: 2020-02-03 | Stop reason: HOSPADM

## 2020-02-01 RX ORDER — POTASSIUM CHLORIDE 1.5 G/1.58G
20-40 POWDER, FOR SOLUTION ORAL
Status: DISCONTINUED | OUTPATIENT
Start: 2020-02-01 | End: 2020-02-03 | Stop reason: HOSPADM

## 2020-02-01 RX ORDER — NALOXONE HYDROCHLORIDE 0.4 MG/ML
.1-.4 INJECTION, SOLUTION INTRAMUSCULAR; INTRAVENOUS; SUBCUTANEOUS
Status: DISCONTINUED | OUTPATIENT
Start: 2020-02-01 | End: 2020-02-03 | Stop reason: HOSPADM

## 2020-02-01 RX ORDER — ACETAMINOPHEN 325 MG/1
650 TABLET ORAL EVERY 4 HOURS PRN
Status: DISCONTINUED | OUTPATIENT
Start: 2020-02-01 | End: 2020-02-03 | Stop reason: HOSPADM

## 2020-02-01 RX ORDER — SODIUM CHLORIDE 9 MG/ML
INJECTION, SOLUTION INTRAVENOUS CONTINUOUS
Status: DISCONTINUED | OUTPATIENT
Start: 2020-02-01 | End: 2020-02-01

## 2020-02-01 RX ORDER — ONDANSETRON 4 MG/1
4 TABLET, ORALLY DISINTEGRATING ORAL EVERY 6 HOURS PRN
Status: DISCONTINUED | OUTPATIENT
Start: 2020-02-01 | End: 2020-02-03 | Stop reason: HOSPADM

## 2020-02-01 RX ORDER — MAGNESIUM SULFATE HEPTAHYDRATE 40 MG/ML
4 INJECTION, SOLUTION INTRAVENOUS EVERY 4 HOURS PRN
Status: DISCONTINUED | OUTPATIENT
Start: 2020-02-01 | End: 2020-02-03 | Stop reason: HOSPADM

## 2020-02-01 RX ORDER — LISINOPRIL 10 MG/1
10 TABLET ORAL DAILY
Status: DISCONTINUED | OUTPATIENT
Start: 2020-02-02 | End: 2020-02-03 | Stop reason: HOSPADM

## 2020-02-01 RX ORDER — IOPAMIDOL 612 MG/ML
100 INJECTION, SOLUTION INTRAVASCULAR ONCE
Status: COMPLETED | OUTPATIENT
Start: 2020-02-01 | End: 2020-02-01

## 2020-02-01 RX ORDER — ONDANSETRON 2 MG/ML
4 INJECTION INTRAMUSCULAR; INTRAVENOUS EVERY 6 HOURS PRN
Status: DISCONTINUED | OUTPATIENT
Start: 2020-02-01 | End: 2020-02-03 | Stop reason: HOSPADM

## 2020-02-01 RX ORDER — POTASSIUM CL/LIDO/0.9 % NACL 10MEQ/0.1L
10 INTRAVENOUS SOLUTION, PIGGYBACK (ML) INTRAVENOUS
Status: DISCONTINUED | OUTPATIENT
Start: 2020-02-01 | End: 2020-02-03 | Stop reason: HOSPADM

## 2020-02-01 RX ORDER — POTASSIUM CHLORIDE 1500 MG/1
20-40 TABLET, EXTENDED RELEASE ORAL
Status: DISCONTINUED | OUTPATIENT
Start: 2020-02-01 | End: 2020-02-03 | Stop reason: HOSPADM

## 2020-02-01 RX ORDER — ACETAMINOPHEN 650 MG/1
650 SUPPOSITORY RECTAL EVERY 4 HOURS PRN
Status: DISCONTINUED | OUTPATIENT
Start: 2020-02-01 | End: 2020-02-03 | Stop reason: HOSPADM

## 2020-02-01 RX ORDER — PROCHLORPERAZINE MALEATE 5 MG
5 TABLET ORAL EVERY 6 HOURS PRN
Status: DISCONTINUED | OUTPATIENT
Start: 2020-02-01 | End: 2020-02-03 | Stop reason: HOSPADM

## 2020-02-01 RX ORDER — ONDANSETRON 2 MG/ML
4 INJECTION INTRAMUSCULAR; INTRAVENOUS EVERY 6 HOURS PRN
Status: DISCONTINUED | OUTPATIENT
Start: 2020-02-01 | End: 2020-02-01

## 2020-02-01 RX ORDER — MAGNESIUM SULFATE HEPTAHYDRATE 40 MG/ML
2 INJECTION, SOLUTION INTRAVENOUS DAILY PRN
Status: DISCONTINUED | OUTPATIENT
Start: 2020-02-01 | End: 2020-02-03 | Stop reason: HOSPADM

## 2020-02-01 RX ORDER — SODIUM CHLORIDE 9 MG/ML
INJECTION, SOLUTION INTRAVENOUS CONTINUOUS
Status: DISCONTINUED | OUTPATIENT
Start: 2020-02-01 | End: 2020-02-03

## 2020-02-01 RX ORDER — POTASSIUM CHLORIDE 7.45 MG/ML
10 INJECTION INTRAVENOUS
Status: DISCONTINUED | OUTPATIENT
Start: 2020-02-01 | End: 2020-02-03 | Stop reason: HOSPADM

## 2020-02-01 RX ADMIN — POTASSIUM CHLORIDE 10 MEQ: 7.46 INJECTION, SOLUTION INTRAVENOUS at 19:47

## 2020-02-01 RX ADMIN — ONDANSETRON 4 MG: 2 INJECTION INTRAMUSCULAR; INTRAVENOUS at 14:08

## 2020-02-01 RX ADMIN — SODIUM CHLORIDE: 9 INJECTION, SOLUTION INTRAVENOUS at 20:59

## 2020-02-01 RX ADMIN — POTASSIUM CHLORIDE 10 MEQ: 7.46 INJECTION, SOLUTION INTRAVENOUS at 20:57

## 2020-02-01 RX ADMIN — SODIUM CHLORIDE: 9 INJECTION, SOLUTION INTRAVENOUS at 14:04

## 2020-02-01 RX ADMIN — POTASSIUM CHLORIDE 10 MEQ: 7.46 INJECTION, SOLUTION INTRAVENOUS at 22:08

## 2020-02-01 RX ADMIN — POTASSIUM CHLORIDE 10 MEQ: 7.46 INJECTION, SOLUTION INTRAVENOUS at 18:36

## 2020-02-01 RX ADMIN — HYDROMORPHONE HYDROCHLORIDE 0.5 MG: 1 INJECTION, SOLUTION INTRAMUSCULAR; INTRAVENOUS; SUBCUTANEOUS at 14:06

## 2020-02-01 RX ADMIN — IOPAMIDOL 100 ML: 612 INJECTION, SOLUTION INTRAVENOUS at 15:05

## 2020-02-01 ASSESSMENT — MIFFLIN-ST. JEOR: SCORE: 1099.38

## 2020-02-01 NOTE — ED PROVIDER NOTES
History     Chief Complaint   Patient presents with     Back Pain     Multiple falls at home today per EMS. Patient reports one fall from standing.      HPI  Claudine Bustos is a 89 year old female who presents via EMS after her second fall today. Her first fall was unwitnessed in the bathroom around 7 a.m. She lives with her  who was unable to help her get up. Her  call her son who told him to call 911. Around 8:30/9 a.m. she was assisted off of the floor. When the son arrived she was sitting up in a chair. He made her breakfast and she seemed to be doing well. Son left. Not long after leaving he got a text that EMS was going to pick her up due to another fall. Apparently she tried to get up but due to pain and weakness she fell onto cough. She uses a can at home to assist in ambulation.     Patient denies feeling light headed, dizzy, chest pain, SOB, TOLENTINO, LE edema, abdominal pain. She is nauseated upon arrival and has face in emesis bag - no vomiting. Denies dysuria, urinary frequency, hematuria.     She has chronic back lower back pain. She does feel it is worse. She is laying on right side and guarding lower back due to discomfort.         Allergies:  No Known Allergies    Problem List:    Patient Active Problem List    Diagnosis Date Noted     Back pain 02/01/2020     Priority: Medium     Hyponatremia 02/01/2020     Priority: Medium     Rhabdomyolysis 02/01/2020     Priority: Medium     Hypokalemia 02/01/2020     Priority: Medium     Anemia 02/01/2020     Priority: Medium     Closed fracture of eleventh thoracic vertebra (H) 02/01/2020     Priority: Medium     Systolic murmur 02/01/2020     Priority: Medium     Hypoxia 02/01/2020     Priority: Medium     Nasal lesion 07/24/2019     Priority: Medium     Renal benign neoplasm, left 06/05/2019     Priority: Medium     Obesity (BMI 35.0-39.9) with comorbidity (H) 06/05/2019     Priority: Medium     Memory loss 06/05/2019     Priority: Medium      Acute right-sided low back pain with right-sided sciatica 06/05/2019     Priority: Medium     CAP (community acquired pneumonia) 01/21/2018     Priority: Medium     Renal mass, right 06/08/2017     Priority: Medium     Postoperative anemia due to acute blood loss 01/24/2017     Priority: Medium     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 Provider to review and confirm.       Advanced care planning/counseling discussion 08/03/2012     Priority: Medium     Chronic kidney disease, unspecified 08/03/2012     Priority: Medium     Diastolic dysfunction 02/27/2012     Priority: Medium     echo 2/2012  EF 60%          Past Medical History:    Past Medical History:   Diagnosis Date     Chronic kidney disease, unspecified 8/3/2012     Diastolic dysfunction 2/27/2012     HTN (hypertension) 4/4/2000     Obesity (BMI 35.0-39.9) with comorbidity (H) 6/5/2019     Personal history of colonic polyps 6/8/2004     Renal benign neoplasm, left 2016     Skin cancer        Past Surgical History:    Past Surgical History:   Procedure Laterality Date     ARTHROPLASTY HIP Right 1/23/2017    Procedure: ARTHROPLASTY HIP;  Surgeon: Jose Manuel Muñoz MD;  Location: [facility]     ARTHROPLASTY KNEE Left 1/25/2016    Procedure: ARTHROPLASTY KNEE;  Surgeon: Jose Manuel Muñoz MD;  Location: [facility]     colonoscopy  2004, 2009     EXCISE LESION EYELID Left 8/1/2017    Procedure: EXCISE LESION EYELID;  LEFT LOWER LID WEDGE EXCISION WITH FROZEN SECTION CONTROL;  Surgeon: Quinten Sotelo MD;  Location: [facility]     excision of rectal villus adenoma       knee replacement Right 1/2011     PHACOEMULSIFICATION WITH STANDARD INTRAOCULAR LENS IMPLANT   2/25/2014    Procedure: PHACOEMULSIFICATION WITH STANDARD INTRAOCULAR LENS IMPLANT;  CATARACT EXTRACTION WITH INTRA OCULAR LENS LEFT;  Surgeon: Jah Bee MD;  Location: [facility]     PHACOEMULSIFICATION WITH STANDARD INTRAOCULAR LENS IMPLANT Right 5/12/2015    Procedure: PHACOEMULSIFICATION WITH STANDARD INTRAOCULAR LENS IMPLANT;  Surgeon: Jah Bee MD;  Location: [facility]     thoracic schwannoma resection       tonsillectomy       urethral dilitation every 4 months         Family History:    Family History   Problem Relation Age of Onset     Alcohol/Drug Father         alcoholism     Cancer Father 51        Esophageal, cause of death     Other - See Comments Father         Tobacco use     Cerebrovascular Disease Mother 51        Cause of death     Other - See Comments Maternal Grandmother      Other - See Comments Maternal Grandfather      Other - See Comments Paternal Grandmother      Other - See Comments Paternal Grandfather      Cancer Sister         Breast     Cancer Brother         Leukemia     Cancer Brother         Lung     Chronic Obstructive Pulmonary Disease Brother        Social History:  Marital Status:   [2]  Social History     Tobacco Use     Smoking status: Never Smoker     Smokeless tobacco: Never Used   Substance Use Topics     Alcohol use: Yes     Frequency: Monthly or less     Drinks per session: 1 or 2     Comment: Mixed, rarely     Drug use: No        Medications:    Calcium Carb-Cholecalciferol (CALCIUM + D3) 600-200 MG-UNIT TABS  donepezil (ARICEPT) 10 MG tablet  gabapentin (NEURONTIN) 100 MG capsule  lisinopril (PRINIVIL/ZESTRIL) 10 MG tablet  Multiple Vitamin (MULTIVITAMIN) per tablet          Review of Systems   Respiratory: Negative for shortness of breath.    Cardiovascular: Negative for chest pain and leg swelling.   Gastrointestinal: Positive for nausea. Negative for abdominal pain.   Genitourinary: Negative for dysuria, frequency and hematuria.   Musculoskeletal:  "Positive for back pain (chronic but worse since falling).   Skin: Positive for color change (bruising).   Neurological: Positive for weakness. Negative for dizziness, syncope, light-headedness and headaches.       Physical Exam   BP: 179/92  Pulse: 99  Heart Rate: 78  Temp: 97.4  F (36.3  C)  Resp: 20  Height: 154.9 cm (5' 1\")  Weight: 73.7 kg (162 lb 7.7 oz)  SpO2: (!) 91 %      Physical Exam  Constitutional:       General: She is in acute distress.   HENT:      Head: Normocephalic and atraumatic. No raccoon eyes or Raphael's sign.      Right Ear: Tympanic membrane, ear canal and external ear normal. No hemotympanum.      Left Ear: Tympanic membrane, ear canal and external ear normal. No hemotympanum.      Nose: Nose normal.      Right Nostril: No epistaxis.      Left Nostril: No epistaxis.      Mouth/Throat:      Lips: Pink.      Mouth: Mucous membranes are moist.      Pharynx: Oropharynx is clear. Uvula midline.   Eyes:      General: Lids are normal.      Extraocular Movements: Extraocular movements intact.      Conjunctiva/sclera: Conjunctivae normal.   Neck:      Musculoskeletal: Full passive range of motion without pain and neck supple. No spinous process tenderness or muscular tenderness.   Cardiovascular:      Rate and Rhythm: Normal rate and regular rhythm.      Heart sounds: S1 normal and S2 normal. Murmur present. Systolic murmur present with a grade of 3/6. No friction rub. No gallop.    Pulmonary:      Effort: Pulmonary effort is normal. No tachypnea or respiratory distress.      Breath sounds: Normal breath sounds. No wheezing, rhonchi or rales.   Chest:      Chest wall: No swelling, tenderness or crepitus.   Abdominal:      General: Bowel sounds are normal. There is no distension.      Palpations: Abdomen is soft.      Tenderness: There is no abdominal tenderness. There is no right CVA tenderness, left CVA tenderness, guarding or rebound.   Musculoskeletal:      Left hip: She exhibits tenderness (and " ecchymosis).      Right knee: She exhibits swelling and erythema.      Left knee: She exhibits swelling and erythema.        Arms:       Right lower leg: No edema.      Left lower leg: No edema.   Skin:     General: Skin is warm and dry.      Capillary Refill: Capillary refill takes less than 2 seconds.      Findings: Ecchymosis (left hip, right forearm) and erythema (bilateral knees) present.   Neurological:      Mental Status: She is alert and oriented to person, place, and time.      Comments: Drifts off to sleep frequently   Psychiatric:         Speech: Speech normal.         Behavior: Behavior is cooperative.         ED Course     ED Course as of Feb 06 1323   Sat Feb 01, 2020   1614 On-call general surgeon consulted regarding HPI- fall x 2 at home, exam, imaging findings of compression fracture, normal chest CT and admission for observation.  Sushma Powell CNP on 2/1/2020 at 4:14 PM          Procedures               EKG Interpretation:      Interpreted by Sushma Powell CNP  Time reviewed: 1330  Symptoms at time of EKG: falls   Rhythm: sinus  Rate: normal  Axis: normal  Ectopy: PACs  Conduction: normal  ST Segments/ T Waves: No acute ST-T wave changes  Comparison to prior: Unchanged from 2017    Clinical Impression: no acute ischemic changes, sinus rhythm with PACs      No results found for this or any previous visit (from the past 24 hour(s)).    Medications   HYDROmorphone (PF) (DILAUDID) injection 0.5 mg (0.5 mg Intravenous Given 2/1/20 1406)   iopamidol (ISOVUE-300) IV solution 61% 100 mL (100 mLs Intravenous Given 2/1/20 1505)   sodium chloride (PF) 0.9% PF flush 60 mL (60 mLs Intravenous Given 2/1/20 1505)       Assessments & Plan (with Medical Decision Making)     I have reviewed the nursing notes.    I have reviewed the findings, diagnosis, plan and need for follow up with the patient.  (R29.6) Falls frequently  (primary encounter diagnosis)  (R09.02) Hypoxia  (S22.080A) Compression fracture of  T11 vertebra, initial encounter (H)  (M62.81) Generalized muscle weakness  (D64.9) Anemia    Plan: 89-year-old female presents via EMS after her second fall today.  She arrived feeling nauseated and dry heaving and emesis bag.  Her main concern is her low back pain.  She has chronic low back pain but has worsened since her second fall.  At one point nursing noticed hypoxia-oxygen saturation at 82 to 84%.  She was put on 2 L of oxygen via nasal cannula and saturation increased to 92 to 94%.  She does not have a history of hypoxia or supplemental oxygen use.  CT chest abdomen pelvis, lumbar spine cervical spine head obtained. Both knees erythematous without open wounds - history of TKA, imaging obtained.   Imaging normal except for T11 compression fracture  Right renal mass that he has been evaluated for previously  Right thyroid nodule  She lives at home with her elderly  so Consulted with hospitalist for admission due to history of falls x 2 today, weakness, pain control. Patient and son in agreement with plan for observation.         Discharge Medication List as of 2/3/2020  3:02 PM          Final diagnoses:   Falls frequently   Hypoxia   Compression fracture of T11 vertebra, initial encounter (H)   Generalized muscle weakness   Anemia     Sushma Powell CNP   2/1/2020   [facility]     Sushma Powell, CNP  02/06/20 [id number]

## 2020-02-01 NOTE — ED TRIAGE NOTES
Patient being evaluated today for midline, low back pain and weakness at home. EMS states that she fell multiple times today and they were dispatched for a lift assist earlier. Patient reports having fallen only once. She states that the pain in her back intensified and she fell to her knees from standing. Patient has reddened areas to both knees and a small skin tear to her right elbow. Patient denies striking her head or any LOC. She is alert and oriented and resting on ED cart.

## 2020-02-01 NOTE — CONSULTS
Patient is an 89 year old female who's being admitted to medicine for pain control after multiple falls.  CT head, neck, chest, abdomen/pelvis, and lumbar spine;  chest xray, thoracic spine xray and knee xray were all negative for acute injury.  There was a chronic appearing compression fracture of T 11.  She's hemodynamically stable with some mild hypoxia.  Labs pertinent for anemia 9.1, leukocytosis 12.6, hyponatremia 130, hypokalemia 3.1 and .  Patient is not toxic appearing and currently isn't complaining of any pain other than low back. Full tertiary exam to follow tomorrow morning.

## 2020-02-01 NOTE — DISCHARGE INSTRUCTIONS
[facility] will call you to arrange for home care services. If you need to call them please call: [phone]

## 2020-02-02 LAB
ANION GAP SERPL CALCULATED.3IONS-SCNC: 7 MMOL/L (ref 3–14)
BACTERIA SPEC CULT: NORMAL
BUN SERPL-MCNC: 10 MG/DL (ref 7–30)
CALCIUM SERPL-MCNC: 8.3 MG/DL (ref 8.5–10.1)
CHLORIDE SERPL-SCNC: 111 MMOL/L (ref 94–109)
CK SERPL-CCNC: 1747 U/L (ref 30–225)
CO2 SERPL-SCNC: 22 MMOL/L (ref 20–32)
CREAT SERPL-MCNC: 0.76 MG/DL (ref 0.52–1.04)
ERYTHROCYTE [DISTWIDTH] IN BLOOD BY AUTOMATED COUNT: 16.2 % (ref 10–15)
GFR SERPL CREATININE-BSD FRML MDRD: 70 ML/MIN/{1.73_M2}
GLUCOSE SERPL-MCNC: 94 MG/DL (ref 70–99)
HCT VFR BLD AUTO: 28 % (ref 35–47)
HGB BLD-MCNC: 9 G/DL (ref 11.7–15.7)
MAGNESIUM SERPL-MCNC: 2.1 MG/DL (ref 1.6–2.3)
MCH RBC QN AUTO: 24 PG (ref 26.5–33)
MCHC RBC AUTO-ENTMCNC: 32.1 G/DL (ref 31.5–36.5)
MCV RBC AUTO: 75 FL (ref 78–100)
PLATELET # BLD AUTO: 338 10E9/L (ref 150–450)
POTASSIUM SERPL-SCNC: 3.3 MMOL/L (ref 3.4–5.3)
POTASSIUM SERPL-SCNC: 4.4 MMOL/L (ref 3.4–5.3)
RBC # BLD AUTO: 3.75 10E12/L (ref 3.8–5.2)
SODIUM SERPL-SCNC: 140 MMOL/L (ref 133–144)
SPECIMEN SOURCE: NORMAL
WBC # BLD AUTO: 9.9 10E9/L (ref 4–11)

## 2020-02-02 PROCEDURE — 84132 ASSAY OF SERUM POTASSIUM: CPT | Mod: 59 | Performed by: INTERNAL MEDICINE

## 2020-02-02 PROCEDURE — 99226 ZZC SUBSEQUENT OBSERVATION CARE,LEVEL III: CPT | Performed by: SURGERY

## 2020-02-02 PROCEDURE — 80048 BASIC METABOLIC PNL TOTAL CA: CPT | Performed by: INTERNAL MEDICINE

## 2020-02-02 PROCEDURE — 85027 COMPLETE CBC AUTOMATED: CPT | Performed by: INTERNAL MEDICINE

## 2020-02-02 PROCEDURE — 36415 COLL VENOUS BLD VENIPUNCTURE: CPT | Performed by: INTERNAL MEDICINE

## 2020-02-02 PROCEDURE — 99225 ZZC SUBSEQUENT OBSERVATION CARE,LEVEL II: CPT | Performed by: INTERNAL MEDICINE

## 2020-02-02 PROCEDURE — 25000132 ZZH RX MED GY IP 250 OP 250 PS 637: Mod: GY | Performed by: INTERNAL MEDICINE

## 2020-02-02 PROCEDURE — 82550 ASSAY OF CK (CPK): CPT | Performed by: INTERNAL MEDICINE

## 2020-02-02 PROCEDURE — G0378 HOSPITAL OBSERVATION PER HR: HCPCS

## 2020-02-02 PROCEDURE — 83735 ASSAY OF MAGNESIUM: CPT | Performed by: INTERNAL MEDICINE

## 2020-02-02 RX ORDER — GABAPENTIN 300 MG/1
300 CAPSULE ORAL 3 TIMES DAILY
Status: DISCONTINUED | OUTPATIENT
Start: 2020-02-02 | End: 2020-02-03 | Stop reason: HOSPADM

## 2020-02-02 RX ADMIN — LISINOPRIL 10 MG: 10 TABLET ORAL at 09:24

## 2020-02-02 RX ADMIN — GABAPENTIN 300 MG: 300 CAPSULE ORAL at 20:31

## 2020-02-02 RX ADMIN — ACETAMINOPHEN 650 MG: 325 TABLET, FILM COATED ORAL at 01:51

## 2020-02-02 RX ADMIN — TRAMADOL HYDROCHLORIDE 25 MG: 50 TABLET, COATED ORAL at 04:13

## 2020-02-02 RX ADMIN — GABAPENTIN 300 MG: 300 CAPSULE ORAL at 14:51

## 2020-02-02 RX ADMIN — POTASSIUM CHLORIDE 40 MEQ: 1500 TABLET, EXTENDED RELEASE ORAL at 06:21

## 2020-02-02 RX ADMIN — POTASSIUM CHLORIDE 20 MEQ: 1500 TABLET, EXTENDED RELEASE ORAL at 09:30

## 2020-02-02 NOTE — PLAN OF CARE
Face to face report given with opportunity to observe patient.    Report given to Carla Timmons RN   2/2/2020  7:30 AM

## 2020-02-02 NOTE — PLAN OF CARE
[facility] Inpatient Admission Note:    Patient admitted to 3226/3226-1 at approximately 1645 via bed accompanied by transport tech and son from emergency room . Report received from DIVYA Brice in SBAR format at 1630 via telephone. Patient transferred to bed via slide board.. Patient is alert and oriented X 3, reports pain; rates at 5 on 0-10 scale.  Patient oriented to room, unit, hourly rounding, and plan of care. Explained admission packet and patient handbook with patient bill of rights brochure. Will continue to monitor and document as needed.     Inpatient Nursing criteria listed below was met:    Health care directives status obtained and documented: Yes    Care Everywhere authorization obtained Yes    MRSA swab completed for patient 65 years and older: Yes    Patient identifies a surrogate decision maker: Yes If yes, who: ania Barroso Contact Information: See facesheet     If initial lactic acid >2.0, repeat lactic acid drawn within one hour of arrival to unit: NA. If no, state reason: Lactic 1.3    Vaccination assessment and education completed: Yes   Vaccinations received prior to admission: Pneumovax no  Influenza(seasonal)  NO   Vaccination(s) ordered: patient declines    Clergy visit ordered if patient requests: N/A    Skin issues/needs documented: Yes    Isolation Patient: no Education given, correct sign in place and documentation row added to PCS:  Yes    Fall Prevention Yes: Care plan updated, education given and documented, sticker and magnet in place: Yes    Care Plan initiated: Yes    Education Documented (including assessment): Yes    Patient has discharge needs : No If yes, please explain:

## 2020-02-02 NOTE — PLAN OF CARE
"Reason for hospital stay:  Falls at home, anemia   Living situation PTA: Comes from home with   Most recent vitals: BP (!) 183/77   Pulse 99   Temp 98.1  F (36.7  C) (Tympanic)   Resp 18   Ht 1.549 m (5' 1\")   Wt 73.7 kg (162 lb 7.7 oz)   SpO2 93%   BMI 30.70 kg/m    Pain Management:  Has not requested pharmacological measures; states \"same old back pain but not too bad\". UA's provided essential oil massage.   LOC:  Oriented, intermittently alert. Falls asleep mid-sentence or will make eye contact but not register what is being said. Forgetful.   Cardiac:  Irregular. Sinus dysrhythmia 70's per tele. HR went as low as 39, highest 80's. Heart murmur detected. Irregular rhythm. BP elevated.   Respiratory:  Lungs clear, anterior and laterally. Removed from 2L O2 upon admission, patient has held mid to upper 90s sats. No cough present. No SOB.   GI:  BS active. No tenderness. No N&V. Requires an occult stool sample; pt did not have BM yet.   :  Frequency, urgency present. Pt originally voiding approx. 50 ml at a time q 20 min, bladder scan post-residual volumes in the 400-500's. Patient has since voided 700. Still going q 20-30 minutes.   Skin Issues:  Scattered bruises/scabs. Mild excoriation under abdominal skin folds; powder to be placed   IVF:  NS @ 75  ABX:  None given  Nutrition: Regular diet; poor appetite. Did not eat dinner, not consuming much for fluids either.   Ambulation: Was able to do a pivot from the bed to bedside commode with heavy assist x2. Pt appeared quite weak.   Safety:  Free of falls, alarms on, bed in lowest, frequent rounding, call light in reach    Face to face report given with opportunity to observe patient.    Report given to DIVYA Sampson   2/1/2020  11:21 PM      "

## 2020-02-02 NOTE — PLAN OF CARE
Pt has been afebrile through the night, VS as charted, Her O2 sats are in the low to mid 90's on RA, lung sounds are clear/diminished.  She does rate pain/discomfort 5/10 and has received tylenol and ultram each x 1 with adequate relief.  She does have IV fluids running started at 125 mL/hr and then decreased down to 50 mL/hr, sips of water through the night with medications, she has been voiding very frequent through the night (every 15-20 min for most of the night - voiding 2-300 mL each time).  She does continue to have 2+ edema to BLE (baseline per patient).  She has remained free of falls through the night, call light within reach - does make her needs known, alarms on and frequent rounding done to assess needs.  She does transfer well with assist of one to BSC, but she did at one time actually transferred herself. Her son is in the room and very attentive to her needs.

## 2020-02-02 NOTE — PROGRESS NOTES
Assessment completed see flowsheet.    LOC: alert and oriented, pleasant and conversational  Others present: Patient and her son's Hira and Gisel    Dx: back pain    Lives with: her  Dharmesh  Living at:  Home in [place]  Transportation: YES She normally drives    Primary PCP: Katarina Gutierrez  Insurance:  Medicare and [employer]  Medicare CAPONE letter reviewed with them today, letter not signed. Hira had numerous questions challenging the decision for admission as an Observation patient. We discussed the process at length in generic terms and ultimately he was advised to seek further answers from Medicare.     Support System:  family  Homecare/PCA: none  /County Services:   none  : NO      How was the VA notification completed: na    Health Care Directive: NO   Guardian: not asked  POA: not asked    Meds management: YES independent    Adequate Resources for needs (housing, utilities, food/med): YES  Household chores: independent  Work/community/social activity: YES as desired    ADLs: independent  Ambulation:independent with a cane or a walker as needed  Falls: just this one  Nutrition: independent with shopping and meal prep    Mental health: they deny concern  Substance abuse: no usage  Exposure to violence/abuse: not asked    Barriers: none known    Plan: she plans to return home via her adult children; if she feels a need for the support of home care or equipment she will inquire to C[facility].

## 2020-02-02 NOTE — H&P
[facility]    History and Physical  Hospitalist       Date of Admission:  2/1/2020    Assessment & Plan   Claudine Bustos is a 89 year old female who presents for evaluation of fall.  She lives with her elderly .  Denies frequent falls.  Surgery consulted for trauma evaluation.  CT head, neck, chest, abdomen/pelvis and lumbar spine done.  There is chronic appearing of compression fracture of T11.  Quite significant chemistry and hematology abnormalities.  CT abdomen showed right kidney mass which is likely malignant but did not change since 2017.  This is per radiology report.  Admitted for observation.  New findings significant aortic murmur frequent extrasystole.  Aortic stenosis needs to be considered.    Principal Problem:  Falling  Assessment; fall at home.   Plan: Differential includes cardiogenic musculoskeletal causes.  PT evaluation.  Telemetry.  Replace electrolytes    Active Problems:   Back pain    Assessment: Was present on admission, minimal at bedtime I examined her in the room.    Plan: Symptomatic pain control with tramadol.  PT evaluation in the morning.     Benign essential hypertension    Assessment: Present on admission, and not optimally controlled.     Plan: Continue lisinopril and will add small dose of amlodipine 2.5 mg      Renal mass, right    Assessment: Present since 2017.  Per radiology report highly malignant but no increase in size or adjacent organ invasion since 2017.    Plan: Not do more imaging studies.      Hyponatremia    Assessment: I believe is hypovolemic.    Plan: Normal saline 75 cc/h and recheck in the morning.      Rhabdomyolysis    Assessment: Mild, present on admission.    Plan: Hydrate and repeat CPK in the morning      Hypokalemia    Assessment: Present on admission   Plan: We will replace p.o. and monitor      Anemia    Assessment: Has not on admission mild.  Clinically no significant bleeding.  Doubt this is the cause of her fall.    Plan: Occult  blood to rule out occult bleeding.      Closed fracture of eleventh thoracic vertebra (H)    Assessment: Most likely this is not an acute    Plan: Pain control and physical therapy      Systolic murmur    Assessment: Most likely aortic stenosis    Plan: Telemetry, outpatient ultrasound.  If she stays more than 2 midnights consider inpatient echo if we think that aortic stenosis is symptomatic and is the cause of her falls.      Hypoxia    Assessment: Was likely due to atelectasis.  No active pulmonary process.    Plan: We will monitor.    Abnormal UA without urinary symptom  Assessment: Present on admission  Hold antibiotics for now.  Follow cultures.      DVT Prophylaxis: Enoxaparin (Lovenox) SQ  Code Status: Full Code    Disposition: Expected discharge in 1-2 days once stable.    Maryellen Murillo    Primary Care Physician   Katarina Gutierrez    Chief Complaint Fall at home    Reason for Admission: Fall, back pain, hypokalemia, hyponatremia mild rhabdomyolysis.    Admission status: Observation    History is obtained from the patient, electronic health record, emergency department physician and patient's son.    History of Present Illness   Claudine Bustos is a 89 year old female woman who has past medical history significant for hypertension, diastolic dysfunction, chronic kidney disease, degenerative joint disease status post right hip replacement, malignant hyperthermia, sleep apnea, right kidney mass who came to emergency room after falling at home.  She denies frequent falls.  EMS states that she fell multiple times today. EMS was dispatched for a lift assist earlier. Patient's son denied frequent falls. Patient reported that she fell when trying to stand up from the chair and she lost her balance.  Denies dizziness lightheadedness, palpitation, chest pain, she also denies GI or  symptoms with possibility of volume loss.  No new medications started recently.  She complained worsening of the back pain.  She  stated her her back pain was present for some time, may be since 2018.  Patient denies striking her head or any loss of consciousness.    ED events: Had multiple PACs on telemetry, and was mildly hypoxic.  Surgery consulted    ED diagnostic workup: Chemistry, hematology, chest x-ray, x-ray knee bilateral, x-ray thoracic spine, x-ray pelvis and hip, and CT chest abdomen and pelvis.    ED imaging studies and blood test results: CT head, neck, chest, abdomen pelvis, lumbar spine showed only chronic appearing compression fracture at T11.  Also right kidney mass and thyroid nodule.  Aortic calcification  Labs pertinent for anemia hemoglobin 9.1, leukocytosis hemoglobin 12.6, hyponatremia 130, hypokalemia 3.1 and .    ED treatment: No treatment    Past Medical History    I have reviewed this patient's medical history and updated it with pertinent information if needed.   Past Medical History:   Diagnosis Date     Chronic kidney disease, unspecified 8/3/2012     Diastolic dysfunction 2/27/2012    echo 2/2012  EF 60%     HTN (hypertension) 4/4/2000     Obesity (BMI 35.0-39.9) with comorbidity (H) 6/5/2019     Personal history of colonic polyps 6/8/2004     Renal benign neoplasm, left 2016    'oncocytic neoplasm' per urology -- benign, stable and following yearly for scans     Skin cancer        Past Surgical History   I have reviewed this patient's surgical history and updated it with pertinent information if needed.  Past Surgical History:   Procedure Laterality Date     ARTHROPLASTY HIP Right 1/23/2017    Procedure: ARTHROPLASTY HIP;  Surgeon: Jose Manuel Muñoz MD;  Location: [facility]     ARTHROPLASTY KNEE Left 1/25/2016    Procedure: ARTHROPLASTY KNEE;  Surgeon: Jose Manuel Muñoz MD;  Location: [facility] OR     colonoscopy  2004, 2009     EXCISE LESION EYELID Left 8/1/2017    Procedure: EXCISE LESION EYELID;  LEFT LOWER LID WEDGE EXCISION WITH FROZEN SECTION CONTROL;  Surgeon: Quinten Sotelo MD;  Location: [facility]      excision of rectal villus adenoma       knee replacement Right 1/2011     PHACOEMULSIFICATION WITH STANDARD INTRAOCULAR LENS IMPLANT  2/25/2014    Procedure: PHACOEMULSIFICATION WITH STANDARD INTRAOCULAR LENS IMPLANT;  CATARACT EXTRACTION WITH INTRA OCULAR LENS LEFT;  Surgeon: Jah Bee MD;  Location: [facility] OR     PHACOEMULSIFICATION WITH STANDARD INTRAOCULAR LENS IMPLANT Right 5/12/2015    Procedure: PHACOEMULSIFICATION WITH STANDARD INTRAOCULAR LENS IMPLANT;  Surgeon: Jah Bee MD;  Location: [facility]     thoracic schwannoma resection       tonsillectomy       urethral dilitation every 4 months         Prior to Admission Medications   Prior to Admission Medications   Prescriptions Last Dose Informant Patient Reported? Taking?   Calcium Carb-Cholecalciferol (CALCIUM + D3) 600-200 MG-UNIT TABS 2/1/2020 at 0800 Self Yes Yes   Sig: Take 1 tablet by mouth daily   Multiple Vitamin (MULTIVITAMIN) per tablet 2/1/2020 at 0800 Self Yes Yes   Sig: Take 1 tablet by mouth daily with food.   donepezil (ARICEPT) 10 MG tablet 1/31/2020 at 2100 Self No Yes   Sig: Take 1 tablet (10 mg) by mouth At Bedtime   gabapentin (NEURONTIN) 100 MG capsule 2/1/2020 at 0800 Self No Yes   Sig: TAKE 2 CAPSULES BY MOUTH 3 TIMES DAILY AFTER ONE WEEK IF STILL HAVINGPAIN INCREASE TO 3 CAPSULES 3 TIMES DAILY   lisinopril (PRINIVIL/ZESTRIL) 10 MG tablet 2/1/2020 at 0800 Self No Yes   Sig: Take 1 tablet (10 mg) by mouth daily   order for DME 2/1/2020 at Unknown time Self No Yes   Sig: Equipment being ordered: Digital home blood pressure monitor kit      Facility-Administered Medications: None     Allergies   No Known Allergies    Social History   I have reviewed this patient's social history and updated it with pertinent information if needed. Claudine MURRAY Akash  reports that she has never smoked. She has never used smokeless tobacco. She reports current alcohol use. She reports that she does not use drugs.    Family History   I have reviewed  this patient's family history and updated it with pertinent information if needed.   Family History   Problem Relation Age of Onset     Alcohol/Drug Father         alcoholism     Cancer Father 51        Esophageal, cause of death     Other - See Comments Father         Tobacco use     Cerebrovascular Disease Mother 51        Cause of death     Other - See Comments Maternal Grandmother      Other - See Comments Maternal Grandfather      Other - See Comments Paternal Grandmother      Other - See Comments Paternal Grandfather      Cancer Sister         Breast     Cancer Brother         Leukemia     Cancer Brother         Lung     Chronic Obstructive Pulmonary Disease Brother        Review of Systems   All 14 Systems were reviewed and found to be negative except what's mentioned in HPI    Physical Exam   Temp: 97.4  F (36.3  C) Temp src: Oral BP: (!) 188/69 Pulse: 99 Heart Rate: 74 Resp: 18 SpO2: 96 % O2 Device: None (Room air) Oxygen Delivery: 2 LPM  Vital Signs with Ranges  Temp:  [97.4  F (36.3  C)] 97.4  F (36.3  C)  Pulse:  [99] 99  Heart Rate:  [74-87] 74  Resp:  [18-20] 18  BP: (179-200)/(69-93) 188/69  SpO2:  [85 %-98 %] 96 %  162 lbs 7.66 oz    Physical exam:   GENERAL: Awake, alert, in no distress. Looks comfortable.    HEENT: NC/AT. MMM. OP clear.   NECK: trachea midline, no JVD.   CARDIAC: Significant systolic murmur 3 maybe 4 out of 6, S2 diminished, pain radiates to the neck.  Frequent extrasystole.    PULMONARY: normal vesicular breath sounds bilaterally.  GI: abdomen not distended and not tender on deep and superficial palpation, bowel sounds present. No hepatosplenomegaly or pulsatile masses.   : CVA not tender, bladder well palpable.  MUSCULOSKELETAL: Osteoarthritic changes.  Patient has reddened areas to both knees and a small skin tear to her right elbow.  Mild sarcopenia.  NEUROLOGICAL:DTR absent, no myoclonus. Gait not tested.   PSYCHIATRIC: Possible mild cognitive impairment  INTEGUMENT: no  rash, no ulcerations, warm, dry.  Superficial bruises knees and back.  LYMPHATIC/HEMATOLOGIC: no LAD in the neck, both axillae, and groins. No petechiae, no ecchymoses.       Goals of care were discussed with the patient and family which included but not limited to anticipated treatment course during the current hospitalization, recovery from current event, discharge planning and transitions of care responsibilities and expected outcomes. Code status was addressed on admission as well. End of life care discussion not done.    Data   Data reviewed today:  I personally reviewed EKG and chest XR.  Radiologist reviewed:   See below radiology report.   Recent Labs   Lab 02/01/20  1309   WBC 12.6*   HGB 9.1*   MCV 74*      *   POTASSIUM 3.1*   CHLORIDE 101   CO2 20   BUN 13   CR 0.79   ANIONGAP 9   LUZMARIA 8.1*   *   ALBUMIN 3.2*   PROTTOTAL 6.1*   BILITOTAL 0.5   ALKPHOS 85   ALT 25   AST 26       Recent Results (from the past 24 hour(s))   CT Head w/o Contrast    Narrative    PROCEDURE: CT HEAD W/O CONTRAST     HISTORY: fall.    COMPARISON: None.    TECHNIQUE:  Helical images of the head from the foramen magnum to the  vertex were obtained without contrast.    FINDINGS: The ventricles and sulci are normal in volume. No acute  intracranial hemorrhage, mass effect, midline shift, hydrocephalus or  basilar cystern effacement are present.    The grey-white matter interface is preserved. There is white matter  low-density both hemispheres consistent with small vessel changes.    The calvarium is intact. The mastoid air cells are clear.  The  visualized paranasal sinuses are clear.      Impression    IMPRESSION: No acute brain abnormality      ALESHIA FITZGERALD MD   Cervical spine CT w/o contrast    Narrative    PROCEDURE: CT CERVICAL SPINE W/O CONTRAST 2/1/2020 1:42 PM    HISTORY: fall    COMPARISONS: None.    Meds/Dose Given:    TECHNIQUE: CT scan of the cervical spine with sagittal  coronal  reconstructions    FINDINGS: There are advanced degenerative changes at the middle  atlantoaxial joint and in all the cervical facet joints. There is mild  decrease in height in the C4-C5 disc with mild posterior osteophytes.  There are no fractures of the vertebral bodies or arches. The  prevertebral soft tissues appear normal.         Impression    IMPRESSION: No cervical fracture. Advanced degenerative changes are  noted    ALESHIA FITZGERALD MD   Lumbar spine CT w/o contrast    Narrative    PROCEDURE: CT LUMBAR SPINE W/O CONTRAST 2/1/2020 1:44 PM    HISTORY: fall, trauma, elderly, pain    COMPARISONS: None.    Meds/Dose Given:    TECHNIQUE: CT scan lumbar spine with sagittal coronal reconstructions    FINDINGS: There is gaseous degeneration of the T12-L1 disc. The L1-L2  L2-L3 discs appear normal. There is some broad-based annular bulging  noted at L3-L4 moderate facet joint degenerative changes are present  and there is developmentally short pedicles this results in severe  central spinal stenosis. A hemangioma seen in the L3 vertebral body    At L4-L5 There is severe facet joint degenerative changes  nonspondylolytic spondylolisthesis developmentally short pedicles and  broad-based annular bulging. This results in severe central spinal  stenosis. This compression of both L5 nerve roots in the lateral  recesses and both L4 nerve roots in the neural foramina.    At L5-S1 there is loss of vertical disc space height noted broad-based  annular bulging severe facet joint degenerative changes and  posterolateral osteophytes. Posterolateral osteophytes impinge on both  L5 nerve roots moderately on the right and mildly on the left.    Sacroiliac iliac joint degenerative changes are noted    No fractures of the vertebral bodies or arches are noted. Interstitial  thickening is seen in the lung bases.         Impression    IMPRESSION: No acute lumbar fractures are seen    ALESHIA FITZGERALD MD   XR Knee Bilateral 3  Views    Narrative    PROCEDURE:  XR KNEE BILATERAL 3 VW    HISTORY: fall    COMPARISON:  None.    TECHNIQUE:  2 views of the both knees were obtained.    FINDINGS: Right knee: There is a right knee prosthesis in place.  Prosthetic elements appear well seated. There is some proximal tibial  deformity consistent with old healed fracture. The distal femur and  proximal fibula are intact.    Left knee: There is a knee prosthesis in place. The prosthetic  elements appear well seated. The distal femur proximal tibia and  fibula are intact.      Impression    IMPRESSION: No acute fracture.      ALESHIA FITZGERALD MD   XR Thoracic Spine 2 Views    Narrative    PROCEDURE: XR THORACIC SPINE 2 VW 2/1/2020 2:03 PM    HISTORY: chronic pain, fall, increased pain    COMPARISONS: None.    TECHNIQUE: AP and lateral    FINDINGS: There is a mild compression fracture of the T11 vertebra.  This fracture is new as compared to 2018. Degenerative osteophytes are  seen throughout the thoracic spine. The paravertebral soft tissues  appear normal.         Impression    IMPRESSION: Mild T11 compression fracture the fracture appears new as  compared to 2018 however on the CT scan there were bridging  osteophytes anteriorly and this is most likely a more chronic  compression fracture.    ALESHIA FITZGERALD MD   XR Pelvis and Hip Bilateral 2 Views    Narrative    PROCEDURE: XR PELVIS AND HIP BILATERAL 2 VIEWS 2/1/2020 2:04 PM    HISTORY: fall, pain    COMPARISONS: None.    TECHNIQUE: Pelvis one view, right hip 2 views, left hip 2 views    FINDINGS: Pelvis: Pelvis is intact. Degenerative changes are seen in  the lower lumbar spine and sacroiliac joints.    Right hip 2 views: There is a right hip prosthesis in place.  Prosthetic elements appear well seated. Some myositis ossificans seen  adjacent to the hip.    Left hip 2 views: The acetabulum and proximal femur appears intact.         Impression    IMPRESSION: No acute pelvic or hip  fracture.    ALESHIA FITZGERALD MD   CT Chest/Abdomen/Pelvis w Contrast    Narrative    PROCEDURE: CT CHEST/ABDOMEN/PELVIS W CONTRAST 2/1/2020 3:18 PM    HISTORY: unwitnessed fall, hypoxia, CXR abnormality    COMPARISONS: None.    Meds/Dose Given: Isovue 300 100ml Given    TECHNIQUE: CT chest abdomen and pelvis was performed with IV contrast  sagittal coronal reconstructions were obtained.    FINDINGS: CT chest: There is some interstitial thickening seen  predominantly in the lower lobes. There is some dependent atelectasis  noted bilaterally. The heart is enlarged. The hilar and mediastinal  lymph nodes appear normal the thoracic aorta is intact. Axillary and  supraclavicular lymph nodes appear normal. There is an 8 mm in  diameter right thyroid nodule. No rib fractures are seen. There is  mild loss of vertical height of the T11 vertebra. Bridging anterior  osteophytes are seen in the lower thoracic spine.    CT scan of the abdomen and pelvis: There is a 5 mm diameter nodule in  the left lobe of the liver. There is no biliary ductal enlargement. No  calcified gallstones are seen. The spleen and pancreas appear normal.  The adrenal glands are normal. There is a 5 cm diameter right renal  mass highly suspicious for malignancy. No left renal masses are noted.  There is no hydronephrosis. The periaortic lymph nodes appear normal.  No free air or free fluid is seen within the abdomen. The bladder and  rectum appear normal. There is a low-density mass in the left ovary  measuring 3 cm in diameter. Degenerative changes are present in the  lumbar spine and sacroiliac joints. There are no pelvic fractures.  There is a hip prosthesis in place on the right. The left proximal  femur appears normal.         Impression    IMPRESSION: 8 mm right thyroid nodule.    Cardiomegaly with aortic valve calcifications..    5 cm right renal mass highly suspicious for malignancy.    3 cm diameter low-density mass in the left ovary.    Mild  T11 compression fracture    ALESHIA FITZGERALD MD

## 2020-02-02 NOTE — PROVIDER NOTIFICATION
Pt has been up to [facility] every 15-20 minutes to void and has voided 2-300 mL each time very pale urine - order to decrease fluids to 50 mL/hr

## 2020-02-02 NOTE — PLAN OF CARE
"/63   Pulse 99   Temp 98.3  F (36.8  C) (Tympanic)   Resp 16   Ht 1.549 m (5' 1\")   Wt 73.7 kg (162 lb 7.7 oz)   SpO2 97%   BMI 30.70 kg/m      Pt alert and oriented upon initial assessment. C/O back soreness, refused medication for pain. Lungs clear. BS active. HR irregular, per ICU tele report Sinus dys 70 -100s. Bruises scattered. Interdry placed under R skin fold on abdomen. Post-residual measured after every void, 230 ml remaining after voids. MD aware. Potassium replaced, recheck 4.4. Ax1 to commode. Son at bedside t/o day, refuses to leave when she is voiding. No falls or injuries this shift. Will continue to monitor.     Face to face report given with opportunity to observe patient.    Report given to DIVYA Conde RN   2/2/2020  3:32 PM      "

## 2020-02-02 NOTE — PROGRESS NOTES
[facility]    Hospitalist Progress Note    Date of Service (when I saw the patient): 02/02/2020    Assessment & Plan   Claudine Bustos is a 89 year old  woman who was admitted on 2/1/2020. F she presented for evaluation of fall.  She lives with her elderly .  Denies frequent falls.  CT head, neck, chest, abdomen/pelvis and lumbar spine done.  There is chronic appearing of compression fracture of T11.  CT abdomen showed right kidney mass which is likely malignant but has not changed in size since 2017.      1.  Mechanical fall  The patient relates that she fell after losing her balance falling, on her knees.  No head trauma.  She  recalls all events of falling and is quite certain that she did not lose consciousness.  She indicates that she usually uses a walker and nearly always uses a cane but that she has difficulties with balance rather than orthostatic disequilibrium particularly worse since her hip replacement which, after initial good result in terms of mobility and pain, then was associated with increasing lumbar pain making standing in particular difficult for her.  She has not had other recent falls.  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  Indeed she has little pain in this region.  We will asked nursing staff to assess her walking ability and, when they are available, PT to evaluate her functional status and any recommendations they may have.  2.  Lumbar back pain  Continue her gabapentin which may be helpful in terms of pain control.  She has not found acetaminophen to be particularly helpful.  Might consider local measures such as heat or lidocaine patch in the event this might help in her pain control.  She does not recall precisely whether she has had physical therapy since she has had more pain following her hip replacement.  It may be her pain developed with her compensating following  her joint replacement.  3.  Systolic murmur  Decrescendo systolic murmur at left sternal border with radiation to carotids left greater than right.  Preserved upstroke.  Suspect this is aortic sclerosis possibly with a component of mild volume depletion.  Benefit may be modest but son in particular is quite anxious to ensure evaluation of any possible findings which might be improved through medication or other intervention.  Repeat echocardiogram would not be unreasonable.  2002 she underwent echocardiogram showing preserved left ventricular function estimated ejection fraction of 60%.  There was a borderline enlargement in the right ventricle without evidence of pressure overload.  Trace mitral regurgitation.  Mild aortic sclerosis without aortic stenosis or valvular gradient.  Estimated right ventricular systolic pressure 30 mmHg plus right atrium.  There is a mild diastolic dysfunction noted.  From the perspective of aortic sclerosis it is unlikely that any specific intervention beyond avoiding overly aggressive decrease in blood pressure and ensuring avoiding hypovolemia 2.  4.  Borderline rhabdomyolysis  Moderate elevation in CPK perhaps more than would be expected from evident injuries.  Urinalysis shows minimal heme with several red blood cells.  Any degree of true rhabdomyolysis is likely to be moderate.  Repeat CK and ensure that she does not become volume depleted.  Renal function is adequate.   5.  Hyponatremia  She did present with hyponatremia/sodium 130 which responded rapidly to relatively modest intervention.  Hypokalemia was also noted on the same blood sample.  Possibly hypovolemic hyponatremia or an ADH effect with acute pain although this is relatively prominent for such cases.  Can eliminate the fact of an aberrant result on this blood specimen.  In any event sodium is returned to within expected levels.  We will continue intermittent monitoring to ensure there is no change with usual oral  fluid repletion.  Have decreased intravenous crystalloid to minimal rate.  6.  Urinary frequency  Urinalysis shows no significant pyuria.  She did have some hyaline casts and perhaps there was some low flow and volume depletion.  She did have greater urinary frequency than would be expected during the course of the night.  Post void residual of 230 mL this morning.  Does not appear that she has had any issues with urinary retention or bladder spasticity and in the past however this could be considered.  Does not appear that she has had frequent urinary tract infections.  I do not find enough to suggest strongly that in fact there is established urinary tract infection and will withhold any antibiotics until there is culture evidence of this.  Catheterized specimen was obtained at the time of admission.  7.  Mild microcytic anemia  Hemoglobin  in a range of approximately 9 for the past year or so.  Microcytic indices.  She did have iron studies in 2015 was suggestive of anemia of chronic inflammation in the context of evaluating her degenerative arthritis.  Dr. Murillo already requested assessment of occult stool blood.  Will also request repeat iron studies.  It is not evident that there is any recent significant blood loss.    8.  Mild hypoxemia  Agree this is likely atelectatic.  Improved with usual measures.  Likely related to her presentation pain.  Continue usual pulmonary hygiene   9.  Right renal mass  This is now been identified for a number of years.  No change in size on imaging.  Has been evaluated by Dr. 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  Without any change in size it is unlikely that regular follow-up needed at this point.  10.  Possible ovarian mass  Low density left ovarian mass, approximately 3 cm.  I do not see that this is been identified in the past although prior CT of abdomen and pelvis did not image the pelvis itself well.  Not certain what  follow-up may be the most reasonable.  Certainly could be deferred to outpatient setting.  Have discussed with the patient and her son the presence of this and that possible other imaging could be considered possibly best after discussion with Dr. Gutierrez about risks and benefits of extensive evaluation.    Principal Problem:    Back pain  Active Problems:    Benign essential hypertension    Renal mass, right    Hyponatremia    Rhabdomyolysis    Hypokalemia    Anemia    Closed fracture of eleventh thoracic vertebra (H)    Systolic murmur    Hypoxia       DVT Prophylaxis: Not mandated for this predicted short hospital stay  Code Status: Full Code    Disposition: Expected discharge in 1-2 days after physical therapy evaluation and further determination of her functional status.    Casey Lorenz    Interval History   Awake and alert.  Chronic lumbar back pain without any new pain.  No dyspnea.    -Data reviewed today: I reviewed all new labs and imaging results over the last 24 hours.     Peripheral IV 02/01/20 Left (Active)   Site Assessment WDL 2/2/2020  9:00 AM   Line Status Infusing 2/2/2020  9:00 AM   Phlebitis Scale 0-->no symptoms 2/2/2020  9:00 AM   Infiltration Scale 0 2/2/2020  9:00 AM   Infiltration Site Treatment Method  None 2/1/2020 10:15 PM   Extravasation? No 2/1/2020 10:15 PM   Number of days: 1       Incision/Surgical Site 01/25/16 Left Knee (Active)   Number of days: 1469       Incision/Surgical Site 01/23/17 Right Hip (Active)   Number of days: 1105       Incision/Surgical Site 08/01/17 Left;Lower Eye (Active)   Number of days: 915     Line/device assessment(s) completed for medical necessity February 2    Physical Exam   Temp: 98.3  F (36.8  C) Temp src: Tympanic BP: 147/63   Heart Rate: 72 Resp: 16 SpO2: 97 % O2 Device: None (Room air) Oxygen Delivery: 2 LPM  Vitals:    02/01/20 1658   Weight: 73.7 kg (162 lb 7.7 oz)     Vital Signs with Ranges  Temp:  [97.4  F (36.3  C)-98.6  F (37  C)] 98.3   F (36.8  C)  Heart Rate:  [72-95] 72  Resp:  [16-18] 16  BP: (147-200)/(61-93) 147/63  SpO2:  [85 %-98 %] 97 %  I/O last 3 completed shifts:  In: 1553 [P.O.:600; I.V.:953]  Out: 3740 [Urine:3740]    Awake, alert, woman sitting in chair on medical wards.  HEENT: Pupils equal, conjugate. No icterus or nystagmus. Oral mucosa moist. No facial asymmetry.   Neck: Supple, jugular veins 1 cm of water ated. Trachea midline   Chest: No chest wall movement asymmetry. Aeration preserved to bases. Accessory muscles not in use. Expiratory time not increased. No tidal wheezes. No rhonchi.  Few scattered basilar medium discrete crackles.   Cardiac: PMI not displaced. S1, S2 unremarkable. No S3, S4. P2 not accentuated.  3/6 decrescendo midsystolic murmur with radiation to carotids left greater than right. Carotid upstroke preserved. Carotid amplitude preserved.   Abdomen: Soft, mildly obese. No palpation or percussion tenderness. No distention. Normoactive bowel sounds. Liver and spleen not increased in size. No bruits, masses, or pulsations.   Extremities: No lower extremity edema.  Warm distally.  No eccymoses, clubbing.   Neurologic: Mental state above. Motor 5/5 and bilaterally equal. Tone preserved. No fasiculations or tremors.      Medications     sodium chloride 50 mL/hr at 02/02/20 0120       gabapentin  300 mg Oral TID     lisinopril  10 mg Oral Daily           Data   Recent Labs   Lab 02/02/20  0522 02/01/20  1309   WBC 9.9 12.6*   HGB 9.0* 9.1*   MCV 75* 74*    325    130*   POTASSIUM 3.3* 3.1*   CHLORIDE 111* 101   CO2 22 20   BUN 10 13   CR 0.76 0.79   ANIONGAP 7 9   LUZMARIA 8.3* 8.1*   GLC 94 104*   ALBUMIN  --  3.2*   PROTTOTAL  --  6.1*   BILITOTAL  --  0.5   ALKPHOS  --  85   ALT  --  25   AST  --  26       Lactic Acid   Date Value Ref Range Status   02/01/2020 1.3 0.7 - 2.0 mmol/L Final   01/23/2018 0.8 0.4 - 2.0 mmol/L Final   01/22/2018 1.1 0.4 - 2.0 mmol/L Final       Recent Results (from the past 24  hour(s))   XR Knee Bilateral 3 Views    Narrative    PROCEDURE:  XR KNEE BILATERAL 3 VW    HISTORY: fall    COMPARISON:  None.    TECHNIQUE:  2 views of the both knees were obtained.    FINDINGS: Right knee: There is a right knee prosthesis in place.  Prosthetic elements appear well seated. There is some proximal tibial  deformity consistent with old healed fracture. The distal femur and  proximal fibula are intact.    Left knee: There is a knee prosthesis in place. The prosthetic  elements appear well seated. The distal femur proximal tibia and  fibula are intact.      Impression    IMPRESSION: No acute fracture.      ALESHIA FITZGERALD MD   XR Thoracic Spine 2 Views    Narrative    PROCEDURE: XR THORACIC SPINE 2 VW 2/1/2020 2:03 PM    HISTORY: chronic pain, fall, increased pain    COMPARISONS: None.    TECHNIQUE: AP and lateral    FINDINGS: There is a mild compression fracture of the T11 vertebra.  This fracture is new as compared to 2018. Degenerative osteophytes are  seen throughout the thoracic spine. The paravertebral soft tissues  appear normal.         Impression    IMPRESSION: Mild T11 compression fracture the fracture appears new as  compared to 2018 however on the CT scan there were bridging  osteophytes anteriorly and this is most likely a more chronic  compression fracture.    ALESHIA FITZGERALD MD   XR Pelvis and Hip Bilateral 2 Views    Narrative    PROCEDURE: XR PELVIS AND HIP BILATERAL 2 VIEWS 2/1/2020 2:04 PM    HISTORY: fall, pain    COMPARISONS: None.    TECHNIQUE: Pelvis one view, right hip 2 views, left hip 2 views    FINDINGS: Pelvis: Pelvis is intact. Degenerative changes are seen in  the lower lumbar spine and sacroiliac joints.    Right hip 2 views: There is a right hip prosthesis in place.  Prosthetic elements appear well seated. Some myositis ossificans seen  adjacent to the hip.    Left hip 2 views: The acetabulum and proximal femur appears intact.         Impression    IMPRESSION: No acute  pelvic or hip fracture.    ALESHIA FITZGERALD MD   CT Chest/Abdomen/Pelvis w Contrast    Narrative    PROCEDURE: CT CHEST/ABDOMEN/PELVIS W CONTRAST 2/1/2020 3:18 PM    HISTORY: unwitnessed fall, hypoxia, CXR abnormality    COMPARISONS: None.    Meds/Dose Given: Isovue 300 100ml Given    TECHNIQUE: CT chest abdomen and pelvis was performed with IV contrast  sagittal coronal reconstructions were obtained.    FINDINGS: CT chest: There is some interstitial thickening seen  predominantly in the lower lobes. There is some dependent atelectasis  noted bilaterally. The heart is enlarged. The hilar and mediastinal  lymph nodes appear normal the thoracic aorta is intact. Axillary and  supraclavicular lymph nodes appear normal. There is an 8 mm in  diameter right thyroid nodule. No rib fractures are seen. There is  mild loss of vertical height of the T11 vertebra. Bridging anterior  osteophytes are seen in the lower thoracic spine.    CT scan of the abdomen and pelvis: There is a 5 mm diameter nodule in  the left lobe of the liver. There is no biliary ductal enlargement. No  calcified gallstones are seen. The spleen and pancreas appear normal.  The adrenal glands are normal. There is a 5 cm diameter right renal  mass highly suspicious for malignancy. No left renal masses are noted.  There is no hydronephrosis. The periaortic lymph nodes appear normal.  No free air or free fluid is seen within the abdomen. The bladder and  rectum appear normal. There is a low-density mass in the left ovary  measuring 3 cm in diameter. Degenerative changes are present in the  lumbar spine and sacroiliac joints. There are no pelvic fractures.  There is a hip prosthesis in place on the right. The left proximal  femur appears normal.         Impression    IMPRESSION: 8 mm right thyroid nodule.    Cardiomegaly with aortic valve calcifications..    5 cm right renal mass highly suspicious for malignancy.    3 cm diameter low-density mass in the left  ovary.    Mild T11 compression fracture    ALESHIA FITZGERALD MD

## 2020-02-03 ENCOUNTER — APPOINTMENT (OUTPATIENT)
Dept: PHYSICAL THERAPY | Facility: HOSPITAL | Age: 85
End: 2020-02-03
Attending: INTERNAL MEDICINE
Payer: MEDICARE

## 2020-02-03 ENCOUNTER — HOSPITAL ENCOUNTER (OUTPATIENT)
Dept: CARDIOLOGY | Facility: HOSPITAL | Age: 85
Setting detail: OBSERVATION
End: 2020-02-03
Attending: INTERNAL MEDICINE
Payer: MEDICARE

## 2020-02-03 VITALS
BODY MASS INDEX: 30.68 KG/M2 | DIASTOLIC BLOOD PRESSURE: 78 MMHG | HEIGHT: 61 IN | SYSTOLIC BLOOD PRESSURE: 160 MMHG | WEIGHT: 162.48 LBS | OXYGEN SATURATION: 92 % | RESPIRATION RATE: 18 BRPM | TEMPERATURE: 97.6 F | HEART RATE: 99 BPM

## 2020-02-03 LAB
BACTERIA SPEC CULT: NORMAL
CK SERPL-CCNC: 756 U/L (ref 30–225)
FERRITIN SERPL-MCNC: 12 NG/ML (ref 8–252)
IRON SATN MFR SERPL: 5 % (ref 15–46)
IRON SERPL-MCNC: 15 UG/DL (ref 35–180)
SPECIMEN SOURCE: NORMAL
TIBC SERPL-MCNC: 305 UG/DL (ref 240–430)

## 2020-02-03 PROCEDURE — 82728 ASSAY OF FERRITIN: CPT | Performed by: INTERNAL MEDICINE

## 2020-02-03 PROCEDURE — 36415 COLL VENOUS BLD VENIPUNCTURE: CPT | Performed by: INTERNAL MEDICINE

## 2020-02-03 PROCEDURE — 93306 TTE W/DOPPLER COMPLETE: CPT | Mod: 26 | Performed by: INTERNAL MEDICINE

## 2020-02-03 PROCEDURE — G0378 HOSPITAL OBSERVATION PER HR: HCPCS

## 2020-02-03 PROCEDURE — 83540 ASSAY OF IRON: CPT | Performed by: INTERNAL MEDICINE

## 2020-02-03 PROCEDURE — 25000132 ZZH RX MED GY IP 250 OP 250 PS 637: Mod: GY | Performed by: INTERNAL MEDICINE

## 2020-02-03 PROCEDURE — 97161 PT EVAL LOW COMPLEX 20 MIN: CPT | Mod: GP | Performed by: PHYSICAL THERAPIST

## 2020-02-03 PROCEDURE — 93306 TTE W/DOPPLER COMPLETE: CPT | Mod: TC

## 2020-02-03 PROCEDURE — 99217 ZZC OBSERVATION CARE DISCHARGE: CPT | Performed by: INTERNAL MEDICINE

## 2020-02-03 PROCEDURE — 82550 ASSAY OF CK (CPK): CPT | Performed by: INTERNAL MEDICINE

## 2020-02-03 PROCEDURE — 83550 IRON BINDING TEST: CPT | Performed by: INTERNAL MEDICINE

## 2020-02-03 RX ADMIN — GABAPENTIN 300 MG: 300 CAPSULE ORAL at 14:27

## 2020-02-03 RX ADMIN — TRAMADOL HYDROCHLORIDE 25 MG: 50 TABLET, COATED ORAL at 09:53

## 2020-02-03 RX ADMIN — GABAPENTIN 300 MG: 300 CAPSULE ORAL at 09:53

## 2020-02-03 RX ADMIN — LISINOPRIL 10 MG: 10 TABLET ORAL at 09:54

## 2020-02-03 RX ADMIN — TRAMADOL HYDROCHLORIDE 25 MG: 50 TABLET, COATED ORAL at 01:27

## 2020-02-03 NOTE — PLAN OF CARE
Face to face report given with opportunity to observe patient.    Report given to Mehrdad Timmons RN   2/3/2020  7:36 AM

## 2020-02-03 NOTE — PLAN OF CARE
Pt has been afebrile through the night, VS as charted.  Her O2 sats are in the upper 90's on RA, lung sounds are clear/diminished.  She did rate pain/discomfort 5/10 to lower back - received PO pain medication x 1 with adequate relief. Tele reads sinus dysrhythmia 60-80's.  She does continue to have IV fluids running at 50 mL/hr - urinary frequency with total of >1100 mL out with one post void residual check of 270 mL.  She does get up to the BSC with assist of one to SBA - tolerates well. She has remained free of falls, call light within reach - does make her needs known, alarms on and frequent rounding done to assess needs.

## 2020-02-03 NOTE — PLAN OF CARE
Discharge Planner PT   Patient plan for discharge: Home with home vs OP PT  Current status: SBA for functional transfers, ambulation SBA x 100' with FWW, up and down 3 stairs with B rails with CGA  Barriers to return to prior living situation: stairs to enter home  Recommendations for discharge: Home with assist of family and home vs OP PT for strengthening and possible consultation for knee OA brace  Rationale for recommendations: due to current functional status       Entered by: Katarina Gallegos 02/03/2020 3:39 PM

## 2020-02-03 NOTE — PLAN OF CARE
"Reason for hospital stay:  Back pain/fall  Living situation PTA: Comes from home with   Most recent vitals: /56   Pulse 99   Temp 98.5  F (36.9  C) (Tympanic)   Resp 16   Ht 1.549 m (5' 1\")   Wt 73.7 kg (162 lb 7.7 oz)   SpO2 94%   BMI 30.70 kg/m    Pain Management:  Denies  LOC:  A&O, makes needs known. Much more alert than yesterday afternoon.   Cardiac:  Irregular, murmur noted. Tele reads sinus dysrhythmia 70's-90's. BP stable   Respiratory:  LS clear, equal. No cough, no SOB. Sats mid 90's on RA.  GI:  BS active. Soft/nontender. No N&V. No bowel movement   :  Voiding well, much less frequently. 200-300mL at a time, post-void residual of 240.   Skin Issues:  Scattered bruises/scabs/skin tears. +2 edema to bilateral ankles/feet; non-pitting.   IVF:  NS @ 50  ABX:  None given  Nutrition: Regular diet, tolerated well  Ambulation: SBA with gait belt; major improvement in mobility since yesterday evening shift   Safety:  Free of falls, call light in reach, bed in lowest, frequent rounds     Face to face report given with opportunity to observe patient.    Report given to DIVYA Sampson   2/2/2020  11:14 PM        "

## 2020-02-03 NOTE — PROGRESS NOTES
Name: Claudine Bustos    MRN#: [id number]    Reason for Hospitalization: Anemia [D64.9]  Hypoxia [R09.02]  Generalized muscle weakness [M62.81]  Falls frequently [R29.6]  Compression fracture of T11 vertebra, initial encounter (H) [S22.080A]    Discharge Date: 2/3/2020    Patient / Family response to discharge plan: they understand and agree    Follow-Up Appt:   Future Appointments   Date Time Provider Department Center   4/20/2020 11:00 AM Katarina Gutierrez MD [facility]       Other Providers (Care Coordinator, County Services, PCA services etc): Yes: [facility] for home PT    Discharge Disposition: home via family      Sary Richardson CM RN

## 2020-02-03 NOTE — PROVIDER NOTIFICATION
MD notified via textpage of pt requesting a change to medication regimen. She feels as if the ketamine simply doesn't last long enough for pain relief.    Currently awaiting orders

## 2020-02-03 NOTE — PLAN OF CARE
Patient discharged at 3:30 PM via wheel chair accompanied by son and staff. Prescriptions - None ordered for discharge. All belongings sent with patient.     Discharge instructions reviewed with patient.     Patient discharged to home.   Report called to N/A    Core Measures and Vaccines  Core Measures applicable during stay: No. If yes, state diagnosis: N/A  Pneumonia and Influenza given prior to discharge, if indicated: N/A    MISC  Follow up appointment made:  Yes  Home and hospital aquired medications returned to patient: N/A  Patient reports pain was well managed at discharge: Yes

## 2020-02-03 NOTE — PROGRESS NOTES
Inpatient Physical Therapy Evaluation    Name: Claudine Bustos MRN# [id number]   Age: 89 year old YOB: 1930     Date of Consultation: February 3, 2020  Consultation is requested by:  Dr. Murillo  Specific Consultation Request:  Evaluate and treat  Primary care provider: Katarina Gutierrez    General Information:   Onset Date: 2020    History of Current Problem/Admission: Anemia [D64.9]  Hypoxia [R09.02]  Generalized muscle weakness [M62.81]  Falls frequently [R29.6]  Compression fracture of T11 vertebra, initial encounter (H) [S22.020A]    Prior Level of Function: IND  Ambulation: 1 - Assistive Equipment   Transferrin - Assistive Equipment   Toiletin - Independent    Bathin - Independent   Dressin - Independent   Eatin - Independent   Communication: 0 - Understands/communicates without difficulty  Swallowin - swallows foods/liquids without difficulty  Cognition: 0 - no cognitive issues reported    Fall history within the last 6 months: No    Current Living Situation: Patient has 3 stairs to enter the home. Patient has a railing on L side. Has son there to help her get in her home. No falls at home. IND in all her activities. Has cane and walker to use. Poor gait pattern since hip surgery VY on R, discussed possible brace for knee as it shift and is unstable, but home set-up seems appropriate     Current Equipment Used at Home: SPC and FWW     Patient & Family Goals: return home     Past Medical History:   Past Medical History:   Diagnosis Date     Chronic kidney disease, unspecified 8/3/2012     Diastolic dysfunction 2012    echo 2012  EF 60%     HTN (hypertension) 2000     Obesity (BMI 35.0-39.9) with comorbidity (H) 2019     Personal history of colonic polyps 2004     Renal benign neoplasm, left 2016    'oncocytic neoplasm' per urology -- benign, stable and following yearly for scans     Skin cancer        Past Surgical History:  Past Surgical History:    Procedure Laterality Date     ARTHROPLASTY HIP Right 1/23/2017    Procedure: ARTHROPLASTY HIP;  Surgeon: Jose Manuel Muñoz MD;  Location: [facility]     ARTHROPLASTY KNEE Left 1/25/2016    Procedure: ARTHROPLASTY KNEE;  Surgeon: Jose Manuel Muñoz MD;  Location: [facility] OR     colonoscopy  2004, 2009     EXCISE LESION EYELID Left 8/1/2017    Procedure: EXCISE LESION EYELID;  LEFT LOWER LID WEDGE EXCISION WITH FROZEN SECTION CONTROL;  Surgeon: Quinten Sotelo MD;  Location:  [facility]     excision of rectal villus adenoma       knee replacement Right 1/2011     PHACOEMULSIFICATION WITH STANDARD INTRAOCULAR LENS IMPLANT  2/25/2014    Procedure: PHACOEMULSIFICATION WITH STANDARD INTRAOCULAR LENS IMPLANT;  CATARACT EXTRACTION WITH INTRA OCULAR LENS LEFT;  Surgeon: Jah Bee MD;  Location: [facility] OR     PHACOEMULSIFICATION WITH STANDARD INTRAOCULAR LENS IMPLANT Right 5/12/2015    Procedure: PHACOEMULSIFICATION WITH STANDARD INTRAOCULAR LENS IMPLANT;  Surgeon: Jah Bee MD;  Location: [facility] OR     thoracic schwannoma resection       tonsillectomy       urethral dilitation every 4 months         Medications:   Current Facility-Administered Medications   Medication     acetaminophen (TYLENOL) Suppository 650 mg     acetaminophen (TYLENOL) tablet 650 mg     gabapentin (NEURONTIN) capsule 300 mg     lisinopril (PRINIVIL/ZESTRIL) tablet 10 mg     magnesium sulfate 2 g in water intermittent infusion     magnesium sulfate 4 g in 100 mL sterile water (premade)     melatonin tablet 1 mg     naloxone (NARCAN) injection 0.1-0.4 mg     ondansetron (ZOFRAN-ODT) ODT tab 4 mg    Or     ondansetron (ZOFRAN) injection 4 mg     potassium chloride (KLOR-CON) Packet 20-40 mEq     potassium chloride 10 mEq in 100 mL intermittent infusion with 10 mg lidocaine     potassium chloride 10 mEq in 100 mL sterile water intermittent infusion (premix)     potassium chloride ER (K-DUR/KLOR-CON M) CR tablet 20-40 mEq     prochlorperazine (COMPAZINE)  injection 5 mg    Or     prochlorperazine (COMPAZINE) tablet 5 mg    Or     prochlorperazine (COMPAZINE) Suppository 12.5 mg     traMADol (ULTRAM) half-tab 25 mg       Weight Bearing Status: FWB     Cognitive Status Examination:  Orientation: awake and alert  Level of Consciousness: alert  Follows Commands and Answers Questions: 100% of the time  Personal Safety and Judgement: Intact  Memory: Immediate recall intact  Comments: NA    Pain:   Currently in pain? No  Pain Location? NA  Pain Ratin (No pain)    Physical Therapy Evaluation:   Integumentary/Edema: WFL  ROM: WFL  Strength: grossly 4/5 B LEs  Bed Mobility: IND  Transfers: SBA to FWW  Gait: SBA FWW x 100'  Stairs: SBA with B rails - adivsed to have help from family when she first gets home  Balance: WFL  Sensory: NT  Coordination: NT    Physical Therapy Interventions: evaluation only    Clinical Impressions:  Criteria for Skilled Therapeutic Intervention Met: Evaluation Only  PT Diagnosis: Physical deconditioning  Influenced by the following impairments: weakness  Functional limitations due to impairment: difficulty ambulating at [facility] with SPC and would benefit from more consistent use of FWW  Clinical presentation: Stable/Uncomplicated  Clinical presentation rationale: due to lack of additional comorbidities that may influence anticipated PT progress  Clinical Decision making (complexity): Low Complexity  Frequency: NA  Predicted Duration of Therapy Intervention (days/wks): 5 days  Anticipated Discharge Disposition: Home with Outpatient Therapy   Anticipated Equipment Needs at Discharge: None  Risks and Benefits of therapy have been explained: Yes  Patient, Family & other staff in agreement with plan of care: Yes  Clinical Impression Comments: Presentation is consistent with fall at home and she has mild weakness to address in an outpatient setting or homecare PT initially until she is stronger, but she is deemed safe to return home at this time with  assist from family. She may benefit from a knee brace with supports on sides to help with safety with ambulation    Total Eval Time: 15

## 2020-02-03 NOTE — DISCHARGE SUMMARY
[facility]    Discharge Summary  Hospitalist    Date of Admission:  2/1/2020  Date of Discharge:  2/3/2020  3:30 PM  Discharging Provider: Jonas Calvillo  Date of Service (when I saw the patient): 2/3/20    Discharge Diagnoses   Mechanical fall  Lumbar back pain  Systolic murmur, probably due to aortic stenosis  Borderline rhabdomyolysis  Hyponatremia   Urinary frequency  Mild microcytic anemia, probably anemia of chronic inflammation  Mild hypoxemia due to atelectasis  Right renal mass  Possible ovarian mass    History of Present Illness   Claudine Bustos is a 89 year old female woman who has past medical history significant for hypertension, diastolic dysfunction, chronic kidney disease, degenerative joint disease status post right hip replacement, malignant hyperthermia, sleep apnea, right kidney mass who came to emergency room after falling at home.  She denies frequent falls.  EMS states that she fell multiple times today. EMS was dispatched for a lift assist earlier. Patient's son denied frequent falls. Patient reported that she fell when trying to stand up from the chair and she lost her balance.  Denies dizziness lightheadedness, palpitation, chest pain, she also denies GI or  symptoms with possibility of volume loss.  No new medications started recently.  She complained worsening of the back pain.  She stated her her back pain was present for some time, may be since 2018.  Patient denies striking her head or any loss of consciousness.     ED events: Had multiple PACs on telemetry, and was mildly hypoxic.  Surgery consulted     ED diagnostic workup: Chemistry, hematology, chest x-ray, x-ray knee bilateral, x-ray thoracic spine, x-ray pelvis and hip, and CT chest abdomen and pelvis.     ED imaging studies and blood test results: CT head, neck, chest, abdomen pelvis, lumbar spine showed only chronic appearing compression fracture at T11.  Also right kidney mass and thyroid nodule.  Aortic  calcification  Labs pertinent for anemia hemoglobin 9.1, leukocytosis hemoglobin 12.6, hyponatremia 130, hypokalemia 3.1 and .     ED treatment: No treatment    Hospital Course     Claudine Bustos is a 89 year old  woman who was admitted on 2/1/2020. She presented for evaluation after a fall.  She lives with her elderly .  Denies frequent falls.  CT head, neck, chest, abdomen/pelvis and lumbar spine done.  There is chronic appearing of compression fracture of T11.  CT abdomen showed right kidney mass which is likely malignant but has not changed in size since 2017. The following problems were addressed during her hospitalization:     1.  Mechanical fall  The patient relates that she fell after losing her balance falling, on her knees.  No head trauma.  She  recalls all events of falling and is quite certain that she did not lose consciousness.  She indicates that she usually uses a walker and nearly always uses a cane but that she has difficulties with balance rather than orthostatic disequilibrium particularly worse since her hip replacement which, after initial good result in terms of mobility and pain, then was associated with increasing lumbar pain making standing in particular difficult for her.  Her family reports she had a prior fall the same day of her presentation, in the bathroom. The patient does not give clear details to this fall, but relates that it also may have been due to instability related to her hip.  Imaging does show, in addition to degenerative spinal disease in the lumbar region, a T11 compression fracture which appears subacute.  Indeed she has little pain in this region.  She was assessed my PT and felt to have poor gait pattern which has developed since her right VY. They did recommend primary use of her walker rather than cane. She had difficulty with balance in coming down the steps and she does have steps at home. Family is very concerned, as a result, about her ability  to exit the home safely on her own. Will plan for home PT with discharge today. PT did suggest consideration of possible consultation for knee OA brace, which may be considered further upon follow up with her PCP pending how she progresses with home PT or upon further recommendations from PT.     2.  Lumbar back pain  Continued her gabapentin which may be helpful in terms of pain control.  She has not found acetaminophen to be particularly helpful.  Might consider local measures such as heat or lidocaine patch in the event this might help in her pain control, she will have follow up with her PCP.  She does not recall precisely whether she has had physical therapy since she has had more pain following her hip replacement.  It may be her pain developed with her compensating following her joint replacement. She will have home PT upon discharge    3.  Systolic murmur, probably due to aortic stenosis  Decrescendo systolic murmur at left sternal border with radiation to carotids left greater than right.  Preserved upstroke.  Suspect this is aortic sclerosis possibly with a component of mild volume depletion.  Benefit may be modest but son in particular is quite anxious to ensure evaluation of any possible findings which might be improved through medication or other intervention.  Repeat echocardiogram would not be unreasonable.  2002 she underwent echocardiogram showing preserved left ventricular function, estimated ejection fraction of 60%, and mild aortic sclerosis without aortic stenosis or valvular gradient.  On echocardiogram this admission, visualization of the aortic leaflet motion was restricted and thus quantitative parameters were difficult to assess due to limited interrogation of the aortic valve. However,  Overall the stenosis was felt probably moderate in severity. Global and regional left ventricular function is normal with an EF of 55-60%. Normal RV function.  From the perspective of the aortic stenosis,  it is unlikely that any specific intervention beyond avoiding overly aggressive decrease in blood pressure and ensuring avoiding hypovolemia. Although, may not be unreasonable to consider serial evaluation of the aortic valve with repeat echocardiogram in one year.  4.  Borderline rhabdomyolysis  She was noted to have moderate elevation in CPK, perhaps more than would be expected from evident injuries.  Urinalysis showed minimal heme with several red blood cells.  CK was monitored, peaking at 1747 and quickly declining down to 756. She was treated with IVF up until discharge. Is taking sufficient PO intake at this point and renal function has remained adequate with creatinine 0.76 upon discharge.   5.  Hyponatremia  She did present with hyponatremia/sodium 130 which responded rapidly to relatively modest intervention.  Hypokalemia was also noted on the same blood sample.  Possibly hypovolemic hyponatremia or an ADH effect with acute pain although this is relatively prominent for such cases.  Cannot eliminate the fact of an aberrant result on this blood specimen.  In any event sodium has returned to within expected levels.   6.  Urinary frequency  Urinalysis shows no significant pyuria.  She did have some hyaline casts and perhaps there was some low flow and volume depletion.  She did have greater urinary frequency than would be expected during the course of the night.  Post void residuals have been 230 and 150 ml and  does not appear that she has had any issues with urinary retention or bladder spasticity.  Does not appear that she has had frequent urinary tract infections. Urine culture showed mixed maria l with no evidence for UTI.  7.  Mild microcytic anemia, probably anemia of chronic inflammation  Hemoglobin  in a range of approximately 9 for the past year or so.  Microcytic indices.  She did have iron studies in 2015 was suggestive of anemia of chronic inflammation in the context of evaluating her degenerative  arthritis.  Repeat iron studies returned with iron 15, ferritin 12,  and iron sat 5 again suggestive of anemia of chronic inflammation. There was plan to obtain occult blood of stool, yet was not collected. However, there has been no evidence of any recent significant blood loss.  Further evaluation can be pursued as outpatient with patient's PCP as felt appropriate.  8.  Mild hypoxemia  Was felt likely atelectatic.  Improved with usual measures.  Likely related to her presentation with pain.  Sats 92-97% room air on discharge.  9.  Right renal mass  This has been identified for a number of years.  No change in size on imaging.  Has been evaluated by Dr. 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  Without any change in size it is unlikely that regular follow-up needed at this point.  10.  Possible ovarian mass  Low density left ovarian mass, approximately 3 cm.  Do not see that this has been identified in the past although prior CT of abdomen and pelvis did not image the pelvis itself well.  Not certain what follow-up may be the most reasonable.  Certainly could be deferred to outpatient setting.  Was discussed with the patient and her son the presence of this and that possible other imaging could be considered, possibly best after discussion with Dr. Gutierrez about risks and benefits of extensive evaluation.         Pending Results   Unresulted Labs Ordered in the Past 30 Days of this Admission     No orders found from 1/2/2020 to 2/2/2020.          Code Status   Full Code       Primary Care Physician   Katarina Gutierrez    Physical Exam   Temp: 97.6  F (36.4  C) Temp src: Tympanic BP: 160/78   Heart Rate: 95 Resp: 18 SpO2: 92 % O2 Device: None (Room air)    Vitals:    02/01/20 1658   Weight: 73.7 kg (162 lb 7.7 oz)     Vital Signs with Ranges  Temp:  [97.6  F (36.4  C)-98.5  F (36.9  C)] 97.6  F (36.4  C)  Heart Rate:  [73-95] 95  Resp:  [16-18] 18  BP: (134-160)/(56-78)  160/78  SpO2:  [92 %-97 %] 92 %  I/O last 3 completed shifts:  In: 2700 [P.O.:1000; I.V.:1700]  Out: 1650 [Urine:1650]    Constitutional: Alert and oriented X 3. No distress   Respiratory: CTA bilaterally. No wheezes or ronchi.    Cardiovascular: RRR. No murmurs, rubs, gallops. Normal S1/S2   GI: Soft, NTND, no organomegaly. Bowel sounds present   Integument: Warm, dry   Extremities:  No edema     Discharge Disposition   Discharged to home  Condition at discharge: Stable    Consultations This Hospital Stay   PHYSICAL THERAPY ADULT IP CONSULT    Time Spent on this Encounter   I, Jonas Calvillo MD, personally saw the patient today and spent greater than 30 minutes discharging this patient.    Discharge Orders      Home Care PT Referral for Hospital Discharge      Home care nursing referral      Follow-up and recommended labs and tests     Follow up with primary care provider, Katarina Gutierrez, within 7-10 days for hospital follow- up.  No follow up labs or test are needed.     Activity    Your activity upon discharge: activity as tolerated, using assist device (primarily walker) as recommended by PT     MD face to face encounter    Documentation of Face to Face and Certification for Home Health Services    I certify that patient: Claudine Bustos is under my care and that I, or a nurse practitioner or physician's assistant working with me, had a face-to-face encounter that meets the physician face-to-face encounter requirements with this patient on: 2/3/2020.    This encounter with the patient was in whole, or in part, for the following medical condition, which is the primary reason for home health care: balance instability, weakness and low back pain s/p remote hip surgery.    I certify that, based on my findings, the following services are medically necessary home health services: Physical Therapy.    My clinical findings support the need for the above services because: Physical Therapy Services are needed to  assess and treat the following functional impairments: balance instability, weakness and low back pain s/p remote hip surgery.    Further, I certify that my clinical findings support that this patient is homebound (i.e. absences from home require considerable and taxing effort and are for medical reasons or [employer] services or infrequently or of short duration when for other reasons) because: Requires assistance of another person or specialized equipment to access medical services because patient: Is unable to exit home safely on own due to: balance, weakness and instability with navigating down her stairs   Based on the above findings. I certify that this patient is confined to the home and needs intermittent skilled nursing care, physical therapy and/or speech therapy.  The patient is under my care, and I have initiated the establishment of the plan of care.  This patient will be followed by a physician who will periodically review the plan of care.  Physician/Provider to provide follow up care: Katarina Gutierrez    [facility] physician (the Medicare certified [facility] provider): Jonas Calvillo MD  Physician Signature: See electronic signature associated with these discharge orders.  Date: 2/3/2020     Full Code     Diet    Follow this diet upon discharge: Orders Placed This Encounter      Regular Diet Adult     Discharge Medications   Discharge Medication List as of 2/3/2020  3:02 PM      CONTINUE these medications which have NOT CHANGED    Details   Calcium Carb-Cholecalciferol (CALCIUM + D3) 600-200 MG-UNIT TABS Take 1 tablet by mouth daily, Historical      donepezil (ARICEPT) 10 MG tablet Take 1 tablet (10 mg) by mouth At Bedtime, Disp-30 tablet, R-1, E-Prescribe      gabapentin (NEURONTIN) 100 MG capsule TAKE 2 CAPSULES BY MOUTH 3 TIMES DAILY AFTER ONE WEEK IF STILL HAVINGPAIN INCREASE TO 3 CAPSULES 3 TIMES DAILY, Disp-360 capsule, R-1, E-Prescribe      lisinopril (PRINIVIL/ZESTRIL) 10 MG tablet Take  1 tablet (10 mg) by mouth daily, Disp-90 tablet, R-3, E-Prescribe      Multiple Vitamin (MULTIVITAMIN) per tablet Take 1 tablet by mouth daily with food., Historical           Allergies   No Known Allergies  Data   Most Recent 3 CBC's:  Recent Labs   Lab Test 02/02/20  0522 02/01/20  1309 08/29/19  1550   WBC 9.9 12.6* 8.0   HGB 9.0* 9.1* 10.5*   MCV 75* 74* 75*    325 367      Most Recent 3 BMP's:  Recent Labs   Lab Test 02/02/20  1335 02/02/20  0522 02/01/20  1309 06/05/19  1200   NA  --  140 130* 141   POTASSIUM 4.4 3.3* 3.1* 4.0   CHLORIDE  --  111* 101 109   CO2  --  22 20 23   BUN  --  10 13 22   CR  --  0.76 0.79 0.85   ANIONGAP  --  7 9 9   LUZMARIA  --  8.3* 8.1* 8.6   GLC  --  94 104* 100*     Most Recent 2 LFT's:  Recent Labs   Lab Test 02/01/20  1309 06/05/19  1200   AST 26 23   ALT 25 27   ALKPHOS 85 121   BILITOTAL 0.5 0.6     Most Recent INR's and Anticoagulation Dosing History:  Anticoagulation Dose History     There is no flowsheet data to display.        Most Recent 3 Troponin's:No lab results found.  Most Recent Cholesterol Panel:  Recent Labs   Lab Test 06/05/19  1200   CHOL 262*   *   HDL 88   TRIG 127     Most Recent 6 Bacteria Isolates From Any Culture (See EPIC Reports for Culture Details):  Recent Labs   Lab Test 02/01/20  1720 02/01/20  1400 10/22/18  1450 06/25/18  1120 05/07/18  1507 04/02/18  1346   CULT No MRSA isolated 10,000 to 50,000 colonies/mL  mixed urogenital maria l  No further identification or sensitivity done   >100,000 colonies/mL  Escherichia coli  * 10,000 to 50,000 colonies/mL  Mixed bacterial maria l  No further identification or sensitivity done  * 10,000 to 50,000 colonies/mL  Mixed bacterial maria l  No further identification or sensitivity done  * >100,000 colonies/mL  Escherichia coli  *     Most Recent TSH, T4 and A1c Labs:  Recent Labs   Lab Test 08/29/19  1550   TSH 1.00     Results for orders placed or performed during the hospital encounter of 02/01/20   XR  Chest 2 Views    Narrative    PROCEDURE:  XR CHEST 2 VW    HISTORY:  fall.     COMPARISON:  None.    FINDINGS:   The heart is enlarged. There is some mild interstitial thickening seen  at the lung bases. On the left there is some linear atelectasis or  scarring. The lungs are clear. No pleural effusion or pneumothorax.  There is a mild lower thoracic compression fracture of uncertain age      Impression    IMPRESSION:  Cardiomegaly mild interstitial thickening which is most  likely chronic.    Lower thoracic compression fracture of uncertain age      ALESHIA FITZGERALD MD   CT Head w/o Contrast    Narrative    PROCEDURE: CT HEAD W/O CONTRAST     HISTORY: fall.    COMPARISON: None.    TECHNIQUE:  Helical images of the head from the foramen magnum to the  vertex were obtained without contrast.    FINDINGS: The ventricles and sulci are normal in volume. No acute  intracranial hemorrhage, mass effect, midline shift, hydrocephalus or  basilar cystern effacement are present.    The grey-white matter interface is preserved. There is white matter  low-density both hemispheres consistent with small vessel changes.    The calvarium is intact. The mastoid air cells are clear.  The  visualized paranasal sinuses are clear.      Impression    IMPRESSION: No acute brain abnormality      ALESHIA FITZGERALD MD   Cervical spine CT w/o contrast    Narrative    PROCEDURE: CT CERVICAL SPINE W/O CONTRAST 2/1/2020 1:42 PM    HISTORY: fall    COMPARISONS: None.    Meds/Dose Given:    TECHNIQUE: CT scan of the cervical spine with sagittal coronal  reconstructions    FINDINGS: There are advanced degenerative changes at the middle  atlantoaxial joint and in all the cervical facet joints. There is mild  decrease in height in the C4-C5 disc with mild posterior osteophytes.  There are no fractures of the vertebral bodies or arches. The  prevertebral soft tissues appear normal.         Impression    IMPRESSION: No cervical fracture. Advanced  degenerative changes are  noted    ALESHIA FITZGERALD MD   Lumbar spine CT w/o contrast    Narrative    PROCEDURE: CT LUMBAR SPINE W/O CONTRAST 2/1/2020 1:44 PM    HISTORY: fall, trauma, elderly, pain    COMPARISONS: None.    Meds/Dose Given:    TECHNIQUE: CT scan lumbar spine with sagittal coronal reconstructions    FINDINGS: There is gaseous degeneration of the T12-L1 disc. The L1-L2  L2-L3 discs appear normal. There is some broad-based annular bulging  noted at L3-L4 moderate facet joint degenerative changes are present  and there is developmentally short pedicles this results in severe  central spinal stenosis. A hemangioma seen in the L3 vertebral body    At L4-L5 There is severe facet joint degenerative changes  nonspondylolytic spondylolisthesis developmentally short pedicles and  broad-based annular bulging. This results in severe central spinal  stenosis. This compression of both L5 nerve roots in the lateral  recesses and both L4 nerve roots in the neural foramina.    At L5-S1 there is loss of vertical disc space height noted broad-based  annular bulging severe facet joint degenerative changes and  posterolateral osteophytes. Posterolateral osteophytes impinge on both  L5 nerve roots moderately on the right and mildly on the left.    Sacroiliac iliac joint degenerative changes are noted    No fractures of the vertebral bodies or arches are noted. Interstitial  thickening is seen in the lung bases.         Impression    IMPRESSION: No acute lumbar fractures are seen    ALESHIA FITZGERALD MD   XR Pelvis and Hip Bilateral 2 Views    Narrative    PROCEDURE: XR PELVIS AND HIP BILATERAL 2 VIEWS 2/1/2020 2:04 PM    HISTORY: fall, pain    COMPARISONS: None.    TECHNIQUE: Pelvis one view, right hip 2 views, left hip 2 views    FINDINGS: Pelvis: Pelvis is intact. Degenerative changes are seen in  the lower lumbar spine and sacroiliac joints.    Right hip 2 views: There is a right hip prosthesis in place.  Prosthetic  elements appear well seated. Some myositis ossificans seen  adjacent to the hip.    Left hip 2 views: The acetabulum and proximal femur appears intact.         Impression    IMPRESSION: No acute pelvic or hip fracture.    ALESHIA FITZGERALD MD   XR Thoracic Spine 2 Views    Narrative    PROCEDURE: XR THORACIC SPINE 2 VW 2/1/2020 2:03 PM    HISTORY: chronic pain, fall, increased pain    COMPARISONS: None.    TECHNIQUE: AP and lateral    FINDINGS: There is a mild compression fracture of the T11 vertebra.  This fracture is new as compared to 2018. Degenerative osteophytes are  seen throughout the thoracic spine. The paravertebral soft tissues  appear normal.         Impression    IMPRESSION: Mild T11 compression fracture the fracture appears new as  compared to 2018 however on the CT scan there were bridging  osteophytes anteriorly and this is most likely a more chronic  compression fracture.    ALESHIA FITZGERALD MD   XR Knee Bilateral 3 Views    Narrative    PROCEDURE:  XR KNEE BILATERAL 3 VW    HISTORY: fall    COMPARISON:  None.    TECHNIQUE:  2 views of the both knees were obtained.    FINDINGS: Right knee: There is a right knee prosthesis in place.  Prosthetic elements appear well seated. There is some proximal tibial  deformity consistent with old healed fracture. The distal femur and  proximal fibula are intact.    Left knee: There is a knee prosthesis in place. The prosthetic  elements appear well seated. The distal femur proximal tibia and  fibula are intact.      Impression    IMPRESSION: No acute fracture.      ALESHIA FITZGERALD MD   CT Chest/Abdomen/Pelvis w Contrast    Narrative    PROCEDURE: CT CHEST/ABDOMEN/PELVIS W CONTRAST 2/1/2020 3:18 PM    HISTORY: unwitnessed fall, hypoxia, CXR abnormality    COMPARISONS: None.    Meds/Dose Given: Isovue 300 100ml Given    TECHNIQUE: CT chest abdomen and pelvis was performed with IV contrast  sagittal coronal reconstructions were obtained.    FINDINGS: CT chest: There  is some interstitial thickening seen  predominantly in the lower lobes. There is some dependent atelectasis  noted bilaterally. The heart is enlarged. The hilar and mediastinal  lymph nodes appear normal the thoracic aorta is intact. Axillary and  supraclavicular lymph nodes appear normal. There is an 8 mm in  diameter right thyroid nodule. No rib fractures are seen. There is  mild loss of vertical height of the T11 vertebra. Bridging anterior  osteophytes are seen in the lower thoracic spine.    CT scan of the abdomen and pelvis: There is a 5 mm diameter nodule in  the left lobe of the liver. There is no biliary ductal enlargement. No  calcified gallstones are seen. The spleen and pancreas appear normal.  The adrenal glands are normal. There is a 5 cm diameter right renal  mass highly suspicious for malignancy. No left renal masses are noted.  There is no hydronephrosis. The periaortic lymph nodes appear normal.  No free air or free fluid is seen within the abdomen. The bladder and  rectum appear normal. There is a low-density mass in the left ovary  measuring 3 cm in diameter. Degenerative changes are present in the  lumbar spine and sacroiliac joints. There are no pelvic fractures.  There is a hip prosthesis in place on the right. The left proximal  femur appears normal.         Impression    IMPRESSION: 8 mm right thyroid nodule.    Cardiomegaly with aortic valve calcifications..    5 cm right renal mass highly suspicious for malignancy.    3 cm diameter low-density mass in the left ovary.    Mild T11 compression fracture    ALESHIA FITZGERALD MD   Echocardiogram Complete    Narrative    [id number]  [id number]  [id number]  [id number]^THERON^TIMMY^CHEPE           [facility]  Echocardiography Laboratory  [address] [place] [id number]     Name: MANUELA FONSECA  MRN: [id number]  : 1930  Study Date: 2020 07:09 AM  Age: 89 yrs  Gender: Female  Patient Location: [facility]  Reason For  Study: Murmur  History: Murmur  Ordering Physician: TIMMY CRUMP  Referring Physician: TIMMY CRUMP  Performed By: Soumya Arias     BSA: 1.6 m2  Height: 61 in  Weight: 137 lb  _____________________________________________________________________________  __     _____________________________________________________________________________  __        Interpretation Summary  Probably moderate aortic stenosis. This is a plausible basis for a systolic  murmur.  Global and regional left ventricular function is normal with an EF of 55-60%.  Normal RV function.  There is no prior study for direct comparison.  _____________________________________________________________________________  __        Left Ventricle  Left ventricular size is normal. Global and regional left ventricular function  is normal with an EF of 55-60%. Relative wall thickness is increased  consistent with concentric remodeling. Grade I or early diastolic dysfunction.  No regional wall motion abnormalities are seen.     Right Ventricle  Right ventricular function, chamber size, wall motion, and thickness are  normal.     Atria  Mild biatrial enlargement is present. The atrial septum is intact as assessed  by color Doppler .     Mitral Valve  The mitral valve is normal. Trace mitral insufficiency is present.     Aortic Valve  Severe aortic valve calcification is present. The aortic valve is tricuspid.  Visually, aortic leaflet motion is significantly restricted, however severity  of aortic stenosis is difficult to assess by quantitative parameters (PV 2.7  m/s MG 20 mmHg DVI 0.38 CATHY 1.0 cm2 SVI 39 mL/m2) due to limited interrogation  of the aortic valve. Overall, aortic stenosis is probably moderate in  severity.        Tricuspid Valve  Mild tricuspid insufficiency is present. Right ventricular systolic pressure  is 35mmHg above the right atrial pressure.     Pulmonic Valve  The pulmonic valve is normal. Trace pulmonic insufficiency is  present.     Vessels  The aorta root is normal. The thoracic aorta is normal. IVC diameter and  respiratory changes fall into an intermediate range suggesting an RA pressure  of 8 mmHg.     Pericardium  No pericardial effusion is present.     Compared to Previous Study  There is no prior study for direct comparison.        Attestation  I have personally viewed the imaging and agree with the interpretation and  report as documented by the fellow, Susan Ramon, and/or edited by me.  _____________________________________________________________________________  __  MMode/2D Measurements & Calculations     IVSd: 0.97 cm  IVSs: 1.5 cm  LVIDd: 5.2 cm  LVIDs: 3.4 cm  LVPWd: 0.93 cm  LVPWs: 1.5 cm  FS: 33.9 %  LV mass(C)d: 180.9 grams  LV mass(C)dI: 112.4 grams/m2  LV mass(C)sI: 117.2 grams/m2  Ao root diam: 3.3 cm  LA dimension: 3.7 cm  LA/Ao: 1.1  LVOT diam: 1.9 cm  LVOT area: 2.8 cm2  RWT: 0.36     Time Measurements  Pulm. HR: 157.7 BPM     Doppler Measurements & Calculations  MV E max lucien: 107.1 cm/sec  MV A max lucien: 128.6 cm/sec  MV E/A: 0.83  MV dec slope: 536.8 cm/sec2  MV dec time: 0.20 sec  AS max P.9 mmHg  Ao V2 max: 267.1 cm/sec  Ao max P.5 mmHg  Ao V2 mean: 190.1 cm/sec  Ao mean P.4 mmHg  Ao V2 VTI: 64.9 cm  CATHY(I,D): 0.97 cm2  CATHY(V,D): 1.1 cm2  LV V1 max P.2 mmHg  LV V1 max: 101.5 cm/sec  LV V1 VTI: 22.2 cm     CO(LVOT): 11.7 l/min  CI(LVOT): 7.3 l/min/m2  SV(LVOT): 63.2 ml  SI(LVOT): 39.3 ml/m2  TV max P.0 mmHg  PA V2 max: 89.8 cm/sec  PA max PG: 3.2 mmHg  PA mean P.5 mmHg  PA V2 VTI: 21.1 cm  TR max lucien: 296.0 cm/sec  TR max P.0 mmHg  AV Lucien Ratio (DI): 0.38  CATHY Index (cm2/m2): 0.61  E/E': 19.1  Peak E' Lcuien: 5.6 cm/sec     _____________________________________________________________________________  __           Report approved by: Herbert Hatfield 2020 05:36 PM

## 2020-02-04 ENCOUNTER — TELEPHONE (OUTPATIENT)
Dept: FAMILY MEDICINE | Facility: OTHER | Age: 85
End: 2020-02-04

## 2020-02-04 NOTE — TELEPHONE ENCOUNTER
Corinne from [facility] called, requesting VO. Contact number: [phone]    VO: admission to home care

## 2020-02-05 ENCOUNTER — TELEPHONE (OUTPATIENT)
Dept: FAMILY MEDICINE | Facility: OTHER | Age: 85
End: 2020-02-05

## 2020-02-05 NOTE — TELEPHONE ENCOUNTER
Kamryn from [facility] calling. @[phone].    Needs verbal orders for home services for skilled nursing 1x week.    Please call    Thank you,    Fawn Bass RN

## 2020-02-05 NOTE — PROGRESS NOTES
Subjective     Claudine Bustos is a 89 year old female who presents to clinic today for the following health issues:    HPI       Hospital Follow-up Visit:    Hospital/Nursing Home/IP Rehab Facility: [facility]  Date of Admission: 2-1-20  Date of Discharge: 2-3-20  Reason(s) for Admission: back pain after fall            Problems taking medications regularly:  None       Medication changes since discharge: None       Problems adhering to non-medication therapy:  None    Summary of hospitalization:  [facility] discharge summary reviewed  Diagnostic Tests/Treatments reviewed.  Follow up needed: ultrasound pelvis, cbc, echocardiogram  Other Healthcare Providers Involved in Patient s Care:         Specialist appointment - urology following renal mass  Update since discharge: improved.   Post Discharge Medication Reconciliation: discharge medications reconciled and changed, per note/orders (see AVS).  Plan of care communicated with patient and family                           Patient Active Problem List   Diagnosis     Advanced care planning/counseling discussion     Hyperlipidemia with target LDL less than 130     S/P total knee replacement using cement, left     Benign essential hypertension     Status post total replacement of right hip     Diastolic dysfunction     Chronic kidney disease, unspecified     Postoperative anemia due to acute blood loss     CAP (community acquired pneumonia)     Renal benign neoplasm, left     Obesity (BMI 35.0-39.9) with comorbidity (H)     Memory loss     Acute right-sided low back pain with right-sided sciatica     Nasal lesion     Renal mass, right     Back pain     Hyponatremia     Rhabdomyolysis     Hypokalemia     Anemia     Closed fracture of eleventh thoracic vertebra (H)     Systolic murmur     Hypoxia     Past Surgical History:   Procedure Laterality Date     ARTHROPLASTY HIP Right 1/23/2017    Procedure: ARTHROPLASTY HIP;  Surgeon: Jose Manuel Muñoz MD;   Location: [facility] OR     ARTHROPLASTY KNEE Left 1/25/2016    Procedure: ARTHROPLASTY KNEE;  Surgeon: Jose Manuel Muñoz MD;  Location: [facility] OR     colonoscopy  2004, 2009     EXCISE LESION EYELID Left 8/1/2017    Procedure: EXCISE LESION EYELID;  LEFT LOWER LID WEDGE EXCISION WITH FROZEN SECTION CONTROL;  Surgeon: Quinten Sotelo MD;  Location:  [facility]     excision of rectal villus adenoma       knee replacement Right 1/2011     PHACOEMULSIFICATION WITH STANDARD INTRAOCULAR LENS IMPLANT  2/25/2014    Procedure: PHACOEMULSIFICATION WITH STANDARD INTRAOCULAR LENS IMPLANT;  CATARACT EXTRACTION WITH INTRA OCULAR LENS LEFT;  Surgeon: Jah Bee MD;  Location: [facility] OR     PHACOEMULSIFICATION WITH STANDARD INTRAOCULAR LENS IMPLANT Right 5/12/2015    Procedure: PHACOEMULSIFICATION WITH STANDARD INTRAOCULAR LENS IMPLANT;  Surgeon: Jah Bee MD;  Location: [facility] OR     thoracic schwannoma resection       tonsillectomy       urethral dilitation every 4 months         Social History     Tobacco Use     Smoking status: Never Smoker     Smokeless tobacco: Never Used   Substance Use Topics     Alcohol use: Yes     Frequency: Monthly or less     Drinks per session: 1 or 2     Comment: Mixed, rarely     Family History   Problem Relation Age of Onset     Alcohol/Drug Father         alcoholism     Cancer Father 51        Esophageal, cause of death     Other - See Comments Father         Tobacco use     Cerebrovascular Disease Mother 51        Cause of death     Other - See Comments Maternal Grandmother      Other - See Comments Maternal Grandfather      Other - See Comments Paternal Grandmother      Other - See Comments Paternal Grandfather      Cancer Sister         Breast     Cancer Brother         Leukemia     Cancer Brother         Lung     Chronic Obstructive Pulmonary Disease Brother          Current Outpatient Medications   Medication Sig Dispense Refill     Calcium Carb-Cholecalciferol (CALCIUM + D3) 600-200 MG-UNIT TABS  Take 1 tablet by mouth daily       ferrous sulfate 325 (65 Fe) MG PO tablet Take 1 tablet (325 mg) by mouth daily (with breakfast) 60 tablet 1     gabapentin (NEURONTIN) 100 MG capsule TAKE 2 CAPSULES BY MOUTH 3 TIMES DAILY AFTER ONE WEEK IF STILL HAVINGPAIN INCREASE TO 3 CAPSULES 3 TIMES DAILY 360 capsule 1     ibuprofen 200 MG PO tablet Take 200 mg by mouth every 6 hours as needed for mild pain       lisinopril (PRINIVIL/ZESTRIL) 10 MG tablet Take 1 tablet (10 mg) by mouth daily 90 tablet 3     Multiple Vitamin (MULTIVITAMIN) per tablet Take 1 tablet by mouth daily with food.       No Known Allergies  BP Readings from Last 3 Encounters:   02/19/20 (!) 146/84   02/03/20 160/78   08/29/19 (!) 154/80    Wt Readings from Last 3 Encounters:   02/19/20 72.6 kg (160 lb)   02/01/20 73.7 kg (162 lb 7.7 oz)   08/29/19 76.6 kg (168 lb 12.8 oz)            Shortness of breath      Duration: months    Description (location/character/radiation): more with activity, resolves within 5-10 min of sitting afterwards    Intensity:  mild    Accompanying signs and symptoms: none    History (similar episodes/previous evaluation): has anemia, mild aortic sclerosis    Precipitating or alleviating factors: resting    Therapies tried and outcome: None     Reviewed and updated as needed this visit by Provider         Review of Systems   ROS COMP: Constitutional, HEENT, cardiovascular, pulmonary, gi and gu systems are negative, except as otherwise noted.      Objective    BP (!) 146/84 (BP Location: Right arm, Patient Position: Chair, Cuff Size: Adult Regular)   Pulse 102   Temp 98.5  F (36.9  C) (Tympanic)   Resp 20   Wt 72.6 kg (160 lb)   SpO2 96%   BMI 30.23 kg/m    Body mass index is 30.23 kg/m .  Physical Exam   GENERAL: healthy, alert and no distress  NECK: no adenopathy, no asymmetry, masses, or scars and thyroid normal to palpation  RESP: lungs clear to auscultation - no rales, rhonchi or wheezes  CV: regular rate and rhythm,  normal S1 S2, no S3 or S4, 1/9 systolic murmur best heard at RUSB, click or rub, no peripheral edema and peripheral pulses strong  ABDOMEN: soft, nontender, no hepatosplenomegaly, no masses and bowel sounds normal  MS: no gross musculoskeletal defects noted, no edema  PSYCH: mentation appears normal, affect normal/bright    Diagnostic Test Results:  Labs reviewed in Epic  Results for orders placed or performed in visit on 02/19/20   Basic metabolic panel     Status: Abnormal (In process)   Result Value Ref Range    Sodium 141 133 - 144 mmol/L    Potassium 3.9 3.4 - 5.3 mmol/L    Chloride 113 (H) 94 - 109 mmol/L    Carbon Dioxide PENDING 20 - 32 mmol/L    Anion Gap PENDING 3 - 14 mmol/L    Glucose PENDING 70 - 99 mg/dL    Urea Nitrogen PENDING 7 - 30 mg/dL    Creatinine PENDING 0.52 - 1.04 mg/dL    GFR Estimate PENDING >60 mL/min/[1.73_m2]    GFR Estimate If Black PENDING >60 mL/min/[1.73_m2]    Calcium PENDING 8.5 - 10.1 mg/dL   CBC with platelets     Status: Abnormal   Result Value Ref Range    WBC 7.9 4.0 - 11.0 10e9/L    RBC Count 4.15 3.8 - 5.2 10e12/L    Hemoglobin 9.7 (L) 11.7 - 15.7 g/dL    Hematocrit 31.4 (L) 35.0 - 47.0 %    MCV 76 (L) 78 - 100 fl    MCH 23.4 (L) 26.5 - 33.0 pg    MCHC 30.9 (L) 31.5 - 36.5 g/dL    RDW 16.7 (H) 10.0 - 15.0 %    Platelet Count 370 150 - 450 10e9/L           Assessment & Plan     (N83.8) Ovarian mass, right  (primary encounter diagnosis)  Comment:   Plan: US Pelvis Complete without Transvaginal        Likely would not do a hysterectomy but will check for peace of mind    (I35.8) Aortic valve sclerosis  Comment: mild on echo in 2002,   Plan: Echocardiogram Complete        Will recheck, mild on exam today    (E61.1) Iron deficiency  Comment:   Plan: ferrous sulfate 325 (65 Fe) MG PO tablet, CBC         with platelets        hgb slight improvement, will have her take iron daily for 4-6 wks until next exam    (E87.6) Hypokalemia  Comment:   Plan: Basic metabolic panel         "Recheck today, was low when she left the hospital    Briefly discussed her renal mass that is stable and has seen urology, also her activity at home.  Daughter is helping her       BMI:   Estimated body mass index is 30.23 kg/m  as calculated from the following:    Height as of 2/1/20: 1.549 m (5' 1\").    Weight as of this encounter: 72.6 kg (160 lb).   Weight management plan: Discussed healthy diet and exercise guidelines      Patient was agreeable to this plan and had no further questions.  There are no Patient Instructions on file for this visit.    No follow-ups on file.    Katarina Gutierrez MD  [facility] - [place]        "

## 2020-02-06 ENCOUNTER — MEDICAL CORRESPONDENCE (OUTPATIENT)
Dept: HEALTH INFORMATION MANAGEMENT | Facility: CLINIC | Age: 85
End: 2020-02-06
Payer: MEDICARE

## 2020-02-06 ASSESSMENT — ENCOUNTER SYMPTOMS
ABDOMINAL PAIN: 0
FREQUENCY: 0
LIGHT-HEADEDNESS: 0
NAUSEA: 1
COLOR CHANGE: 1
DIZZINESS: 0
DYSURIA: 0
SHORTNESS OF BREATH: 0
WEAKNESS: 1
HEADACHES: 0
HEMATURIA: 0
BACK PAIN: 1

## 2020-02-07 NOTE — TELEPHONE ENCOUNTER
Can you please look to see if the plan of care was received.  Home care is needing this returned ASAP.  Thank you     [facility]

## 2020-02-12 DIAGNOSIS — Z53.9 PERSONS ENCOUNTERING HEALTH SERVICES FOR SPECIFIC PROCEDURES, NOT CARRIED OUT: Primary | ICD-10-CM

## 2020-02-12 PROCEDURE — G0180 MD CERTIFICATION HHA PATIENT: HCPCS | Performed by: FAMILY MEDICINE

## 2020-02-19 ENCOUNTER — OFFICE VISIT (OUTPATIENT)
Dept: FAMILY MEDICINE | Facility: OTHER | Age: 85
End: 2020-02-19
Attending: FAMILY MEDICINE
Payer: MEDICARE

## 2020-02-19 VITALS
BODY MASS INDEX: 30.23 KG/M2 | SYSTOLIC BLOOD PRESSURE: 146 MMHG | OXYGEN SATURATION: 96 % | RESPIRATION RATE: 20 BRPM | WEIGHT: 160 LBS | HEART RATE: 102 BPM | DIASTOLIC BLOOD PRESSURE: 84 MMHG | TEMPERATURE: 98.5 F

## 2020-02-19 DIAGNOSIS — N83.8 OVARIAN MASS, RIGHT: Primary | ICD-10-CM

## 2020-02-19 DIAGNOSIS — E61.1 IRON DEFICIENCY: ICD-10-CM

## 2020-02-19 DIAGNOSIS — E87.6 HYPOKALEMIA: ICD-10-CM

## 2020-02-19 DIAGNOSIS — I35.8 AORTIC VALVE SCLEROSIS: ICD-10-CM

## 2020-02-19 LAB
ANION GAP SERPL CALCULATED.3IONS-SCNC: 5 MMOL/L (ref 3–14)
BUN SERPL-MCNC: 16 MG/DL (ref 7–30)
CALCIUM SERPL-MCNC: 9.1 MG/DL (ref 8.5–10.1)
CHLORIDE SERPL-SCNC: 113 MMOL/L (ref 94–109)
CO2 SERPL-SCNC: 23 MMOL/L (ref 20–32)
CREAT SERPL-MCNC: 0.68 MG/DL (ref 0.52–1.04)
ERYTHROCYTE [DISTWIDTH] IN BLOOD BY AUTOMATED COUNT: 16.7 % (ref 10–15)
GFR SERPL CREATININE-BSD FRML MDRD: 77 ML/MIN/{1.73_M2}
GLUCOSE SERPL-MCNC: 104 MG/DL (ref 70–99)
HCT VFR BLD AUTO: 31.4 % (ref 35–47)
HGB BLD-MCNC: 9.7 G/DL (ref 11.7–15.7)
MCH RBC QN AUTO: 23.4 PG (ref 26.5–33)
MCHC RBC AUTO-ENTMCNC: 30.9 G/DL (ref 31.5–36.5)
MCV RBC AUTO: 76 FL (ref 78–100)
PLATELET # BLD AUTO: 370 10E9/L (ref 150–450)
POTASSIUM SERPL-SCNC: 3.9 MMOL/L (ref 3.4–5.3)
RBC # BLD AUTO: 4.15 10E12/L (ref 3.8–5.2)
SODIUM SERPL-SCNC: 141 MMOL/L (ref 133–144)
WBC # BLD AUTO: 7.9 10E9/L (ref 4–11)

## 2020-02-19 PROCEDURE — G0463 HOSPITAL OUTPT CLINIC VISIT: HCPCS

## 2020-02-19 PROCEDURE — 36415 COLL VENOUS BLD VENIPUNCTURE: CPT | Mod: ZL | Performed by: FAMILY MEDICINE

## 2020-02-19 PROCEDURE — 99214 OFFICE O/P EST MOD 30 MIN: CPT | Performed by: FAMILY MEDICINE

## 2020-02-19 PROCEDURE — 85027 COMPLETE CBC AUTOMATED: CPT | Mod: ZL | Performed by: FAMILY MEDICINE

## 2020-02-19 PROCEDURE — 80048 BASIC METABOLIC PNL TOTAL CA: CPT | Mod: ZL | Performed by: FAMILY MEDICINE

## 2020-02-19 RX ORDER — IBUPROFEN 200 MG
200 TABLET ORAL EVERY 6 HOURS PRN
COMMUNITY
End: 2022-01-07

## 2020-02-19 RX ORDER — FERROUS SULFATE 325(65) MG
325 TABLET ORAL
Qty: 60 TABLET | Refills: 1 | Status: SHIPPED | OUTPATIENT
Start: 2020-02-19 | End: 2020-07-13

## 2020-02-19 ASSESSMENT — PAIN SCALES - GENERAL: PAINLEVEL: MODERATE PAIN (5)

## 2020-02-19 NOTE — NURSING NOTE
"Chief Complaint   Patient presents with     Hospital F/U       Initial BP (!) 146/84 (BP Location: Right arm, Patient Position: Chair, Cuff Size: Adult Regular)   Pulse 102   Temp 98.5  F (36.9  C) (Tympanic)   Resp 20   Wt 72.6 kg (160 lb)   SpO2 96%   BMI 30.23 kg/m   Estimated body mass index is 30.23 kg/m  as calculated from the following:    Height as of 2/1/20: 1.549 m (5' 1\").    Weight as of this encounter: 72.6 kg (160 lb).  Medication Reconciliation: complete  Brie Simms LPN    "

## 2020-03-04 ENCOUNTER — HOSPITAL ENCOUNTER (OUTPATIENT)
Dept: ULTRASOUND IMAGING | Facility: HOSPITAL | Age: 85
Discharge: HOME | End: 2020-03-04
Attending: FAMILY MEDICINE | Admitting: FAMILY MEDICINE
Payer: MEDICARE

## 2020-03-04 DIAGNOSIS — N83.8 OVARIAN MASS, RIGHT: ICD-10-CM

## 2020-03-04 PROCEDURE — 76856 US EXAM PELVIC COMPLETE: CPT | Mod: TC

## 2020-03-19 ENCOUNTER — TELEPHONE (OUTPATIENT)
Dept: FAMILY MEDICINE | Facility: OTHER | Age: 85
End: 2020-03-19

## 2020-03-19 DIAGNOSIS — N83.202 LEFT OVARIAN CYST: Primary | ICD-10-CM

## 2020-03-19 NOTE — TELEPHONE ENCOUNTER
Was unable to reach pt. Per US recommend gyn onc referral.Pended referral and letter mailed to pt. Thanks

## 2020-04-01 ENCOUNTER — TELEPHONE (OUTPATIENT)
Dept: FAMILY MEDICINE | Facility: OTHER | Age: 85
End: 2020-04-01

## 2020-04-01 NOTE — TELEPHONE ENCOUNTER
ana maría from [facility] in [place] called regarding referall to dr. Hernandez, stated pt should be seen by urology.  please call [phone] to speak to them

## 2020-06-26 DIAGNOSIS — I10 BENIGN ESSENTIAL HYPERTENSION: ICD-10-CM

## 2020-06-29 NOTE — TELEPHONE ENCOUNTER
lisinopril      Last Written Prescription Date:  6.5.19  Last Fill Quantity: 90,   # refills: 3  Last Office Visit: 2.19.2020

## 2020-06-30 RX ORDER — LISINOPRIL 10 MG/1
TABLET ORAL
Qty: 90 TABLET | Refills: 0 | Status: SHIPPED | OUTPATIENT
Start: 2020-06-30 | End: 2020-07-13

## 2020-07-06 ENCOUNTER — TELEPHONE (OUTPATIENT)
Dept: FAMILY MEDICINE | Facility: OTHER | Age: 85
End: 2020-07-06

## 2020-07-06 DIAGNOSIS — M54.41 ACUTE RIGHT-SIDED LOW BACK PAIN WITH RIGHT-SIDED SCIATICA: Primary | ICD-10-CM

## 2020-07-06 RX ORDER — TRAMADOL HYDROCHLORIDE 50 MG/1
TABLET ORAL
Qty: 30 TABLET | Refills: 0 | Status: SHIPPED | OUTPATIENT
Start: 2020-07-06 | End: 2020-07-13

## 2020-07-06 NOTE — TELEPHONE ENCOUNTER
Tramadol sent to [facility].  Needs Televisit or in person visit with DR POWER this week to discuss pain management and make sure this is her sciatica.

## 2020-07-06 NOTE — TELEPHONE ENCOUNTER
Physical Therapy Progress Note    Referred by: NORAH Siddiqui  Medical Diagnosis (from order):   Current Functional Limitations: Has pain with all movements of her hands, opening cans, holding a cup of coffee, lifting light objects, still getting swelling around her wrists. Pain in shoulder with reaching with R arm. Current Pain (0-10 scale): 7    OBJECTIVE   only deficits and what applies noted below    Range of Motion (degrees)   Left Right   Date Initial Initial   Shoulder Flexion (170-180) 145 145*   Shoulder Extension (50-60)     Shoulder Abduction (170-180) 150 145*   Shoulder Adduction (50-75)     Shoulder Internal Rotation ()     Shoulder External Rotation (80-90)     Elbow Flexion (140-150) 140 140*   Elbow Extension (0-10) 0 0   Forearm Supination (90)     Forearm Pronation (90)     Wrist Flexion (80) 70 70*   Wrist Extension (70) 65 65*   Wrist Radial Deviation (20)     Wrist Ulnar Deviation (45)     Reported in degrees, active range of motion recorded unless noted as AA=active assistive or P=passive; standard testing positions unless otherwise noted, norms included in ( ); *=pain         Only those motions that were assessed are noted. Strength (out of 5)   Left Right   Date Initial Initial   Shoulder Flexors 5* 4*   Shoulder Extensors      Shoulder Abductors 5* 4+*   Shoulder Adductors     Shoulder Internal Rotators 4+* 4+*   Shoulder External Rotators  4+* 4*   Elbow Flexors 5 5   Elbow Extensors  5 5   Forearm Supinators      Forearm Pronators     Wrist Flexors 4+* 4+*   Wrist Extensors  4+* 4+*   Wrist Radial Deviators      Wrist Ulnar Deviators     standard testing positions unless otherwise noted; *=pain  Only muscle strength that was assessed are noted. Outcome Measures: (Outcome Scoring)  Quick Disabilities of the Arm, Shoulder and Hand (QDASH): 42 (11-55);  Calculated Score: 70.45 (0-100) (scored 0-100; a higher score indicates greater disability)    ASSESSMENT   To date Please advise as Dr. Gutierrez is out   the patient has made gains with daily activities, ability to dress, prepare a meal, and sleep as reported. Patient continues to have impairments and functional deficits as noted. Patient will continue to benefit from skilled care as outlined. PLAN    Updates to plan of care: continue PT 1x/wk for 6 weeks for strengthening  Requesting authorization for 6 additional visits    Goals  1. Patient will be independent with progressed and modified home exercise program. Progressing. 2. Decrease pain/symptoms to 2/10. Progressing. 3. Improve  strength to 40# bilaterally. Progressing. through improvements listed above patient will:  4. Be able to  and lift a light grocery bag,  steering wheel, perform light home/yard maintenance tasks with minimal pain/difficulty. Progressing. 5. Improve coordination speed for resumption of work related tasks, leisure pursuits, playing of musical instrument, writing, typing. Progressing. 6. Be able to reach at and above shoulder height with minimal pain/difficulty to improve completion of household tasks, completion of self-care tasks/dressing. Progressing. 7. Be able to sleep 6 hours without disruption from pain. Progressing. 8. Quick DASH: Patient will complete form to reflect an improved score from initial score of 100% to less than or equal to 40% to indicate patient reported improvement in function/disability/impairment. Progressing. Physical Therapy Daily Treatment    Visit SJDKI:O 72P H L Q E N F U A L E T Z U X X O E E Y U VLWM of Care:Â 5/17/2019 Through:Â 10/16/2019  Insurance Information:Â Worker's comp denied her claim    Current Work Status:Â return to work with the following restrictions: no repetitive work, no lifting more than 5 pounds. These restrictions are in effect until she is reassessed on November 6, 2019. SUBJECTIVE   Reports her pain is about the same, was seen by Dr. Perry Mireles in orthopedics 3 weeks ago, had an injection to her shoulder.  Does not feel it made a difference. She is also having numbness in both hands up to her elbows. She was told to stop wearing the wrist and elbow braces. Referred back to physical therapy, to focus on strengthening/work hardening. Was given a work restriction, her employer told her they don't have any jobs for her. Current Pain (0-10 scale): 7/10  Functional Change: Has a hard time doing daily functions and holding onto objects due to pain. OBJECTIVE   See progress note    Palpation:  tenderness to R lateral shoulder, upper trap, B medial epicondyle, wrist flexors, wrist flexors    Treatment   Therapeutic Exercise:   PROM/gentle stretching to R shoulder in supine  Supine hand in hand flexion, has increased pain  Pulleys for shoulder ROM, x 3 minutes  UBE level 1.0 x 2.5 minutes forward- stopped due to fatigue, increased numbness in hands  Orange theraband rows x 15, cues for posture  Orange band chest press, x 10  Bicep curls 2# x 10  Shoulder flexion 1# x 10  Wrist flexor stretches     Manual Therapy:   STM R upper trap, biceps  Â   Skilled input: verbal instruction/cues to keep exercises in tolerable range    Home Program:   ROM, stretching,  strengthening    Writer verbally educated the patient and received verbal consent from the patient on hand placement, positioning of patient, and techniques to be performed today including ROM, strengthening, STM/MFR as described above and how they are pertinent to the patient's plan of care. Suggestions for next session as indicated: progress per plan of care, cervical and UE stretching, nerve glides, posture, awaiting for ortho appointment for further instructions. ASSESSMENT   Patient fatigues quickly with strengthening exercises progressed today. Pain after treatment (patient reported, 0-10 scale):6-7/10  Result of above outlined education: Verbalizes understanding and Demonstrates understanding    THERAPY DAILY BILLING   Insurance: [employer] 2. N/A    Evaluation Procedures:  No evaluation codes were used on this date of service    Timed Procedures:  Manual Therapy, 12 minutes  Therapeutic Exercise, 42 minutes    Untimed Procedures:  No untimed codes were used on this date of service    Total Treatment Time: 54 minutes

## 2020-07-06 NOTE — TELEPHONE ENCOUNTER
Patient daughter calling in regards to the patient. States that she is still having a lot of back pain, states it is most likely sciatic pain. She has been taking tylenol and ibuprofen for pain but it does not seem to be helping. Daughter is wondering if she needs to be seen or if they could have something more ordered for pain, stated that she was given something in the past she couldn't remember if it was oxycodone or something else. please return call to discuss. [phone] ([place]). Ashley LeChevalier LPN

## 2020-07-10 NOTE — PROGRESS NOTES
Subjective     Claudine Bustos is a 89 year old female who presents to clinic today for the following health issues:    HPI   Chronic/Recurring Back Pain Follow Up      Where is your back pain located? (Select all that apply) low back right    How would you describe your back pain?  shooting down right leg    Where does your back pain spread? the right  knee and the right foot    Since your last clinic visit for back pain, how has your pain changed? always present, but gets better and worse    Does your back pain interfere with your job? YES    Since your last visit, have you tried any new treatment? Tramadol was given last week to try and unknown does not seem like it      How many servings of fruits and vegetables do you eat daily?  0-1    On average, how many sweetened beverages do you drink each day (Examples: soda, juice, sweet tea, etc.  Do NOT count diet or artificially sweetened beverages)?   0    How many days per week do you exercise enough to make your heart beat faster? 3 or less    How many minutes a day do you exercise enough to make your heart beat faster? 9 or less  How many days per week do you miss taking your medication? Unknown but has a pill box at home    What makes it hard for you to take your medications?  remembering to take    Depression and Anxiety Follow-Up    How are you doing with your depression since your last visit? No change    How are you doing with your anxiety since your last visit?  No change    Are you having other symptoms that might be associated with depression or anxiety? No    Have you had a significant life event? No     Do you have any concerns with your use of alcohol or other drugs? No    Social History     Tobacco Use     Smoking status: Never Smoker     Smokeless tobacco: Never Used   Substance Use Topics     Alcohol use: Yes     Frequency: Monthly or less     Drinks per session: 1 or 2     Comment: Mixed, rarely     Drug use: No     PHQ 6/25/2018 6/5/2019 7/13/2020    PHQ-9 Total Score 0 5 14   Q9: Thoughts of better off dead/self-harm past 2 weeks Not at all Not at all Not at all     STELLA-7 SCORE 6/25/2018 6/5/2019 7/13/2020   Total Score 0 0 0     STELLA-7  7/13/2020   1. Feeling nervous, anxious, or on edge 0   2. Not being able to stop or control worrying 0   3. Worrying too much about different things 0   4. Trouble relaxing 0   5. Being so restless that it is hard to sit still 0   6. Becoming easily annoyed or irritable 0   7. Feeling afraid, as if something awful might happen 0   STELLA-7 Total Score 0   If you checked any problems, how difficult have they made it for you to do your work, take care of things at home, or get along with other people? -       Suicide Assessment Five-step Evaluation and Treatment (SAFE-T)      Patient Active Problem List   Diagnosis     Advanced care planning/counseling discussion     Hyperlipidemia with target LDL less than 130     S/P total knee replacement using cement, left     Benign essential hypertension     Status post total replacement of right hip     Diastolic dysfunction     Chronic kidney disease, unspecified     Postoperative anemia due to acute blood loss     CAP (community acquired pneumonia)     Renal benign neoplasm, left     Obesity (BMI 35.0-39.9) with comorbidity (H)     Memory loss     Acute right-sided low back pain with right-sided sciatica     Nasal lesion     Renal mass, right     Back pain     Hyponatremia     Rhabdomyolysis     Hypokalemia     Anemia     Closed fracture of eleventh thoracic vertebra (H)     Systolic murmur     Hypoxia     Iron deficiency     Spinal stenosis of lumbosacral region     Sacroiliitis (H)     Past Surgical History:   Procedure Laterality Date     ARTHROPLASTY HIP Right 1/23/2017    Procedure: ARTHROPLASTY HIP;  Surgeon: Jose Manuel Muñoz MD;  Location: [facility]     ARTHROPLASTY KNEE Left 1/25/2016    Procedure: ARTHROPLASTY KNEE;  Surgeon: Jose Manuel Muñoz MD;  Location: [facility]      colonoscopy  2004, 2009     EXCISE LESION EYELID Left 8/1/2017    Procedure: EXCISE LESION EYELID;  LEFT LOWER LID WEDGE EXCISION WITH FROZEN SECTION CONTROL;  Surgeon: Quinten Sotelo MD;  [facility]     excision of rectal villus adenoma       knee replacement Right 1/2011     PHACOEMULSIFICATION WITH STANDARD INTRAOCULAR LENS IMPLANT  2/25/2014    Procedure: PHACOEMULSIFICATION WITH STANDARD INTRAOCULAR LENS IMPLANT;  CATARACT EXTRACTION WITH INTRA OCULAR LENS LEFT;  Surgeon: Jah Bee MD;  Location: [facility]     PHACOEMULSIFICATION WITH STANDARD INTRAOCULAR LENS IMPLANT Right 5/12/2015    Procedure: PHACOEMULSIFICATION WITH STANDARD INTRAOCULAR LENS IMPLANT;  Surgeon: Jah Bee MD;  Location: [facility]     thoracic schwannoma resection       tonsillectomy       urethral dilitation every 4 months         Social History     Tobacco Use     Smoking status: Never Smoker     Smokeless tobacco: Never Used   Substance Use Topics     Alcohol use: Yes     Frequency: Monthly or less     Drinks per session: 1 or 2     Comment: Mixed, rarely     Family History   Problem Relation Age of Onset     Alcohol/Drug Father         alcoholism     Cancer Father 51        Esophageal, cause of death     Other - See Comments Father         Tobacco use     Cerebrovascular Disease Mother 51        Cause of death     Other - See Comments Maternal Grandmother      Other - See Comments Maternal Grandfather      Other - See Comments Paternal Grandmother      Other - See Comments Paternal Grandfather      Cancer Sister         Breast     Cancer Brother         Leukemia     Cancer Brother         Lung     Chronic Obstructive Pulmonary Disease Brother          Current Outpatient Medications   Medication Sig Dispense Refill     ferrous sulfate (FEROSUL) 325 (65 Fe) MG tablet Take 1 tablet (325 mg) by mouth daily (with breakfast) 90 tablet 1     gabapentin (NEURONTIN) 100 MG capsule TAKE 1 CAPSULE BY MOUTH 3 TIMES DAILY AFTER ONE  "WEEK IF STILL HAVING PAIN INCREASE TO 2 CAPSULES 3 TIMES DAILY 360 capsule 1     ibuprofen 200 MG PO tablet Take 200 mg by mouth every 6 hours as needed for mild pain       lisinopril (ZESTRIL) 10 MG tablet Take 1 tablet (10 mg) by mouth daily 90 tablet 3     potassium chloride ER (KLOR-CON M) 10 MEQ CR tablet Take 1 tablet (10 mEq) by mouth 2 times daily 90 tablet 1     traMADol (ULTRAM) 50 MG tablet 1-2 tabs orally  Every 6 hrs as needed for pain. 30 tablet 1     Calcium Carb-Cholecalciferol (CALCIUM + D3) 600-200 MG-UNIT TABS Take 1 tablet by mouth daily       Multiple Vitamin (MULTIVITAMIN) per tablet Take 1 tablet by mouth daily with food.       No Known Allergies  BP Readings from Last 3 Encounters:   07/13/20 (!) 140/74   02/19/20 (!) 146/84   02/03/20 160/78    Wt Readings from Last 3 Encounters:   07/13/20 65.8 kg (145 lb)   02/19/20 72.6 kg (160 lb)   02/01/20 73.7 kg (162 lb 7.7 oz)          Hyperlipidemia Follow-Up      Are you regularly taking any medication or supplement to lower your cholesterol?   No    Are you having muscle aches or other side effects that you think could be caused by your cholesterol lowering medication?  No    Hypertension Follow-up      Do you check your blood pressure regularly outside of the clinic? No     Are you following a low salt diet? No    Are your blood pressures ever more than 140 on the top number (systolic) OR more   than 90 on the bottom number (diastolic), for example 140/90? No    Reviewed and updated as needed this visit by Provider  Problems         Review of Systems   Constitutional, HEENT, cardiovascular, pulmonary, gi and gu systems are negative, except as otherwise noted.      Objective    BP (!) 140/74 (BP Location: Right arm, Patient Position: Chair, Cuff Size: Adult Regular)   Pulse 83   Temp 98.2  F (36.8  C) (Tympanic)   Ht 1.549 m (5' 1\")   Wt 65.8 kg (145 lb)   SpO2 94%   BMI 27.40 kg/m    There is no height or weight on file to calculate " BMI.  Physical Exam   GENERAL: healthy, alert and no distress  NECK: no adenopathy, no asymmetry, masses, or scars and thyroid normal to palpation  RESP: lungs clear to auscultation - no rales, rhonchi or wheezes  CV: regular rate and rhythm, normal S1 S2, no S3 or S4, no murmur, click or rub, no peripheral edema and peripheral pulses strong  ABDOMEN: soft, nontender, no hepatosplenomegaly, no masses and bowel sounds normal  MS: decreased range of motion low back, sitting in wheelchair today  PSYCH: confused, affect normal/bright and needed a few things repeated today    Diagnostic Test Results:  Labs reviewed in Epic  Results for orders placed or performed in visit on 07/13/20   Lipid Profile (Chol, Trig, HDL, LDL calc)     Status: Abnormal   Result Value Ref Range    Cholesterol 227 (H) <200 mg/dL    Triglycerides 165 (H) <150 mg/dL    HDL Cholesterol 59 >49 mg/dL    LDL Cholesterol Calculated 135 (H) <100 mg/dL    Non HDL Cholesterol 168 (H) <130 mg/dL   CBC with platelets     Status: Abnormal   Result Value Ref Range    WBC 10.3 4.0 - 11.0 10e9/L    RBC Count 4.34 3.8 - 5.2 10e12/L    Hemoglobin 9.8 (L) 11.7 - 15.7 g/dL    Hematocrit 32.3 (L) 35.0 - 47.0 %    MCV 74 (L) 78 - 100 fl    MCH 22.6 (L) 26.5 - 33.0 pg    MCHC 30.3 (L) 31.5 - 36.5 g/dL    RDW 19.1 (H) 10.0 - 15.0 %    Platelet Count 364 150 - 450 10e9/L   Comprehensive metabolic panel     Status: Abnormal   Result Value Ref Range    Sodium 141 133 - 144 mmol/L    Potassium 3.2 (L) 3.4 - 5.3 mmol/L    Chloride 110 (H) 94 - 109 mmol/L    Carbon Dioxide 22 20 - 32 mmol/L    Anion Gap 9 3 - 14 mmol/L    Glucose 85 70 - 99 mg/dL    Urea Nitrogen 25 7 - 30 mg/dL    Creatinine 0.84 0.52 - 1.04 mg/dL    GFR Estimate 61 >60 mL/min/[1.73_m2]    GFR Estimate If Black 71 >60 mL/min/[1.73_m2]    Calcium 8.6 8.5 - 10.1 mg/dL    Bilirubin Total 0.4 0.2 - 1.3 mg/dL    Albumin 2.9 (L) 3.4 - 5.0 g/dL    Protein Total 6.2 (L) 6.8 - 8.8 g/dL    Alkaline Phosphatase 88 40  - 150 U/L    ALT 32 0 - 50 U/L    AST 20 0 - 45 U/L           Assessment & Plan     (E78.5) Hyperlipidemia with target LDL less than 130  (primary encounter diagnosis)  Comment:   Plan: Comprehensive metabolic panel, Lipid Profile         (Chol, Trig, HDL, LDL calc)        Was fasting today    (I10) Benign essential hypertension  Comment:   Plan: lisinopril (ZESTRIL) 10 MG tablet        refilled    (N18.3) Stage 3 chronic kidney disease (H)  Comment:   Plan: Iron and iron binding capacity        Restart feSO4    (D64.9) Anemia, unspecified type  Comment:   Plan: CBC with platelets, Iron and iron binding         capacity        As above  Follow-up 3 mos    (M54.41) Acute right-sided low back pain with right-sided sciatica  Comment:   Plan: traMADol (ULTRAM) 50 MG tablet, gabapentin         (NEURONTIN) 100 MG capsule        Restart gabapentin and use tramadol prn  Will get radiology consult to help with pain relief    (R41.3) Memory loss  Comment:   Plan: Vitamin B12        Didn't like the memory pills she was on    (E61.1) Iron deficiency  Comment:   Plan: ferrous sulfate (FEROSUL) 325 (65 Fe) MG tablet          (M48.07) Spinal stenosis of lumbosacral region  Comment:   Plan: IR Consultation for IR Exam (Pain Consult)          (M46.1) Sacroiliitis (H)  Comment:   Plan: IR Consultation for IR Exam (Pain Consult)          (M54.41,  G89.29) Chronic right-sided low back pain with right-sided sciatica  Comment:   Plan: IR Consultation for IR Exam (Pain Consult)        Follow-up 3 mos    (E87.6) Hypokalemia  Comment:   Plan: potassium chloride ER (KLOR-CON M) 10 MEQ CR         tablet             Patient was agreeable to this plan and had no further questions.  There are no Patient Instructions on file for this visit.    No follow-ups on file.    Katarina Gutierrez MD  [facility]

## 2020-07-13 ENCOUNTER — OFFICE VISIT (OUTPATIENT)
Dept: FAMILY MEDICINE | Facility: OTHER | Age: 85
End: 2020-07-13
Attending: FAMILY MEDICINE
Payer: MEDICARE

## 2020-07-13 VITALS
SYSTOLIC BLOOD PRESSURE: 140 MMHG | WEIGHT: 145 LBS | HEIGHT: 61 IN | HEART RATE: 83 BPM | BODY MASS INDEX: 27.38 KG/M2 | TEMPERATURE: 98.2 F | OXYGEN SATURATION: 94 % | DIASTOLIC BLOOD PRESSURE: 74 MMHG

## 2020-07-13 DIAGNOSIS — D64.9 ANEMIA, UNSPECIFIED TYPE: ICD-10-CM

## 2020-07-13 DIAGNOSIS — E61.1 IRON DEFICIENCY: ICD-10-CM

## 2020-07-13 DIAGNOSIS — N18.30 STAGE 3 CHRONIC KIDNEY DISEASE (H): ICD-10-CM

## 2020-07-13 DIAGNOSIS — M54.41 ACUTE RIGHT-SIDED LOW BACK PAIN WITH RIGHT-SIDED SCIATICA: ICD-10-CM

## 2020-07-13 DIAGNOSIS — M48.07 SPINAL STENOSIS OF LUMBOSACRAL REGION: ICD-10-CM

## 2020-07-13 DIAGNOSIS — G89.29 CHRONIC RIGHT-SIDED LOW BACK PAIN WITH RIGHT-SIDED SCIATICA: ICD-10-CM

## 2020-07-13 DIAGNOSIS — E78.5 HYPERLIPIDEMIA WITH TARGET LDL LESS THAN 130: Primary | ICD-10-CM

## 2020-07-13 DIAGNOSIS — E78.5 HYPERLIPIDEMIA WITH TARGET LDL LESS THAN 130: ICD-10-CM

## 2020-07-13 DIAGNOSIS — E87.6 HYPOKALEMIA: ICD-10-CM

## 2020-07-13 DIAGNOSIS — R41.3 MEMORY LOSS: ICD-10-CM

## 2020-07-13 DIAGNOSIS — I10 BENIGN ESSENTIAL HYPERTENSION: ICD-10-CM

## 2020-07-13 DIAGNOSIS — M46.1 SACROILIITIS (H): ICD-10-CM

## 2020-07-13 DIAGNOSIS — M54.41 CHRONIC RIGHT-SIDED LOW BACK PAIN WITH RIGHT-SIDED SCIATICA: ICD-10-CM

## 2020-07-13 LAB
ALBUMIN SERPL-MCNC: 2.9 G/DL (ref 3.4–5)
ALP SERPL-CCNC: 88 U/L (ref 40–150)
ALT SERPL W P-5'-P-CCNC: 32 U/L (ref 0–50)
ANION GAP SERPL CALCULATED.3IONS-SCNC: 9 MMOL/L (ref 3–14)
AST SERPL W P-5'-P-CCNC: 20 U/L (ref 0–45)
BILIRUB SERPL-MCNC: 0.4 MG/DL (ref 0.2–1.3)
BUN SERPL-MCNC: 25 MG/DL (ref 7–30)
CALCIUM SERPL-MCNC: 8.6 MG/DL (ref 8.5–10.1)
CHLORIDE SERPL-SCNC: 110 MMOL/L (ref 94–109)
CHOLEST SERPL-MCNC: 227 MG/DL
CO2 SERPL-SCNC: 22 MMOL/L (ref 20–32)
CREAT SERPL-MCNC: 0.84 MG/DL (ref 0.52–1.04)
ERYTHROCYTE [DISTWIDTH] IN BLOOD BY AUTOMATED COUNT: 19.1 % (ref 10–15)
GFR SERPL CREATININE-BSD FRML MDRD: 61 ML/MIN/{1.73_M2}
GLUCOSE SERPL-MCNC: 85 MG/DL (ref 70–99)
HCT VFR BLD AUTO: 32.3 % (ref 35–47)
HDLC SERPL-MCNC: 59 MG/DL
HGB BLD-MCNC: 9.8 G/DL (ref 11.7–15.7)
IRON SATN MFR SERPL: 7 % (ref 15–46)
IRON SERPL-MCNC: 22 UG/DL (ref 35–180)
LDLC SERPL CALC-MCNC: 135 MG/DL
MCH RBC QN AUTO: 22.6 PG (ref 26.5–33)
MCHC RBC AUTO-ENTMCNC: 30.3 G/DL (ref 31.5–36.5)
MCV RBC AUTO: 74 FL (ref 78–100)
NONHDLC SERPL-MCNC: 168 MG/DL
PLATELET # BLD AUTO: 364 10E9/L (ref 150–450)
POTASSIUM SERPL-SCNC: 3.2 MMOL/L (ref 3.4–5.3)
PROT SERPL-MCNC: 6.2 G/DL (ref 6.8–8.8)
RBC # BLD AUTO: 4.34 10E12/L (ref 3.8–5.2)
SODIUM SERPL-SCNC: 141 MMOL/L (ref 133–144)
TIBC SERPL-MCNC: 315 UG/DL (ref 240–430)
TRIGL SERPL-MCNC: 165 MG/DL
WBC # BLD AUTO: 10.3 10E9/L (ref 4–11)

## 2020-07-13 PROCEDURE — G0463 HOSPITAL OUTPT CLINIC VISIT: HCPCS

## 2020-07-13 PROCEDURE — 83550 IRON BINDING TEST: CPT | Mod: ZL | Performed by: FAMILY MEDICINE

## 2020-07-13 PROCEDURE — 36415 COLL VENOUS BLD VENIPUNCTURE: CPT | Mod: ZL | Performed by: FAMILY MEDICINE

## 2020-07-13 PROCEDURE — 80053 COMPREHEN METABOLIC PANEL: CPT | Mod: ZL | Performed by: FAMILY MEDICINE

## 2020-07-13 PROCEDURE — 82607 VITAMIN B-12: CPT | Mod: ZL | Performed by: FAMILY MEDICINE

## 2020-07-13 PROCEDURE — 99214 OFFICE O/P EST MOD 30 MIN: CPT | Performed by: FAMILY MEDICINE

## 2020-07-13 PROCEDURE — 80061 LIPID PANEL: CPT | Mod: ZL | Performed by: FAMILY MEDICINE

## 2020-07-13 PROCEDURE — 85027 COMPLETE CBC AUTOMATED: CPT | Mod: ZL | Performed by: FAMILY MEDICINE

## 2020-07-13 PROCEDURE — 83540 ASSAY OF IRON: CPT | Mod: ZL | Performed by: FAMILY MEDICINE

## 2020-07-13 RX ORDER — POTASSIUM CHLORIDE 750 MG/1
10 TABLET, EXTENDED RELEASE ORAL 2 TIMES DAILY
Qty: 90 TABLET | Refills: 1 | Status: SHIPPED | OUTPATIENT
Start: 2020-07-13 | End: 2023-03-06

## 2020-07-13 RX ORDER — LISINOPRIL 10 MG/1
10 TABLET ORAL DAILY
Qty: 90 TABLET | Refills: 3 | Status: SHIPPED | OUTPATIENT
Start: 2020-07-13 | End: 2023-03-06

## 2020-07-13 RX ORDER — FERROUS SULFATE 325(65) MG
325 TABLET ORAL
Qty: 90 TABLET | Refills: 1 | Status: SHIPPED | OUTPATIENT
Start: 2020-07-13 | End: 2023-04-10

## 2020-07-13 RX ORDER — GABAPENTIN 100 MG/1
CAPSULE ORAL
Qty: 360 CAPSULE | Refills: 1 | Status: SHIPPED | OUTPATIENT
Start: 2020-07-13 | End: 2022-02-15

## 2020-07-13 RX ORDER — TRAMADOL HYDROCHLORIDE 50 MG/1
TABLET ORAL
Qty: 30 TABLET | Refills: 1 | Status: SHIPPED | OUTPATIENT
Start: 2020-07-13 | End: 2021-11-12

## 2020-07-13 ASSESSMENT — ANXIETY QUESTIONNAIRES
1. FEELING NERVOUS, ANXIOUS, OR ON EDGE: NOT AT ALL
6. BECOMING EASILY ANNOYED OR IRRITABLE: NOT AT ALL
GAD7 TOTAL SCORE: 0
2. NOT BEING ABLE TO STOP OR CONTROL WORRYING: NOT AT ALL
4. TROUBLE RELAXING: NOT AT ALL
5. BEING SO RESTLESS THAT IT IS HARD TO SIT STILL: NOT AT ALL
3. WORRYING TOO MUCH ABOUT DIFFERENT THINGS: NOT AT ALL
7. FEELING AFRAID AS IF SOMETHING AWFUL MIGHT HAPPEN: NOT AT ALL

## 2020-07-13 ASSESSMENT — PAIN SCALES - GENERAL: PAINLEVEL: MODERATE PAIN (5)

## 2020-07-13 ASSESSMENT — MIFFLIN-ST. JEOR: SCORE: 1015.1

## 2020-07-13 ASSESSMENT — PATIENT HEALTH QUESTIONNAIRE - PHQ9: SUM OF ALL RESPONSES TO PHQ QUESTIONS 1-9: 14

## 2020-07-13 NOTE — NURSING NOTE
"Chief Complaint   Patient presents with     follow up on medication for her back       Initial BP (!) 140/74 (BP Location: Right arm, Patient Position: Chair, Cuff Size: Adult Regular)   Pulse 83   Temp 98.2  F (36.8  C) (Tympanic)   Ht 1.549 m (5' 1\")   Wt 65.8 kg (145 lb)   SpO2 94%   BMI 27.40 kg/m   Estimated body mass index is 27.4 kg/m  as calculated from the following:    Height as of this encounter: 1.549 m (5' 1\").    Weight as of this encounter: 65.8 kg (145 lb).  Medication Reconciliation: complete  Sheridan Alicia LPN  "

## 2020-07-14 LAB — VIT B12 SERPL-MCNC: 715 PG/ML (ref 193–986)

## 2020-07-14 ASSESSMENT — ANXIETY QUESTIONNAIRES: GAD7 TOTAL SCORE: 0

## 2020-07-15 ENCOUNTER — HOSPITAL ENCOUNTER (OUTPATIENT)
Dept: INTERVENTIONAL RADIOLOGY/VASCULAR | Facility: HOSPITAL | Age: 85
Discharge: HOME | End: 2020-07-15
Attending: FAMILY MEDICINE | Admitting: RADIOLOGY
Payer: MEDICARE

## 2020-07-15 DIAGNOSIS — M46.1 SACROILIITIS (H): ICD-10-CM

## 2020-07-15 DIAGNOSIS — M54.41 CHRONIC RIGHT-SIDED LOW BACK PAIN WITH RIGHT-SIDED SCIATICA: ICD-10-CM

## 2020-07-15 DIAGNOSIS — M48.07 SPINAL STENOSIS OF LUMBOSACRAL REGION: ICD-10-CM

## 2020-07-15 DIAGNOSIS — G89.29 CHRONIC RIGHT-SIDED LOW BACK PAIN WITH RIGHT-SIDED SCIATICA: ICD-10-CM

## 2020-07-15 PROCEDURE — G0463 HOSPITAL OUTPT CLINIC VISIT: HCPCS | Mod: TC

## 2020-08-20 ENCOUNTER — OFFICE VISIT (OUTPATIENT)
Dept: FAMILY MEDICINE | Facility: OTHER | Age: 85
End: 2020-08-20
Attending: FAMILY MEDICINE
Payer: MEDICARE

## 2020-08-20 DIAGNOSIS — Z01.818 PRE-OP TESTING: Primary | ICD-10-CM

## 2020-08-20 PROCEDURE — U0003 INFECTIOUS AGENT DETECTION BY NUCLEIC ACID (DNA OR RNA); SEVERE ACUTE RESPIRATORY SYNDROME CORONAVIRUS 2 (SARS-COV-2) (CORONAVIRUS DISEASE [COVID-19]), AMPLIFIED PROBE TECHNIQUE, MAKING USE OF HIGH THROUGHPUT TECHNOLOGIES AS DESCRIBED BY CMS-2020-01-R: HCPCS | Mod: ZL | Performed by: FAMILY MEDICINE

## 2020-08-21 ENCOUNTER — TELEPHONE (OUTPATIENT)
Dept: FAMILY MEDICINE | Facility: OTHER | Age: 85
End: 2020-08-21

## 2020-08-21 LAB
SARS-COV-2 RNA SPEC QL NAA+PROBE: NOT DETECTED
SPECIMEN SOURCE: NORMAL

## 2020-08-24 ENCOUNTER — HOSPITAL ENCOUNTER (OUTPATIENT)
Dept: INTERVENTIONAL RADIOLOGY/VASCULAR | Facility: HOSPITAL | Age: 85
End: 2020-08-24
Attending: FAMILY MEDICINE
Payer: MEDICARE

## 2020-08-24 ENCOUNTER — HOSPITAL ENCOUNTER (OUTPATIENT)
Facility: HOSPITAL | Age: 85
Discharge: HOME | End: 2020-08-24
Attending: FAMILY MEDICINE | Admitting: RADIOLOGY
Payer: MEDICARE

## 2020-08-24 DIAGNOSIS — M47.816 LUMBAR SPONDYLOSIS: ICD-10-CM

## 2020-08-24 PROCEDURE — 64483 NJX AA&/STRD TFRM EPI L/S 1: CPT | Mod: TC

## 2020-08-24 PROCEDURE — 25000128 H RX IP 250 OP 636: Performed by: RADIOLOGY

## 2020-08-24 RX ORDER — DEXAMETHASONE SODIUM PHOSPHATE 10 MG/ML
INJECTION, SOLUTION INTRAMUSCULAR; INTRAVENOUS
Status: DISCONTINUED
Start: 2020-08-24 | End: 2020-08-25 | Stop reason: HOSPADM

## 2020-08-24 RX ORDER — IOPAMIDOL 612 MG/ML
15 INJECTION, SOLUTION INTRATHECAL ONCE
Status: COMPLETED | OUTPATIENT
Start: 2020-08-24 | End: 2020-08-24

## 2020-08-24 RX ORDER — DEXAMETHASONE SODIUM PHOSPHATE 10 MG/ML
10 INJECTION, SOLUTION INTRAMUSCULAR; INTRAVENOUS ONCE
Status: COMPLETED | OUTPATIENT
Start: 2020-08-24 | End: 2020-08-24

## 2020-08-24 RX ADMIN — DEXAMETHASONE SODIUM PHOSPHATE 10 MG: 10 INJECTION, SOLUTION INTRAMUSCULAR; INTRAVENOUS at 14:32

## 2020-08-24 RX ADMIN — IOPAMIDOL 2 ML: 612 INJECTION, SOLUTION INTRATHECAL at 14:32

## 2020-08-24 NOTE — IP AVS SNAPSHOT
[facility]  [address] [address]  Phone:  [phone]  Fax:  [phone]                                    After Visit Summary   8/24/2020    Claudine Bustos    MRN: [id number]           After Visit Summary Signature Page    I have received my discharge instructions, and my questions have been answered. I have discussed any challenges I see with this plan with the nurse or doctor.    ..........................................................................................................................................  Patient/Patient Representative Signature      ..........................................................................................................................................  Patient Representative Print Name and Relationship to Patient    ..................................................               ................................................  Date                                   Time    ..........................................................................................................................................  Reviewed by Signature/Title    ...................................................              ..............................................  Date                                               Time          22EPIC Rev 08/18

## 2020-08-24 NOTE — DISCHARGE INSTRUCTIONS
Home number on file [phone] (home)  Is it ok to leave a message at this number(s)? Yes    Dr. Chauhan completed your procedure on 8/24/2020.    Current Pain Level (0-10 Scale): 4/10  Post Pain Level (0-10):  3/10    Radiology Discharge instructions for Steroid Injection    Activity Level:     Do not do any heavy activity or exercise for 24 hours.   Do not drive for 4 hours after your injection.  Diet:   Return to your normal diet.  Medications:   If you have stopped taking your Aspirin, Coumadin/Warfarin, Ibuprofen, or any   other blood thinner for this procedure you may resume in the morning unless   your primary care provider has given you other instructions.    Diabetics may see an increase in blood sugar after steroid injections. If you are concerned about your blood sugar, please contact your family doctor.    Site Care:  Remove the bandage and bathe or shower the morning after the procedure.      This is a Pain Management procedure.  You will be contacted in two weeks for follow up.    Call your Primary Care Provider if you have the following (if your primary care provider is not available please seek emergency care):   Nausea with vomiting   Severe headache   Drowsiness or confusion   Redness or drainage at the injection or puncture site   Temperature over 101 degrees F   Other concerns   Worsening back pain   Stiff neck

## 2020-09-08 ENCOUNTER — TELEPHONE (OUTPATIENT)
Dept: INTERVENTIONAL RADIOLOGY/VASCULAR | Facility: HOSPITAL | Age: 85
End: 2020-09-08

## 2020-09-08 NOTE — TELEPHONE ENCOUNTER
CONSULT PATIENT  PAIN INJECTION POST CALL    Procedure: Epidural Right TF L4-5  Radiologist(s): Dr. Manpreet Chauhan  Date of Procedure: 8-24-20    I responded to the patient's questions/concerns.    Pre-procedure pain score was: 4 (See pre-procedure score)  Post-procedure pain score as of today is: 7  What % relief?0%    Would you say this injection has been beneficial?Yes and No   If yes, for how long? For about the first week patient was experiencing about 30% relief and able to walk a little bit better.     Was there one injection that worked better than the other? This was patients first injection.    Where is the pain? Pain is located in the lower back traveling to the right outer thigh.  Can you describe the pain?Constant ache  Does the pain radiate anywhere?Down the outside of the left thigh  If yes, where does it radiate and where does the pain stop? A little below the left knee    Is this new pain? No    Patient would like to pursue another injection.      Tanja Reddy

## 2020-09-08 NOTE — TELEPHONE ENCOUNTER
CONSULT PATIENT  PAIN INJECTION POST CALL    Procedure: Epidural Right TF L3-$  Radiologist(s): Dr. Manpreet Chauhan  Date of Procedure: 8-24-20    The patient was not available by telephone. Left message for patient to call [phone].      Tanja Reddy

## 2020-10-07 ENCOUNTER — HOSPITAL ENCOUNTER (OUTPATIENT)
Facility: HOSPITAL | Age: 85
Discharge: HOME | End: 2020-10-07
Attending: RADIOLOGY | Admitting: RADIOLOGY
Payer: MEDICARE

## 2020-10-07 ENCOUNTER — HOSPITAL ENCOUNTER (OUTPATIENT)
Dept: INTERVENTIONAL RADIOLOGY/VASCULAR | Facility: HOSPITAL | Age: 85
End: 2020-10-07
Attending: FAMILY MEDICINE | Admitting: RADIOLOGY
Payer: MEDICARE

## 2020-10-07 DIAGNOSIS — M47.816 LUMBAR SPONDYLOSIS: ICD-10-CM

## 2020-10-07 PROCEDURE — C1751 CATH, INF, PER/CENT/MIDLINE: HCPCS

## 2020-10-07 PROCEDURE — 250N000011 HC RX IP 250 OP 636: Performed by: RADIOLOGY

## 2020-10-07 RX ORDER — DEXAMETHASONE SODIUM PHOSPHATE 10 MG/ML
10 INJECTION, SOLUTION INTRAMUSCULAR; INTRAVENOUS ONCE
Status: COMPLETED | OUTPATIENT
Start: 2020-10-07 | End: 2020-10-07

## 2020-10-07 RX ORDER — IOPAMIDOL 612 MG/ML
15 INJECTION, SOLUTION INTRATHECAL ONCE
Status: COMPLETED | OUTPATIENT
Start: 2020-10-07 | End: 2020-10-07

## 2020-10-07 RX ORDER — DEXAMETHASONE SODIUM PHOSPHATE 10 MG/ML
INJECTION, SOLUTION INTRAMUSCULAR; INTRAVENOUS
Status: DISCONTINUED
Start: 2020-10-07 | End: 2020-10-08 | Stop reason: HOSPADM

## 2020-10-07 RX ADMIN — IOPAMIDOL 3 ML: 612 INJECTION, SOLUTION INTRATHECAL at 14:06

## 2020-10-07 RX ADMIN — DEXAMETHASONE SODIUM PHOSPHATE 10 MG: 10 INJECTION, SOLUTION INTRAMUSCULAR; INTRAVENOUS at 14:06

## 2020-10-07 NOTE — DISCHARGE INSTRUCTIONS
Home number on file [phone] (home)  Is it ok to leave a message at this number(s)? Yes    Dr Myers completed your procedure on 10/7/2020.    Current Pain Level (0-10 Scale): 5/10  Post Pain Level (0-10):  2/10    Radiology Discharge instructions for Steroid Injection    Activity Level:     Do not do any heavy activity or exercise for 24 hours.   Do not drive for 4 hours after your injection.  Diet:   Return to your normal diet.  Medications:   If you have stopped taking your Aspirin, Coumadin/Warfarin, Ibuprofen, or any   other blood thinner for this procedure you may resume in the morning unless   your primary care provider has given you other instructions.    Diabetics may see an increase in blood sugar after steroid injections. If you are concerned about your blood sugar, please contact your family doctor.    Site Care:  Remove the bandage and bathe or shower the morning after the procedure.      This is a Pain Management procedure.  You will be contacted in two weeks for follow up.    Call your Primary Care Provider if you have the following (if your primary care provider is not available please seek emergency care):   Nausea with vomiting   Severe headache   Drowsiness or confusion   Redness or drainage at the injection or puncture site   Temperature over 101 degrees F   Other concerns   Worsening back pain   Stiff neck

## 2020-10-21 ENCOUNTER — TELEPHONE (OUTPATIENT)
Dept: INTERVENTIONAL RADIOLOGY/VASCULAR | Facility: HOSPITAL | Age: 85
End: 2020-10-21

## 2020-10-21 NOTE — TELEPHONE ENCOUNTER
CONSULT PATIENT  PAIN INJECTION POST CALL    Procedure: Epidural TF right L4-5  Radiologist(s): Dr. Arthur Myers  Date of Procedure: 10-7-20    The patient had no questions or concerns. Spoke with patients daughter.    Pre-procedure pain score was: 5 (See pre-procedure score)  Post-procedure pain score as of today is: 1.5  What % relief?70%    Would you say this injection has been beneficial? Yes   If yes, for how long? Currently helping    Was there one injection that worked better than the other? no    Where is the pain? Not much pain, sometimes in the right side of low back but doing much better. Patient use to sit on left side and now can sit normally and no more pain going down right leg.   Can you describe the pain? Aches once in a while but has not complained of any pain lately states her daughter  Does the pain radiate anywhere? no  If yes, where does it radiate and where does the pain stop? n/a    Is this new pain? No    Patient would not like to pursue another injection at this time.  The patient will contact IR at [phone] if that changes.      Tanja Reddy

## 2021-03-04 ENCOUNTER — IMMUNIZATION (OUTPATIENT)
Dept: FAMILY MEDICINE | Facility: OTHER | Age: 86
End: 2021-03-04
Attending: FAMILY MEDICINE
Payer: MEDICARE

## 2021-03-04 PROCEDURE — 91300 PR COVID VAC PFIZER DIL RECON 30 MCG/0.3 ML IM: CPT

## 2021-03-20 ENCOUNTER — MEDICAL CORRESPONDENCE (OUTPATIENT)
Dept: HEALTH INFORMATION MANAGEMENT | Facility: CLINIC | Age: 86
End: 2021-03-20

## 2021-03-22 ENCOUNTER — IMMUNIZATION (OUTPATIENT)
Dept: FAMILY MEDICINE | Facility: OTHER | Age: 86
End: 2021-03-22
Attending: FAMILY MEDICINE
Payer: MEDICARE

## 2021-03-22 PROCEDURE — 91300 PR COVID VAC PFIZER DIL RECON 30 MCG/0.3 ML IM: CPT

## 2021-03-27 ENCOUNTER — HOSPITAL ENCOUNTER (INPATIENT)
Facility: HOSPITAL | Age: 86
LOS: 2 days | Discharge: HOME HEALTH | DRG: 281 | End: 2021-03-29
Attending: EMERGENCY MEDICINE | Admitting: INTERNAL MEDICINE
Payer: MEDICARE

## 2021-03-27 DIAGNOSIS — I51.9 HEART DISEASE, UNSPECIFIED: ICD-10-CM

## 2021-03-27 DIAGNOSIS — R79.89 ELEVATED TROPONIN: Primary | ICD-10-CM

## 2021-03-27 DIAGNOSIS — E87.6 HYPOKALEMIA: ICD-10-CM

## 2021-03-27 DIAGNOSIS — R79.89 TROPONIN LEVEL ELEVATED: ICD-10-CM

## 2021-03-27 DIAGNOSIS — M62.81 GENERALIZED MUSCLE WEAKNESS: ICD-10-CM

## 2021-03-27 DIAGNOSIS — E87.1 HYPONATREMIA: ICD-10-CM

## 2021-03-27 DIAGNOSIS — N39.0 URINARY TRACT INFECTION WITHOUT HEMATURIA, SITE UNSPECIFIED: ICD-10-CM

## 2021-03-27 LAB
ALBUMIN SERPL-MCNC: 3 G/DL (ref 3.4–5)
ALBUMIN UR-MCNC: 10 MG/DL
ALP SERPL-CCNC: 130 U/L (ref 40–150)
ALT SERPL W P-5'-P-CCNC: 27 U/L (ref 0–50)
ANION GAP SERPL CALCULATED.3IONS-SCNC: 7 MMOL/L (ref 3–14)
APPEARANCE UR: ABNORMAL
AST SERPL W P-5'-P-CCNC: 21 U/L (ref 0–45)
BACTERIA #/AREA URNS HPF: ABNORMAL /HPF
BASOPHILS # BLD AUTO: 0 10E9/L (ref 0–0.2)
BASOPHILS NFR BLD AUTO: 0.2 %
BILIRUB SERPL-MCNC: 0.4 MG/DL (ref 0.2–1.3)
BILIRUB UR QL STRIP: NEGATIVE
BUN SERPL-MCNC: 19 MG/DL (ref 7–30)
CALCIUM SERPL-MCNC: 8.1 MG/DL (ref 8.5–10.1)
CHLORIDE SERPL-SCNC: 99 MMOL/L (ref 94–109)
CO2 SERPL-SCNC: 22 MMOL/L (ref 20–32)
COLOR UR AUTO: ABNORMAL
CREAT SERPL-MCNC: 0.62 MG/DL (ref 0.52–1.04)
CRP SERPL-MCNC: 7.8 MG/L (ref 0–8)
DIFFERENTIAL METHOD BLD: ABNORMAL
EOSINOPHIL # BLD AUTO: 0 10E9/L (ref 0–0.7)
EOSINOPHIL NFR BLD AUTO: 0.2 %
ERYTHROCYTE [DISTWIDTH] IN BLOOD BY AUTOMATED COUNT: 16.4 % (ref 10–15)
GFR SERPL CREATININE-BSD FRML MDRD: 79 ML/MIN/{1.73_M2}
GLUCOSE BLDC GLUCOMTR-MCNC: 104 MG/DL (ref 70–99)
GLUCOSE BLDC GLUCOMTR-MCNC: 116 MG/DL (ref 70–99)
GLUCOSE SERPL-MCNC: 136 MG/DL (ref 70–99)
GLUCOSE UR STRIP-MCNC: NEGATIVE MG/DL
HCT VFR BLD AUTO: 32.2 % (ref 35–47)
HGB BLD-MCNC: 9.8 G/DL (ref 11.7–15.7)
HGB UR QL STRIP: NEGATIVE
IMM GRANULOCYTES # BLD: 0.1 10E9/L (ref 0–0.4)
IMM GRANULOCYTES NFR BLD: 1 %
KETONES UR STRIP-MCNC: NEGATIVE MG/DL
LABORATORY COMMENT REPORT: NORMAL
LEUKOCYTE ESTERASE UR QL STRIP: ABNORMAL
LYMPHOCYTES # BLD AUTO: 0.6 10E9/L (ref 0.8–5.3)
LYMPHOCYTES NFR BLD AUTO: 4.7 %
MAGNESIUM SERPL-MCNC: 1.6 MG/DL (ref 1.6–2.3)
MCH RBC QN AUTO: 22.3 PG (ref 26.5–33)
MCHC RBC AUTO-ENTMCNC: 30.4 G/DL (ref 31.5–36.5)
MCV RBC AUTO: 73 FL (ref 78–100)
MONOCYTES # BLD AUTO: 0.9 10E9/L (ref 0–1.3)
MONOCYTES NFR BLD AUTO: 7.1 %
MUCOUS THREADS #/AREA URNS LPF: PRESENT /LPF
NEUTROPHILS # BLD AUTO: 10.8 10E9/L (ref 1.6–8.3)
NEUTROPHILS NFR BLD AUTO: 86.8 %
NITRATE UR QL: NEGATIVE
NRBC # BLD AUTO: 0 10*3/UL
NRBC BLD AUTO-RTO: 0 /100
OSMOLALITY SERPL: 271 MMOL/KG (ref 280–295)
PH UR STRIP: 6 PH (ref 4.7–8)
PLATELET # BLD AUTO: 280 10E9/L (ref 150–450)
POTASSIUM SERPL-SCNC: 3.1 MMOL/L (ref 3.4–5.3)
POTASSIUM SERPL-SCNC: 3.2 MMOL/L (ref 3.4–5.3)
POTASSIUM SERPL-SCNC: 4 MMOL/L (ref 3.4–5.3)
PROCALCITONIN SERPL-MCNC: <0.05 NG/ML
PROT SERPL-MCNC: 6.2 G/DL (ref 6.8–8.8)
RBC # BLD AUTO: 4.4 10E12/L (ref 3.8–5.2)
RBC #/AREA URNS AUTO: 2 /HPF (ref 0–2)
SARS-COV-2 RNA RESP QL NAA+PROBE: NEGATIVE
SODIUM SERPL-SCNC: 128 MMOL/L (ref 133–144)
SOURCE: ABNORMAL
SP GR UR STRIP: 1.01 (ref 1–1.03)
SPECIMEN SOURCE: NORMAL
SQUAMOUS #/AREA URNS AUTO: 4 /HPF (ref 0–1)
TROPONIN I SERPL-MCNC: 0.05 UG/L (ref 0–0.04)
TROPONIN I SERPL-MCNC: 0.16 UG/L (ref 0–0.04)
TROPONIN I SERPL-MCNC: 0.29 UG/L (ref 0–0.04)
TROPONIN I SERPL-MCNC: 0.34 UG/L (ref 0–0.04)
UROBILINOGEN UR STRIP-MCNC: NORMAL MG/DL (ref 0–2)
WBC # BLD AUTO: 12.5 10E9/L (ref 4–11)
WBC #/AREA URNS AUTO: 23 /HPF (ref 0–5)

## 2021-03-27 PROCEDURE — 83930 ASSAY OF BLOOD OSMOLALITY: CPT | Performed by: INTERNAL MEDICINE

## 2021-03-27 PROCEDURE — 99285 EMERGENCY DEPT VISIT HI MDM: CPT | Mod: 25 | Performed by: INTERNAL MEDICINE

## 2021-03-27 PROCEDURE — 87186 SC STD MICRODIL/AGAR DIL: CPT | Performed by: EMERGENCY MEDICINE

## 2021-03-27 PROCEDURE — 99285 EMERGENCY DEPT VISIT HI MDM: CPT | Performed by: EMERGENCY MEDICINE

## 2021-03-27 PROCEDURE — 250N000013 HC RX MED GY IP 250 OP 250 PS 637: Performed by: EMERGENCY MEDICINE

## 2021-03-27 PROCEDURE — U0003 INFECTIOUS AGENT DETECTION BY NUCLEIC ACID (DNA OR RNA); SEVERE ACUTE RESPIRATORY SYNDROME CORONAVIRUS 2 (SARS-COV-2) (CORONAVIRUS DISEASE [COVID-19]), AMPLIFIED PROBE TECHNIQUE, MAKING USE OF HIGH THROUGHPUT TECHNOLOGIES AS DESCRIBED BY CMS-2020-01-R: HCPCS | Performed by: EMERGENCY MEDICINE

## 2021-03-27 PROCEDURE — 36415 COLL VENOUS BLD VENIPUNCTURE: CPT | Performed by: EMERGENCY MEDICINE

## 2021-03-27 PROCEDURE — 120N000001 HC R&B MED SURG/OB

## 2021-03-27 PROCEDURE — 96372 THER/PROPH/DIAG INJ SC/IM: CPT

## 2021-03-27 PROCEDURE — 87088 URINE BACTERIA CULTURE: CPT | Performed by: EMERGENCY MEDICINE

## 2021-03-27 PROCEDURE — 250N000011 HC RX IP 250 OP 636: Performed by: EMERGENCY MEDICINE

## 2021-03-27 PROCEDURE — 93010 ELECTROCARDIOGRAM REPORT: CPT | Performed by: INTERNAL MEDICINE

## 2021-03-27 PROCEDURE — 81001 URINALYSIS AUTO W/SCOPE: CPT | Performed by: EMERGENCY MEDICINE

## 2021-03-27 PROCEDURE — 85025 COMPLETE CBC W/AUTO DIFF WBC: CPT | Performed by: EMERGENCY MEDICINE

## 2021-03-27 PROCEDURE — 93005 ELECTROCARDIOGRAM TRACING: CPT | Mod: 76 | Performed by: INTERNAL MEDICINE

## 2021-03-27 PROCEDURE — 84484 ASSAY OF TROPONIN QUANT: CPT | Performed by: EMERGENCY MEDICINE

## 2021-03-27 PROCEDURE — 36415 COLL VENOUS BLD VENIPUNCTURE: CPT | Performed by: INTERNAL MEDICINE

## 2021-03-27 PROCEDURE — 87086 URINE CULTURE/COLONY COUNT: CPT | Performed by: EMERGENCY MEDICINE

## 2021-03-27 PROCEDURE — 250N000013 HC RX MED GY IP 250 OP 250 PS 637: Performed by: INTERNAL MEDICINE

## 2021-03-27 PROCEDURE — 250N000011 HC RX IP 250 OP 636: Performed by: INTERNAL MEDICINE

## 2021-03-27 PROCEDURE — 83735 ASSAY OF MAGNESIUM: CPT | Performed by: EMERGENCY MEDICINE

## 2021-03-27 PROCEDURE — 86140 C-REACTIVE PROTEIN: CPT | Performed by: EMERGENCY MEDICINE

## 2021-03-27 PROCEDURE — 99222 1ST HOSP IP/OBS MODERATE 55: CPT | Mod: AI | Performed by: INTERNAL MEDICINE

## 2021-03-27 PROCEDURE — 84145 PROCALCITONIN (PCT): CPT | Performed by: EMERGENCY MEDICINE

## 2021-03-27 PROCEDURE — 93005 ELECTROCARDIOGRAM TRACING: CPT

## 2021-03-27 PROCEDURE — U0005 INFEC AGEN DETEC AMPLI PROBE: HCPCS | Performed by: EMERGENCY MEDICINE

## 2021-03-27 PROCEDURE — 999N001017 HC STATISTIC GLUCOSE BY METER IP

## 2021-03-27 PROCEDURE — 84132 ASSAY OF SERUM POTASSIUM: CPT | Performed by: INTERNAL MEDICINE

## 2021-03-27 PROCEDURE — 80053 COMPREHEN METABOLIC PANEL: CPT | Performed by: EMERGENCY MEDICINE

## 2021-03-27 PROCEDURE — C9803 HOPD COVID-19 SPEC COLLECT: HCPCS | Performed by: INTERNAL MEDICINE

## 2021-03-27 PROCEDURE — 84484 ASSAY OF TROPONIN QUANT: CPT | Performed by: INTERNAL MEDICINE

## 2021-03-27 RX ORDER — AMOXICILLIN 250 MG
1 CAPSULE ORAL 2 TIMES DAILY
Status: DISCONTINUED | OUTPATIENT
Start: 2021-03-27 | End: 2021-03-29 | Stop reason: HOSPADM

## 2021-03-27 RX ORDER — ACETAMINOPHEN 325 MG/1
650 TABLET ORAL EVERY 4 HOURS PRN
Status: DISCONTINUED | OUTPATIENT
Start: 2021-03-27 | End: 2021-03-29 | Stop reason: HOSPADM

## 2021-03-27 RX ORDER — ONDANSETRON 4 MG/1
4 TABLET, ORALLY DISINTEGRATING ORAL EVERY 6 HOURS PRN
Status: DISCONTINUED | OUTPATIENT
Start: 2021-03-27 | End: 2021-03-29 | Stop reason: HOSPADM

## 2021-03-27 RX ORDER — CEFDINIR 300 MG/1
300 CAPSULE ORAL EVERY 12 HOURS SCHEDULED
Status: DISCONTINUED | OUTPATIENT
Start: 2021-03-27 | End: 2021-03-27

## 2021-03-27 RX ORDER — PHENAZOPYRIDINE HYDROCHLORIDE 100 MG/1
100 TABLET, FILM COATED ORAL
Status: COMPLETED | OUTPATIENT
Start: 2021-03-27 | End: 2021-03-29

## 2021-03-27 RX ORDER — CEFTRIAXONE SODIUM 1 G/50ML
1 INJECTION, SOLUTION INTRAVENOUS EVERY 24 HOURS
Status: DISCONTINUED | OUTPATIENT
Start: 2021-03-27 | End: 2021-03-29 | Stop reason: HOSPADM

## 2021-03-27 RX ORDER — MAGNESIUM SULFATE HEPTAHYDRATE 40 MG/ML
2 INJECTION, SOLUTION INTRAVENOUS ONCE
Status: COMPLETED | OUTPATIENT
Start: 2021-03-27 | End: 2021-03-27

## 2021-03-27 RX ORDER — ASPIRIN 81 MG/1
324 TABLET, CHEWABLE ORAL ONCE
Status: COMPLETED | OUTPATIENT
Start: 2021-03-27 | End: 2021-03-27

## 2021-03-27 RX ORDER — ONDANSETRON 2 MG/ML
4 INJECTION INTRAMUSCULAR; INTRAVENOUS EVERY 6 HOURS PRN
Status: DISCONTINUED | OUTPATIENT
Start: 2021-03-27 | End: 2021-03-29 | Stop reason: HOSPADM

## 2021-03-27 RX ORDER — POTASSIUM CHLORIDE 1500 MG/1
40 TABLET, EXTENDED RELEASE ORAL
Status: COMPLETED | OUTPATIENT
Start: 2021-03-27 | End: 2021-03-27

## 2021-03-27 RX ORDER — CEFDINIR 300 MG/1
600 CAPSULE ORAL ONCE
Status: COMPLETED | OUTPATIENT
Start: 2021-03-27 | End: 2021-03-27

## 2021-03-27 RX ORDER — CEFDINIR 300 MG/1
300 CAPSULE ORAL 2 TIMES DAILY
Qty: 14 CAPSULE | Refills: 0 | Status: SHIPPED | OUTPATIENT
Start: 2021-03-27 | End: 2021-03-29

## 2021-03-27 RX ORDER — LIDOCAINE 40 MG/G
CREAM TOPICAL
Status: DISCONTINUED | OUTPATIENT
Start: 2021-03-27 | End: 2021-03-29 | Stop reason: HOSPADM

## 2021-03-27 RX ORDER — AMOXICILLIN 250 MG
2 CAPSULE ORAL 2 TIMES DAILY
Status: DISCONTINUED | OUTPATIENT
Start: 2021-03-27 | End: 2021-03-29 | Stop reason: HOSPADM

## 2021-03-27 RX ORDER — POTASSIUM CHLORIDE 1500 MG/1
20 TABLET, EXTENDED RELEASE ORAL ONCE
Status: COMPLETED | OUTPATIENT
Start: 2021-03-27 | End: 2021-03-27

## 2021-03-27 RX ADMIN — CEFTRIAXONE SODIUM 1 G: 1 INJECTION, SOLUTION INTRAVENOUS at 14:45

## 2021-03-27 RX ADMIN — POTASSIUM CHLORIDE 40 MEQ: 1500 TABLET, EXTENDED RELEASE ORAL at 16:18

## 2021-03-27 RX ADMIN — CEFDINIR 600 MG: 300 CAPSULE ORAL at 09:14

## 2021-03-27 RX ADMIN — ASPIRIN 324 MG: 81 TABLET, CHEWABLE ORAL at 08:35

## 2021-03-27 RX ADMIN — METOPROLOL TARTRATE 12.5 MG: 25 TABLET, FILM COATED ORAL at 14:48

## 2021-03-27 RX ADMIN — ENOXAPARIN SODIUM 70 MG: 80 INJECTION SUBCUTANEOUS at 10:43

## 2021-03-27 RX ADMIN — SENNOSIDES AND DOCUSATE SODIUM 1 TABLET: 8.6; 5 TABLET ORAL at 21:13

## 2021-03-27 RX ADMIN — POTASSIUM CHLORIDE 20 MEQ: 1500 TABLET, EXTENDED RELEASE ORAL at 08:35

## 2021-03-27 RX ADMIN — PHENAZOPYRIDINE HYDROCHLORIDE 100 MG: 100 TABLET, FILM COATED ORAL at 16:18

## 2021-03-27 RX ADMIN — ENOXAPARIN SODIUM 60 MG: 60 INJECTION SUBCUTANEOUS at 21:13

## 2021-03-27 RX ADMIN — MAGNESIUM SULFATE IN WATER 2 G: 40 INJECTION, SOLUTION INTRAVENOUS at 16:18

## 2021-03-27 RX ADMIN — POTASSIUM CHLORIDE 40 MEQ: 1500 TABLET, EXTENDED RELEASE ORAL at 14:38

## 2021-03-27 RX ADMIN — METOPROLOL TARTRATE 12.5 MG: 25 TABLET, FILM COATED ORAL at 21:13

## 2021-03-27 RX ADMIN — PHENAZOPYRIDINE HYDROCHLORIDE 100 MG: 100 TABLET, FILM COATED ORAL at 14:49

## 2021-03-27 ASSESSMENT — ENCOUNTER SYMPTOMS
FEVER: 0
SHORTNESS OF BREATH: 0
CONFUSION: 0
EYE REDNESS: 0
DIFFICULTY URINATING: 0
COLOR CHANGE: 0
HEADACHES: 0
NECK STIFFNESS: 0
ABDOMINAL PAIN: 0
ARTHRALGIAS: 0

## 2021-03-27 ASSESSMENT — MIFFLIN-ST. JEOR: SCORE: 965.38

## 2021-03-27 ASSESSMENT — ACTIVITIES OF DAILY LIVING (ADL)
ADLS_ACUITY_SCORE: 17

## 2021-03-27 NOTE — ED NOTES
Verbal telephone report given to floor RN.  Update provided and pt will be transported off monitor to acute care [id number].  Admitting md to see pt in acute care.

## 2021-03-27 NOTE — H&P
"[facility]    History and Physical - Hospitalist Service       Date of Admission:  3/27/2021    Assessment & Plan   Claudine Bustos is a 89 year old female admitted on 3/27/2021.     Principal Problem:    Troponin level elevated  Active Problems:    Hyponatremia    Hypokalemia    Elevated troponin    Urinary tract infection without hematuria, site unspecified    1. Fall, Elevated troponin  - pt's fall might have been due to cardiac event vs UTI  - pt wants conservative therapy  - ASA, enoxaparin, metoprolol  - asymptomatic  - telemetry monitoring    2. Acute cystitis  - ceftriaxone IV  - follow urine culture  - Pyridium for symptomatic relief    3. Hypokalemia  - replace    4. hyponatremia  - check osmolality       Diet: Combination Diet Low Saturated Fat Na <2400mg Diet, No Caffeine Diet    DVT Prophylaxis: Enoxaparin (Lovenox) SQ  Castro Catheter: not present  Code Status: Full Code           Disposition Plan   Expected discharge: 2 - 3 days, recommended to prior living arrangement once antibiotic plan established and cardiac status stable.  Entered: Wm Ardon MD 03/27/2021, 12:00 PM     The patient's care was discussed with the Patient.    Wm Ardon MD  [facility]  Contact information available via [facility] Paging/Directory      ______________________________________________________________________    Chief Complaint   Fall    History is obtained from the patient    History of Present Illness   Claudine Bustos is a 89 year old female who presents after a fall at home from standing position.    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  Pt reports she was walking to the bathroom when she fell.  Denies LOC and did not hit her head.  No pain after the fall.  Pt noted urinary frequency but denies dysuria, fever, chills, dyspnea, chest pain, headache, palpitation, lightheadedness, focal weakness/sensation changes, speech changes, " nausea, vomiting, abdominal pain, diarrhea, edema.  EMS brought pt to ER.  In the ER, she was noted with UTI, hypertension, and elevated troponin.  Subsequent troponin trended higher.  Pt wanted conservative therapy for her cardiac condition; she is now being admitted for further care.  She was given cefdinir for UTI in ER.    Review of Systems    The 10 point Review of Systems is negative other than noted in the HPI or here.     Past Medical History    I have reviewed this patient's medical history and updated it with pertinent information if needed.   Past Medical History:   Diagnosis Date     Chronic kidney disease, unspecified 8/3/2012     Diastolic dysfunction 2/27/2012    echo 2/2012  EF 60%     HTN (hypertension) 4/4/2000     Obesity (BMI 35.0-39.9) with comorbidity (H) 6/5/2019     Personal history of colonic polyps 6/8/2004     Renal benign neoplasm, left 2016    'oncocytic neoplasm' per urology -- benign, stable and following yearly for scans     Skin cancer        Past Surgical History   I have reviewed this patient's surgical history and updated it with pertinent information if needed.  Past Surgical History:   Procedure Laterality Date     ARTHROPLASTY HIP Right 1/23/2017    Procedure: ARTHROPLASTY HIP;  Surgeon: Jose Manuel Muñoz MD;  Location: [facility]     ARTHROPLASTY KNEE Left 1/25/2016    Procedure: ARTHROPLASTY KNEE;  Surgeon: Jose Manuel Muñoz MD;  Location: [facility]     colonoscopy  2004, 2009     EXCISE LESION EYELID Left 8/1/2017    Procedure: EXCISE LESION EYELID;  LEFT LOWER LID WEDGE EXCISION WITH FROZEN SECTION CONTROL;  Surgeon: Quinten Sotelo MD;  Location: [facility]     excision of rectal villus adenoma       IR CONSULTATION FOR IR EXAM  7/15/2020     knee replacement Right 1/2011     PHACOEMULSIFICATION WITH STANDARD INTRAOCULAR LENS IMPLANT  2/25/2014    Procedure: PHACOEMULSIFICATION WITH STANDARD INTRAOCULAR LENS IMPLANT;  CATARACT EXTRACTION WITH INTRA OCULAR LENS LEFT;  Surgeon:  Jah Bee MD;  Location: [facility]     PHACOEMULSIFICATION WITH STANDARD INTRAOCULAR LENS IMPLANT Right 5/12/2015    Procedure: PHACOEMULSIFICATION WITH STANDARD INTRAOCULAR LENS IMPLANT;  Surgeon: Jah Bee MD;  Location: [facility]     thoracic schwannoma resection       tonsillectomy       urethral dilitation every 4 months         Social History   I have reviewed this patient's social history and updated it with pertinent information if needed.  Social History     Tobacco Use     Smoking status: Never Smoker     Smokeless tobacco: Never Used   Substance Use Topics     Alcohol use: Yes     Frequency: Monthly or less     Drinks per session: 1 or 2     Comment: Mixed, rarely     Drug use: No       Family History   I have reviewed this patient's family history and updated it with pertinent information if needed.  Family History   Problem Relation Age of Onset     Alcohol/Drug Father         alcoholism     Cancer Father 51        Esophageal, cause of death     Other - See Comments Father         Tobacco use     Cerebrovascular Disease Mother 51        Cause of death     Other - See Comments Maternal Grandmother      Other - See Comments Maternal Grandfather      Other - See Comments Paternal Grandmother      Other - See Comments Paternal Grandfather      Cancer Sister         Breast     Cancer Brother         Leukemia     Cancer Brother         Lung     Chronic Obstructive Pulmonary Disease Brother        Prior to Admission Medications   Prior to Admission Medications   Prescriptions Last Dose Informant Patient Reported? Taking?   Calcium Carb-Cholecalciferol (CALCIUM + D3) 600-200 MG-UNIT TABS  Self Yes No   Sig: Take 1 tablet by mouth daily   Multiple Vitamin (MULTIVITAMIN) per tablet  Self Yes No   Sig: Take 1 tablet by mouth daily with food.   ferrous sulfate (FEROSUL) 325 (65 Fe) MG tablet   No No   Sig: Take 1 tablet (325 mg) by mouth daily (with breakfast)   gabapentin (NEURONTIN) 100 MG capsule   No No    Sig: TAKE 1 CAPSULE BY MOUTH 3 TIMES DAILY AFTER ONE WEEK IF STILL HAVING PAIN INCREASE TO 2 CAPSULES 3 TIMES DAILY   ibuprofen 200 MG PO tablet  Self Yes No   Sig: Take 200 mg by mouth every 6 hours as needed for mild pain   lisinopril (ZESTRIL) 10 MG tablet   No No   Sig: Take 1 tablet (10 mg) by mouth daily   potassium chloride ER (KLOR-CON M) 10 MEQ CR tablet   No No   Sig: Take 1 tablet (10 mEq) by mouth 2 times daily   traMADol (ULTRAM) 50 MG tablet   No No   Si-2 tabs orally  Every 6 hrs as needed for pain.      Facility-Administered Medications: None     Allergies   No Known Allergies    Physical Exam   Vital Signs: Temp: 97.3  F (36.3  C) Temp src: Tympanic BP: 172/63 Pulse: 80   Resp: 20 SpO2: 92 % O2 Device: None (Room air)    Weight: 0 lbs 0 oz    General Appearance: sitting in bed in NAD  Eyes: cataract surgery noted  HEENT: clear O&P  Respiratory: CTA bilaterally  Cardiovascular: RRR, no murmurs or gallops  GI: soft NT ND  Lymph/Hematologic: no palpable LN  Genitourinary: no suprapubic/ CVA tenderness  Skin: intact  Musculoskeletal: 1+ LE edema  Neurologic: no facial asymmetry, no focal weakness  Psychiatric: appropriate affect    Data   Data reviewed today: I reviewed all medications, new labs and imaging results over the last 24 hours. I personally reviewed the EKG tracing showing no acute ischemic changes.    Recent Labs   Lab 21  1211 21  0936 21  0732   WBC  --   --  12.5*   HGB  --   --  9.8*   MCV  --   --  73*   PLT  --   --  280   NA  --   --  128*   POTASSIUM 3.2*  --  3.1*   CHLORIDE  --   --  99   CO2  --   --  22   BUN  --   --  19   CR  --   --  0.62   ANIONGAP  --   --  7   LUZMARIA  --   --  8.1*   GLC  --   --  136*   ALBUMIN  --   --  3.0*   PROTTOTAL  --   --  6.2*   BILITOTAL  --   --  0.4   ALKPHOS  --   --  130   ALT  --   --  27   AST  --   --  21   TROPI  --  0.161* 0.053*     Most Recent 3 Troponin's:  Recent Labs   Lab Test 21  0936 21  0732    TROPI 0.161* 0.053*     No results found for this or any previous visit (from the past 24 hour(s)).

## 2021-03-27 NOTE — ED PROVIDER NOTES
"  History     Chief Complaint   Patient presents with     Fall     at home.  No LOC.     HPI  Claudine Bustos is a 89 year old female who presents to the emergency department for evaluation.  Patient apparently fell on the way to the bathroom but did not hit the head and denies pain anywhere in the body.  She denies urinary symptoms, chest pain, shortness of breath, fever, chills, headache, unilateral body weakness, slurring of speech, vomiting, diarrhea.  She was brought in by [facility] to be \"checked out\".  She denies any other concerns.    Allergies:  No Known Allergies    Problem List:    Patient Active Problem List    Diagnosis Date Noted     Elevated troponin 03/27/2021     Priority: Medium     Troponin level elevated 03/27/2021     Priority: Medium     Urinary tract infection without hematuria, site unspecified 03/27/2021     Priority: Medium     Iron deficiency 07/13/2020     Priority: Medium     Spinal stenosis of lumbosacral region 07/13/2020     Priority: Medium     Sacroiliitis (H) 07/13/2020     Priority: Medium     Back pain 02/01/2020     Priority: Medium     Hyponatremia 02/01/2020     Priority: Medium     Rhabdomyolysis 02/01/2020     Priority: Medium     Hypokalemia 02/01/2020     Priority: Medium     Anemia 02/01/2020     Priority: Medium     Closed fracture of eleventh thoracic vertebra (H) 02/01/2020     Priority: Medium     Systolic murmur 02/01/2020     Priority: Medium     Hypoxia 02/01/2020     Priority: Medium     Nasal lesion 07/24/2019     Priority: Medium     Renal benign neoplasm, left 06/05/2019     Priority: Medium     Obesity (BMI 35.0-39.9) with comorbidity (H) 06/05/2019     Priority: Medium     Memory loss 06/05/2019     Priority: Medium     Acute right-sided low back pain with right-sided sciatica 06/05/2019     Priority: Medium     CAP (community acquired pneumonia) 01/21/2018     Priority: Medium     Renal mass, right 06/08/2017     Priority: Medium     Postoperative anemia " due to acute blood loss 01/24/2017     Priority: Medium     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 Provider to review and confirm.       Advanced care planning/counseling discussion 08/03/2012     Priority: Medium     Chronic kidney disease, unspecified 08/03/2012     Priority: Medium     Diastolic dysfunction 02/27/2012     Priority: Medium     echo 2/2012  EF 60%          Past Medical History:    Past Medical History:   Diagnosis Date     Chronic kidney disease, unspecified 8/3/2012     Diastolic dysfunction 2/27/2012     HTN (hypertension) 4/4/2000     Obesity (BMI 35.0-39.9) with comorbidity (H) 6/5/2019     Personal history of colonic polyps 6/8/2004     Renal benign neoplasm, left 2016     Skin cancer        Past Surgical History:    Past Surgical History:   Procedure Laterality Date     ARTHROPLASTY HIP Right 1/23/2017    Procedure: ARTHROPLASTY HIP;  Surgeon: Jose Manuel Muñoz MD;  [facility]: [facility]     ARTHROPLASTY KNEE Left 1/25/2016    Procedure: ARTHROPLASTY KNEE;  Surgeon: Jose Manuel Muñoz MD;  Location: [facility]     colonoscopy  2004, 2009     EXCISE LESION EYELID Left 8/1/2017    Procedure: EXCISE LESION EYELID;  LEFT LOWER LID WEDGE EXCISION WITH FROZEN SECTION CONTROL;  Surgeon: Quinten Sotelo MD;  Location: [facility]     excision of rectal villus adenoma       IR CONSULTATION FOR IR EXAM  7/15/2020     knee replacement Right 1/2011     PHACOEMULSIFICATION WITH STANDARD INTRAOCULAR LENS IMPLANT  2/25/2014    Procedure: PHACOEMULSIFICATION WITH STANDARD INTRAOCULAR LENS IMPLANT;  CATARACT EXTRACTION WITH INTRA OCULAR LENS LEFT;  Surgeon: Jah Bee MD;  Location: [facility]      "PHACOEMULSIFICATION WITH STANDARD INTRAOCULAR LENS IMPLANT Right 5/12/2015    Procedure: PHACOEMULSIFICATION WITH STANDARD INTRAOCULAR LENS IMPLANT;  Surgeon: Jah Bee MD;  Location: [facility]     thoracic schwannoma resection       tonsillectomy       urethral dilitation every 4 months         Family History:    Family History   Problem Relation Age of Onset     Alcohol/Drug Father         alcoholism     Cancer Father 51        Esophageal, cause of death     Other - See Comments Father         Tobacco use     Cerebrovascular Disease Mother 51        Cause of death     Other - See Comments Maternal Grandmother      Other - See Comments Maternal Grandfather      Other - See Comments Paternal Grandmother      Other - See Comments Paternal Grandfather      Cancer Sister         Breast     Cancer Brother         Leukemia     Cancer Brother         Lung     Chronic Obstructive Pulmonary Disease Brother        Social History:  Marital Status:   [2]  Social History     Tobacco Use     Smoking status: Never Smoker     Smokeless tobacco: Never Used   Substance Use Topics     Alcohol use: Yes     Frequency: Monthly or less     Drinks per session: 1 or 2     Comment: Mixed, rarely     Drug use: No        Medications:    cefdinir (OMNICEF) 300 MG capsule          Review of Systems   Constitutional: Negative for fever.   HENT: Negative for congestion.    Eyes: Negative for redness.   Respiratory: Negative for shortness of breath.    Cardiovascular: Negative for chest pain.   Gastrointestinal: Negative for abdominal pain.   Genitourinary: Negative for difficulty urinating.   Musculoskeletal: Negative for arthralgias and neck stiffness.   Skin: Negative for color change.   Neurological: Negative for headaches.   Psychiatric/Behavioral: Negative for confusion.   All other systems reviewed and are negative.      Physical Exam   BP: 109/55  Pulse: 84  Temp: 97.7  F (36.5  C)  Resp: 20  Height: 154.9 cm (5' 1\")  Weight: " 60.8 kg (134 lb 0.6 oz)  SpO2: 92 %  Lying Orthostatic BP: 143/56  Lying Orthostatic Pulse: 82 bpm  Sitting Orthostatic BP: 129/78  Sitting Orthostatic Pulse: 84 bpm  Standing Orthostatic BP: 147/72  Standing Orthostatic Pulse: 95 bpm      Physical Exam  Vitals signs and nursing note reviewed.   Constitutional:       General: She is not in acute distress.     Appearance: Normal appearance. She is not diaphoretic.   HENT:      Head: Normocephalic and atraumatic.      Mouth/Throat:      Pharynx: No oropharyngeal exudate.   Eyes:      Extraocular Movements: Extraocular movements intact.      Pupils: Pupils are equal, round, and reactive to light.   Neck:      Musculoskeletal: Neck supple.   Cardiovascular:      Rate and Rhythm: Normal rate and regular rhythm.      Pulses: Normal pulses.      Heart sounds: Normal heart sounds.   Pulmonary:      Effort: No respiratory distress.      Breath sounds: Normal breath sounds.   Abdominal:      General: Bowel sounds are normal.      Palpations: Abdomen is soft.      Tenderness: There is no abdominal tenderness.   Musculoskeletal:         General: No swelling or tenderness.   Neurological:      General: No focal deficit present.      Mental Status: She is alert and oriented to person, place, and time. Mental status is at baseline.         ED Course   Patient evaluated and labs ordered including EKG.  Hypokalemia- oral K-dur given  Transferred care to oncoming provider Dr Angel at 8 AM pending  UA, repeat troponin, re-evaluation and subsequent disposition.    Procedures       EKG Interpretation:      Interpreted by David Varner MD  Time reviewed: 7:23 AM  Symptoms at time of EKG: Syncope  Rhythm: normal sinus   Rate: normal  Axis: normal  Ectopy: none  Conduction: normal  ST Segments/ T Waves: No ST-T wave changes  Q Waves: none  Comparison to prior: No old EKG available    Clinical Impression: Sinus rhythm with premature atrial complexes      Results for orders placed or  performed during the hospital encounter of 03/27/21 (from the past 24 hour(s))   CBC with platelets differential   Result Value Ref Range    WBC 12.5 (H) 4.0 - 11.0 10e9/L    RBC Count 4.40 3.8 - 5.2 10e12/L    Hemoglobin 9.8 (L) 11.7 - 15.7 g/dL    Hematocrit 32.2 (L) 35.0 - 47.0 %    MCV 73 (L) 78 - 100 fl    MCH 22.3 (L) 26.5 - 33.0 pg    MCHC 30.4 (L) 31.5 - 36.5 g/dL    RDW 16.4 (H) 10.0 - 15.0 %    Platelet Count 280 150 - 450 10e9/L    Diff Method Automated Method     % Neutrophils 86.8 %    % Lymphocytes 4.7 %    % Monocytes 7.1 %    % Eosinophils 0.2 %    % Basophils 0.2 %    % Immature Granulocytes 1.0 %    Nucleated RBCs 0 0 /100    Absolute Neutrophil 10.8 (H) 1.6 - 8.3 10e9/L    Absolute Lymphocytes 0.6 (L) 0.8 - 5.3 10e9/L    Absolute Monocytes 0.9 0.0 - 1.3 10e9/L    Absolute Eosinophils 0.0 0.0 - 0.7 10e9/L    Absolute Basophils 0.0 0.0 - 0.2 10e9/L    Abs Immature Granulocytes 0.1 0 - 0.4 10e9/L    Absolute Nucleated RBC 0.0    Comprehensive metabolic panel   Result Value Ref Range    Sodium 128 (L) 133 - 144 mmol/L    Potassium 3.1 (L) 3.4 - 5.3 mmol/L    Chloride 99 94 - 109 mmol/L    Carbon Dioxide 22 20 - 32 mmol/L    Anion Gap 7 3 - 14 mmol/L    Glucose 136 (H) 70 - 99 mg/dL    Urea Nitrogen 19 7 - 30 mg/dL    Creatinine 0.62 0.52 - 1.04 mg/dL    GFR Estimate 79 >60 mL/min/[1.73_m2]    GFR Estimate If Black >90 >60 mL/min/[1.73_m2]    Calcium 8.1 (L) 8.5 - 10.1 mg/dL    Bilirubin Total 0.4 0.2 - 1.3 mg/dL    Albumin 3.0 (L) 3.4 - 5.0 g/dL    Protein Total 6.2 (L) 6.8 - 8.8 g/dL    Alkaline Phosphatase 130 40 - 150 U/L    ALT 27 0 - 50 U/L    AST 21 0 - 45 U/L   Troponin I   Result Value Ref Range    Troponin I ES 0.053 (H) 0.000 - 0.045 ug/L   CRP inflammation   Result Value Ref Range    CRP Inflammation 7.8 0.0 - 8.0 mg/L   Procalcitonin   Result Value Ref Range    Procalcitonin <0.05 ng/ml   Magnesium   Result Value Ref Range    Magnesium 1.6 1.6 - 2.3 mg/dL   UA reflex to Microscopic and  Culture    Specimen: Midstream Urine   Result Value Ref Range    Color Urine Light Yellow     Appearance Urine Slightly Cloudy     Glucose Urine Negative NEG^Negative mg/dL    Bilirubin Urine Negative NEG^Negative    Ketones Urine Negative NEG^Negative mg/dL    Specific Gravity Urine 1.009 1.003 - 1.035    Blood Urine Negative NEG^Negative    pH Urine 6.0 4.7 - 8.0 pH    Protein Albumin Urine 10 (A) NEG^Negative mg/dL    Urobilinogen mg/dL Normal 0.0 - 2.0 mg/dL    Nitrite Urine Negative NEG^Negative    Leukocyte Esterase Urine Moderate (A) NEG^Negative    Source Midstream Urine     RBC Urine 2 0 - 2 /HPF    WBC Urine 23 (H) 0 - 5 /HPF    Bacteria Urine Few (A) NEG^Negative /HPF    Squamous Epithelial /HPF Urine 4 (H) 0 - 1 /HPF    Mucous Urine Present (A) NEG^Negative /LPF   Troponin I   Result Value Ref Range    Troponin I ES 0.161 (HH) 0.000 - 0.045 ug/L   Asymptomatic SARS-CoV-2 COVID-19 Virus (Coronavirus) by PCR    Specimen: Nasopharyngeal   Result Value Ref Range    SARS-CoV-2 Virus Specimen Source Nasopharyngeal     SARS-CoV-2 PCR Result NEGATIVE     SARS-CoV-2 PCR Comment       Testing was performed using the Xpert Xpress SARS-CoV-2 Assay on the Cepheid Gene-Xpert   Instrument Systems. Additional information about this Emergency Use Authorization (EUA)   assay can be found via the Lab Guide.     Potassium   Result Value Ref Range    Potassium 3.2 (L) 3.4 - 5.3 mmol/L   Osmolality   Result Value Ref Range    Osmolality 271 (L) 280 - 295 mmol/kg   Troponin I   Result Value Ref Range    Troponin I ES 0.336 (HH) 0.000 - 0.045 ug/L   Glucose by meter   Result Value Ref Range    Glucose 116 (H) 70 - 99 mg/dL       Medications   lidocaine 1 % 0.1-1 mL (has no administration in time range)   lidocaine (LMX4) kit (has no administration in time range)   sodium chloride (PF) 0.9% PF flush 3 mL (3 mLs Intracatheter Given 3/27/21 1431)   sodium chloride (PF) 0.9% PF flush 3 mL (has no administration in time range)    sodium chloride (PF) 0.9% PF flush 3 mL (has no administration in time range)   melatonin tablet 1 mg (has no administration in time range)   acetaminophen (TYLENOL) tablet 650 mg (has no administration in time range)   senna-docusate (SENOKOT-S/PERICOLACE) 8.6-50 MG per tablet 1 tablet (1 tablet Oral Not Given 3/27/21 1453)     Or   senna-docusate (SENOKOT-S/PERICOLACE) 8.6-50 MG per tablet 2 tablet ( Oral See Alternative 3/27/21 1453)   ondansetron (ZOFRAN-ODT) ODT tab 4 mg (has no administration in time range)     Or   ondansetron (ZOFRAN) injection 4 mg (has no administration in time range)   enoxaparin ANTICOAGULANT (LOVENOX) injection 60 mg (has no administration in time range)   metoprolol tartrate (LOPRESSOR) half-tab 12.5 mg (12.5 mg Oral Given 3/27/21 1448)   cefTRIAXone in d5w (ROCEPHIN) intermittent infusion 1 g (1 g Intravenous New Bag 3/27/21 1445)   phenazopyridine (PYRIDIUM) tablet 100 mg (100 mg Oral Given 3/27/21 1618)   potassium chloride ER (KLOR-CON M) CR tablet 20 mEq (20 mEq Oral Given 3/27/21 0835)   aspirin (ASA) chewable tablet 324 mg (324 mg Oral Given 3/27/21 0835)   cefdinir (OMNICEF) capsule 600 mg (600 mg Oral Given 3/27/21 0914)   enoxaparin ANTICOAGULANT (LOVENOX) injection 70 mg (70 mg Subcutaneous Given 3/27/21 1043)   potassium chloride ER (KLOR-CON M) CR tablet 40 mEq (40 mEq Oral Given 3/27/21 1618)   magnesium sulfate 2 g in water intermittent infusion (2 g Intravenous New Bag 3/27/21 1618)       Assessments & Plan (with Medical Decision Making)     I have reviewed the nursing notes.    I have reviewed the findings, diagnosis, plan and need for follow up with the patient.    Current Discharge Medication List      START taking these medications    Details   cefdinir (OMNICEF) 300 MG capsule Take 1 capsule (300 mg) by mouth 2 times daily for 7 days  Qty: 14 capsule, Refills: 0             Final diagnoses:   Hypokalemia   Hyponatremia   Elevated troponin   Urinary tract  infection without hematuria, site unspecified       3/27/2021   [facility]     David Varner MD  03/27/21 1957

## 2021-03-27 NOTE — PLAN OF CARE
"[facility] Inpatient Admission Note:    Patient admitted to 3108/3108-1 at approximately 1130 via cart accompanied by transport tech and son from emergency room . Report received from Jerry in SBAR format at 1115 via telephone. Patient transferred to bed via slide board.. Patient is alert and oriented X 3, denies pain; rates at 0 on 0-10 scale.  Patient oriented to room, unit, hourly rounding, and plan of care. Explained admission packet and patient handbook with patient bill of rights brochure. Will continue to monitor and document as needed.     Inpatient Nursing criteria listed below was met:    Health care directives status obtained and documented: Yes    Care Everywhere authorization obtained No    MRSA swab completed for patient 65 years and older: N/A    Patient identifies a surrogate decision maker: Yes If yes, who:Sonn Contact Information:see facesheet     If initial lactic acid >2.0, repeat lactic acid drawn within one hour of arrival to unit: NA. If no, state reason: n/a    Vaccination assessment and education completed: No   Vaccinations received prior to admission: Pneumovax no  Influenza(seasonal)  N/A   Vaccination(s) ordered: n/a    Clergy visit ordered if patient requests: N/A    Skin issues/needs documented: N/A    Isolation Patient: no Education given, correct sign in place and documentation row added to PCS:  No    Fall Prevention Yes: Care plan updated, education given and documented, sticker and magnet in place: Yes    Care Plan initiated: Yes    Education Documented (including assessment): Yes    Patient has discharge needs : Yes If yes, please explain:PT    Per Pt wants to be DNR/DNI, states \"when I am ready let me go.\". Notified Dr. Ardon     Face to face report given with opportunity to observe patient.    Report given to Doc Andrade RN   3/27/2021  1:18 PM      "

## 2021-03-27 NOTE — PHARMACY-MEDICATION REGIMEN REVIEW
Pharmacy Antimicrobial Stewardship Assessment     Current Antimicrobial Therapy:  Anti-infectives (From now, onward)    Start     Dose/Rate Route Frequency Ordered Stop    03/27/21 1500  cefTRIAXone in d5w (ROCEPHIN) intermittent infusion 1 g      1 g  over 30 Minutes Intravenous EVERY 24 HOURS 03/27/21 1231      Oral 2 TIMES DAILY 03/27/21 0911 04/03/21 2359          Indication: UTI    Days of Therapy: 1     Pertinent Labs:  WBC   Date Value Ref Range Status   03/27/2021 12.5 (H) 4.0 - 11.0 10e9/L Final   07/13/2020 10.3 4.0 - 11.0 10e9/L Final   02/19/2020 7.9 4.0 - 11.0 10e9/L Final     Procalcitonin   Date Value Ref Range Status   03/27/2021 <0.05 ng/ml Final     Comment:     <0.05 ng/ml  Normal  Recommendation: Very low risk of bacterial infection.   Discourage antibiotics unless strong clinical suspicion for serious infection.       CRP Inflammation   Date Value Ref Range Status   03/27/2021 7.8 0.0 - 8.0 mg/L Final       Culture Results:   (3/27/21) COVID = negative  (3/27/21) Urine     Recommendations/Interventions:  1. Transition to oral when able    Morro Lawson, [facility]  March 27, 2021

## 2021-03-27 NOTE — ED TRIAGE NOTES
[facility] for fall at home from standing position.  Pt originally called for lift assist.  Brought in for weakness and low back pain.  Pt denies pain anywhere at this time.  Pt states she was walking to [place] when she fell.  No LOC.  Pt denies injury.  No obvious sign of trauma.

## 2021-03-27 NOTE — ED PROVIDER NOTES
Emergency Department Transfer of Care Note    Assessment / Plan / Medical Decision Making   Agree with previous provider's plan and exam. Urinalysis returned with evidence of infection. Tolerating PO. Omnicef given and rx written. Second trop significantly elevated, EKG remains unchanged and patient denies symptoms of ACS.  I discussed with the patient and family member in the room about transfer to [place] versus admission here.  The patient does not want aggressive interventional cardiology procedures, and would like to stay here for trending of troponins.  Likely acute cardiac injury.  Admitted to medicine.    New Prescriptions    CEFDINIR (OMNICEF) 300 MG CAPSULE    Take 1 capsule (300 mg) by mouth 2 times daily for 7 days       Final diagnoses:   Hypokalemia   Hyponatremia   Elevated troponin   Urinary tract infection without hematuria, site unspecified       Jamarcus Angel MD  3/27/2021   [facility]     Jamarcus Angel MD  03/27/21 5484

## 2021-03-27 NOTE — PLAN OF CARE
Pt VSS.  A&O.  Orthostatic BP's negative as charted.  Pt has frequent urination with burning - gave Pyridium.  Gave 1 dose Zachary iv.  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  Pt up with asst x1 to commode, left leg is weak and pt states that's why she has had falls at home, son in room confirmed this information.  Lunch ordered for pt, pt has good appetite.  Alarms on and call light in reach.      Face to face report given with opportunity to observe patient.    Report given to Magnolia Astorga RN   3/27/2021  4:58 PM

## 2021-03-27 NOTE — ED NOTES
Concerns for blood pressure being elevated, pt had been up to the commode several times during prior to blood pressure results.  Rechecked blood pressure,  Blood pressure has decreased to 172 systolic.

## 2021-03-27 NOTE — ED NOTES
Pt up to commode,  Assisted up with two person assist.  Pt covered with warm blankets after being up to the bathroom.

## 2021-03-27 NOTE — PROVIDER NOTIFICATION
DATE:  3/27/2021   TIME OF RECEIPT FROM LAB:  5875  LAB TEST:  Troponin   LAB VALUE:  0.336  RESULTS GIVEN WITH READ-BACK TO (PROVIDER):  Wm Ardon MD  TIME LAB VALUE REPORTED TO PROVIDER:   9255

## 2021-03-28 LAB
ALBUMIN SERPL-MCNC: 2.9 G/DL (ref 3.4–5)
ALP SERPL-CCNC: 132 U/L (ref 40–150)
ALT SERPL W P-5'-P-CCNC: 48 U/L (ref 0–50)
ANION GAP SERPL CALCULATED.3IONS-SCNC: 4 MMOL/L (ref 3–14)
AST SERPL W P-5'-P-CCNC: 88 U/L (ref 0–45)
BILIRUB DIRECT SERPL-MCNC: 0.1 MG/DL (ref 0–0.2)
BILIRUB SERPL-MCNC: 0.4 MG/DL (ref 0.2–1.3)
BUN SERPL-MCNC: 11 MG/DL (ref 7–30)
CALCIUM SERPL-MCNC: 8.4 MG/DL (ref 8.5–10.1)
CHLORIDE SERPL-SCNC: 108 MMOL/L (ref 94–109)
CO2 SERPL-SCNC: 24 MMOL/L (ref 20–32)
CREAT SERPL-MCNC: 0.62 MG/DL (ref 0.52–1.04)
ERYTHROCYTE [DISTWIDTH] IN BLOOD BY AUTOMATED COUNT: 16.6 % (ref 10–15)
GFR SERPL CREATININE-BSD FRML MDRD: 79 ML/MIN/{1.73_M2}
GLUCOSE SERPL-MCNC: 92 MG/DL (ref 70–99)
HCT VFR BLD AUTO: 34.7 % (ref 35–47)
HGB BLD-MCNC: 10.8 G/DL (ref 11.7–15.7)
MAGNESIUM SERPL-MCNC: 2.2 MG/DL (ref 1.6–2.3)
MCH RBC QN AUTO: 22.7 PG (ref 26.5–33)
MCHC RBC AUTO-ENTMCNC: 31.1 G/DL (ref 31.5–36.5)
MCV RBC AUTO: 73 FL (ref 78–100)
PHOSPHATE SERPL-MCNC: 2.7 MG/DL (ref 2.5–4.5)
PLATELET # BLD AUTO: 322 10E9/L (ref 150–450)
POTASSIUM SERPL-SCNC: 3.9 MMOL/L (ref 3.4–5.3)
PROT SERPL-MCNC: 6.2 G/DL (ref 6.8–8.8)
RBC # BLD AUTO: 4.76 10E12/L (ref 3.8–5.2)
SODIUM SERPL-SCNC: 136 MMOL/L (ref 133–144)
WBC # BLD AUTO: 9.1 10E9/L (ref 4–11)

## 2021-03-28 PROCEDURE — 84460 ALANINE AMINO (ALT) (SGPT): CPT | Performed by: INTERNAL MEDICINE

## 2021-03-28 PROCEDURE — 250N000011 HC RX IP 250 OP 636: Performed by: INTERNAL MEDICINE

## 2021-03-28 PROCEDURE — 250N000013 HC RX MED GY IP 250 OP 250 PS 637: Performed by: INTERNAL MEDICINE

## 2021-03-28 PROCEDURE — 84075 ASSAY ALKALINE PHOSPHATASE: CPT | Performed by: INTERNAL MEDICINE

## 2021-03-28 PROCEDURE — 120N000001 HC R&B MED SURG/OB

## 2021-03-28 PROCEDURE — 36415 COLL VENOUS BLD VENIPUNCTURE: CPT | Performed by: INTERNAL MEDICINE

## 2021-03-28 PROCEDURE — 84450 TRANSFERASE (AST) (SGOT): CPT | Performed by: INTERNAL MEDICINE

## 2021-03-28 PROCEDURE — 99232 SBSQ HOSP IP/OBS MODERATE 35: CPT | Performed by: INTERNAL MEDICINE

## 2021-03-28 PROCEDURE — 83735 ASSAY OF MAGNESIUM: CPT | Performed by: INTERNAL MEDICINE

## 2021-03-28 PROCEDURE — 85027 COMPLETE CBC AUTOMATED: CPT | Performed by: INTERNAL MEDICINE

## 2021-03-28 PROCEDURE — 84155 ASSAY OF PROTEIN SERUM: CPT | Performed by: INTERNAL MEDICINE

## 2021-03-28 PROCEDURE — 82248 BILIRUBIN DIRECT: CPT | Performed by: INTERNAL MEDICINE

## 2021-03-28 PROCEDURE — 80069 RENAL FUNCTION PANEL: CPT | Performed by: INTERNAL MEDICINE

## 2021-03-28 PROCEDURE — 82247 BILIRUBIN TOTAL: CPT | Performed by: INTERNAL MEDICINE

## 2021-03-28 RX ADMIN — PHENAZOPYRIDINE HYDROCHLORIDE 100 MG: 100 TABLET, FILM COATED ORAL at 12:53

## 2021-03-28 RX ADMIN — ENOXAPARIN SODIUM 60 MG: 60 INJECTION SUBCUTANEOUS at 20:13

## 2021-03-28 RX ADMIN — METOPROLOL TARTRATE 12.5 MG: 25 TABLET, FILM COATED ORAL at 20:15

## 2021-03-28 RX ADMIN — ENOXAPARIN SODIUM 60 MG: 60 INJECTION SUBCUTANEOUS at 09:16

## 2021-03-28 RX ADMIN — CEFTRIAXONE SODIUM 1 G: 1 INJECTION, SOLUTION INTRAVENOUS at 15:26

## 2021-03-28 RX ADMIN — METOPROLOL TARTRATE 12.5 MG: 25 TABLET, FILM COATED ORAL at 09:16

## 2021-03-28 RX ADMIN — PHENAZOPYRIDINE HYDROCHLORIDE 100 MG: 100 TABLET, FILM COATED ORAL at 16:22

## 2021-03-28 RX ADMIN — PHENAZOPYRIDINE HYDROCHLORIDE 100 MG: 100 TABLET, FILM COATED ORAL at 09:18

## 2021-03-28 RX ADMIN — SENNOSIDES AND DOCUSATE SODIUM 1 TABLET: 8.6; 5 TABLET ORAL at 09:16

## 2021-03-28 ASSESSMENT — ACTIVITIES OF DAILY LIVING (ADL)
ADLS_ACUITY_SCORE: 17

## 2021-03-28 ASSESSMENT — MIFFLIN-ST. JEOR: SCORE: 960.38

## 2021-03-28 NOTE — PLAN OF CARE
Shift assessment complete as charted.  VSS, afebrile.  Denies pain t/o shift.  Has been up in chair since breakfast.  Up with assist x1.  A&O.    Face to face report given with opportunity to observe patient.    Report given to DIVYA Milligan RN   3/28/2021  3:14 PM

## 2021-03-28 NOTE — PLAN OF CARE
DATE:  3/27/2021   TIME OF RECEIPT FROM LAB:  2205  LAB TEST:   Troponin  LAB VALUE:  0.290  RESULTS GIVEN WITH READ-BACK TO (PROVIDER):  Dr. Sotelo  TIME LAB VALUE REPORTED TO PROVIDER:   4925

## 2021-03-28 NOTE — PLAN OF CARE
"/63 (BP Location: Left arm)   Pulse 82   Temp 97.9  F (36.6  C) (Tympanic)   Resp 18   Ht 1.549 m (5' 1\")   Wt 60.3 kg (132 lb 15 oz)   SpO2 95%   BMI 25.12 kg/m      Pt alert and oriented with some forgetfulness. Denies pain. HR irregular, per ICU tele report SR 80s w/ freq PACs. Lungs clear, satting 95% RA.  BS active. Frequent urination, continent. To bedside commode Ax1 with walker. Does not use call light. Per pt feeling stronger by AM. No falls or injuries this shift. Will continue to monitor.     Face to face report given with opportunity to observe patient.    Report given to DIVYA Singh RN   3/28/2021  7:03 AM      "

## 2021-03-28 NOTE — PHARMACY-MEDICATION REGIMEN REVIEW
Pharmacy Antimicrobial Stewardship Assessment     Current Antimicrobial Therapy:  Anti-infectives (From now, onward)    Start     Dose/Rate Route Frequency Ordered Stop    03/27/21 1500  cefTRIAXone in d5w (ROCEPHIN) intermittent infusion 1 g      1 g  over 30 Minutes Intravenous EVERY 24 HOURS 03/27/21 1231      300 mg Oral 2 TIMES DAILY 03/27/21 0911 04/03/21 1449        Indication: UTI     Days of Therapy: 2     Pertinent Labs:  WBC   Date Value Ref Range Status   03/28/2021 9.1 4.0 - 11.0 10e9/L Final   03/27/2021 12.5 (H) 4.0 - 11.0 10e9/L Final   07/13/2020 10.3 4.0 - 11.0 10e9/L Final     Procalcitonin   Date Value Ref Range Status   03/27/2021 <0.05 ng/ml Final     Comment:     <0.05 ng/ml  Normal  Recommendation: Very low risk of bacterial infection.   Discourage antibiotics unless strong clinical suspicion for serious infection.       CRP Inflammation   Date Value Ref Range Status   03/27/2021 7.8 0.0 - 8.0 mg/L Final       Culture Results:   (3/27/21) COVID = negative  (3/27/21) Urine =>100K GNR     Recommendations/Interventions:  1. Transition to oral when able    Morro Lawson, [facility]  March 28, 2021

## 2021-03-28 NOTE — PLAN OF CARE
"Most recent vitals: /64   Pulse 78   Temp 97.9  F (36.6  C) (Tympanic)   Resp 18   Ht 1.549 m (5' 1\")   Wt 60.3 kg (132 lb 15 oz)   SpO2 95%   BMI 25.12 kg/m      Alert and oriented. Able to make needs known. VSS. Afebrile. Denies pain. Lungs clear and diminished bilaterally. Bowel sounds active. Adequate urine output during the shift. Rocephin given per order. Blanchable redness to buttocks, no open areas. Barrier cream applied. Bruising remains to nose. IV SL. Assist x1. Contact precautions maintained. Was incontinent of stool once during the shift with loose stools x3. Stool softener held.    Face to face report given with opportunity to observe patient.    Report given to Mali Peterson RN   3/28/2021  10:55 PM      "

## 2021-03-28 NOTE — PROGRESS NOTES
Assessment completed by face to face with patient.    LOC: alert /oriented     Dx: UTI, Elevated troponin level   Chronic Disease Management: HTN, Spinal Stenosis of lumbosacral region, systolic murmur    Lives with: spouse et son  Living at:  Home in [place]  Transportation: YES , family    Primary PCP: Katarina Gutierrez  Insurance:  Medicare, Commercial [employer]  Medicare IMM letter reviewed with patient.    Support System:  family  Homecare/PCA: not connected    : NO      How was the VA notification completed: N/A    Health Care Directive: No, declines offer of [facility] Cho[facility]  Guardian: N/A    Pharmacy: [facility]  Meds management: Independently    Adequate Resources for needs (housing, utilities, food/med): YES  Household chores: [place]/dtr  Work/community/social activity: YES connected w/family, likes to watch TV     ADLs: Indeepndent  Ambulation:Uses walker, cane  Falls: one in last six months that brought pt here    Equipment used: Walker, cane, shower bench that folds down, 2 grab bars around shower.      Oxygen supplier: N/A      Does the supplier have valid oxygen orders: N/A    Mental health: no concerns voiced  Substance abuse: occasional glass of wine  Exposure to violence/abuse: No concerns voiced    Able to Return to Prior Living Arrangements: YES    Choice of Vendor: N/A    Barriers: None anticipated    JOYCE: Average    Plan: Return to home via son/Hira to provide personal vehicle transportation.

## 2021-03-28 NOTE — UTILIZATION REVIEW
Admission Status; Secondary Review Determination    Under the authority of the Utilization Management Committee, the utilization review process indicated a secondary review on the above patient. The review outcome is based on review of the medical records, discussions with staff, and applying clinical experience noted on the date of the review.    (x) Inpatient Status Appropriate - This patient's medical care is consistent with medical management for inpatient care and reasonable inpatient medical practice.    RATIONALE FOR DETERMINATION:89-year-old female with history of hypertension, chronic kidney disease, diastolic dysfunction.  Patient developed urinary frequency and had a fall in route to bathroom.  In the emergency room patient is found to have significant pyuria, leukocytosis with significant left shift, complicated by progressive elevation of troponin level consistent with a serious acute illness.  Patient is expected require greater than 2 nights in the hospital appropriate for inpatient care.    At the time of admission with the information available to the attending physician more than 2 nights Hospital complex care was anticipated, based on patient risk of adverse outcome if treated as outpatient and complex care required. Inpatient admission is appropriate based on the Medicare guidelines.    This document was produced using voice recognition software    The information on this document is developed by the utilization review team in order for the business office to ensure compliance. This only denotes the appropriateness of proper admission status and does not reflect the quality of care rendered.    The definitions of Inpatient Status and Observation Status used in making the determination above are those provided in the CMS Coverage Manual, Chapter 1 and Chapter 6, section 70.4.    Sincerely,    Cristiano Peña MD  Utilization Review  Physician Advisor  [facility].

## 2021-03-28 NOTE — PLAN OF CARE
"Most recent vitals: /55   Pulse 80   Temp 98.9  F (37.2  C) (Tympanic)   Resp 18   Ht 1.549 m (5' 1\")   Wt 60.8 kg (134 lb 0.6 oz)   SpO2 96%   BMI 25.33 kg/m      Alert and oriented but forgetful at times. VSS. Afebrile. Denies pain. Lungs clear and diminished bilaterally. Bowel sounds active. Frequency and urgency when voiding. Patient was up many times to the commode throughout the shift. Adequate urine output during the shift. IV SL. Rocephin and magnesium infused per order. Oral potassium given, recheck at 2030 was 4.0. Ate 50% of supper. Assist x1 to commode with walker and gait belt. Telemetry applied. Buttocks has blanchable redness, barrier cream applied. Bruise to nose. Bilateral edema to legs +2. Alarms active. Assist x1 to commode.    Face to face report given with opportunity to observe patient.    Report given to Carla Peterson RN   3/27/2021  11:01 PM        "

## 2021-03-29 ENCOUNTER — TELEPHONE (OUTPATIENT)
Dept: FAMILY MEDICINE | Facility: OTHER | Age: 86
End: 2021-03-29

## 2021-03-29 ENCOUNTER — APPOINTMENT (OUTPATIENT)
Dept: PHYSICAL THERAPY | Facility: HOSPITAL | Age: 86
DRG: 281 | End: 2021-03-29
Attending: INTERNAL MEDICINE
Payer: MEDICARE

## 2021-03-29 VITALS
WEIGHT: 134.04 LBS | HEART RATE: 67 BPM | DIASTOLIC BLOOD PRESSURE: 54 MMHG | OXYGEN SATURATION: 93 % | SYSTOLIC BLOOD PRESSURE: 136 MMHG | BODY MASS INDEX: 25.31 KG/M2 | HEIGHT: 61 IN | TEMPERATURE: 97.3 F | RESPIRATION RATE: 16 BRPM

## 2021-03-29 LAB
BACTERIA SPEC CULT: ABNORMAL
MAGNESIUM SERPL-MCNC: 2 MG/DL (ref 1.6–2.3)
SPECIMEN SOURCE: ABNORMAL

## 2021-03-29 PROCEDURE — 99239 HOSP IP/OBS DSCHRG MGMT >30: CPT | Performed by: INTERNAL MEDICINE

## 2021-03-29 PROCEDURE — 83735 ASSAY OF MAGNESIUM: CPT | Performed by: INTERNAL MEDICINE

## 2021-03-29 PROCEDURE — 97161 PT EVAL LOW COMPLEX 20 MIN: CPT | Mod: GP | Performed by: PHYSICAL THERAPIST

## 2021-03-29 PROCEDURE — 36415 COLL VENOUS BLD VENIPUNCTURE: CPT | Performed by: INTERNAL MEDICINE

## 2021-03-29 PROCEDURE — 250N000013 HC RX MED GY IP 250 OP 250 PS 637: Performed by: INTERNAL MEDICINE

## 2021-03-29 PROCEDURE — 97530 THERAPEUTIC ACTIVITIES: CPT | Mod: GP | Performed by: PHYSICAL THERAPIST

## 2021-03-29 RX ORDER — CEFDINIR 300 MG/1
300 CAPSULE ORAL 2 TIMES DAILY
Qty: 12 CAPSULE | Refills: 0 | Status: SHIPPED | OUTPATIENT
Start: 2021-03-29 | End: 2021-04-04

## 2021-03-29 RX ORDER — METOPROLOL TARTRATE 25 MG/1
12.5 TABLET, FILM COATED ORAL 2 TIMES DAILY
Qty: 30 TABLET | Refills: 0 | Status: SHIPPED | OUTPATIENT
Start: 2021-03-29 | End: 2022-02-15

## 2021-03-29 RX ADMIN — SENNOSIDES AND DOCUSATE SODIUM 1 TABLET: 8.6; 5 TABLET ORAL at 08:54

## 2021-03-29 RX ADMIN — METOPROLOL TARTRATE 12.5 MG: 25 TABLET, FILM COATED ORAL at 08:54

## 2021-03-29 RX ADMIN — PHENAZOPYRIDINE HYDROCHLORIDE 100 MG: 100 TABLET, FILM COATED ORAL at 08:54

## 2021-03-29 RX ADMIN — ACETAMINOPHEN 650 MG: 325 TABLET, FILM COATED ORAL at 05:09

## 2021-03-29 ASSESSMENT — ACTIVITIES OF DAILY LIVING (ADL)
ADLS_ACUITY_SCORE: 18
ADLS_ACUITY_SCORE: 17
ADLS_ACUITY_SCORE: 18

## 2021-03-29 ASSESSMENT — MIFFLIN-ST. JEOR: SCORE: 965.38

## 2021-03-29 NOTE — DISCHARGE INSTRUCTIONS
7 Day follow up appointment with Katarina Gutierrez is scheduled for April 5th, arrive at 2:00 PM.

## 2021-03-29 NOTE — PLAN OF CARE
VSS. Denies any pain/discomfort with urination, does have urgency et frequency with voiding, voids approx 50-150ml . Aware of the urge to void, will les help me, is continent of bladder with little dribbling d/t urgency. Alarms audible et active.  Face to face report given with opportunity to observe patient.    Report given to Gaurav Andrade RN   3/29/2021  7:10 AM

## 2021-03-29 NOTE — DISCHARGE SUMMARY
[facility]    Discharge Summary  Hospitalist    Date of Admission:  3/27/2021  Date of Discharge:  3/29/2021  Discharging Provider: Ron Johnson MD  Date of Service (when I saw the patient): 03/29/21    Discharge Diagnoses   Principal Problem:    Troponin level elevated (3/27/2021)  Active Problems:    Hyponatremia (2/1/2020)    Hypokalemia (2/1/2020)    Elevated troponin (3/27/2021)    Urinary tract infection without hematuria, site unspecified (3/27/2021)    Acute cystitis without hematuria   Generalized weakness    Fall at home      History of Present Illness   Claudine Bustos is an 89 year old female who presented with fall at home.  Please see admission H+P for additional details.    Hospital Course   Claudine Bustos was admitted on 3/27/2021.  89-year-old female with a history of essential hypertension, CHF with diastolic dysfunction with EF 60% in 2012, chronic kidney disease who presented with a fall at home.  Patient denied any injuries, so no imaging work-up was done.  Work-up revealed elevated troponin, high blood pressure, as well as a possible UTI.  EKG was negative for acute ischemic changes.  She did decline to transfer to [facility] for aggressive intervention for her elevated troponin, which did peak at 0.336.  She did not have any symptoms of chest pain or pressure, shortness of breath, diaphoresis.  She was given an antibiotic (ceftriaxone) for UTI, she was treated conservatively for her elevated troponin with aspirin, metoprolol, as well as Lovenox.  Given circumstances, this is most likely demand ischemia type II NSTEMI.  Echocardiogram was obtained, however official read will not be ready prior to discharge.  Patient is asymptomatic, blood pressure and heart rate are within normal limits and stable.  Therapy evaluated the patient, recommendation was that she will most likely benefit from home care therapy, and this will be ordered for her.  We will discharge her with a total of 7  days of antibiotics, she will finish off a course of cefdinir.  Urine culture grew greater than 100,000 colonies of Klebsiella resistant to ampicillin.  She will follow-up with her primary care physician within the week to go over results of echocardiogram and cardiology referral versus stress testing can be given from there if indicated.  We will send her home with aspirin daily.    Ron Johnson MD      Significant Results and Procedures   See below    Pending Results   These results will be followed up by Katarina Gutierrez    Unresulted Labs Ordered in the Past 30 Days of this Admission     No orders found from 2/25/2021 to 3/28/2021.          Code Status   Full Code       Primary Care Physician   Katarina Gutierrez    Physical Exam   Temp: 97.3  F (36.3  C) Temp src: Tympanic BP: 136/54 Pulse: 67   Resp: 16 SpO2: 93 % O2 Device: None (Room air)    Vitals:    03/27/21 1422 03/28/21 0436 03/29/21 0324   Weight: 60.8 kg (134 lb 0.6 oz) 60.3 kg (132 lb 15 oz) 60.8 kg (134 lb 0.6 oz)     Vital Signs with Ranges  Temp:  [97.3  F (36.3  C)-98.3  F (36.8  C)] 97.3  F (36.3  C)  Pulse:  [67-89] 67  Resp:  [16-18] 16  BP: (128-144)/(54-74) 136/54  SpO2:  [93 %-98 %] 93 %  I/O last 3 completed shifts:  In: 240 [P.O.:240]  Out: 1100 [Urine:1100]    Constitutional: AA, NAD, elderly, frail appearing  Eyes: PERRLA, no injection, no icterus  HEENT: atraumatic, normocephalic  Respiratory: CTA b/l  Cardiovascular: S1 S2 RRR  GI: soft, NT, ND, + bowel sounds  Lymph/Hematologic: no palpable lymphadenopathy  Skin: no rashes, no lesions  Musculoskeletal: No edema, good tone, no deformities  Neurologic: oriented x 3, no focal deficits  Psychiatric: appropriate affect    Discharge Disposition   Discharged to home  Condition at discharge: Stable    Consultations This Hospital Stay   ADVANCE DIRECTIVE IP CONSULT  PHYSICAL THERAPY ADULT IP CONSULT  OCCUPATIONAL THERAPY ADULT IP CONSULT    Time Spent on this Encounter   IRon  MD, personally saw the patient today and spent greater than 30 minutes discharging this patient.    Discharge Orders      Home care nursing referral      Home Care PT Referral for Hospital Discharge      Home Care OT Referral for Hospital Discharge      Reason for your hospital stay    Fall at home, UTI, NSTEMI     Follow-up and recommended labs and tests     Follow up with primary care provider, Katarina Gutierrez, within 7 days for hospital follow- up.  No follow up labs or test are needed.     Activity    Your activity upon discharge: activity as tolerated     When to contact your care team    Call your primary doctor if you have any of the following: increasing weakness.     MD face to face encounter    Documentation of Face to Face and Certification for Home Health Services    I certify that patient: Claudine Bustos is under my care and that I, or a nurse practitioner or physician's assistant working with me, had a face-to-face encounter that meets the physician face-to-face encounter requirements with this patient on: 3/29/2021.    This encounter with the patient was in whole, or in part, for the following medical condition, which is the primary reason for home health care: weakness.    I certify that, based on my findings, the following services are medically necessary home health services: Nursing, Occupational Therapy and Physical Therapy.    My clinical findings support the need for the above services because: Nurse is needed: To assess home safety after changes in medications or other medical regimen.., Occupational Therapy Services are needed to assess and treat cognitive ability and address ADL safety due to impairment in weakness. and Physical Therapy Services are needed to assess and treat the following functional impairments: weakness.    Further, I certify that my clinical findings support that this patient is homebound (i.e. absences from home require considerable and taxing effort and are for  medical reasons or [facility] services or infrequently or of short duration when for other reasons) because: Requires assistance of another person or specialized equipment to access medical services because patient: Requires supervision of another for safe transfer...    Based on the above findings. I certify that this patient is confined to the home and needs intermittent skilled nursing care, physical therapy and/or speech therapy.  The patient is under my care, and I have initiated the establishment of the plan of care.  This patient will be followed by a physician who will periodically review the plan of care.  Physician/Provider to provide follow up care: Katarina Gutierrez    Attending [facility] physician (the Medicare certified [place] provider): Ron Johnson MD  Physician Signature: See electronic signature associated with these discharge orders.  Date: 3/29/2021     No CPR- Do NOT Intubate     Diet    Follow this diet upon discharge: Orders Placed This Encounter      Combination Diet Low Saturated Fat Na <2400mg Diet, No Caffeine Diet     Discharge Medications   Current Discharge Medication List      START taking these medications    Details   aspirin (ASA) 81 MG EC tablet Take 1 tablet (81 mg) by mouth daily  Qty: 30 tablet, Refills: 3    Associated Diagnoses: Troponin level elevated      !! cefdinir (OMNICEF) 300 MG capsule Take 1 capsule (300 mg) by mouth 2 times daily for 6 days  Qty: 12 capsule, Refills: 0    Associated Diagnoses: Urinary tract infection without hematuria, site unspecified      !! cefdinir (OMNICEF) 300 MG capsule Take 1 capsule (300 mg) by mouth 2 times daily for 7 days  Qty: 14 capsule, Refills: 0      metoprolol tartrate (LOPRESSOR) 25 MG tablet Take 0.5 tablets (12.5 mg) by mouth 2 times daily  Qty: 30 tablet, Refills: 0    Associated Diagnoses: Troponin level elevated; Heart disease, unspecified       !! - Potential duplicate medications found. Please discuss with provider.       CONTINUE these medications which have NOT CHANGED    Details   gabapentin (NEURONTIN) 100 MG capsule TAKE 1 CAPSULE BY MOUTH 3 TIMES DAILY AFTER ONE WEEK IF STILL HAVING PAIN INCREASE TO 2 CAPSULES 3 TIMES DAILY  Qty: 360 capsule, Refills: 1    Associated Diagnoses: Acute right-sided low back pain with right-sided sciatica      lisinopril (ZESTRIL) 10 MG tablet Take 1 tablet (10 mg) by mouth daily  Qty: 90 tablet, Refills: 3    Associated Diagnoses: Benign essential hypertension      Calcium Carb-Cholecalciferol (CALCIUM + D3) 600-200 MG-UNIT TABS Take 1 tablet by mouth daily      ferrous sulfate (FEROSUL) 325 (65 Fe) MG tablet Take 1 tablet (325 mg) by mouth daily (with breakfast)  Qty: 90 tablet, Refills: 1    Associated Diagnoses: Iron deficiency      ibuprofen 200 MG PO tablet Take 200 mg by mouth every 6 hours as needed for mild pain      Multiple Vitamin (MULTIVITAMIN) per tablet Take 1 tablet by mouth daily with food.      potassium chloride ER (KLOR-CON M) 10 MEQ CR tablet Take 1 tablet (10 mEq) by mouth 2 times daily  Qty: 90 tablet, Refills: 1    Associated Diagnoses: Hypokalemia      traMADol (ULTRAM) 50 MG tablet 1-2 tabs orally  Every 6 hrs as needed for pain.  Qty: 30 tablet, Refills: 1    Associated Diagnoses: Acute right-sided low back pain with right-sided sciatica           Allergies   No Known Allergies  Data   Most Recent 3 CBC's:  Recent Labs   Lab Test 03/28/21  0622 03/27/21  0732 07/13/20  1354   WBC 9.1 12.5* 10.3   HGB 10.8* 9.8* 9.8*   MCV 73* 73* 74*    280 364      Most Recent 3 BMP's:  Recent Labs   Lab Test 03/28/21  0622 03/27/21  2106 03/27/21  1211 03/27/21  0732 07/13/20  1354     --   --  128* 141   POTASSIUM 3.9 4.0 3.2* 3.1* 3.2*   CHLORIDE 108  --   --  99 110*   CO2 24  --   --  22 22   BUN 11  --   --  19 25   CR 0.62  --   --  0.62 0.84   ANIONGAP 4  --   --  7 9   LUZMARIA 8.4*  --   --  8.1* 8.6   GLC 92  --   --  136* 85     Most Recent 2 LFT's:  Recent Labs    Lab Test 03/28/21  0622 03/27/21  0732   AST 88* 21   ALT 48 27   ALKPHOS 132 130   BILITOTAL 0.4 0.4     Most Recent INR's and Anticoagulation Dosing History:  Anticoagulation Dose History     There is no flowsheet data to display.        Most Recent 3 Troponin's:  Recent Labs   Lab Test 03/27/21  2106 03/27/21  1606 03/27/21  0936   TROPI 0.290* 0.336* 0.161*     Most Recent Cholesterol Panel:  Recent Labs   Lab Test 07/13/20  1354   CHOL 227*   *   HDL 59   TRIG 165*     Most Recent 6 Bacteria Isolates From Any Culture (See EPIC Reports for Culture Details):  Recent Labs   Lab Test 03/27/21  0824 02/01/20  1720 02/01/20  1400 10/22/18  1450 06/25/18  1120 05/07/18  1507   CULT >100,000 colonies/mL  Klebsiella pneumoniae  * No MRSA isolated 10,000 to 50,000 colonies/mL  mixed urogenital maria l  No further identification or sensitivity done   >100,000 colonies/mL  Escherichia coli  * 10,000 to 50,000 colonies/mL  Mixed bacterial maria l  No further identification or sensitivity done  * 10,000 to 50,000 colonies/mL  Mixed bacterial maria l  No further identification or sensitivity done  *     Most Recent TSH, T4 and A1c Labs:  Recent Labs   Lab Test 08/29/19  1550   TSH 1.00

## 2021-03-29 NOTE — PROGRESS NOTES
Inpatient Physical Therapy Evaluation    Name: Claudine Bustos MRN# [id number]   Age: 89 year old YOB: 1930     Date of Consultation: 2021  Consultation is requested by:  Dr. Ardon  Specific Consultation Request:  deconditioning  Primary care provider: Katarina Gutierrez    General Information:   Onset Date: 3/27/2021    History of Current Problem/Admission: Hypokalemia [E87.6]  Hyponatremia [E87.1]  Elevated troponin [R77.8]  Urinary tract infection without hematuria, site unspecified [N39.0]    Prior Level of Function: Pt reports she is independent with all ADLs at home.  Pt states she ambulates with FWW at baseline.  Pt states she will help her son with cooking and cleaning some.  Pt does not drive    Ambulation: 1 - Assistive Equipment   Transferrin - Assistive Equipment   Toiletin - Independent    Bathin - Assistive Equipment   Dressin - Independent   Eatin - Independent   Communication: 0 - Understands/communicates without difficulty  Swallowin - swallows foods/liquids without difficulty  Cognition: 0 - no cognitive issues reported    Fall history within the last 6 months: Yes, 1    Current Living Situation: Patient has 3-4 steps with bilateral rails to enter home.  Pt manages on main floor.  Laundry is located in basement but reports that her son does the laundry.      Current Equipment Used at Home: FWW     Patient & Family Goals: to go back home     Past Medical History:   Past Medical History:   Diagnosis Date     Chronic kidney disease, unspecified 8/3/2012     Diastolic dysfunction 2012    echo 2012  EF 60%     HTN (hypertension) 2000     Obesity (BMI 35.0-39.9) with comorbidity (H) 2019     Personal history of colonic polyps 2004     Renal benign neoplasm, left 2016    'oncocytic neoplasm' per urology -- benign, stable and following yearly for scans     Skin cancer        Past Surgical History:  Past Surgical History:   Procedure  Laterality Date     ARTHROPLASTY HIP Right 1/23/2017    Procedure: ARTHROPLASTY HIP;  Surgeon: Jose Manuel Muñoz MD;  Location: [facility]     ARTHROPLASTY KNEE Left 1/25/2016    Procedure: ARTHROPLASTY KNEE;  Surgeon: Jose Manuel Muñoz MD;  Location: [facility]     colonoscopy  2004, 2009     EXCISE LESION EYELID Left 8/1/2017    Procedure: EXCISE LESION EYELID;  LEFT LOWER LID WEDGE EXCISION WITH FROZEN SECTION CONTROL;  Surgeon: Quinten Sotelo MD;  [facility]     excision of rectal villus adenoma       IR CONSULTATION FOR IR EXAM  7/15/2020     knee replacement Right 1/2011     PHACOEMULSIFICATION WITH STANDARD INTRAOCULAR LENS IMPLANT  2/25/2014    Procedure: PHACOEMULSIFICATION WITH STANDARD INTRAOCULAR LENS IMPLANT;  CATARACT EXTRACTION WITH INTRA OCULAR LENS LEFT;  Surgeon: Jah Bee MD;  Location: [facility]     PHACOEMULSIFICATION WITH STANDARD INTRAOCULAR LENS IMPLANT Right 5/12/2015    Procedure: PHACOEMULSIFICATION WITH STANDARD INTRAOCULAR LENS IMPLANT;  Surgeon: Jah Bee MD;  Location: [facility]     thoracic schwannoma resection       tonsillectomy       urethral dilitation every 4 months         Medications:   Current Facility-Administered Medications   Medication     acetaminophen (TYLENOL) tablet 650 mg     cefTRIAXone in d5w (ROCEPHIN) intermittent infusion 1 g     lidocaine (LMX4) kit     lidocaine 1 % 0.1-1 mL     melatonin tablet 1 mg     metoprolol tartrate (LOPRESSOR) half-tab 12.5 mg     ondansetron (ZOFRAN-ODT) ODT tab 4 mg    Or     ondansetron (ZOFRAN) injection 4 mg     senna-docusate (SENOKOT-S/PERICOLACE) 8.6-50 MG per tablet 1 tablet    Or     senna-docusate (SENOKOT-S/PERICOLACE) 8.6-50 MG per tablet 2 tablet     sodium chloride (PF) 0.9% PF flush 3 mL     sodium chloride (PF) 0.9% PF flush 3 mL     sodium chloride (PF) 0.9% PF flush 3 mL       Weight Bearing Status: FWB LEs     Cognitive Status Examination:  Orientation: oriented to time, place and person  Level of  Consciousness: alert  Follows Commands and Answers Questions: 75% of the time  Personal Safety and Judgement: Intact  Memory: Short-term memory intact  Comments:     Pain:   Currently in pain? No  Pain Location?   Pain Rating:     Physical Therapy Evaluation:   Integumentary/Edema: unremarkable  ROM: LE AROM WFL  Strength: LE strength grossly 4-/4  Bed Mobility: NT, up in chair  Transfers: sit>stand CGA, takes several attempts to complete and requires cues for hand placement for safety and ease of transfer  Gait: ambulated 40' with FWW CGA at slow pace with fatigue noted and request to sit down.  Stairs: NT  Balance: fair+ with support from walker  Sensory: denies numbness/tingling LEs  Coordination: NT    Physical Therapy Interventions: Balance, Gait Training , Neuro-muscular re-education, Strengthening, Transfer Training, Risk Factor Education, Home Programming Guidelines and Progressive Activity/Exercise     Clinical Impressions:  Criteria for Skilled Therapeutic Intervention Met: Yes, treatment indicated  PT Diagnosis: impaired strength for functional mobility and ADLs  Influenced by the following impairments: weakness, decreased activity tolerance, decreased balance  Functional limitations due to impairment: decreased tolerance and safety with all functional mobility and ADLs  Clinical presentation: Stable/Uncomplicated  Clinical presentation rationale: clinical judgement  Clinical Decision making (complexity): Low Complexity  Frequency: 6 times/week  Predicted Duration of Therapy Intervention (days/wks): 5 days  Anticipated Discharge Disposition: Home with Assist, Home with Home Therapy and Transitional Care Facility   Anticipated Equipment Needs at Discharge:   Risks and Benefits of therapy have been explained: Yes  Patient, Family & other staff in agreement with plan of care: Yes  Clinical Impression Comments: PT evaluation completed.  Pt lives with her son and is independent at baseline.  Pt does  demonstrate some weakness and difficulty with sit<>stand transfers and tolerates only short distance ambulation.  Pt is likely close to baseline however would benefit from discussion with son to determine how pt has been managing at home.  If son feels that pt has been doing well, pt could likely discharge home with assist from son and added home PT services however if son does have concerns, pt could benefit from short term rehab to optimize mobility and strength to increase safety and independence with functional mobility and ADLs.  Will see pt during acute care stay to optimize strength and functional mobility.     Total Eval Time: 16

## 2021-03-29 NOTE — PROGRESS NOTES
Pt/family was provided with the Medicare Compare list for Home Care.  Discussed associated Medicare star ratings to assist with choice for referrals/discharge planning Yes    Education was given to pt/family that star ratings are updated/maintained by Medicare and can be reviewed by visiting www.medicare.gov Yes    Patient/family choices for facility in priority are:   First Choice : Home Care [place]: [facility]                 [phone]    Second Choice: Home Care [place]: [facility]                                                      [phone]     [facility], therefore family has elected to go with [facility] for services as they can start this week.  IATF physician orders et discharge summary was faxed over to [facility] .

## 2021-03-29 NOTE — PLAN OF CARE
Prior to Admission Medication Reconciliation:     Medications added:   [x] None  [] As listed below:    Medications deleted:   [x] None  [] As listed below:    Changes made to existing medications:   [x] None  [] As listed below:    Last times/dates taken verified with patient:  [] Yes- completed myself  [x] Nurse completed no changes made  [] Unable to review with patient:  [] Nurse completed/changes made:     Allergy modifications:    [x]Did not review  []Patient verified NKA  []Patient verified current existing allergies: no changes made  []New allergies: listed below    Medication reconciliation sources:   [x]Patient  []Patient family member/emergency contact: **  [x][facility] Report Review: none  [x]Epic Chart Review  [x]Care Everywhere review  [x]Pharmacy med list: [facility]   Name Strength Instructions Last Fill Date QTY/DS Notes   [] cefdinir 300 mg BID x 7 days 3/27/21 14    [] Potassium cl er  10 meq BID 7/13/20 90    [] Ferrous sulfate 325 mg Daily  7/13/20 90    [] gabapentin 100 mg 1 TID. Increase to 2 caps TID if still pain after 1 week 7/13/20 360    [] lisinopril 10 mg Daily  7/13/20 90    [] tramadol 50 mg 1-2 tabs q6h prn  7/6/20     []Pharmacy phone call  []Outside meds dispense report  []Nursing home or Assisted Living MAR:  []Other: **    Pharmacy desired at discharge: [facility]    Is patient on coumadin?  [x]No    Requests for consultation by provider or pharmacist:   [] Patient understands why all of their meds were prescribed and how to take them. No questions.   [] Fill dates coincide with compliancy for all maintenance meds.   [x] Fill dates do not coincide with compliancy with maintenance meds.   [] Patient has questions about the following:    Comments: discharge medication reconciliation completed on this patient. [place] patient. Fills at [facility], None of her medications have been filled since July of 2020. So technically she should be all out. I called just to ask if she fills  anywhere else and she stated [facility] was the only one. I asked her about her compliancy and she said she is compliant. Other than that I have no other concerns. Nurse completed review.       Arely Goetz on 3/29/2021 at 10:51 AM       Discrepancies: [x] No []Not Applicable []Yes: listed below    Time spent on medication reconciliation:   []5-20 mins  [x]20-40 mins  []> 40 mins    Issues completing PTA medication reconciliation:  [] On hold for a long time  [] Waited for a call back  [] Fax didn't come through  [] Fax took a long time  [] Other:    Notifying appropriate party of changes/additions/discrepancies:  [x]No pertinent changes made, notification not necessary.   [] Notified attending provider via text page  [] Notified attending provider in person  [] Notified pharmacy  [] Notified nurse  [] Attending provider not available, left detailed notes  [] Changes/additions made don't need provider notification because provider has not seen patient or input any orders  [] Changes/additions made don't need provider notification because changes made are to medications not ordered  Medications Prior to Admission   Medication Sig Dispense Refill Last Dose     gabapentin (NEURONTIN) 100 MG capsule TAKE 1 CAPSULE BY MOUTH 3 TIMES DAILY AFTER ONE WEEK IF STILL HAVING PAIN INCREASE TO 2 CAPSULES 3 TIMES DAILY 360 capsule 1 3/26/2021 at Unknown time     lisinopril (ZESTRIL) 10 MG tablet Take 1 tablet (10 mg) by mouth daily 90 tablet 3 3/26/2021 at Unknown time     Calcium Carb-Cholecalciferol (CALCIUM + D3) 600-200 MG-UNIT TABS Take 1 tablet by mouth daily   Unknown at Unknown time     ferrous sulfate (FEROSUL) 325 (65 Fe) MG tablet Take 1 tablet (325 mg) by mouth daily (with breakfast) 90 tablet 1 Unknown at Unknown time     ibuprofen 200 MG PO tablet Take 200 mg by mouth every 6 hours as needed for mild pain   Unknown at Unknown time     Multiple Vitamin (MULTIVITAMIN) per tablet Take 1 tablet by mouth daily with food.    Unknown at Unknown time     potassium chloride ER (KLOR-CON M) 10 MEQ CR tablet Take 1 tablet (10 mEq) by mouth 2 times daily 90 tablet 1 Unknown at Unknown time     traMADol (ULTRAM) 50 MG tablet 1-2 tabs orally  Every 6 hrs as needed for pain. 30 tablet 1 Unknown at Unknown time       .Medication Dispense History (from 3/29/2020 to 3/29/2021)  Expand All  Collapse All  Cefdinir   Dispensed Days Supply Quantity Provider Pharmacy   CEFDINIR  300 MG CAPS 03/27/2021 7 14 capsule [facility]         Gabapentin   Dispensed Days Supply Quantity Provider Pharmacy   GABAPENTIN  100 MG CAPS 07/13/2020 60 360 each [facility]         Lisinopril   Dispensed Days Supply Quantity Provider Pharmacy   LISINOPRIL  10 MG TABS 07/13/2020 90 90 each [facility]   LISINOPRIL  10 MG TABS 06/30/2020 90 90 each [facility]         Potassium Chloride   Dispensed Days Supply Quantity Provider Pharmacy   POTASSIUM CHLORIDE ER  10 MEQ TBCR 07/13/2020 45 90 each [facility]         traMADol HCl   Dispensed Days Supply Quantity Provider Pharmacy   TRAMADOL HCL  50 MG TABS 07/13/2020 4 30 each [facility]   TRAMADOL HCL  50 MG TABS 07/06/2020 4 30 each [facility]...

## 2021-03-29 NOTE — PLAN OF CARE
Patient discharged at 11:21 AM via wheel chair accompanied by son and staff. Prescriptions sent to patients preferred pharmacy. All belongings sent with patient.     Discharge instructions reviewed with Claudine and son. Listed belongings gathered and returned to patient. Yes     Patient discharged to  Home with son.   Report called to NA    Core Measures and Vaccines  Core Measures applicable during stay: NA. If yes, state diagnosis: NA  Pneumonia and Influenza given prior to discharge, if indicated: N/A    Surgical Patient   Surgical Procedures during stay: NA  Did patient receive discharge instruction on wound care and recognition of infection symptoms? N/A    MISC  Follow up appointment made:  Yes  Home and hospital aquired medications returned to patient: N/A  Patient reports pain was well managed at discharge: Yes

## 2021-03-29 NOTE — PROGRESS NOTES
Name: Claudine Bustos    MRN#: [id number]    Reason for Hospitalization: Hypokalemia [E87.6]  Hyponatremia [E87.1]  Elevated troponin [R77.8]  Urinary tract infection without hematuria, site unspecified [N39.0]    JOYCE: Low    Discharge Date: March 29, 2021    Medicare IM letter reviewed with patient    Patient / Family response to discharge plan: Patient et family were involved in discharge planning et agree.    Follow-Up Appt:   Future Appointments   Date Time Provider Department Center   3/30/2021  9:00 AM Rebeka Clifton, PT [facility]   4/5/2021  2:15 PM Katarina Gutierrez MD [facility]       Other Providers (Care Coordinator, County Services, PCA services etc): Yes: , [facility] can start services at any time.    Discharge Disposition: home via son/Hira      Pt or health care agent verbalized understanding.           Jessika Chavez CM RN

## 2021-03-29 NOTE — PLAN OF CARE
Physical Therapy Discharge Summary    Reason for therapy discharge:    Discharged to home with home therapy.    Progress towards therapy goal(s). See goals on Care Plan in [facility] electronic health record for goal details.  Goals not met.  Barriers to achieving goals:   discharge on same date as initial evaluation.    Therapy recommendation(s):    Continued therapy is recommended.  Rationale/Recommendations:  for home safety assessment and work on strength and conditioning.

## 2021-03-29 NOTE — PROGRESS NOTES
03/29/21 0951   Signing Clinician's Name / Credentials   Signing clinician's name / credentials April Schuster DPT   Quick Adds   Rehab Discipline PT   Therapeutic Activity   Minutes of Treatment 24 minutes   Symptoms Noted During/After Treatment Fatigue   Treatment Detail After seated rest break, pt transferred sit>stand from w/c CGA up to FWW, again needs cues for safe hand placement with transfer.  Pt ambulated additional 40'x1, 80'x1 with FWW CGA, at slow pace with fatigue reported from pt.  Pt requires cues for hand placement with all transfers, does not carry over from one repetition to the next.  Pt needing to use commode.  Pt required assist with managing brief and completing toileting.  Pt was min-mod A to complete hygiene and brief change.  Discussed discharge options and pt feels she is safe to return home, discussed home PT and pt open to this.     PT Discharge Planning    PT Discharge Recommendation (DC Rec) home with assist;home with home care physical therapy;Transitional Care Facility   PT Rationale for DC Rec PT evaluation completed.  Pt lives with her son and is independent at baseline.  Pt does demonstrate some weakness and difficulty with sit<>stand transfers and tolerates only short distance ambulation.  Pt is likely close to baseline however would benefit from discussion with son to determine how pt has been managing at home.  If son feels that pt has been doing well, pt could likely discharge home with assist from son and added home PT services however if son does have concerns, pt could benefit from short term rehab to optimize mobility and strength to increase safety and independence with functional mobility and ADLs.  Will see pt during acute care stay to optimize strength and functional mobility.    PT Brief overview of current status  After seated rest break, pt transferred sit>stand from w/c CGA up to FWW, again needs cues for safe hand placement with transfer.  Pt ambulated  additional 40'x1, 80'x1 with FWW CGA, at slow pace with fatigue reported from pt.  Pt requires cues for hand placement with all transfers, does not carry over from one repetition to the next.  Pt needing to use commode.  Pt required assist with managing brief and completing toileting.  Pt was min-mod A to complete hygiene and brief change.  Discussed discharge options and pt feels she is safe to return home, discussed home PT and pt open to this.     Additional Documentation   Rehab Comments limited activity tolerance   PT Plan Progress mobility and strength   Total Session Time   Total Session Time (minutes) 24 minutes

## 2021-03-30 ENCOUNTER — TELEPHONE (OUTPATIENT)
Dept: FAMILY MEDICINE | Facility: OTHER | Age: 86
End: 2021-03-30

## 2021-03-30 ENCOUNTER — TELEPHONE (OUTPATIENT)
Dept: CASE MANAGEMENT | Facility: HOSPITAL | Age: 86
End: 2021-03-30

## 2021-03-30 ENCOUNTER — MEDICAL CORRESPONDENCE (OUTPATIENT)
Dept: HEALTH INFORMATION MANAGEMENT | Facility: CLINIC | Age: 86
End: 2021-03-30

## 2021-03-30 NOTE — TELEPHONE ENCOUNTER
Verbal orders given for Skilled nursing 2x weekly for 2 weeks, then once weekly for 6 weeks to [facility].

## 2021-04-16 DIAGNOSIS — Z53.9 PERSONS ENCOUNTERING HEALTH SERVICES FOR SPECIFIC PROCEDURES, NOT CARRIED OUT: Primary | ICD-10-CM

## 2021-04-16 PROCEDURE — G0180 MD CERTIFICATION HHA PATIENT: HCPCS | Performed by: FAMILY MEDICINE

## 2021-09-12 NOTE — PLAN OF CARE
Elevated systolic BP in the 160s, but otherwise VSS. Scheduled lisinopril administered as ordered. Tele in place reading SR/SD 70s. Satting 92% on RA w/ no complaints or chest pain. Rates pain at a 3-5/10 to bilateral lower back, which pt states is her norm. Pain managed w/ PO tramadol. Pt is A&O w/ some forgetfulness. Trace edema noted to legs w/ mild edema to bilateral feet/ankles. Reports urinary frequency, however only voided x1. Bladder scanned w/ 150 mLs retained. Up in the chair during most of shift and ambulated w/ PT at 11 AM. Ate 100% of breakfast w/ adequate fluid intake w/ meal. Son visited today and was present throughout all of shift. Pleasant and cooperative w/ cares. Bed in lowest position. Call light in reach. ID band in place. Makes needs known.        negative

## 2021-09-27 ENCOUNTER — NURSE TRIAGE (OUTPATIENT)
Dept: FAMILY MEDICINE | Facility: OTHER | Age: 86
End: 2021-09-27

## 2021-09-27 NOTE — TELEPHONE ENCOUNTER
"Patient's son, Hira, on the phone and states patient called him last night stating she has a UTI. Patient states she has \"burning\". Denies fever or back/side pain, blood in urine. No available appointments today with PCP. can patient be worked in or would you like patient to see another provider or go to ER/UC? Please advise, thank you.    Hira's phone number is [phone]    Reason for Disposition    Age > 50 years    Additional Information    Negative: Followed a genital area injury    Negative: Taking antibiotic for urinary tract infection (UTI)    Negative: Pregnant    Negative: Postpartum (from 0 to 6 weeks after delivery)    Negative: Shock suspected (e.g., cold/pale/clammy skin, too weak to stand, low BP, rapid pulse)    Negative: Sounds like a life-threatening emergency to the triager    Negative: [1] Unable to urinate (or only a few drops) > 4 hours AND [2] bladder feels very full (e.g., palpable bladder or strong urge to urinate)    Negative: Vomiting    Negative: Patient sounds very sick or weak to the triager    Negative: [1] SEVERE pain with urination  (e.g., excruciating) AND [2] not improved after 2 hours of pain medicine and Sitz bath    Negative: Fever > 100.4 F (38.0 C)    Negative: Side (flank) or lower back pain present    Negative: Diabetes mellitus or weak immune system (e.g., HIV positive, cancer chemo, splenectomy, organ transplant, chronic steroids)    Negative: Bedridden (e.g., nursing home patient, CVA, chronic illness, recovering from surgery)    Negative: Artificial heart valve or artificial joint    Negative: Unusual vaginal discharge (e.g., bad smelling, yellow, green, or foamy-white)    Negative: Patient is worried about sexually transmitted disease (STD)    Negative: Possibility of pregnancy    Negative: Blood in urine (red, pink, or tea-colored)    Answer Assessment - Initial Assessment Questions  1. SEVERITY: \"How bad is the pain?\"  (e.g., Scale 1-10; mild, moderate, or " "severe)    - MILD (1-3): complains slightly about urination hurting    - MODERATE (4-7): interferes with normal activities      - SEVERE (8-10): excruciating, unwilling or unable to urinate because of the pain       Yes burning.   2. FREQUENCY: \"How many times have you had painful urination today?\"       NA  3. PATTERN: \"Is pain present every time you urinate or just sometimes?\"       everytime  4. ONSET: \"When did the painful urination start?\"       yesterday  5. FEVER: \"Do you have a fever?\" If so, ask: \"What is your temperature, how was it measured, and when did it start?\"      no  6. PAST UTI: \"Have you had a urine infection before?\" If so, ask: \"When was the last time?\" and \"What happened that time?\"       yes  7. CAUSE: \"What do you think is causing the painful urination?\"  (e.g., UTI, scratch, Herpes sore)      UTI  8. OTHER SYMPTOMS: \"Do you have any other symptoms?\" (e.g., flank pain, vaginal discharge, genital sores, urgency, blood in urine)      frequency  9. PREGNANCY: \"Is there any chance you are pregnant?\" \"When was your last menstrual period?\"      No    Protocols used: URINATION PAIN - FEMALE-A-AH      "

## 2021-09-28 ENCOUNTER — OFFICE VISIT (OUTPATIENT)
Dept: FAMILY MEDICINE | Facility: OTHER | Age: 86
End: 2021-09-28
Attending: FAMILY MEDICINE
Payer: MEDICARE

## 2021-09-28 VITALS
SYSTOLIC BLOOD PRESSURE: 132 MMHG | DIASTOLIC BLOOD PRESSURE: 70 MMHG | BODY MASS INDEX: 25.3 KG/M2 | RESPIRATION RATE: 16 BRPM | HEIGHT: 61 IN | TEMPERATURE: 97.3 F | WEIGHT: 134 LBS | OXYGEN SATURATION: 98 % | HEART RATE: 79 BPM

## 2021-09-28 DIAGNOSIS — R35.0 URINARY FREQUENCY: ICD-10-CM

## 2021-09-28 DIAGNOSIS — R31.9 URINARY TRACT INFECTION WITH HEMATURIA, SITE UNSPECIFIED: Primary | ICD-10-CM

## 2021-09-28 DIAGNOSIS — N39.0 URINARY TRACT INFECTION WITH HEMATURIA, SITE UNSPECIFIED: Primary | ICD-10-CM

## 2021-09-28 LAB
ALBUMIN UR-MCNC: NEGATIVE MG/DL
APPEARANCE UR: CLEAR
BACTERIA #/AREA URNS HPF: ABNORMAL /HPF
BILIRUB UR QL STRIP: NEGATIVE
COLOR UR AUTO: YELLOW
GLUCOSE UR STRIP-MCNC: NEGATIVE MG/DL
HGB UR QL STRIP: NEGATIVE
KETONES UR STRIP-MCNC: NEGATIVE MG/DL
LEUKOCYTE ESTERASE UR QL STRIP: ABNORMAL
NITRATE UR QL: POSITIVE
PH UR STRIP: 6.5 [PH] (ref 5–7)
RBC #/AREA URNS AUTO: ABNORMAL /HPF
SP GR UR STRIP: 1.02 (ref 1–1.03)
UROBILINOGEN UR STRIP-ACNC: 0.2 E.U./DL
WBC #/AREA URNS AUTO: ABNORMAL /HPF

## 2021-09-28 PROCEDURE — 99214 OFFICE O/P EST MOD 30 MIN: CPT | Performed by: FAMILY MEDICINE

## 2021-09-28 PROCEDURE — 87086 URINE CULTURE/COLONY COUNT: CPT | Mod: ZL | Performed by: FAMILY MEDICINE

## 2021-09-28 PROCEDURE — G0463 HOSPITAL OUTPT CLINIC VISIT: HCPCS

## 2021-09-28 PROCEDURE — 81015 MICROSCOPIC EXAM OF URINE: CPT | Mod: ZL | Performed by: FAMILY MEDICINE

## 2021-09-28 RX ORDER — CEFDINIR 300 MG/1
300 CAPSULE ORAL 2 TIMES DAILY
Qty: 14 CAPSULE | Refills: 0 | Status: SHIPPED | OUTPATIENT
Start: 2021-09-28 | End: 2021-10-05

## 2021-09-28 ASSESSMENT — ANXIETY QUESTIONNAIRES
1. FEELING NERVOUS, ANXIOUS, OR ON EDGE: NOT AT ALL
3. WORRYING TOO MUCH ABOUT DIFFERENT THINGS: NOT AT ALL
5. BEING SO RESTLESS THAT IT IS HARD TO SIT STILL: NOT AT ALL
2. NOT BEING ABLE TO STOP OR CONTROL WORRYING: NOT AT ALL
IF YOU CHECKED OFF ANY PROBLEMS ON THIS QUESTIONNAIRE, HOW DIFFICULT HAVE THESE PROBLEMS MADE IT FOR YOU TO DO YOUR WORK, TAKE CARE OF THINGS AT HOME, OR GET ALONG WITH OTHER PEOPLE: NOT DIFFICULT AT ALL
7. FEELING AFRAID AS IF SOMETHING AWFUL MIGHT HAPPEN: NOT AT ALL
6. BECOMING EASILY ANNOYED OR IRRITABLE: NOT AT ALL
GAD7 TOTAL SCORE: 0
4. TROUBLE RELAXING: NOT AT ALL

## 2021-09-28 ASSESSMENT — PAIN SCALES - GENERAL: PAINLEVEL: MILD PAIN (2)

## 2021-09-28 ASSESSMENT — MIFFLIN-ST. JEOR: SCORE: 960.2

## 2021-09-28 ASSESSMENT — PATIENT HEALTH QUESTIONNAIRE - PHQ9: SUM OF ALL RESPONSES TO PHQ QUESTIONS 1-9: 1

## 2021-09-28 NOTE — PROGRESS NOTES
"    Assessment & Plan     1. Urinary tract infection with hematuria, site unspecified  Preliminary urine culture was not available at time of visit, but with previous infections, previous culture results, and current UA findings, we did elect to start Omnicef (3rd generation cephalosporin).  Will await cultures to see if antibiotics need to be changed.  Importance of checking UA and culture with sensitivities discussed with patient and her son, they express understanding.  Follow-up if symptoms are not improving.  - cefdinir (OMNICEF) 300 MG capsule; Take 1 capsule (300 mg) by mouth 2 times daily for 7 days  Dispense: 14 capsule; Refill: 0    2. Urinary frequency  - *UA reflex to Microscopic and Culture - [facility]; Future  - *[facility]scopic [facility]  - Urine Microscopic Exam  - Urine Culture       BMI:   Estimated body mass index is 25.32 kg/m  as calculated from the following:    Height as of this encounter: 1.549 m (5' 1\").    Weight as of this encounter: 60.8 kg (134 lb).     Return if symptoms worsen or fail to improve.    Harriet Leroy MD  [facility] [place] JAMESON Chow is a 89 year old who presents for the following health issues  accompanied by her son:    HPI     Genitourinary - Female  Onset/Duration: a few days  Description:   Painful urination (Dysuria): YES           Frequency: YES  Blood in urine (Hematuria): no  Delay in urine (Hesitency): no  Intensity: mild  Progression of Symptoms:  worsening  Accompanying Signs & Symptoms:  Fever/chills: no  Flank pain: YES  Nausea and vomiting: no  Vaginal symptoms: odor  Abdominal/Pelvic Pain: no  History:   History of frequent UTI s: no  History of kidney stones: no  Sexually Active: no  Possibility of pregnancy: No  Precipitating or alleviating factors: None  Therapies tried and outcome:  none   Patient states she has history of frequent infection.  Previous urine culture results reviewed, " "patient's last UTI was caused by klebsiella      Review of Systems   Constitutional, HEENT, cardiovascular, pulmonary, gi and gu systems are negative, except as otherwise noted.      Objective    /70 (BP Location: Right arm, Patient Position: Sitting, Cuff Size: Adult Regular)   Pulse 79   Temp 97.3  F (36.3  C) (Tympanic)   Resp 16   Ht 1.549 m (5' 1\")   Wt 60.8 kg (134 lb)   SpO2 98%   BMI 25.32 kg/m    Body mass index is 25.32 kg/m .  Physical Exam   GENERAL: alert and no distress  PSYCH: mentation appears normal, affect normal/bright    Results for orders placed or performed in visit on 09/28/21 (from the past 24 hour(s))   *UA reflex to Microscopic and Culture - [facility]    Specimen: Urine, Midstream   Result Value Ref Range    Color Urine Yellow Colorless, Straw, Light Yellow, Yellow    Appearance Urine Clear Clear    Glucose Urine Negative Negative mg/dL    Bilirubin Urine Negative Negative    Ketones Urine Negative Negative mg/dL    Specific Gravity Urine 1.020 1.003 - 1.035    Blood Urine Negative Negative    pH Urine 6.5 5.0 - 7.0    Protein Albumin Urine Negative Negative mg/dL    Urobilinogen Urine 0.2 0.2, 1.0 E.U./dL    Nitrite Urine Positive (A) Negative    Leukocyte Esterase Urine Moderate (A) Negative   Urine Microscopic Exam   Result Value Ref Range    Bacteria Urine Many (A) None Seen /HPF    RBC Urine 2-5 (A) 0-2 /HPF /HPF    WBC Urine 5-10 (A) 0-5 /HPF /HPF   Urine Culture    Specimen: Urine, Midstream   Result Value Ref Range    Culture >100,000 CFU/mL Escherichia coli (A)              "

## 2021-09-28 NOTE — NURSING NOTE
"Chief Complaint   Patient presents with     UTI       Initial /70 (BP Location: Right arm, Patient Position: Sitting, Cuff Size: Adult Regular)   Pulse 79   Temp 97.3  F (36.3  C) (Tympanic)   Resp 16   Ht 1.549 m (5' 1\")   Wt 60.8 kg (134 lb)   SpO2 98%   BMI 25.32 kg/m   Estimated body mass index is 25.32 kg/m  as calculated from the following:    Height as of this encounter: 1.549 m (5' 1\").    Weight as of this encounter: 60.8 kg (134 lb).  Medication Reconciliation: complete  Mary Fuentes MA  "

## 2021-09-29 ASSESSMENT — ANXIETY QUESTIONNAIRES: GAD7 TOTAL SCORE: 0

## 2021-10-04 LAB — BACTERIA UR CULT: ABNORMAL

## 2021-10-12 ENCOUNTER — HOSPITAL ENCOUNTER (INPATIENT)
Facility: HOSPITAL | Age: 86
LOS: 2 days | Discharge: NURSING, REHAB OR LONG-TERM CARE | DRG: 543 | End: 2021-10-15
Attending: PHYSICIAN ASSISTANT | Admitting: INTERNAL MEDICINE
Payer: MEDICARE

## 2021-10-12 ENCOUNTER — APPOINTMENT (OUTPATIENT)
Dept: CT IMAGING | Facility: HOSPITAL | Age: 86
DRG: 543 | End: 2021-10-12
Attending: PHYSICIAN ASSISTANT
Payer: MEDICARE

## 2021-10-12 DIAGNOSIS — S32.10XA CLOSED FRACTURE OF SACRUM, UNSPECIFIED PORTION OF SACRUM, INITIAL ENCOUNTER (H): ICD-10-CM

## 2021-10-12 DIAGNOSIS — R26.2 UNABLE TO AMBULATE: ICD-10-CM

## 2021-10-12 DIAGNOSIS — N12 PYELONEPHRITIS: ICD-10-CM

## 2021-10-12 DIAGNOSIS — N39.0 URINARY TRACT INFECTION WITHOUT HEMATURIA, SITE UNSPECIFIED: Primary | ICD-10-CM

## 2021-10-12 DIAGNOSIS — M84.48XA SACRAL INSUFFICIENCY FRACTURE, INITIAL ENCOUNTER: ICD-10-CM

## 2021-10-12 DIAGNOSIS — K59.00 CONSTIPATION, UNSPECIFIED CONSTIPATION TYPE: ICD-10-CM

## 2021-10-12 DIAGNOSIS — M54.50 RIGHT-SIDED LOW BACK PAIN WITHOUT SCIATICA: ICD-10-CM

## 2021-10-12 DIAGNOSIS — M54.41 ACUTE RIGHT-SIDED LOW BACK PAIN WITH RIGHT-SIDED SCIATICA: ICD-10-CM

## 2021-10-12 DIAGNOSIS — M62.81 GENERALIZED MUSCLE WEAKNESS: ICD-10-CM

## 2021-10-12 LAB
ALBUMIN SERPL-MCNC: 2.8 G/DL (ref 3.4–5)
ALBUMIN UR-MCNC: 30 MG/DL
ALP SERPL-CCNC: 128 U/L (ref 40–150)
ALT SERPL W P-5'-P-CCNC: 31 U/L (ref 0–50)
ANION GAP SERPL CALCULATED.3IONS-SCNC: 7 MMOL/L (ref 3–14)
APPEARANCE UR: ABNORMAL
AST SERPL W P-5'-P-CCNC: 46 U/L (ref 0–45)
BASOPHILS # BLD AUTO: 0 10E3/UL (ref 0–0.2)
BASOPHILS NFR BLD AUTO: 0 %
BILIRUB SERPL-MCNC: 1 MG/DL (ref 0.2–1.3)
BILIRUB UR QL STRIP: NEGATIVE
BUN SERPL-MCNC: 16 MG/DL (ref 7–30)
CALCIUM SERPL-MCNC: 8.2 MG/DL (ref 8.5–10.1)
CHLORIDE BLD-SCNC: 101 MMOL/L (ref 94–109)
CO2 SERPL-SCNC: 22 MMOL/L (ref 20–32)
COLOR UR AUTO: YELLOW
CREAT SERPL-MCNC: 0.55 MG/DL (ref 0.52–1.04)
EOSINOPHIL # BLD AUTO: 0 10E3/UL (ref 0–0.7)
EOSINOPHIL NFR BLD AUTO: 0 %
ERYTHROCYTE [DISTWIDTH] IN BLOOD BY AUTOMATED COUNT: 17.4 % (ref 10–15)
GFR SERPL CREATININE-BSD FRML MDRD: 82 ML/MIN/1.73M2
GLUCOSE BLD-MCNC: 140 MG/DL (ref 70–99)
GLUCOSE UR STRIP-MCNC: NEGATIVE MG/DL
HCT VFR BLD AUTO: 32.9 % (ref 35–47)
HGB BLD-MCNC: 10.6 G/DL (ref 11.7–15.7)
HGB UR QL STRIP: ABNORMAL
IMM GRANULOCYTES # BLD: 0.1 10E3/UL
IMM GRANULOCYTES NFR BLD: 1 %
KETONES UR STRIP-MCNC: ABNORMAL MG/DL
LEUKOCYTE ESTERASE UR QL STRIP: ABNORMAL
LYMPHOCYTES # BLD AUTO: 0.5 10E3/UL (ref 0.8–5.3)
LYMPHOCYTES NFR BLD AUTO: 3 %
MCH RBC QN AUTO: 23.5 PG (ref 26.5–33)
MCHC RBC AUTO-ENTMCNC: 32.2 G/DL (ref 31.5–36.5)
MCV RBC AUTO: 73 FL (ref 78–100)
MONOCYTES # BLD AUTO: 1.3 10E3/UL (ref 0–1.3)
MONOCYTES NFR BLD AUTO: 8 %
MUCOUS THREADS #/AREA URNS LPF: PRESENT /LPF
NEUTROPHILS # BLD AUTO: 13.8 10E3/UL (ref 1.6–8.3)
NEUTROPHILS NFR BLD AUTO: 88 %
NITRATE UR QL: NEGATIVE
NRBC # BLD AUTO: 0 10E3/UL
NRBC BLD AUTO-RTO: 0 /100
PH UR STRIP: 6.5 [PH] (ref 4.7–8)
PLATELET # BLD AUTO: 260 10E3/UL (ref 150–450)
POTASSIUM BLD-SCNC: 3.6 MMOL/L (ref 3.4–5.3)
PROT SERPL-MCNC: 6.4 G/DL (ref 6.8–8.8)
RBC # BLD AUTO: 4.51 10E6/UL (ref 3.8–5.2)
RBC URINE: 2 /HPF
SARS-COV-2 RNA RESP QL NAA+PROBE: NEGATIVE
SODIUM SERPL-SCNC: 130 MMOL/L (ref 133–144)
SP GR UR STRIP: 1.01 (ref 1–1.03)
SQUAMOUS EPITHELIAL: 4 /HPF
UROBILINOGEN UR STRIP-MCNC: NORMAL MG/DL
WBC # BLD AUTO: 15.7 10E3/UL (ref 4–11)
WBC URINE: 65 /HPF

## 2021-10-12 PROCEDURE — G0378 HOSPITAL OBSERVATION PER HR: HCPCS

## 2021-10-12 PROCEDURE — 99284 EMERGENCY DEPT VISIT MOD MDM: CPT | Performed by: PHYSICIAN ASSISTANT

## 2021-10-12 PROCEDURE — 99223 1ST HOSP IP/OBS HIGH 75: CPT | Mod: AI | Performed by: INTERNAL MEDICINE

## 2021-10-12 PROCEDURE — 36415 COLL VENOUS BLD VENIPUNCTURE: CPT | Performed by: PHYSICIAN ASSISTANT

## 2021-10-12 PROCEDURE — 96365 THER/PROPH/DIAG IV INF INIT: CPT

## 2021-10-12 PROCEDURE — 258N000003 HC RX IP 258 OP 636: Performed by: PHYSICIAN ASSISTANT

## 2021-10-12 PROCEDURE — 96376 TX/PRO/DX INJ SAME DRUG ADON: CPT

## 2021-10-12 PROCEDURE — U0003 INFECTIOUS AGENT DETECTION BY NUCLEIC ACID (DNA OR RNA); SEVERE ACUTE RESPIRATORY SYNDROME CORONAVIRUS 2 (SARS-COV-2) (CORONAVIRUS DISEASE [COVID-19]), AMPLIFIED PROBE TECHNIQUE, MAKING USE OF HIGH THROUGHPUT TECHNOLOGIES AS DESCRIBED BY CMS-2020-01-R: HCPCS | Performed by: PHYSICIAN ASSISTANT

## 2021-10-12 PROCEDURE — 96372 THER/PROPH/DIAG INJ SC/IM: CPT | Performed by: INTERNAL MEDICINE

## 2021-10-12 PROCEDURE — 93010 ELECTROCARDIOGRAM REPORT: CPT | Performed by: INTERNAL MEDICINE

## 2021-10-12 PROCEDURE — 250N000011 HC RX IP 250 OP 636: Performed by: INTERNAL MEDICINE

## 2021-10-12 PROCEDURE — 81001 URINALYSIS AUTO W/SCOPE: CPT | Performed by: PHYSICIAN ASSISTANT

## 2021-10-12 PROCEDURE — 258N000003 HC RX IP 258 OP 636: Performed by: INTERNAL MEDICINE

## 2021-10-12 PROCEDURE — 74177 CT ABD & PELVIS W/CONTRAST: CPT | Mod: MC

## 2021-10-12 PROCEDURE — C9803 HOPD COVID-19 SPEC COLLECT: HCPCS

## 2021-10-12 PROCEDURE — 255N000002 HC RX 255 OP 636: Performed by: RADIOLOGY

## 2021-10-12 PROCEDURE — 93005 ELECTROCARDIOGRAM TRACING: CPT

## 2021-10-12 PROCEDURE — 85025 COMPLETE CBC W/AUTO DIFF WBC: CPT | Performed by: PHYSICIAN ASSISTANT

## 2021-10-12 PROCEDURE — 250N000011 HC RX IP 250 OP 636: Performed by: PHYSICIAN ASSISTANT

## 2021-10-12 PROCEDURE — 87086 URINE CULTURE/COLONY COUNT: CPT | Performed by: PHYSICIAN ASSISTANT

## 2021-10-12 PROCEDURE — 82040 ASSAY OF SERUM ALBUMIN: CPT | Performed by: PHYSICIAN ASSISTANT

## 2021-10-12 PROCEDURE — 96375 TX/PRO/DX INJ NEW DRUG ADDON: CPT

## 2021-10-12 PROCEDURE — 250N000013 HC RX MED GY IP 250 OP 250 PS 637: Performed by: INTERNAL MEDICINE

## 2021-10-12 PROCEDURE — 99285 EMERGENCY DEPT VISIT HI MDM: CPT | Mod: 25

## 2021-10-12 RX ORDER — KETOROLAC TROMETHAMINE 15 MG/ML
15 INJECTION, SOLUTION INTRAMUSCULAR; INTRAVENOUS ONCE
Status: COMPLETED | OUTPATIENT
Start: 2021-10-12 | End: 2021-10-12

## 2021-10-12 RX ORDER — HEPARIN SODIUM 5000 [USP'U]/.5ML
5000 INJECTION, SOLUTION INTRAVENOUS; SUBCUTANEOUS EVERY 12 HOURS
Status: DISCONTINUED | OUTPATIENT
Start: 2021-10-12 | End: 2021-10-15 | Stop reason: HOSPADM

## 2021-10-12 RX ORDER — SODIUM CHLORIDE 9 MG/ML
INJECTION, SOLUTION INTRAVENOUS CONTINUOUS
Status: ACTIVE | OUTPATIENT
Start: 2021-10-12 | End: 2021-10-13

## 2021-10-12 RX ORDER — ASPIRIN 81 MG/1
81 TABLET, CHEWABLE ORAL DAILY
Status: DISCONTINUED | OUTPATIENT
Start: 2021-10-13 | End: 2021-10-15 | Stop reason: HOSPADM

## 2021-10-12 RX ORDER — ONDANSETRON 2 MG/ML
4 INJECTION INTRAMUSCULAR; INTRAVENOUS EVERY 6 HOURS PRN
Status: DISCONTINUED | OUTPATIENT
Start: 2021-10-12 | End: 2021-10-15 | Stop reason: HOSPADM

## 2021-10-12 RX ORDER — GABAPENTIN 100 MG/1
100 CAPSULE ORAL 3 TIMES DAILY
Status: DISCONTINUED | OUTPATIENT
Start: 2021-10-12 | End: 2021-10-15 | Stop reason: HOSPADM

## 2021-10-12 RX ORDER — NALOXONE HYDROCHLORIDE 0.4 MG/ML
0.2 INJECTION, SOLUTION INTRAMUSCULAR; INTRAVENOUS; SUBCUTANEOUS
Status: DISCONTINUED | OUTPATIENT
Start: 2021-10-12 | End: 2021-10-15 | Stop reason: HOSPADM

## 2021-10-12 RX ORDER — NALOXONE HYDROCHLORIDE 0.4 MG/ML
0.4 INJECTION, SOLUTION INTRAMUSCULAR; INTRAVENOUS; SUBCUTANEOUS
Status: DISCONTINUED | OUTPATIENT
Start: 2021-10-12 | End: 2021-10-15 | Stop reason: HOSPADM

## 2021-10-12 RX ORDER — FERROUS SULFATE 325(65) MG
325 TABLET ORAL
Status: DISCONTINUED | OUTPATIENT
Start: 2021-10-13 | End: 2021-10-15 | Stop reason: HOSPADM

## 2021-10-12 RX ORDER — CEFTRIAXONE SODIUM 1 G/50ML
1 INJECTION, SOLUTION INTRAVENOUS EVERY 24 HOURS
Status: DISCONTINUED | OUTPATIENT
Start: 2021-10-13 | End: 2021-10-14

## 2021-10-12 RX ORDER — POTASSIUM CHLORIDE 750 MG/1
10 CAPSULE, EXTENDED RELEASE ORAL 2 TIMES DAILY
Status: DISCONTINUED | OUTPATIENT
Start: 2021-10-12 | End: 2021-10-15 | Stop reason: HOSPADM

## 2021-10-12 RX ORDER — LISINOPRIL 10 MG/1
10 TABLET ORAL DAILY
Status: DISCONTINUED | OUTPATIENT
Start: 2021-10-13 | End: 2021-10-15 | Stop reason: HOSPADM

## 2021-10-12 RX ORDER — SODIUM CHLORIDE 9 MG/ML
INJECTION, SOLUTION INTRAVENOUS CONTINUOUS
Status: DISCONTINUED | OUTPATIENT
Start: 2021-10-12 | End: 2021-10-12

## 2021-10-12 RX ORDER — FENTANYL CITRATE 50 UG/ML
50 INJECTION, SOLUTION INTRAMUSCULAR; INTRAVENOUS ONCE
Status: COMPLETED | OUTPATIENT
Start: 2021-10-12 | End: 2021-10-12

## 2021-10-12 RX ORDER — IOPAMIDOL 612 MG/ML
100 INJECTION, SOLUTION INTRAVASCULAR ONCE
Status: COMPLETED | OUTPATIENT
Start: 2021-10-12 | End: 2021-10-12

## 2021-10-12 RX ORDER — FENTANYL CITRATE 50 UG/ML
50 INJECTION, SOLUTION INTRAMUSCULAR; INTRAVENOUS
Status: DISCONTINUED | OUTPATIENT
Start: 2021-10-12 | End: 2021-10-12

## 2021-10-12 RX ORDER — ONDANSETRON 4 MG/1
4 TABLET, ORALLY DISINTEGRATING ORAL EVERY 6 HOURS PRN
Status: DISCONTINUED | OUTPATIENT
Start: 2021-10-12 | End: 2021-10-15 | Stop reason: HOSPADM

## 2021-10-12 RX ORDER — CEFTRIAXONE SODIUM 2 G/50ML
2 INJECTION, SOLUTION INTRAVENOUS ONCE
Status: COMPLETED | OUTPATIENT
Start: 2021-10-12 | End: 2021-10-12

## 2021-10-12 RX ORDER — TRAMADOL HYDROCHLORIDE 50 MG/1
50 TABLET ORAL EVERY 6 HOURS PRN
Status: DISCONTINUED | OUTPATIENT
Start: 2021-10-12 | End: 2021-10-15 | Stop reason: HOSPADM

## 2021-10-12 RX ADMIN — FENTANYL CITRATE 50 MCG: 50 INJECTION, SOLUTION INTRAMUSCULAR; INTRAVENOUS at 14:52

## 2021-10-12 RX ADMIN — POTASSIUM CHLORIDE 10 MEQ: 10 CAPSULE, COATED, EXTENDED RELEASE ORAL at 21:10

## 2021-10-12 RX ADMIN — CEFTRIAXONE SODIUM 2 G: 2 INJECTION, SOLUTION INTRAVENOUS at 15:58

## 2021-10-12 RX ADMIN — FENTANYL CITRATE 50 MCG: 50 INJECTION, SOLUTION INTRAMUSCULAR; INTRAVENOUS at 16:29

## 2021-10-12 RX ADMIN — KETOROLAC TROMETHAMINE 15 MG: 15 INJECTION, SOLUTION INTRAMUSCULAR; INTRAVENOUS at 14:18

## 2021-10-12 RX ADMIN — SODIUM CHLORIDE: 9 INJECTION, SOLUTION INTRAVENOUS at 14:15

## 2021-10-12 RX ADMIN — TRAMADOL HYDROCHLORIDE 50 MG: 50 TABLET, FILM COATED ORAL at 23:40

## 2021-10-12 RX ADMIN — IOPAMIDOL 81 ML: 612 INJECTION, SOLUTION INTRAVENOUS at 14:59

## 2021-10-12 RX ADMIN — SODIUM CHLORIDE: 9 INJECTION, SOLUTION INTRAVENOUS at 20:03

## 2021-10-12 RX ADMIN — HEPARIN SODIUM 5000 UNITS: 5000 INJECTION, SOLUTION INTRAVENOUS; SUBCUTANEOUS at 23:41

## 2021-10-12 RX ADMIN — GABAPENTIN 100 MG: 100 CAPSULE ORAL at 21:10

## 2021-10-12 ASSESSMENT — ENCOUNTER SYMPTOMS
ABDOMINAL PAIN: 0
NECK STIFFNESS: 0
COUGH: 0
ACTIVITY CHANGE: 1
HEADACHES: 0
NAUSEA: 0
FEVER: 0
NECK PAIN: 0
VOMITING: 0
FATIGUE: 1
BACK PAIN: 1
SHORTNESS OF BREATH: 0
APPETITE CHANGE: 0

## 2021-10-12 NOTE — ED PROVIDER NOTES
History     Chief Complaint   Patient presents with     Back Pain     The history is provided by the patient and a relative.     Claudine Bustos is a 89 year old female who presented to the emergency department via EMS for evaluation of low back pain.  Patient is currently unable to ambulate due to weakness and pain.  The patient tells me she has chronic low back pain.  The home medications today were not working.  She denies any other questions or concerns.  Lives independently with her son.    Allergies:  No Known Allergies    Problem List:    Patient Active Problem List    Diagnosis Date Noted     Pyelonephritis 10/12/2021     Priority: Medium     Generalized muscle weakness 10/12/2021     Priority: Medium     Unable to ambulate 10/12/2021     Priority: Medium     Right-sided low back pain without sciatica 10/12/2021     Priority: Medium     Closed fracture of sacrum, unspecified portion of sacrum, initial encounter (H) 10/12/2021     Priority: Medium     Elevated troponin 03/27/2021     Priority: Medium     Troponin level elevated 03/27/2021     Priority: Medium     Urinary tract infection without hematuria, site unspecified 03/27/2021     Priority: Medium     Iron deficiency 07/13/2020     Priority: Medium     Spinal stenosis of lumbosacral region 07/13/2020     Priority: Medium     Sacroiliitis (H) 07/13/2020     Priority: Medium     Back pain 02/01/2020     Priority: Medium     Hyponatremia 02/01/2020     Priority: Medium     Rhabdomyolysis 02/01/2020     Priority: Medium     Hypokalemia 02/01/2020     Priority: Medium     Anemia 02/01/2020     Priority: Medium     Closed fracture of eleventh thoracic vertebra (H) 02/01/2020     Priority: Medium     Systolic murmur 02/01/2020     Priority: Medium     Hypoxia 02/01/2020     Priority: Medium     Nasal lesion 07/24/2019     Priority: Medium     Renal benign neoplasm, left 06/05/2019     Priority: Medium     Obesity (BMI 35.0-39.9) with comorbidity (H)  06/05/2019     Priority: Medium     Memory loss 06/05/2019     Priority: Medium     Acute right-sided low back pain with right-sided sciatica 06/05/2019     Priority: Medium     CAP (community acquired pneumonia) 01/21/2018     Priority: Medium     Renal mass, right 06/08/2017     Priority: Medium     Postoperative anemia due to acute blood loss 01/24/2017     Priority: Medium     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 Provider to review and confirm.       Advanced care planning/counseling discussion 08/03/2012     Priority: Medium     Chronic kidney disease, unspecified 08/03/2012     Priority: Medium     Diastolic dysfunction 02/27/2012     Priority: Medium     echo 2/2012  EF 60%          Past Medical History:    Past Medical History:   Diagnosis Date     Chronic kidney disease, unspecified 8/3/2012     Diastolic dysfunction 2/27/2012     HTN (hypertension) 4/4/2000     Obesity (BMI 35.0-39.9) with comorbidity (H) 6/5/2019     Personal history of colonic polyps 6/8/2004     Renal benign neoplasm, left 2016     Skin cancer        Past Surgical History:    Past Surgical History:   Procedure Laterality Date     ARTHROPLASTY HIP Right 1/23/2017    Procedure: ARTHROPLASTY HIP;  Surgeon: Jose Manuel Muñoz MD;  Location: [facility]     ARTHROPLASTY KNEE Left 1/25/2016    Procedure: ARTHROPLASTY KNEE;  Surgeon: Jose Manuel Muñoz MD;  Location: [facility]     colonoscopy  2004, 2009     EXCISE LESION EYELID Left 8/1/2017    Procedure: EXCISE LESION EYELID;  LEFT LOWER LID WEDGE EXCISION WITH FROZEN SECTION CONTROL;  Surgeon: Quinten Sotelo MD;  [facility]     excision of rectal villus adenoma       IR  CONSULTATION FOR IR EXAM  7/15/2020     knee replacement Right 1/2011     PHACOEMULSIFICATION WITH STANDARD INTRAOCULAR LENS IMPLANT  2/25/2014    Procedure: PHACOEMULSIFICATION WITH STANDARD INTRAOCULAR LENS IMPLANT;  CATARACT EXTRACTION WITH INTRA OCULAR LENS LEFT;  Surgeon: Jah Bee MD;  Location: [facility]     PHACOEMULSIFICATION WITH STANDARD INTRAOCULAR LENS IMPLANT Right 5/12/2015    Procedure: PHACOEMULSIFICATION WITH STANDARD INTRAOCULAR LENS IMPLANT;  Surgeon: Jah Bee MD;  Location: [facility]     thoracic schwannoma resection       tonsillectomy       urethral dilitation every 4 months         Family History:    Family History   Problem Relation Age of Onset     Alcohol/Drug Father         alcoholism     Cancer Father 51        Esophageal, cause of death     Other - See Comments Father         Tobacco use     Cerebrovascular Disease Mother 51        Cause of death     Other - See Comments Maternal Grandmother      Other - See Comments Maternal Grandfather      Other - See Comments Paternal Grandmother      Other - See Comments Paternal Grandfather      Cancer Sister         Breast     Cancer Brother         Leukemia     Cancer Brother         Lung     Chronic Obstructive Pulmonary Disease Brother        Social History:  Marital Status:   [2]  Social History     Tobacco Use     Smoking status: Never Smoker     Smokeless tobacco: Never Used   Substance Use Topics     Alcohol use: Yes     Comment: Mixed, rarely     Drug use: No        Medications:    gabapentin (NEURONTIN) 100 MG capsule  ibuprofen 200 MG PO tablet  traMADol (ULTRAM) 50 MG tablet  aspirin (ASA) 81 MG EC tablet  Calcium Carb-Cholecalciferol (CALCIUM + D3) 600-200 MG-UNIT TABS  ferrous sulfate (FEROSUL) 325 (65 Fe) MG tablet  lisinopril (ZESTRIL) 10 MG tablet  metoprolol tartrate (LOPRESSOR) 25 MG tablet  Multiple Vitamin (MULTIVITAMIN) per tablet  potassium chloride ER (KLOR-CON M) 10 MEQ CR tablet          Review of Systems    Constitutional: Positive for activity change and fatigue. Negative for appetite change and fever.   HENT: Negative.    Respiratory: Negative for cough and shortness of breath.    Cardiovascular: Negative for chest pain.   Gastrointestinal: Negative for abdominal pain, nausea and vomiting.   Genitourinary: Negative.    Musculoskeletal: Positive for back pain. Negative for neck pain and neck stiffness.   Skin: Negative.    Neurological: Negative for headaches.       Physical Exam   BP: 154/68  Pulse: 92  Temp: 98.1  F (36.7  C)  Resp: 18  SpO2: 95 %      Physical Exam  Vitals and nursing note reviewed.   Constitutional:       General: She is not in acute distress.     Appearance: Normal appearance. She is normal weight. She is not ill-appearing, toxic-appearing or diaphoretic.   Cardiovascular:      Rate and Rhythm: Normal rate and regular rhythm.   Pulmonary:      Effort: Pulmonary effort is normal.      Breath sounds: Normal breath sounds.   Abdominal:      Palpations: Abdomen is soft.      Tenderness: There is abdominal tenderness.   Musculoskeletal:      Right lower leg: No edema.      Left lower leg: No edema.      Comments: Some tenderness upon palpation of the low back.   Skin:     General: Skin is warm and dry.      Capillary Refill: Capillary refill takes less than 2 seconds.   Neurological:      General: No focal deficit present.      Mental Status: She is alert and oriented to person, place, and time.         ED Course     ED Course as of Oct 12 1747   Tue Oct 12, 2021   1613 Discussed with son.  Plan will be for protracted observation and treatment in the emergency department with possible boarding versus admission, versus home to previous living arrangement.        Procedures  EKG shows a sinus rhythm with PACs.  No ST concerns.  No concerning T waves.            Critical Care time:  none               Results for orders placed or performed during the hospital encounter of 10/12/21 (from the past 24  hour(s))   CBC with platelets differential    Narrative    The following orders were created for panel order CBC with platelets differential.  Procedure                               Abnormality         Status                     ---------                               -----------         ------                     CBC with platelets and d...[[id number]]  Abnormal            Final result                 Please view results for these tests on the individual orders.   Comprehensive metabolic panel   Result Value Ref Range    Sodium 130 (L) 133 - 144 mmol/L    Potassium 3.6 3.4 - 5.3 mmol/L    Chloride 101 94 - 109 mmol/L    Carbon Dioxide (CO2) 22 20 - 32 mmol/L    Anion Gap 7 3 - 14 mmol/L    Urea Nitrogen 16 7 - 30 mg/dL    Creatinine 0.55 0.52 - 1.04 mg/dL    Calcium 8.2 (L) 8.5 - 10.1 mg/dL    Glucose 140 (H) 70 - 99 mg/dL    Alkaline Phosphatase 128 40 - 150 U/L    AST 46 (H) 0 - 45 U/L    ALT 31 0 - 50 U/L    Protein Total 6.4 (L) 6.8 - 8.8 g/dL    Albumin 2.8 (L) 3.4 - 5.0 g/dL    Bilirubin Total 1.0 0.2 - 1.3 mg/dL    GFR Estimate 82 >60 mL/min/1.73m2   CBC with platelets and differential   Result Value Ref Range    WBC Count 15.7 (H) 4.0 - 11.0 10e3/uL    RBC Count 4.51 3.80 - 5.20 10e6/uL    Hemoglobin 10.6 (L) 11.7 - 15.7 g/dL    Hematocrit 32.9 (L) 35.0 - 47.0 %    MCV 73 (L) 78 - 100 fL    MCH 23.5 (L) 26.5 - 33.0 pg    MCHC 32.2 31.5 - 36.5 g/dL    RDW 17.4 (H) 10.0 - 15.0 %    Platelet Count 260 150 - 450 10e3/uL    % Neutrophils 88 %    % Lymphocytes 3 %    % Monocytes 8 %    % Eosinophils 0 %    % Basophils 0 %    % Immature Granulocytes 1 %    NRBCs per 100 WBC 0 <1 /100    Absolute Neutrophils 13.8 (H) 1.6 - 8.3 10e3/uL    Absolute Lymphocytes 0.5 (L) 0.8 - 5.3 10e3/uL    Absolute Monocytes 1.3 0.0 - 1.3 10e3/uL    Absolute Eosinophils 0.0 0.0 - 0.7 10e3/uL    Absolute Basophils 0.0 0.0 - 0.2 10e3/uL    Absolute Immature Granulocytes 0.1 (H) <=0.0 10e3/uL    Absolute NRBCs 0.0 10e3/uL   UA with  Microscopic reflex to Culture    Specimen: Urine, Midstream   Result Value Ref Range    Color Urine Yellow Colorless, Straw, Light Yellow, Yellow    Appearance Urine Slightly Cloudy (A) Clear    Glucose Urine Negative Negative mg/dL    Bilirubin Urine Negative Negative    Ketones Urine Trace (A) Negative mg/dL    Specific Gravity Urine 1.013 1.003 - 1.035    Blood Urine Small (A) Negative    pH Urine 6.5 4.7 - 8.0    Protein Albumin Urine 30  (A) Negative mg/dL    Urobilinogen Urine Normal Normal, 2.0 mg/dL    Nitrite Urine Negative Negative    Leukocyte Esterase Urine Large (A) Negative    Mucus Urine Present (A) None Seen /LPF    RBC Urine 2 <=2 /HPF    WBC Urine 65 (H) <=5 /HPF    Squamous Epithelials Urine 4 (H) <=1 /HPF    Narrative    Urine Culture ordered based on laboratory criteria   CT Abdomen Pelvis w Contrast    Narrative    CT ABDOMEN PELVIS W CONTRAST    CLINICAL HISTORY: Female, age 89 years,  Generalized abdominal pain  and leukocytosis;    Comparison:  CT scan chest abdomen pelvis 2/1/2020    TECHNIQUE:  CT was performed of the abdomen and pelvis with IV  contrast. Sagittal, coronal, axial and MIP reconstructions were  reviewed.     FINDINGS:  Emphysematous changes with peripheral areas of atelectasis again seen  at the lung bases. Visualized portions of the heart demonstrate no  acute abnormality. The heart is mildly enlarged.    Stomach and duodenum: Normal.    Liver: Normal.    Gallbladder: Normal.    Spleen: Normal.    Pancreas: No acute abnormality. Mild atrophy again seen within the  body and head. There is mild dilatation of the pancreatic duct within  the head of the pancreas, unchanged.    Adrenal glands: Normal.    Kidneys: 5 cm low dense lesion again seen medially in the right  kidney, concerning for neoplasm. Mild cortical thinning/scarring again  seen medially in the left kidney.    Ureters: Visualized portions of the ureters are normal. Streak  artifact arising from the right hip  arthroplasty limits evaluation.  Each of the ureters is moderately distended.    Urinary bladder: Visualized portions are normal.    Large and small bowel: No distinct evidence of an acute inflammatory  process. Moderate to large volume of stool is seen within the rectum.    There is a 4.6 cm cyst seen in the left hemipelvis which is increased  in size, previously measuring 3 cm in maximum dimension.    There is no evidence of pathologic lymph node enlargement.    The patient has developed bilateral sacral insufficiency fracture  since the previous study. Severe degenerative changes are again seen  throughout the lumbar spine. Chronic appearing wedge type fracture  again seen at T11.      Impression    IMPRESSION:  Bilateral sacral insufficiency fractures have developed since the  prior examination.    Bilateral hydronephrosis/hydroureter without evidence of obstructing  stone. Streak artifact from the hip arthroplasty limits evaluation of  the pelvic structures.    5 cm low dense right renal lesion is similar in appearance, concerning  for neoplasm. There has been no significant change in overall size. No  evidence of apparent lymphadenopathy or other finding of suggest  local/distant metastatic disease.    Interval enlargement of 4.6 cm left pelvic cyst, previously measuring  3 cm in maximum dimension.    Diverticulosis of the colon. No apparent diverticulitis.    Large volume of stool within the distal colon/rectum. An 4C, streak  artifact from the right hip arthroplasty limits evaluation.    Other chronic changes as described above.    IGOR COOK MD         SYSTEM ID:  [id number]       Medications   sodium chloride 0.9% infusion ( Intravenous New Bag 10/12/21 1415)   fentaNYL (PF) (SUBLIMAZE) injection 50 mcg (50 mcg Intravenous Given 10/12/21 1629)   ketorolac (TORADOL) injection 15 mg (15 mg Intravenous Given 10/12/21 1418)   fentaNYL (PF) (SUBLIMAZE) injection 50 mcg (50 mcg Intravenous Given 10/12/21  1452)   sodium chloride (PF) 0.9% PF flush 60 mL (60 mLs Intravenous Given 10/12/21 1500)   iopamidol (ISOVUE-300) IV solution 61% 100 mL (81 mLs Intravenous Given 10/12/21 1459)   cefTRIAXone IN D5W (ROCEPHIN) intermittent infusion 2 g (0 g Intravenous Stopped 10/12/21 1626)       Assessments & Plan (with Medical Decision Making)   Findings as above.  89-year-old female who presented with low back pain and worsening weakness.  Unable to ambulate in the emergency department.  Findings are positive for sacral insufficiency fractures of unknown etiology as well as urinary tract infection and bilateral mild hydronephrosis without evidence of obstruction.  Treatment as above.  Rocephin emergency department with urine culture.  CT scan as above.  We were cheryl enough for room to open up.  [place] range and the patient would fit any reasonable indication for admission and further treatment.    This document was prepared using a combination of typing and voice generated software.  While every attempt was made for accuracy, spelling and grammatical errors may exist.    I have reviewed the nursing notes.    I have reviewed the findings, diagnosis, plan and need for follow up with the patient.       New Prescriptions    No medications on file       Final diagnoses:   Pyelonephritis   Right-sided low back pain without sciatica   Generalized muscle weakness   Unable to ambulate   Closed fracture of sacrum, unspecified portion of sacrum, initial encounter (H)       10/12/2021   [facility]     Christophe Ta PA-C  10/12/21 1751       Christophe Ta PA-C  10/12/21 1816

## 2021-10-12 NOTE — ED TRIAGE NOTES
Patient presents with complaints of back pain that she states she always gets. States she took advil this am and it did let up a little and that's what she usually takes, but states it's just so painful now. Rates her pain at a 6/10

## 2021-10-12 NOTE — ED NOTES
Bed: ED11a  Expected date:   Expected time:   Means of arrival:   Comments:  Yvon, elderly back pain hx UTI

## 2021-10-12 NOTE — ED NOTES
"Patient asks nurse to reposition pillow every few seconds. After asking approx. 25 times, this nurse repositioned patient onto her right side (her preference) and put a pillow behind her back and between her knees. Patient picking at gown and different cords and repeatedly states \"this is just not right.\" Patient prefers gown and all cords to be off. Complains back is sore- toradol given. Positioning is helpful per pt. Will be going to CT next and then will attempt to get urine sample.   "

## 2021-10-13 ENCOUNTER — APPOINTMENT (OUTPATIENT)
Dept: PHYSICAL THERAPY | Facility: HOSPITAL | Age: 86
DRG: 543 | End: 2021-10-13
Attending: INTERNAL MEDICINE
Payer: MEDICARE

## 2021-10-13 ENCOUNTER — APPOINTMENT (OUTPATIENT)
Dept: OCCUPATIONAL THERAPY | Facility: HOSPITAL | Age: 86
DRG: 543 | End: 2021-10-13
Attending: INTERNAL MEDICINE
Payer: MEDICARE

## 2021-10-13 LAB
ANION GAP SERPL CALCULATED.3IONS-SCNC: 6 MMOL/L (ref 3–14)
BUN SERPL-MCNC: 15 MG/DL (ref 7–30)
CALCIUM SERPL-MCNC: 7.7 MG/DL (ref 8.5–10.1)
CHLORIDE BLD-SCNC: 108 MMOL/L (ref 94–109)
CO2 SERPL-SCNC: 22 MMOL/L (ref 20–32)
CREAT SERPL-MCNC: 0.56 MG/DL (ref 0.52–1.04)
GFR SERPL CREATININE-BSD FRML MDRD: 82 ML/MIN/1.73M2
GLUCOSE BLD-MCNC: 112 MG/DL (ref 70–99)
POTASSIUM BLD-SCNC: 2.8 MMOL/L (ref 3.4–5.3)
SODIUM SERPL-SCNC: 136 MMOL/L (ref 133–144)

## 2021-10-13 PROCEDURE — 250N000011 HC RX IP 250 OP 636: Performed by: INTERNAL MEDICINE

## 2021-10-13 PROCEDURE — 80048 BASIC METABOLIC PNL TOTAL CA: CPT | Performed by: INTERNAL MEDICINE

## 2021-10-13 PROCEDURE — 36415 COLL VENOUS BLD VENIPUNCTURE: CPT | Performed by: INTERNAL MEDICINE

## 2021-10-13 PROCEDURE — 96372 THER/PROPH/DIAG INJ SC/IM: CPT | Performed by: INTERNAL MEDICINE

## 2021-10-13 PROCEDURE — 250N000013 HC RX MED GY IP 250 OP 250 PS 637: Performed by: INTERNAL MEDICINE

## 2021-10-13 PROCEDURE — G0378 HOSPITAL OBSERVATION PER HR: HCPCS

## 2021-10-13 PROCEDURE — 99232 SBSQ HOSP IP/OBS MODERATE 35: CPT | Performed by: INTERNAL MEDICINE

## 2021-10-13 PROCEDURE — 97161 PT EVAL LOW COMPLEX 20 MIN: CPT | Mod: GP | Performed by: PHYSICAL THERAPIST

## 2021-10-13 PROCEDURE — 97165 OT EVAL LOW COMPLEX 30 MIN: CPT | Mod: GO

## 2021-10-13 PROCEDURE — 120N000001 HC R&B MED SURG/OB

## 2021-10-13 PROCEDURE — 97530 THERAPEUTIC ACTIVITIES: CPT | Mod: GP | Performed by: PHYSICAL THERAPIST

## 2021-10-13 RX ADMIN — FERROUS SULFATE TAB 325 MG (65 MG ELEMENTAL FE) 325 MG: 325 (65 FE) TAB at 09:09

## 2021-10-13 RX ADMIN — GABAPENTIN 100 MG: 100 CAPSULE ORAL at 13:49

## 2021-10-13 RX ADMIN — HEPARIN SODIUM 5000 UNITS: 5000 INJECTION, SOLUTION INTRAVENOUS; SUBCUTANEOUS at 21:51

## 2021-10-13 RX ADMIN — POTASSIUM CHLORIDE 10 MEQ: 10 CAPSULE, COATED, EXTENDED RELEASE ORAL at 21:51

## 2021-10-13 RX ADMIN — GABAPENTIN 100 MG: 100 CAPSULE ORAL at 21:51

## 2021-10-13 RX ADMIN — ASPIRIN 81 MG: 81 TABLET, CHEWABLE ORAL at 09:09

## 2021-10-13 RX ADMIN — HEPARIN SODIUM 5000 UNITS: 5000 INJECTION, SOLUTION INTRAVENOUS; SUBCUTANEOUS at 09:09

## 2021-10-13 RX ADMIN — CEFTRIAXONE SODIUM 1 G: 1 INJECTION, SOLUTION INTRAVENOUS at 13:49

## 2021-10-13 RX ADMIN — TRAMADOL HYDROCHLORIDE 50 MG: 50 TABLET, FILM COATED ORAL at 18:12

## 2021-10-13 RX ADMIN — LISINOPRIL 10 MG: 10 TABLET ORAL at 13:49

## 2021-10-13 RX ADMIN — POTASSIUM CHLORIDE 10 MEQ: 10 CAPSULE, COATED, EXTENDED RELEASE ORAL at 09:09

## 2021-10-13 RX ADMIN — MICONAZOLE NITRATE: 2 POWDER TOPICAL at 21:51

## 2021-10-13 RX ADMIN — GABAPENTIN 100 MG: 100 CAPSULE ORAL at 09:09

## 2021-10-13 RX ADMIN — TRAMADOL HYDROCHLORIDE 50 MG: 50 TABLET, FILM COATED ORAL at 09:09

## 2021-10-13 ASSESSMENT — ACTIVITIES OF DAILY LIVING (ADL)
ADLS_ACUITY_SCORE: 17
ADLS_ACUITY_SCORE: 17

## 2021-10-13 NOTE — PHARMACY-MEDICATION REGIMEN REVIEW
Pharmacy Antimicrobial Stewardship Assessment     Current Antimicrobial Therapy:  Anti-infectives (From now, onward)    Start     Dose/Rate Route Frequency Ordered Stop    10/13/21 1700  cefTRIAXone in d5w (ROCEPHIN) intermittent infusion 1 g      1 g  over 30 Minutes Intravenous EVERY 24 HOURS 10/12/21 1947            Indication: UTI    Days of Therapy: 2     Pertinent Labs:  Recent Labs   Lab Test 10/12/21  1351 WBC 15.7*     Culture Results:   (10/12/21) COVID = negative  (10/12/21) Urine    (9/28/21) Urine = E. Coli pan sensitive  9/28/21: Cefdinir 300 mg BID x7 days     Recommendations/Interventions:  1. Transition to oral when able    Morro Lawson, [facility]  October 13, 2021

## 2021-10-13 NOTE — PLAN OF CARE
Most recent vitals: /55   Pulse 80   Temp 99.3  F (37.4  C) (Tympanic)   Resp 18   SpO2 92%     Alert to self and place at times. Forgetful. Hypertensive at times. 140s this morning. Otherwise VSS. Frequency and urgency with urination. Remained in bed due to weakness. Turned and repo'd independently. IV fluids running.     Face to face report given with opportunity to observe patient.    Report given to [place] Aldana RN   10/13/2021  7:31 AM

## 2021-10-13 NOTE — PROGRESS NOTES
10/13/21 1154   Signing Clinician's Name / Credentials   Signing clinician's name / credentials April Schuster DPT    Quick Adds   Rehab Discipline PT   Quick Adds Therapeutic Activity   Therapeutic Activity   Minutes of Treatment 15 minutes   Symptoms Noted During/After Treatment Increased pain   Treatment Detail Sup>sit with mod A to help with UE, once sitting was mod A for 2 minutes before pt was able to sit SBA at EOB. sit>stand max A x2 with FWW, pt reported 10/10 pain with sitting and standing and was screaming out in pain. Sit>supine max A x2 due to pain and total assit to boost in bed. Pt was able to lift up her head to scoot up in bed. Pt able to reach across and hold railing to roll both directions in order to perform cares. Pt left in bed with alarm on and call light within reach. Pt educated on d/c plans to go to short term rehab, pt seemed reluctant but seemed to understand why.    Supervision   Supervision Direct supervision provided;Direct Patient Contact Provided   PT Discharge Planning    PT Discharge Recommendation (DC Rec) Transitional Care Facility   PT Rationale for DC Rec At this time pt is not safe to return home due unmanaged pain that is limiting her mobility and ability to perform ADLs. Short term rehab is recommended to increase her activity tolerance and strength.    PT Brief overview of current status  Sup>sit with mod A to help with UE, sit>stand max A x2 with FWW, pt reported 10/10 pain with standing. Sit>supine max A x2 due to pain and total assit to boost in bed. Pt was able to lift up her head to scoot up in bed. Pt able to reach across and hold railing to roll both directions in order to perform cares. Pt left in bed with alarm on and call light within reach. Pt educated on d/c plans to go to short term rehab, pt seemed reluctant but seemed to understand why.    Additional Documentation   PT Plan Progress activity tolerance as able    Total Session Time   Total Session Time  (minutes) 15 minutes

## 2021-10-13 NOTE — PLAN OF CARE
Alert & oriented, did know where she was and what year it was but, is forgetful. IV fluids discontinued. Took all medications whole with no problems. Vital signs stable, afebrile. Is able to use call light appropriately. Is comfortable when laying flat in bed. Is in excruciating pain with any type of movement.   Assist of 2 to transfer to chair for breakfast. Did have a lot of pain when transferring. Sat up long enough to eat breakfast than requested to return to bed.  Lungs are clear,diminished, bowel sounds ae positive.   Gave Ultram 50 mg po at 0858.  Will continue to monitor and medicate as needed.  Son here to visit, patient sleeping. Writer spoke with son who states that patient fell Monday morning sometime and was not able to take any of her medication on Tuesday due to pain.   Did ask to speak with .  did come and speak with patient's son.  Woke pt. At 1400 to take medications, asking for something to eat. Did not want to sit in chair for meal. Was able to sit patient up in bed to eat.      Face to face report given with opportunity to observe patient.    Report given to DIVYA Steiner RN   10/13/2021  3:03 PM

## 2021-10-13 NOTE — PROVIDER NOTIFICATION
DATE:  10/13/2021   TIME OF RECEIPT FROM LAB:  0605  LAB TEST:  Potassium   LAB VALUE:  2.8  RESULTS GIVEN WITH READ-BACK TO (PROVIDER):  Dr. Johnson   TIME LAB VALUE REPORTED TO PROVIDER:  0617

## 2021-10-13 NOTE — PROGRESS NOTES
Inpatient Occupational Therapy Evaluation    Name: Claudine Bustos MRN# [id number]   Age: 89 year old YOB: 1930     Date of Consultation: 2021  Consultation is requested by: Dr. Johnson  Specific Consultation Request: weakness  Primary care provider: Katarina Gutierrez    General Information:   Onset Date: 10/13/2021    History of Current Problem/Admission: Pyelonephritis [N12]  Generalized muscle weakness [M62.81]  Unable to ambulate [R26.2]  Right-sided low back pain without sciatica [M54.50]  Closed fracture of sacrum, unspecified portion of sacrum, initial encounter (H) [S32.10XA]    Prior Level of Function: Pt reports she requires assistance for bathing from daughter, otherwise is independent in ADLs. She uses a FWW for functional mobility.   Ambulation: 1 - Assistive Equipment   Transferrin - Assistive Equipment   Toiletin - Assistive Equipment    Bathing: 3 - Assistive Equipment & Person  Dressin - Independent   Eatin - Not assessed   Communication: 0 - Understands/communicates without difficulty  Swallowin - Not assessed   Cognition: unknown    Fall history within the last 6 months: Pt denied - per chart review, pt did have a fall on Monday    Current Living Situation: Pt lives in a house with her spouse and son. 3 steps to enter home, and a full flight of steps to the basement. Pt reports she does go to the basement but bedroom and bathroom are on the main floor. Bathroom has a walk in shower with a shower chair, no grab bars. Pt reports grab bars by the toilet.      Current Equipment Used at Home: FWW, grab bars near toilet     Patient & Family Goals: return home     Past Medical History:   Past Medical History:   Diagnosis Date     Chronic kidney disease, unspecified 8/3/2012     Diastolic dysfunction 2012    echo 2012  EF 60%     HTN (hypertension) 2000     Obesity (BMI 35.0-39.9) with comorbidity (H) 2019     Personal history of colonic polyps  6/8/2004     Renal benign neoplasm, left 2016    'oncocytic neoplasm' per urology -- benign, stable and following yearly for scans     Skin cancer        Past Surgical History:  Past Surgical History:   Procedure Laterality Date     ARTHROPLASTY HIP Right 1/23/2017    Procedure: ARTHROPLASTY HIP;  Surgeon: Jose Manuel Muñoz MD;  Location: [facility]     ARTHROPLASTY KNEE Left 1/25/2016    Procedure: ARTHROPLASTY KNEE;  Surgeon: Jose Manuel Muñoz MD;  Location: [facility]     colonoscopy  2004, 2009     EXCISE LESION EYELID Left 8/1/2017    Procedure: EXCISE LESION EYELID;  LEFT LOWER LID WEDGE EXCISION WITH FROZEN SECTION CONTROL;  Surgeon: Quinten Sotelo MD;  Location: [facility]     excision of rectal villus adenoma       IR CONSULTATION FOR IR EXAM  7/15/2020     knee replacement Right 1/2011     PHACOEMULSIFICATION WITH STANDARD INTRAOCULAR LENS IMPLANT  2/25/2014    Procedure: PHACOEMULSIFICATION WITH STANDARD INTRAOCULAR LENS IMPLANT;  CATARACT EXTRACTION WITH INTRA OCULAR LENS LEFT;  Surgeon: Jah Bee MD;  Location: [facility]     PHACOEMULSIFICATION WITH STANDARD INTRAOCULAR LENS IMPLANT Right 5/12/2015    Procedure: PHACOEMULSIFICATION WITH STANDARD INTRAOCULAR LENS IMPLANT;  Surgeon: Jah Bee MD;  Location: [facility]     thoracic schwannoma resection       tonsillectomy       urethral dilitation every 4 months         Medications:   Current Facility-Administered Medications   Medication     aspirin (ASA) chewable tablet 81 mg     cefTRIAXone in d5w (ROCEPHIN) intermittent infusion 1 g     ferrous sulfate (FEROSUL) tablet 325 mg     gabapentin (NEURONTIN) capsule 100 mg     heparin ANTICOAGULANT injection 5,000 Units     lisinopril (ZESTRIL) tablet 10 mg     melatonin tablet 1 mg     naloxone (NARCAN) injection 0.2 mg    Or     naloxone (NARCAN) injection 0.4 mg    Or     naloxone (NARCAN) injection 0.2 mg    Or     naloxone (NARCAN) injection 0.4 mg     ondansetron (ZOFRAN-ODT) ODT tab 4 mg    Or      ondansetron (ZOFRAN) injection 4 mg     potassium chloride ER (MICRO-K) CR capsule 10 mEq     traMADol (ULTRAM) tablet 50 mg       Weight Bearing Status: no restrictions      Cognitive Status Examination:  Orientation: disoriented to month  Level of Consciousness: lethargic  Follows Commands and Answers Questions: 100% of the time  Personal Safety and Judgement: Intact  Memory: Immediate recall intact  Comments: Pt very tired and needed lots of encouragement to participate in evaluation, stating she was very tired. Pt stated it was November.     Pain:   Currently in pain? Yes  Pain Location? low back  Pain Ratin at rest, 10 with activity     Occupational Therapy Evaluation:   Integumentary/Edema: NT  Range of Motion: WFL   Strength: WFL - generalized weakness noted  Muscle Tone Assessment: no issues observed  Coordination: no significant findings    Mobility:   Bed Mobility: ModA x1-2 supine>sit EOB, MaxA x2 sit>supine, MaxA x1-2 for rolling  Transfer Skills: MaxA x2 and pt unable to come to a full standing position due to pain. She was yelling out in pain and then asked to return to lying  Bed to Chair/Chair to Bed: Unable to complete today due to 10/10 pain   Toilet Transfer: Unable to complete with OT   Balance: NT     ADLs:   Lower Body Dressing: Max-TotalA to don socks  Toileting: TotalA   Grooming: NT    IADLs:   Previous Responsibilities of the Patient: Medication Management and Finances   Comments: Pt and spouse complete the finances.  completes yard work.  and daughter complete the cooking and transport pt. Daughter does the housekeeping, laundry, and shopping.      Activities of Daily Living Analysis:   Impairments Contributing to Impaired Activities of Daily Living: pain, strength decreased and activity tolerance decreased    Occupational Therapy Interventions: ADL Retraining, cognition, strengthening, progressive activity/exercise and risk factor education     Clinical  Impressions:  Criteria for Skilled Therapeutic Intervention Met: Yes, treatment indicated  OT Diagnosis: Impaired ADLs  Influenced by the following impairments: significant pain, generalized weakness, decreased activity tolerance  Functional limitations due to impairment: dressing, grooming, bathing, toileting, functional mobility  Clinical presentation: Stable/Uncomplicated  Clinical presentation rationale: clinical judgement  Clinical Decision making (complexity): Low Complexity  Frequency: 5 times/week  Predicted Duration of Therapy Intervention (days/wks): 5 days    Anticipated Discharge Disposition: Transitional Care Facility   Anticipated Equipment Needs at Discharge:   Risks and Benefits of therapy have been explained: Yes  Patient, Family & other staff in agreement with plan of care: Yes  Clinical Impression Comments: Pt is currently requiring significant assistance for ADLs/functional mobility due to pain and decreased activity tolerance from sacral fxs. Pt unable to safely return home at this time. Recommend short term rehab to increase pt's strength, activity tolerance, and overall functional status.     Total Eval Time: 20

## 2021-10-13 NOTE — PROGRESS NOTES
"[facility]    Hospitalist Progress Note    Date of Service (when I saw the patient): 10/13/2021    Assessment & Plan   Claudine Bustos is a 89 year old female who was admitted on 10/12/2021.     1. Back pain. Most likely due to DJD. This is chronic condition. Took advil but relief. Was admitted for 2 days in March 2021 for fall. Today's \"new\" finding is bilataral sacral insufficiency fractures on CT.  -PT/OT     2. UTI. Most likely cystitis. Doubt pyelo (no fever, normal WBC). Continue Rocephin and IVF.  -follow urine culture, still pending     3. Right renal mass. Similar in appearance. Concerning of neoplasm. Was present prior and no significant change in overall size.      4. Constipation. Will use lactulose     5. Generalized weakness. Complex etiology - DJD and UTI  Will treat both. PT/OT consult.      6. Hyponatremia. Mild, resolved    Clinically Significant Risk Factors Present on Admission   { TIP  This section helps capture the illness of the patient on admission.     - Review diagnoses highlighted in blue; right click, edit & delete if not appropriate   - Optional smartlists should be completed unless they are not applicable   - If blank, no additional diagnoses identified   :[id number]     # Hypokalemia: K = 2.8 mmol/L (Ref range: 3.4 - 5.3 mmol/L) on admission, will replace as needed       # Platelet Defect: home medication list includes an antiplatelet medication      DVT Prophylaxis: Heparin SQ    Code Status: No CPR- Do NOT Intubate    Disposition: Expected discharge in 2-3 days once pain controlled, placement found.    Ron Johnson MD        Interval History   Patient seen in room.  Patient is pleasant.  Mobility is still limited by sacral pain.  No acute events overnight, no new symptoms.    -Data reviewed today: I reviewed all new labs and imaging results over the last 24 hours. I personally reviewed imaging reports.    Physical Exam   Temp: 98.1  F (36.7  C) Temp src: Tympanic BP: " 130/51 Pulse: 84   Resp: 18 SpO2: 94 % O2 Device: None (Room air) Oxygen Delivery: 1 LPM  There were no vitals filed for this visit.  Vital Signs with Ranges  Temp:  [97.6  F (36.4  C)-100.2  F (37.9  C)] 98.1  F (36.7  C)  Pulse:  [] 84  Resp:  [18] 18  BP: (130-183)/(51-86) 130/51  SpO2:  [90 %-95 %] 94 %    Intake/Output Summary (Last 24 hours) at 10/13/2021 1107  Last data filed at 10/13/2021 0952  Gross per 24 hour   Intake 2073 ml   Output 120 ml   Net 1953 ml       Peripheral IV 10/12/21 (Active)   Site Assessment [facility] 10/13/21 0757   Line Status Saline locked 10/13/21 0757   Dressing Intervention New dressing  10/12/21 1352   Phlebitis Scale 0-->no symptoms 10/13/21 0757   Infiltration Scale 0 10/13/21 0757   Number of days: 1       Peripheral IV 10/13/21 Right Hand (Active)   Site Assessment [facility] 10/13/21 0757   Line Status Saline locked 10/13/21 0757   Dressing Intervention New dressing  10/13/21 0757   Phlebitis Scale 0-->no symptoms 10/13/21 0757   Infiltration Scale 0 10/13/21 0757   Number of days: 0       Incision/Surgical Site 01/25/16 Left Knee (Active)   Number of days: 2088       Incision/Surgical Site 01/23/17 Right Hip (Active)   Number of days: 1724       Incision/Surgical Site 08/01/17 Left;Lower Eye (Active)   Number of days: 1534     No line/device    Constitutional - AA, NAD, elderly female  HEENT - atraumatic, normocephalic  Neck - supple, no masses, no JVD  CVS - S1 S2 RRR, no murmurs, rubs, gallops  Respiratory - CTA b/l  GI - soft, NT, ND, + bowel sounds, no organomegaly  Musculoskeletal - no LE edema, no lesions  Neuro - oriented x 3, no gross focal deficits    Medications       aspirin  81 mg Oral Daily     cefTRIAXone  1 g Intravenous Q24H     ferrous sulfate  325 mg Oral Daily with breakfast     gabapentin  100 mg Oral TID     heparin ANTICOAGULANT  5,000 Units Subcutaneous Q12H     lisinopril  10 mg Oral Daily     potassium chloride ER  10 mEq Oral BID       Data   Recent Labs    Lab 10/13/21  0512 10/12/21  1351   WBC  --  15.7*   HGB  --  10.6*   MCV  --  73*   PLT  --  260    130*   POTASSIUM 2.8* 3.6   CHLORIDE 108 101   CO2 22 22   BUN 15 16   CR 0.56 0.55   ANIONGAP 6 7   LUZMARIA 7.7* 8.2*   * 140*   ALBUMIN  --  2.8*   PROTTOTAL  --  6.4*   BILITOTAL  --  1.0   ALKPHOS  --  128   ALT  --  31   AST  --  46*     Lactic Acid   Date Value Ref Range Status   02/01/2020 1.3 0.7 - 2.0 mmol/L Final   01/23/2018 0.8 0.4 - 2.0 mmol/L Final   01/22/2018 1.1 0.4 - 2.0 mmol/L Final       Recent Results (from the past 24 hour(s))   CT Abdomen Pelvis w Contrast    Narrative    CT ABDOMEN PELVIS W CONTRAST    CLINICAL HISTORY: Female, age 89 years,  Generalized abdominal pain  and leukocytosis;    Comparison:  CT scan chest abdomen pelvis 2/1/2020    TECHNIQUE:  CT was performed of the abdomen and pelvis with IV  contrast. Sagittal, coronal, axial and MIP reconstructions were  reviewed.     FINDINGS:  Emphysematous changes with peripheral areas of atelectasis again seen  at the lung bases. Visualized portions of the heart demonstrate no  acute abnormality. The heart is mildly enlarged.    Stomach and duodenum: Normal.    Liver: Normal.    Gallbladder: Normal.    Spleen: Normal.    Pancreas: No acute abnormality. Mild atrophy again seen within the  body and head. There is mild dilatation of the pancreatic duct within  the head of the pancreas, unchanged.    Adrenal glands: Normal.    Kidneys: 5 cm low dense lesion again seen medially in the right  kidney, concerning for neoplasm. Mild cortical thinning/scarring again  seen medially in the left kidney.    Ureters: Visualized portions of the ureters are normal. Streak  artifact arising from the right hip arthroplasty limits evaluation.  Each of the ureters is moderately distended.    Urinary bladder: Visualized portions are normal.    Large and small bowel: No distinct evidence of an acute inflammatory  process. Moderate to large volume of  stool is seen within the rectum.    There is a 4.6 cm cyst seen in the left hemipelvis which is increased  in size, previously measuring 3 cm in maximum dimension.    There is no evidence of pathologic lymph node enlargement.    The patient has developed bilateral sacral insufficiency fracture  since the previous study. Severe degenerative changes are again seen  throughout the lumbar spine. Chronic appearing wedge type fracture  again seen at T11.      Impression    IMPRESSION:  Bilateral sacral insufficiency fractures have developed since the  prior examination.    Bilateral hydronephrosis/hydroureter without evidence of obstructing  stone. Streak artifact from the hip arthroplasty limits evaluation of  the pelvic structures.    5 cm low dense right renal lesion is similar in appearance, concerning  for neoplasm. There has been no significant change in overall size. No  evidence of apparent lymphadenopathy or other finding of suggest  local/distant metastatic disease.    Interval enlargement of 4.6 cm left pelvic cyst, previously measuring  3 cm in maximum dimension.    Diverticulosis of the colon. No apparent diverticulitis.    Large volume of stool within the distal colon/rectum. An 4C, streak  artifact from the right hip arthroplasty limits evaluation.    Other chronic changes as described above.    IGOR COOK MD         SYSTEM ID:  [id number]       Ron Johnson MD

## 2021-10-13 NOTE — PROGRESS NOTES
Inpatient Physical Therapy Evaluation    Name: Claudine Bustos MRN# [id number]   Age: 89 year old YOB: 1930     Date of Consultation: 2021  Consultation is requested by:  Dr. Johnson  Specific Consultation Request:  Weakness  Primary care provider: Katarina Gutierrez    General Information:   Onset Date: 10/12/2021    History of Current Problem/Admission: Pyelonephritis [N12]  Generalized muscle weakness [M62.81]  Unable to ambulate [R26.2]  Right-sided low back pain without sciatica [M54.50]  Closed fracture of sacrum, unspecified portion of sacrum, initial encounter (H) [S32.10XA]    Prior Level of Function: Pt reports being mod I with ambulation- using a FWW. Pt reports being independent with dressing and toileting with grab bars by toilet. States that daughter helps with showering and shopping.   Ambulation: 1 - Assistive Equipment   Transferrin - Assistive Equipment   Toiletin - Assistive Equipment    Bathin - Assistive Person   Dressin - Independent   Eatin - Not assessed  Communication: 0 - Understands/communicates without difficulty  Cognition: 0 - no cognitive issues reported    Fall history within the last 6 months: No, pt reports no falls.     Current Living Situation: Patient lives with her  and son in a 1 story home with a basement that has 3 stairs to enter with a railing on the R. Pt reports 1 flight of stairs to the basement- reports that she does go down here but her room, bathroom, and kitchen are all on the main level.     Current Equipment Used at Home: FWW, grab bars in shower      Patient & Family Goals: To go home      Past Medical History:   Past Medical History:   Diagnosis Date     Chronic kidney disease, unspecified 8/3/2012     Diastolic dysfunction 2012    echo 2012  EF 60%     HTN (hypertension) 2000     Obesity (BMI 35.0-39.9) with comorbidity (H) 2019     Personal history of colonic polyps 2004     Renal benign  neoplasm, left 2016    'oncocytic neoplasm' per urology -- benign, stable and following yearly for scans     Skin cancer        Past Surgical History:  Past Surgical History:   Procedure Laterality Date     ARTHROPLASTY HIP Right 1/23/2017    Procedure: ARTHROPLASTY HIP;  Surgeon: Jose Manuel Muñoz MD;  Location: [facility]     ARTHROPLASTY KNEE Left 1/25/2016    Procedure: ARTHROPLASTY KNEE;  Surgeon: Jose Manuel Muñoz MD;  Location: [facility]     colonoscopy  2004, 2009     EXCISE LESION EYELID Left 8/1/2017    Procedure: EXCISE LESION EYELID;  LEFT LOWER LID WEDGE EXCISION WITH FROZEN SECTION CONTROL;  Surgeon: Quinten Sotelo MD;  Location: [facility]     excision of rectal villus adenoma       IR CONSULTATION FOR IR EXAM  7/15/2020     knee replacement Right 1/2011     PHACOEMULSIFICATION WITH STANDARD INTRAOCULAR LENS IMPLANT  2/25/2014    Procedure: PHACOEMULSIFICATION WITH STANDARD INTRAOCULAR LENS IMPLANT;  CATARACT EXTRACTION WITH INTRA OCULAR LENS LEFT;  Surgeon: Jah Bee MD;  Location: [facility]     PHACOEMULSIFICATION WITH STANDARD INTRAOCULAR LENS IMPLANT Right 5/12/2015    Procedure: PHACOEMULSIFICATION WITH STANDARD INTRAOCULAR LENS IMPLANT;  Surgeon: Jah Bee MD;  Location: [facility]     thoracic schwannoma resection       tonsillectomy       urethral dilitation every 4 months         Medications:   Current Facility-Administered Medications   Medication     aspirin (ASA) chewable tablet 81 mg     cefTRIAXone in d5w (ROCEPHIN) intermittent infusion 1 g     ferrous sulfate (FEROSUL) tablet 325 mg     gabapentin (NEURONTIN) capsule 100 mg     heparin ANTICOAGULANT injection 5,000 Units     lisinopril (ZESTRIL) tablet 10 mg     melatonin tablet 1 mg     naloxone (NARCAN) injection 0.2 mg    Or     naloxone (NARCAN) injection 0.4 mg    Or     naloxone (NARCAN) injection 0.2 mg    Or     naloxone (NARCAN) injection 0.4 mg     ondansetron (ZOFRAN-ODT) ODT tab 4 mg    Or     ondansetron (ZOFRAN)  injection 4 mg     potassium chloride ER (MICRO-K) CR capsule 10 mEq     traMADol (ULTRAM) tablet 50 mg       Weight Bearing Status: No restrictions      Cognitive Status Examination:  Orientation: awake and alert and disoriented to month- thought it was November   Level of Consciousness: alert and lethargic  Follows Commands and Answers Questions: 100% of the time  Personal Safety and Judgement: Intact  Memory: Immediate recall intact  Comments:     Pain:   Currently in pain? Yes  Pain Location? low back  Pain Ratin/10 at start of session, but 10/10 with sitting and standing     Physical Therapy Evaluation:   Integumentary/Edema: NT   ROM: WFL  Strength: Not formally tested, but able to lift against gravity   Bed Mobility: Sup>sit with mod A to help with UE   Transfers: sit>stand max A x2 with FWW, pt reported 10/10 pain with standing   Gait: NT   Stairs: NT   Balance: NT   Sensory: NT   Coordination: NT     Physical Therapy Interventions: Bed Mobility, Gait Training , Strengthening, Transfer Training and Progressive Activity/Exercise     Clinical Impressions:  Criteria for Skilled Therapeutic Intervention Met: Yes, treatment indicated  PT Diagnosis: gait disturbance   Influenced by the following impairments: pain related to fractures in sacrum, decreased activity tolerance   Functional limitations due to impairment: Inability to perform ADLs  Clinical presentation: Stable/Uncomplicated  Clinical presentation rationale: Clinical judgement   Clinical Decision making (complexity): Low Complexity  Frequency: 6 times/week  Predicted Duration of Therapy Intervention (days/wks): 5 days  Anticipated Discharge Disposition: Transitional Care Facility   Anticipated Equipment Needs at Discharge: None   Risks and Benefits of therapy have been explained: Yes  Patient, Family & other staff in agreement with plan of care: Yes  Clinical Impression Comments: Sup>sit with mod A to help with UE, sit>stand max A x2 with FWW, pt  reported 10/10 pain with standing. At this time pt is not safe to return home due unmanaged pain that is limiting her mobility and ability to perform ADLs. Short term rehab is recommended to increase her activity tolerance and strength.     Total Eval Time: 12

## 2021-10-13 NOTE — PLAN OF CARE
Admitted this shift. Pleasantly confused. Forgetful. IVF @ 75/hr. Inc of urine. Wears brief. Resting comfortable. Dozing at intervals.

## 2021-10-13 NOTE — H&P
"[facility]    History and Physical - Hospitalist Service       Date of Admission:  10/12/2021    Assessment & Plan      Claudine Bustos is a 89 year old female admitted on 10/12/2021. She lives with her son and presents to ED with worsening of back pain.     Hospital problem list:   1. Back pain. Most likely due to DJD. This is chronic condition. Took advil but relief. Was admitted for 2 days in March 2021 for fall. Today's \"new\" finding is bilataral sacral insufficiency fractures. Will treat pain and consult PT    2. UTI. Most likely cystitis. Doubt pyelo (no fever, normal WBC). Will check CRP and procalcitonin. Continue Rocephin and IVF.     3. Right renal mass. Similar in appearance. Concerning of neoplasm. Was present prior and no significant change in overall size.     4. Constipation. Will do lactulose    5. Generalized weakness. Complex etiology - DJD and UTI  Will treat both. PT consult.     6. Hyponatremia. Mild. Will recheck in AM. NS will be given, but not too much.      Diet: Regular Diet Adult    DVT Prophylaxis: Heparin SQ  Castro Catheter: Not present  Central Lines: None  Code Status: No CPR- Do NOT Intubate      Clinically Significant Risk Factors Present on Admission         # Hyponatremia: Na = 130 mmol/L (Ref range: 133 - 144 mmol/L) on admission, will monitor as appropriate      # Platelet Defect: home medication list includes an antiplatelet medication      Disposition Plan   Expected discharge: expect two more days stay. May need NH for short rehab.      The patient's care was discussed with the Primary team.    Maryellen Murillo MD  [facility]  Securely message with the Vocera Web Console (learn more here)  Text page via [facility] Paging/Directory      ______________________________________________________________________    Chief Complaint   Weakness, back pain. Pain resolved now. Denies any complaints.     History is obtained from the patient, electronic health record and " emergency department physician    History of Present Illness   Claudine Bustos is a 89 year old female who has h/o DJD, chronic back pain, s/p Total knee and hip, anemia, UTI, renal mass, emphysema, diverticulosis who was brought to ED for back pain. Denies recent fall. Treated for fall and back pain 3.2021. Recently treated for UTI. Cultures revealed pan-sensitive E. Coli.   Presents to in stable condition. Afebrile. HR 92 and /68. 95% on RA.   ED tests and results: WBC 15.7  Hg 10.6 Plat 260 Na 130 K 3.6 ALT 31 AST 46  CT abdomen: Bilateral sacral insufficiency fractures have developed since the  prior examination.  Bilateral hydronephrosis/hydroureter without evidence of obstructing  stone. Streak artifact from the hip arthroplasty limits evaluation of the pelvic structures.  5 cm low dense right renal lesion is similar in appearance, concerning for neoplasm. There has been no significant change in overall size. No evidence of apparent lymphadenopathy or other finding of suggest local/distant metastatic disease.  Interval enlargement of 4.6 cm left pelvic cyst, previously measuring 3 cm in maximum dimension.  Diverticulosis of the colon. No apparent diverticulitis.  Large volume of stool within the distal colon/rectum. An 4C, streak artifact from the right hip arthroplasty limits evaluation.  Other chronic changes as described above.    Treated with small doses of fentanyl pain completely resolved. When they tried to walk her, she couldn't .   She was given Rocephin for UTI.       Review of Systems    12 points obtained. Denies all (doubt this is reliable). Not excluded that she might have some MDI or slight dementia.      Past Medical History    I have reviewed this patient's medical history and updated it with pertinent information if needed.   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  Past Surgical History:   Procedure Laterality Date     ARTHROPLASTY HIP Right 1/23/2017    Procedure: ARTHROPLASTY HIP;  Surgeon: Jose Manuel Muñoz MD;  Location: [facility]     ARTHROPLASTY KNEE Left 1/25/2016    Procedure: ARTHROPLASTY KNEE;  Surgeon: Jose Manuel Muñoz MD;  Location: [facility] OR     colonoscopy  2004, 2009     EXCISE LESION EYELID Left 8/1/2017    Procedure: EXCISE LESION EYELID;  LEFT LOWER LID WEDGE EXCISION WITH FROZEN SECTION CONTROL;  Surgeon: Quinten Sotelo MD;  Location:  [facility]     excision of rectal villus adenoma       IR CONSULTATION FOR IR EXAM  7/15/2020     knee replacement Right 1/2011     PHACOEMULSIFICATION WITH STANDARD INTRAOCULAR LENS IMPLANT  2/25/2014    Procedure: PHACOEMULSIFICATION WITH STANDARD INTRAOCULAR LENS IMPLANT;  CATARACT EXTRACTION WITH INTRA OCULAR LENS LEFT;  Surgeon: Jah Bee MD;  Location: [facility] OR     PHACOEMULSIFICATION WITH STANDARD INTRAOCULAR LENS IMPLANT Right 5/12/2015    Procedure: PHACOEMULSIFICATION WITH STANDARD INTRAOCULAR LENS IMPLANT;  Surgeon: Jah Bee MD;  Location: [facility] OR     thoracic schwannoma resection       tonsillectomy       urethral dilitation every 4 months         Social History   I have reviewed this patient's social history and updated it with pertinent information if needed.  Social History     Tobacco Use     Smoking status: Never Smoker     Smokeless tobacco: Never Used   Substance Use Topics     Alcohol use: Yes     Comment: Mixed, rarely     Drug use: No       Family History   I have reviewed this patient's family history  and updated it with pertinent information if needed.  Family History   Problem Relation Age of Onset     Alcohol/Drug Father         alcoholism     Cancer Father 51        Esophageal, cause of death     Other - See Comments Father         Tobacco use     Cerebrovascular Disease Mother 51        Cause of death     Other - See Comments Maternal Grandmother      Other - See Comments Maternal Grandfather      Other - See Comments Paternal Grandmother      Other - See Comments Paternal Grandfather      Cancer Sister         Breast     Cancer Brother         Leukemia     Cancer Brother         Lung     Chronic Obstructive Pulmonary Disease Brother        Prior to Admission Medications   Prior to Admission Medications   Prescriptions Last Dose Informant Patient Reported? Taking?   Calcium Carb-Cholecalciferol (CALCIUM + D3) 600-200 MG-UNIT TABS  Self Yes No   Sig: Take 1 tablet by mouth daily   Multiple Vitamin (MULTIVITAMIN) per tablet  Self Yes No   Sig: Take 1 tablet by mouth daily with food.   aspirin (ASA) 81 MG EC tablet   No No   Sig: Take 1 tablet (81 mg) by mouth daily   ferrous sulfate (FEROSUL) 325 (65 Fe) MG tablet   No No   Sig: Take 1 tablet (325 mg) by mouth daily (with breakfast)   gabapentin (NEURONTIN) 100 MG capsule 10/12/2021 at Unknown time  No Yes   Sig: TAKE 1 CAPSULE BY MOUTH 3 TIMES DAILY AFTER ONE WEEK IF STILL HAVING PAIN INCREASE TO 2 CAPSULES 3 TIMES DAILY   ibuprofen 200 MG PO tablet 10/12/2021 at Unknown time Self Yes Yes   Sig: Take 200 mg by mouth every 6 hours as needed for mild pain   lisinopril (ZESTRIL) 10 MG tablet   No No   Sig: Take 1 tablet (10 mg) by mouth daily   metoprolol tartrate (LOPRESSOR) 25 MG tablet   No No   Sig: Take 0.5 tablets (12.5 mg) by mouth 2 times daily   potassium chloride ER (KLOR-CON M) 10 MEQ CR tablet   No No   Sig: Take 1 tablet (10 mEq) by mouth 2 times daily   traMADol (ULTRAM) 50 MG tablet Unknown at Unknown time  No Yes   Si-2 tabs orally  Every 6  hrs as needed for pain.      Facility-Administered Medications: None     Allergies   No Known Allergies    Physical Exam   Vital Signs: Temp: 100.2  F (37.9  C) Temp src: Tympanic BP: 164/71 Pulse: 92   Resp: 18 SpO2: 92 % O2 Device: None (Room air) Oxygen Delivery: 1 LPM  Weight: 0 lbs 0 oz    Physical exam:   General: not in distress  HEENT: not pale, PERRL  Neck: no JVD  Cardiac: regular  Pulmonary: clear BS bilaterally  Abd; soft, BS present, mild tenderness  : bladder not palpable.   Neurological: non-focal  Psychiatric: mildly confused.   Skin: warm, rash  MS: not tenderness, post op hip and knee. Nicely healed scars.     Data   Data reviewed today: I reviewed all medications, new labs and imaging results over the last 24 hours. I personally reviewed the EKG tracing showing NSR. CT was reviewed by radiologist.    Recent Labs   Lab 10/12/21  1351   WBC 15.7*   HGB 10.6*   MCV 73*      *   POTASSIUM 3.6   CHLORIDE 101   CO2 22   BUN 16   CR 0.55   ANIONGAP 7   LUZMARIA 8.2*   *   ALBUMIN 2.8*   PROTTOTAL 6.4*   BILITOTAL 1.0   ALKPHOS 128   ALT 31   AST 46*     Recent Results (from the past 24 hour(s))   CT Abdomen Pelvis w Contrast    Narrative    CT ABDOMEN PELVIS W CONTRAST    CLINICAL HISTORY: Female, age 89 years,  Generalized abdominal pain  and leukocytosis;    Comparison:  CT scan chest abdomen pelvis 2/1/2020    TECHNIQUE:  CT was performed of the abdomen and pelvis with IV  contrast. Sagittal, coronal, axial and MIP reconstructions were  reviewed.     FINDINGS:  Emphysematous changes with peripheral areas of atelectasis again seen  at the lung bases. Visualized portions of the heart demonstrate no  acute abnormality. The heart is mildly enlarged.    Stomach and duodenum: Normal.    Liver: Normal.    Gallbladder: Normal.    Spleen: Normal.    Pancreas: No acute abnormality. Mild atrophy again seen within the  body and head. There is mild dilatation of the pancreatic duct within  the  head of the pancreas, unchanged.    Adrenal glands: Normal.    Kidneys: 5 cm low dense lesion again seen medially in the right  kidney, concerning for neoplasm. Mild cortical thinning/scarring again  seen medially in the left kidney.    Ureters: Visualized portions of the ureters are normal. Streak  artifact arising from the right hip arthroplasty limits evaluation.  Each of the ureters is moderately distended.    Urinary bladder: Visualized portions are normal.    Large and small bowel: No distinct evidence of an acute inflammatory  process. Moderate to large volume of stool is seen within the rectum.    There is a 4.6 cm cyst seen in the left hemipelvis which is increased  in size, previously measuring 3 cm in maximum dimension.    There is no evidence of pathologic lymph node enlargement.    The patient has developed bilateral sacral insufficiency fracture  since the previous study. Severe degenerative changes are again seen  throughout the lumbar spine. Chronic appearing wedge type fracture  again seen at T11.      Impression    IMPRESSION:  Bilateral sacral insufficiency fractures have developed since the  prior examination.    Bilateral hydronephrosis/hydroureter without evidence of obstructing  stone. Streak artifact from the hip arthroplasty limits evaluation of  the pelvic structures.    5 cm low dense right renal lesion is similar in appearance, concerning  for neoplasm. There has been no significant change in overall size. No  evidence of apparent lymphadenopathy or other finding of suggest  local/distant metastatic disease.    Interval enlargement of 4.6 cm left pelvic cyst, previously measuring  3 cm in maximum dimension.    Diverticulosis of the colon. No apparent diverticulitis.    Large volume of stool within the distal colon/rectum. An 4C, streak  artifact from the right hip arthroplasty limits evaluation.    Other chronic changes as described above.    IGOR COOK MD         SYSTEM ID:   [id number]

## 2021-10-13 NOTE — PROGRESS NOTES
10/13/21 1300   Signing Clinician's Name / Credentials   Signing clinician's name / credentials Tomasa Amaral, OTR/L   Quick Adds   Rehab Discipline OT   OT Discharge Planning    OT Discharge Recommendation (DC Rec) Transitional Care Facility   OT Rationale for DC Rec Pt is currently requiring significant assistance for ADLs/functional mobility due to pain and decreased activity tolerance from sacral fxs. Pt unable to safely return home at this time. Recommend short term rehab to increase pt's strength, activity tolerance, and overall functional status.    OT Brief overview of current status  ModA x1-2 supine>sit EOB, MaxA x2 sit>supine, MaxA x1-2 for rolling; MaxA x2 attempting sit>stand and pt unable to come to a full standing position due to pain. She was yelling out in pain and then asked to return to lying; Lower Body Dressing: Max-TotalA to don socks; Toileting: TotalA    Additional Documentation   Rehab Comments significant pain limiting activity tolerance and functional status   OT Plan Progress strength, activity tolerance, and ADLs

## 2021-10-13 NOTE — PLAN OF CARE
[facility] Inpatient Admission Note:    Patient admitted to 3108/3108-1 at approximately 1920 via cart accompanied by transport tech from emergency room . Report received from Babs  in SBAR format at 1900 via telephone. Patient transferred to bed via slide board.. Patient is alert and oriented X 1, denies pain; rates at 0 on 0-10 scale.  Patient oriented to room, unit, hourly rounding, and plan of care. Explained admission packet and patient handbook with patient bill of rights brochure. Will continue to monitor and document as needed.     Inpatient Nursing criteria listed below was met:    Health care directives status obtained and documented: Yes    Patient identifies a surrogate decision maker: Yes If yes, who:son-see index Contact Information:see index     If initial lactic acid greater than 2.0, repeat lactic acid drawn within one hour of arrival to unit: NA. If no, state reason: n/a    Clergy visit ordered if patient requests: N/A    Skin issues/needs documented: N/A    Isolation Patient: no Education given, correct sign in place and documentation row added to PCS:  No    Fall Prevention Yes: Care plan updated, education given and documented, sticker and magnet in place: Yes    Care Plan initiated: Yes    Education Documented (including assessment): Yes    Patient has discharge needs : No If yes, please explain:

## 2021-10-13 NOTE — UTILIZATION REVIEW
"  Continued stay status; Secondary Review Determination     Admission Date: 10/12/2021  1:11 PM      Under the authority of the Utilization Management Committee, the utilization review process indicated a secondary review on the above patient.  The review outcome is based on review of the medical records, discussions with staff, and applying clinical experience noted on the date of the review.        (x)      Inpatient Status Appropriate - This patient's medical care is consistent with medical management for inpatient care and reasonable inpatient medical practice.      () Observation Status Appropriate - This patient does not meet hospital inpatient criteria and is placed in observation status. If this patient's primary payer is Medicare and was admitted as an inpatient, Condition Code 44 should be used and patient status changed to \"observation\".   () Admission Status NOT Appropriate - This patient's medical care is not consistent with medical management for Inpatient or Observation Status.          RATIONALE FOR DETERMINATION   Claudine Bustos is a 89 year old female with a past medical history significant for degenerative joint disease, chronic back pain, anemia, emphysema, and diverticulosis.  She presented to emergency room with acute on chronic back pain.  She was found to have urinary tract infection.  Started on IV ceftriaxone.  Also noted to have bilateral sacral insufficiency fractures.  She was initially placed in the hospital on observation status for further treatment with expectation of a short hospital stay.  Unfortunately, she is now going to require a greater than 2 midnight stay at the hospital.  Due to this patient's advanced age, complex past medical history, UTI with need for IV ceftriaxone, acute back pain with bilateral sacral insufficiency fractures, and need for a greater than 2 midnight stay at the hospital, it is appropriate to admit this patient to the hospital as an inpatient.      The " severity of illness, intensity of service provided, expected LOS and risk for adverse outcome make the care complex, high risk and appropriate for hospital admission.        The information on this document is developed by the utilization review team in order for the business office to ensure compliance.  This only denotes the appropriateness of proper admission status and does not reflect the quality of care rendered.         The definitions of Inpatient Status and Observation Status used in making the determination above are those provided in the CMS Coverage Manual, Chapter 1 and Chapter 6, section 70.4.      Sincerely,     Jay Jay Maynard D.O.  Utilization Review/ Case Management  [facility].

## 2021-10-13 NOTE — PROGRESS NOTES
Assessment completed by visit with started with Claudine, however she requested writer speak with family.  Majority of assessment completed with Dharmesh    LOC: alert, oriented     Dx: sacral fracture, pyelonephtritis   Chronic Disease Management: HTN    Lives with: , Dharmesh; she thought sonHira to but Hira doesn't live with them, just near by   Living at:  Home   Transportation: YES Family provides     Primary PCP: Katarina Gutierrez  Insurance:  Medicare and [facility]   Medicare CAPONE letter reviewed with Dharmesh.  Also, explained to sonHira when he arrived.  He inquired about admission requirements.  Writer advised that writer would look into this.     Support System:  Family   Homecare/PCA: not connected   /County Services:   Not connected  : NO      How was the [facility] notification completed: n/a    Health Care Directive: no  Guardian:  no  POA: no     Pharmacy: [facility]   Meds management: independently manages    Adequate Resources for needs (housing, utilities, food/med): YES  Household chores: family   Work/community/social activity: YES as desired     ADLs: independent; receives assistance from daughterLatonia  Ambulation:four wheeled walker   Falls: yes,   Nutrition: no concerns  Sleep: not addressed     Equipment used: walker, shower chair, grab bars       Oxygen supplier: no      Does the supplier have valid oxygen orders: no     Mental health: not addressed   Substance abuse: no   Exposure to violence/abuse: no concerns voiced/identified  Stressors: constipation      Able to Return to Prior Living Arrangements: [facility]- Claudine agreeable to snf.  Dharmesh reluctantly agreed.  In speaking with Hira marmolejo, he is on board with plan for rehab. Both aware of observation status and confirm that able to pay privately if necessary.  At this time, will try to get her to rehab under covid waiver     Choice of Vendor: Pt/family was provided with the Medicare Compare list for  SNF.  Discussed associated Medicare star ratings to assist with choice for referrals/discharge planning Yes    Education was given to pt/family that star ratings are updated/maintained by Medicare and can be reviewed by visiting www.medicare.gov Yes    Patient/family choices for facility in priority are:   First Choice : [facility]    [phone]; message left inquiring about availability    Second Choice: Skilled Nursing Facility [place]: [facility]     [phone]; on hold for admissions this week.     Third Choice: Skilled Nursing Facility [place]: [facility]         [phone];     Fourth: [place]/ [place]       Barriers: bed availability     JOYCE: low     Plan: short term rehab.

## 2021-10-14 ENCOUNTER — APPOINTMENT (OUTPATIENT)
Dept: OCCUPATIONAL THERAPY | Facility: HOSPITAL | Age: 86
DRG: 543 | End: 2021-10-14
Payer: MEDICARE

## 2021-10-14 ENCOUNTER — APPOINTMENT (OUTPATIENT)
Dept: PHYSICAL THERAPY | Facility: HOSPITAL | Age: 86
DRG: 543 | End: 2021-10-14
Payer: MEDICARE

## 2021-10-14 LAB — BACTERIA UR CULT: ABNORMAL

## 2021-10-14 PROCEDURE — 97530 THERAPEUTIC ACTIVITIES: CPT | Mod: GP

## 2021-10-14 PROCEDURE — 250N000013 HC RX MED GY IP 250 OP 250 PS 637: Performed by: INTERNAL MEDICINE

## 2021-10-14 PROCEDURE — 97530 THERAPEUTIC ACTIVITIES: CPT | Mod: GO

## 2021-10-14 PROCEDURE — 120N000001 HC R&B MED SURG/OB

## 2021-10-14 PROCEDURE — 250N000011 HC RX IP 250 OP 636: Performed by: INTERNAL MEDICINE

## 2021-10-14 PROCEDURE — 99232 SBSQ HOSP IP/OBS MODERATE 35: CPT | Performed by: INTERNAL MEDICINE

## 2021-10-14 RX ORDER — HYDROCODONE BITARTRATE AND ACETAMINOPHEN 5; 325 MG/1; MG/1
1 TABLET ORAL EVERY 4 HOURS PRN
Status: DISCONTINUED | OUTPATIENT
Start: 2021-10-14 | End: 2021-10-15 | Stop reason: HOSPADM

## 2021-10-14 RX ORDER — CIPROFLOXACIN 500 MG/1
500 TABLET, FILM COATED ORAL EVERY 12 HOURS SCHEDULED
Status: DISCONTINUED | OUTPATIENT
Start: 2021-10-14 | End: 2021-10-15 | Stop reason: HOSPADM

## 2021-10-14 RX ADMIN — GABAPENTIN 100 MG: 100 CAPSULE ORAL at 08:12

## 2021-10-14 RX ADMIN — HEPARIN SODIUM 5000 UNITS: 5000 INJECTION, SOLUTION INTRAVENOUS; SUBCUTANEOUS at 08:12

## 2021-10-14 RX ADMIN — POTASSIUM CHLORIDE 10 MEQ: 10 CAPSULE, COATED, EXTENDED RELEASE ORAL at 20:45

## 2021-10-14 RX ADMIN — TRAMADOL HYDROCHLORIDE 50 MG: 50 TABLET, FILM COATED ORAL at 05:01

## 2021-10-14 RX ADMIN — HYDROCODONE BITARTRATE AND ACETAMINOPHEN 1 TABLET: 5; 325 TABLET ORAL at 17:37

## 2021-10-14 RX ADMIN — CIPROFLOXACIN 500 MG: 500 TABLET, COATED ORAL at 20:45

## 2021-10-14 RX ADMIN — HYDROCODONE BITARTRATE AND ACETAMINOPHEN 1 TABLET: 5; 325 TABLET ORAL at 12:31

## 2021-10-14 RX ADMIN — GABAPENTIN 100 MG: 100 CAPSULE ORAL at 20:45

## 2021-10-14 RX ADMIN — ASPIRIN 81 MG: 81 TABLET, CHEWABLE ORAL at 08:12

## 2021-10-14 RX ADMIN — HYDROCODONE BITARTRATE AND ACETAMINOPHEN 1 TABLET: 5; 325 TABLET ORAL at 08:12

## 2021-10-14 RX ADMIN — MICONAZOLE NITRATE: 2 POWDER TOPICAL at 20:51

## 2021-10-14 RX ADMIN — POTASSIUM CHLORIDE 10 MEQ: 10 CAPSULE, COATED, EXTENDED RELEASE ORAL at 08:12

## 2021-10-14 RX ADMIN — GABAPENTIN 100 MG: 100 CAPSULE ORAL at 14:21

## 2021-10-14 RX ADMIN — LISINOPRIL 10 MG: 10 TABLET ORAL at 08:12

## 2021-10-14 RX ADMIN — FERROUS SULFATE TAB 325 MG (65 MG ELEMENTAL FE) 325 MG: 325 (65 FE) TAB at 08:12

## 2021-10-14 RX ADMIN — HEPARIN SODIUM 5000 UNITS: 5000 INJECTION, SOLUTION INTRAVENOUS; SUBCUTANEOUS at 20:45

## 2021-10-14 RX ADMIN — MICONAZOLE NITRATE: 2 POWDER TOPICAL at 08:15

## 2021-10-14 ASSESSMENT — ACTIVITIES OF DAILY LIVING (ADL)
ADLS_ACUITY_SCORE: 15
ADLS_ACUITY_SCORE: 15
ADLS_ACUITY_SCORE: 21
ADLS_ACUITY_SCORE: 15

## 2021-10-14 NOTE — PROGRESS NOTES
"[facility]    Hospitalist Progress Note    Date of Service (when I saw the patient): 10/14/2021    Assessment & Plan   Claudine Bustos is a 89 year old female who was admitted on 10/12/2021.     1. Back pain. Most likely due to DJD. This is chronic condition. Took advil but relief. Was admitted for 2 days in March 2021 for fall. Today's \"new\" finding is bilataral sacral insufficiency fractures on CT.  -PT/OT, will need placement  -change tramadol to norco for increased pain control, uptitrate as indicated     2. UTI. Most likely cystitis. Doubt pyelo (no fever, normal WBC). Pan-sensitive E coli.  No signs of sepsis.  -switch to PO ciprofloxacin     3. Right renal mass. Similar in appearance. Concerning of neoplasm. Was present prior and no significant change in overall size.      4. Constipation. Will use lactulose     5. Generalized weakness. Complex etiology - DJD and UTI  Will treat both. PT/OT consult.      6. Hyponatremia. Mild, resolved    Clinically Significant Risk Factors Present on Admission   { TIP  This section helps capture the illness of the patient on admission.     - Review diagnoses highlighted in blue; right click, edit & delete if not appropriate   - Optional smartlists should be completed unless they are not applicable   - If blank, no additional diagnoses identified   :[id number]     # Hypokalemia: K = 2.8 mmol/L (Ref range: 3.4 - 5.3 mmol/L) on admission, will replace as needed       # Platelet Defect: home medication list includes an antiplatelet medication      DVT Prophylaxis: Heparin SQ    Code Status: No CPR- Do NOT Intubate    Disposition: Expected discharge in 1-2 days once pain controlled, placement found.    Ron Johnson MD        Interval History   Patient seen in room.  Patient is pleasant.  Mobility is still limited by sacral pain.  No acute events overnight, no new symptoms.    -Data reviewed today: I reviewed all new labs and imaging results over the last 24 hours. I " personally reviewed imaging reports.    Physical Exam   Temp: 97.2  F (36.2  C) Temp src: Tympanic BP: 129/57 Pulse: 66   Resp: 20 SpO2: 95 % O2 Device: None (Room air) Oxygen Delivery: 1 LPM  There were no vitals filed for this visit.  Vital Signs with Ranges  Temp:  [97.2  F (36.2  C)-99.4  F (37.4  C)] 97.2  F (36.2  C)  Pulse:  [66-87] 66  Resp:  [16-20] 20  BP: (104-129)/(46-57) 129/57  SpO2:  [90 %-98 %] 95 %    Intake/Output Summary (Last 24 hours) at 10/13/2021 1107  Last data filed at 10/13/2021 0952  Gross per 24 hour   Intake 2073 ml   Output 120 ml   Net 1953 ml       Peripheral IV 10/12/21 (Active)   Site Assessment WDL 10/14/21 0804   Line Status Saline locked 10/14/21 0804   Dressing Intervention New dressing  10/12/21 1352   Phlebitis Scale 0-->no symptoms 10/14/21 0804   Infiltration Scale 0 10/14/21 0804   Number of days: 2       Peripheral IV 10/13/21 Right Hand (Active)   Site Assessment WDL 10/14/21 0804   Line Status Saline locked 10/14/21 0804   Dressing Intervention New dressing  10/13/21 0757   Phlebitis Scale 0-->no symptoms 10/14/21 0804   Infiltration Scale 0 10/14/21 0804   Number of days: 1       Incision/Surgical Site 01/25/16 Left Knee (Active)   Number of days: 2089       Incision/Surgical Site 01/23/17 Right Hip (Active)   Number of days: 1725       Incision/Surgical Site 08/01/17 Left;Lower Eye (Active)   Number of days: 1535     No line/device    Constitutional - AA, NAD, elderly female  HEENT - atraumatic, normocephalic  Neck - supple, no masses, no JVD  CVS - S1 S2 RRR, no murmurs, rubs, gallops  Respiratory - CTA b/l  GI - soft, NT, ND, + bowel sounds, no organomegaly  Musculoskeletal - no LE edema, no lesions  Neuro - oriented x 3, no gross focal deficits    Medications       aspirin  81 mg Oral Daily     cefTRIAXone  1 g Intravenous Q24H     ferrous sulfate  325 mg Oral Daily with breakfast     gabapentin  100 mg Oral TID     heparin ANTICOAGULANT  5,000 Units Subcutaneous  Q12H     lisinopril  10 mg Oral Daily     miconazole   Topical BID     potassium chloride ER  10 mEq Oral BID       Data   Recent Labs   Lab 10/13/21  0512 10/12/21  1351   WBC  --  15.7*   HGB  --  10.6*   MCV  --  73*   PLT  --  260    130*   POTASSIUM 2.8* 3.6   CHLORIDE 108 101   CO2 22 22   BUN 15 16   CR 0.56 0.55   ANIONGAP 6 7   LUZMARIA 7.7* 8.2*   * 140*   ALBUMIN  --  2.8*   PROTTOTAL  --  6.4*   BILITOTAL  --  1.0   ALKPHOS  --  128   ALT  --  31   AST  --  46*     Lactic Acid   Date Value Ref Range Status   02/01/2020 1.3 0.7 - 2.0 mmol/L Final   01/23/2018 0.8 0.4 - 2.0 mmol/L Final   01/22/2018 1.1 0.4 - 2.0 mmol/L Final       No results found for this or any previous visit (from the past 24 hour(s)).    Ron Johnson MD

## 2021-10-14 NOTE — PROGRESS NOTES
"   10/14/21 1200   Signing Clinician's Name / Credentials   Signing clinician's name / credentials Tomasa Amaral OTR/L   Quick Adds   Rehab Discipline OT   Therapeutic Activity   Minutes of Treatment 15 minutes   Symptoms Noted During/After Treatment Increased pain  (in low back with raising HOB)   Treatment Detail Pt resting in bed upon OT arrival. She woke with moderate verbal stimulus and was agreeable to tx. Pt brushed her teeth with set up and verbal cues. Pt instructed to spit in spitter cup and she spit back onto water cup instead despite cues, and pt then stated \"too late\". Pt able to wipe her mouth also with set up. She completed UB strengthening exercises x10 reps each including shoulder flex to ~90*, punch outs, elbow flex/ext, forearm pro/sup, wrist flex/ext, and finger flex/ext. Pt left resting in bed with the call light within reach.   OT Discharge Planning    OT Discharge Recommendation (DC Rec) Transitional Care Facility   OT Rationale for DC Rec Pt is currently requiring significant assistance for ADLs/functional mobility due to pain and decreased activity tolerance from sacral fxs. Pt unable to safely return home at this time. Recommend short term rehab to increase pt's strength, activity tolerance, and overall functional status.    OT Brief overview of current status  Pt resting in bed upon OT arrival. She woke with moderate verbal stimulus and was agreeable to tx. Pt brushed her teeth with set up and verbal cues. Pt instructed to spit in spitter cup and she spit back onto water cup instead despite cues, and pt then stated \"too late\". Pt able to wipe her mouth also with set up. She completed UB strengthening exercises x10 reps each including shoulder flex to ~90*, punch outs, elbow flex/ext, forearm pro/sup, wrist flex/ext, and finger flex/ext. Pt left resting in bed with the call light within reach.   Additional Documentation   OT Plan Progress strength, activity tolerance, and ADLs   Total " Session Time   Total Session Time (minutes) 15 minutes

## 2021-10-14 NOTE — PROGRESS NOTES
Received declination from Nanette Mcghee.  Updated Claudine and son, Hira of plan to admit tomorrow at [facility].  [facility] arranged for 1130 in anticipation.     PAS-RR    Per DHS regulation, CTS team completed and submitted PAS-RR to [place] [employer] on [employer]ging Direct Connect via the Senior [employer]. CTS team advised SNF and they are aware a PAS-RR has been submitted.     CTS team reviewed with pt or health care agent that they may be contacted for a follow up appointment within 10 days of hospital discharge if SNF stay is less than 30 days. Contact information for [employer] was also provided.     Pt or health care agent verbalized understanding.     PAS-RR # [id number]

## 2021-10-14 NOTE — PLAN OF CARE
/51 (BP Location: Left arm)   Pulse 87   Temp 97.8  F (36.6  C) (Tympanic)   Resp 16   SpO2 92%     A&O, VSS, and afebrile. Slept most of shift. PRN Ultram given before activity, rated back pain 8/10. Student was in w/ pt and got her up in chair, Ax2 w/ gaitbelt. Appetite has been poor. On assessment, a rash was noted in the left groin fold and blanchable redness noted on buttocks. Pt has been repo'd and checked for incontinence q2hrs by [facility]. MD notified of rash, received orders for miconazole BID. Call light within reach, alarms on, and free of falls this shift.    Face to face report given with opportunity to observe patient.    Report given to DIVYA Prasad RN   10/13/2021  10:57 PM

## 2021-10-14 NOTE — PHARMACY
Pharmacy Antimicrobial Stewardship Assessment     Current Antimicrobial Therapy:  Anti-infectives (From now, onward)    Start     Dose/Rate Route Frequency Ordered Stop    10/13/21 1400  cefTRIAXone in d5w (ROCEPHIN) intermittent infusion 1 g      1 g  over 30 Minutes Intravenous EVERY 24 HOURS 10/12/21 1947            Indication: UTI    Days of Therapy: 3     Pertinent Labs:  Lab Test 10/12/21  1351   WBC 15.7*        Temp (24hrs), Av.1  F (36.7  C), Min:97.2  F (36.2  C), Max:99.4  F (37.4  C)      Culture Results:        Recommendations/Interventions:  1. None at this time.    Agatha Galarza, [facility]  2021

## 2021-10-14 NOTE — PLAN OF CARE
A&O, vital signs stable,afebrile.  Does use call light appropriately. Eating & drinking with no problems. Pain is better today after being switched from Ultram to Norco 5/325 mg po.  Did have one today at 0812 & 1230 with good relief. Does still have pain with movement but is able to tolerate it better.  Plan is to discharge tomorrow to [facility] with pickup at 1130.  Did sit up at breakfast time today with no problems.       Face to face report given with opportunity to observe patient.    Report given to DIVYA Prasad RN   10/14/2021  3:00 PM

## 2021-10-14 NOTE — PLAN OF CARE
Prior to Admission Medication Reconciliation:     Medications added:   [x] None  [] As listed below:    Medications deleted:   [x] None  [] As listed below:    Medications marked for review/removal by attending:  [x] None  [] As listed below:    Changes made to existing medications:   [x] None  [] Updated strengths and frequencies to most current  [] As listed below:    Last times/dates taken verified with patient:  [] Yes- completed myself  [] Prepared PTA medlist for review only. (will not be available to review personally)  [] Did not review with patient. Rx verification only. Review completed by nursing.    [x] Nurse completed no changes made (double checked entries)  [] Unable to review with patient at this time:  [] Nurse completed/changes made:     Allergies listed at another location:  []Allergies match allergies listed in Epic  []Other allergies listed:    Allergy review:    [x]Did not review: reviewed by nursing  []Did not review: pt unable at this time  []Patient/MAR verified NKDA  []Patient/MAR verified current existing allergies: no changes made  []Patient confirmed current existing allergies and new allergies added:    Medication reconciliation sources:   [x]Patient  []Patient family member/emergency contact: **  [x]. [facility] Report Review  [x]Epic Chart Review  [x]Care Everywhere review  [x][facility]: [facility]- See below  []Pharmacy phone call  []Outside meds dispense report: see below  []Nursing home or Assisted Living MAR:  []Other: **    Pharmacy [facility] at discharge: [facility]    Is patient on coumadin?  [x]No    Requests for consultation by provider or pharmacist:   [x] Patient understands why all of their meds were prescribed and how to take them. No questions.   [] Managing party has no questions.   [] Patient/ managing party has questions about the following:  [] Did not review with patient. Cannot assess.     Fill dates and reported compliancy:  [] Fill dates coincide with reported  compliancy for all/most maintenance meds.   [x] Fill dates do not coincide with compliancy with maintenance meds. See notes in PTA medlist and in comments.    [] Fill dates do not coincide with the following medications but pt reports compliancy:  [] Did not review with patient. Cannot assess.     Historian accuracy:  [] Excellent- alert and oriented, understands why meds were prescribed and how to take, able to answer specifics  [] Good- alert and oriented, understands why meds were prescribed and how to take, some confusion   [x] Fair- alert and oriented, doesn't know medications without list, cannot answer specifics about medications, but has a decent process for which to take at home  [] Poor- does not know medications, may not have a process to take at home, may be cognitively unable to review at this time  []Medication management done by family member or facility, no concerns about historian accuracy.   [] Did not review with patient. Cannot assess.     Medication Management:  [x] Manages meds independently  [] Family member/ other party manages meds/assists:  [] Meds managed by staff at facility  [] Meds set up by home care, family/other party helps administer  [] Meds set up by home care, self administers  [] Did not review with patient. Cannot assess.     Other medications aside from PTA:  [x] Denies taking any medications aside from those listed in PTA meds  [] Reports taking another medication(s) but cannot recall the name(s)  [] Refuses to say.  [] Did not review with patient. Cannot assess.     Comments: pt pleasantly confused. Reports managing her own medications, and taking them as prescribed. Denies filling her medications at any other pharmacy aside from [facility]. Pt historian accuracy is questionable. Didn't seem to know her medications well. Also struggled hearing me. Fill dates do not reflect compliancy and it's likely pt does not take her medications as prescribed.       Arely Goetz on  10/14/2021 at 1:06 PM       Discrepancies: [x] No []Not Applicable []Yes: listed below    Issues completing PTA medication reconciliation:  [] On hold for a long time  [] Waited for a call back  [] Fax didn't come through  [] Fax took a long time  [] Other:    Notifying appropriate party of changes/additions/discrepancies:  [x]No pertinent changes made, notification not necessary.   [] Notified attending provider via text page/phone call  [] Notified attending provider in person  [] Notified pharmacy  [] Notified nurse  [] Medications have not been reconciled by a provider yet, notification not necessary  [] Pt is not admitted to floor yet, PTA meds completed before admission.     Medications Prior to Admission   Medication Sig Dispense Refill Last Dose     aspirin (ASA) 81 MG EC tablet Take 1 tablet (81 mg) by mouth daily 30 tablet 3 Past Month at Unknown time     Calcium Carb-Cholecalciferol (CALCIUM + D3) 600-200 MG-UNIT TABS Take 1 tablet by mouth daily   Past Month at Unknown time     ferrous sulfate (FEROSUL) 325 (65 Fe) MG tablet Take 1 tablet (325 mg) by mouth daily (with breakfast) 90 tablet 1 Past Week at Unknown time     gabapentin (NEURONTIN) 100 MG capsule TAKE 1 CAPSULE BY MOUTH 3 TIMES DAILY AFTER ONE WEEK IF STILL HAVING PAIN INCREASE TO 2 CAPSULES 3 TIMES DAILY (Patient taking differently: Take 100 mg by mouth daily ) 360 capsule 1 10/12/2021 at Unknown time     ibuprofen 200 MG PO tablet Take 200 mg by mouth every 6 hours as needed for mild pain   10/12/2021 at Unknown time     lisinopril (ZESTRIL) 10 MG tablet Take 1 tablet (10 mg) by mouth daily 90 tablet 3 Past Week at Unknown time     metoprolol tartrate (LOPRESSOR) 25 MG tablet Take 0.5 tablets (12.5 mg) by mouth 2 times daily 30 tablet 0 Past Week at Unknown time     Multiple Vitamin (MULTIVITAMIN) per tablet Take 1 tablet by mouth daily with food.   Past Week at Unknown time     potassium chloride ER (KLOR-CON M) 10 MEQ CR tablet Take 1  tablet (10 mEq) by mouth 2 times daily 90 tablet 1 Unknown at Unknown time     traMADol (ULTRAM) 50 MG tablet 1-2 tabs orally  Every 6 hrs as needed for pain. 30 tablet 1 Unknown at Unknown time

## 2021-10-14 NOTE — PROGRESS NOTES
10/14/21 0846   Signing Clinician's Name / Credentials   Signing clinician's name / credentials Nayla Romero PTA   Quick Adds   Rehab Discipline PT   PT Assistant Visit Number 1   Therapeutic Activity   Minutes of Treatment 23 minutes   Symptoms Noted During/After Treatment Increased pain   Treatment Detail Pt was in bed with ns on but no oxygen running through the tube. Went and talked to RN about the ns and flow of O2 she stated to take the oxygen off. Pt was willing to participate in PT but was yelling due to increased back pain lower back region. Pt was Total assist of two to sit up on the EOB, max A2 to to go from sit to stand yelling out due to pain 10/10.  Pt was MaA2 to pivot transfer onto the recliner surfaces. The Dr came in and is going to give her more pain meds for the pain and a hot pad for her lower back region.   PT Discharge Planning    PT Discharge Recommendation (DC Rec) Transitional Care Facility   PT Rationale for DC Rec At this time pt is not safe to return home due unmanaged pain that is limiting her mobility and ability to perform ADLs. Short term rehab is recommended to increase her activity tolerance and strength.    PT Brief overview of current status  Supine to sit total assist of two this morning due to pain yelling out with movement,, Sit to stand maxa2 pivot transfer maxa2 difficulty standing and pivoting due to pain 10/10 lower back region.   Additional Documentation   PT Plan Progress activity tolerance as able    Total Session Time   Total Session Time (minutes) 23 minutes

## 2021-10-14 NOTE — PROGRESS NOTES
Spoke with Lorrie at [facility] in regards to referral, addressed all questions.  Referral under review.  Received call from Corinne at [facility], they anticipate they can take Claudine tomorrow. Updated son, Hira as Claudine asleep.  IMM letter reviewed.

## 2021-10-14 NOTE — PLAN OF CARE
Face to face report given with opportunity to observe patient.    Report given to Sheridan Mooney   10/13/2021  7:30 PM

## 2021-10-14 NOTE — PLAN OF CARE
Reason for hospital stay:  UTI, Weakness, Fall    Most recent vitals: BP (!) 113/47 (BP Location: Left arm)   Pulse 84   Temp 97.8  F (36.6  C) (Tympanic)   Resp 16   SpO2 92%     Pain Management: Complains of lower back pain at times with movement and repositioning. Gave Ultram with effective pain management.     LOC:  A+O x4 - forgetful at times. Pleasant and cooperative. Makes needs known.     Cardiac:  AP irregular - has hx of PACs. Murmur auscultated.       Respiratory:  Lungs clear throughout. Sats 89-90% on RA. Placed on 1 LPM O2 to maintain sats 92%.     GI:  WDL    :  WDL    Skin Issues:  Blanchable red sacrum and coccyx - barrier cream applied. Redness to groin folds - Micatin powder in use.     IVF:  SL    ABX:  Continues on IV Rocephin    Safety:  Alarms on      Face to face report given with opportunity to observe patient.    Report given to Albania Rhoades RN   10/14/2021  7:22 AM

## 2021-10-15 VITALS
OXYGEN SATURATION: 93 % | HEART RATE: 75 BPM | SYSTOLIC BLOOD PRESSURE: 135 MMHG | RESPIRATION RATE: 16 BRPM | DIASTOLIC BLOOD PRESSURE: 55 MMHG | TEMPERATURE: 98.3 F

## 2021-10-15 LAB
ANION GAP SERPL CALCULATED.3IONS-SCNC: 6 MMOL/L (ref 3–14)
BASOPHILS # BLD AUTO: 0 10E3/UL (ref 0–0.2)
BASOPHILS NFR BLD AUTO: 1 %
BUN SERPL-MCNC: 22 MG/DL (ref 7–30)
CALCIUM SERPL-MCNC: 8.3 MG/DL (ref 8.5–10.1)
CHLORIDE BLD-SCNC: 107 MMOL/L (ref 94–109)
CO2 SERPL-SCNC: 23 MMOL/L (ref 20–32)
CREAT SERPL-MCNC: 0.57 MG/DL (ref 0.52–1.04)
EOSINOPHIL # BLD AUTO: 0.3 10E3/UL (ref 0–0.7)
EOSINOPHIL NFR BLD AUTO: 4 %
ERYTHROCYTE [DISTWIDTH] IN BLOOD BY AUTOMATED COUNT: 18.9 % (ref 10–15)
GFR SERPL CREATININE-BSD FRML MDRD: 81 ML/MIN/1.73M2
GLUCOSE BLD-MCNC: 95 MG/DL (ref 70–99)
HCT VFR BLD AUTO: 33.4 % (ref 35–47)
HGB BLD-MCNC: 10.2 G/DL (ref 11.7–15.7)
IMM GRANULOCYTES # BLD: 0.1 10E3/UL
IMM GRANULOCYTES NFR BLD: 1 %
LYMPHOCYTES # BLD AUTO: 1.9 10E3/UL (ref 0.8–5.3)
LYMPHOCYTES NFR BLD AUTO: 24 %
MCH RBC QN AUTO: 23.3 PG (ref 26.5–33)
MCHC RBC AUTO-ENTMCNC: 30.5 G/DL (ref 31.5–36.5)
MCV RBC AUTO: 76 FL (ref 78–100)
MONOCYTES # BLD AUTO: 0.9 10E3/UL (ref 0–1.3)
MONOCYTES NFR BLD AUTO: 11 %
NEUTROPHILS # BLD AUTO: 4.8 10E3/UL (ref 1.6–8.3)
NEUTROPHILS NFR BLD AUTO: 59 %
NRBC # BLD AUTO: 0 10E3/UL
NRBC BLD AUTO-RTO: 0 /100
PLATELET # BLD AUTO: 321 10E3/UL (ref 150–450)
PLATELET # BLD AUTO: 331 10E3/UL (ref 150–450)
POTASSIUM BLD-SCNC: 4.1 MMOL/L (ref 3.4–5.3)
RBC # BLD AUTO: 4.38 10E6/UL (ref 3.8–5.2)
SODIUM SERPL-SCNC: 136 MMOL/L (ref 133–144)
WBC # BLD AUTO: 8 10E3/UL (ref 4–11)

## 2021-10-15 PROCEDURE — 250N000011 HC RX IP 250 OP 636: Performed by: INTERNAL MEDICINE

## 2021-10-15 PROCEDURE — 36415 COLL VENOUS BLD VENIPUNCTURE: CPT | Performed by: INTERNAL MEDICINE

## 2021-10-15 PROCEDURE — 85049 AUTOMATED PLATELET COUNT: CPT | Performed by: HOSPITALIST

## 2021-10-15 PROCEDURE — 250N000013 HC RX MED GY IP 250 OP 250 PS 637: Performed by: HOSPITALIST

## 2021-10-15 PROCEDURE — 250N000013 HC RX MED GY IP 250 OP 250 PS 637: Performed by: INTERNAL MEDICINE

## 2021-10-15 PROCEDURE — 80048 BASIC METABOLIC PNL TOTAL CA: CPT | Performed by: HOSPITALIST

## 2021-10-15 PROCEDURE — 99239 HOSP IP/OBS DSCHRG MGMT >30: CPT | Performed by: HOSPITALIST

## 2021-10-15 PROCEDURE — 85049 AUTOMATED PLATELET COUNT: CPT | Performed by: INTERNAL MEDICINE

## 2021-10-15 RX ORDER — LIDOCAINE 4 G/G
3 PATCH TOPICAL
Status: DISCONTINUED | OUTPATIENT
Start: 2021-10-15 | End: 2021-10-15

## 2021-10-15 RX ORDER — LACTULOSE 10 G/15ML
20 SOLUTION ORAL 2 TIMES DAILY PRN
Qty: 100 ML | Refills: 0 | Status: SHIPPED | OUTPATIENT
Start: 2021-10-15 | End: 2021-10-18

## 2021-10-15 RX ORDER — LACTULOSE 10 G/15ML
20 SOLUTION ORAL 2 TIMES DAILY PRN
Status: DISCONTINUED | OUTPATIENT
Start: 2021-10-15 | End: 2021-10-15 | Stop reason: HOSPADM

## 2021-10-15 RX ORDER — LIDOCAINE 4 G/G
3 PATCH TOPICAL
Status: DISCONTINUED | OUTPATIENT
Start: 2021-10-15 | End: 2021-10-15 | Stop reason: HOSPADM

## 2021-10-15 RX ORDER — CIPROFLOXACIN 500 MG/1
500 TABLET, FILM COATED ORAL EVERY 12 HOURS
Qty: 4 TABLET | Refills: 0 | Status: SHIPPED | OUTPATIENT
Start: 2021-10-15 | End: 2021-10-17

## 2021-10-15 RX ORDER — OXYCODONE HYDROCHLORIDE 5 MG/1
5 TABLET ORAL EVERY 6 HOURS PRN
Qty: 6 TABLET | Refills: 0 | Status: SHIPPED | OUTPATIENT
Start: 2021-10-15 | End: 2021-10-18

## 2021-10-15 RX ORDER — AMOXICILLIN 250 MG
1 CAPSULE ORAL 2 TIMES DAILY
Qty: 60 TABLET | Refills: 1 | Status: SHIPPED | OUTPATIENT
Start: 2021-10-15 | End: 2022-02-15

## 2021-10-15 RX ORDER — AMOXICILLIN 250 MG
1 CAPSULE ORAL 2 TIMES DAILY
Status: DISCONTINUED | OUTPATIENT
Start: 2021-10-15 | End: 2021-10-15 | Stop reason: HOSPADM

## 2021-10-15 RX ADMIN — HEPARIN SODIUM 5000 UNITS: 5000 INJECTION, SOLUTION INTRAVENOUS; SUBCUTANEOUS at 08:12

## 2021-10-15 RX ADMIN — CIPROFLOXACIN 500 MG: 500 TABLET, COATED ORAL at 08:11

## 2021-10-15 RX ADMIN — POTASSIUM CHLORIDE 10 MEQ: 10 CAPSULE, COATED, EXTENDED RELEASE ORAL at 08:11

## 2021-10-15 RX ADMIN — LISINOPRIL 10 MG: 10 TABLET ORAL at 08:10

## 2021-10-15 RX ADMIN — LIDOCAINE 3 PATCH: 246 PATCH TOPICAL at 08:12

## 2021-10-15 RX ADMIN — ASPIRIN 81 MG: 81 TABLET, CHEWABLE ORAL at 08:11

## 2021-10-15 RX ADMIN — SENNOSIDES AND DOCUSATE SODIUM 1 TABLET: 8.6; 5 TABLET ORAL at 08:11

## 2021-10-15 RX ADMIN — MICONAZOLE NITRATE: 2 POWDER TOPICAL at 08:23

## 2021-10-15 RX ADMIN — FERROUS SULFATE TAB 325 MG (65 MG ELEMENTAL FE) 325 MG: 325 (65 FE) TAB at 08:10

## 2021-10-15 RX ADMIN — GABAPENTIN 100 MG: 100 CAPSULE ORAL at 08:11

## 2021-10-15 RX ADMIN — HYDROCODONE BITARTRATE AND ACETAMINOPHEN 1 TABLET: 5; 325 TABLET ORAL at 11:10

## 2021-10-15 RX ADMIN — TRAMADOL HYDROCHLORIDE 50 MG: 50 TABLET, FILM COATED ORAL at 06:05

## 2021-10-15 ASSESSMENT — ACTIVITIES OF DAILY LIVING (ADL)
ADLS_ACUITY_SCORE: 17
ADLS_ACUITY_SCORE: 17
ADLS_ACUITY_SCORE: 15

## 2021-10-15 NOTE — PROVIDER NOTIFICATION
Family member requesting to have CT results explained to him along with images to see the fracture.

## 2021-10-15 NOTE — PLAN OF CARE
Physical Therapy Discharge Summary    Reason for therapy discharge:    Discharged to transitional care facility.    Progress towards therapy goal(s). See goals on Care Plan in [facility] electronic health record for goal details.  Goals partially met.  Barriers to achieving goals:   discharge from facility.    Therapy recommendation(s):    Continued therapy is recommended.  Rationale/Recommendations:  Continued PT recommended at SNF.

## 2021-10-15 NOTE — PLAN OF CARE
Reason for hospital stay:  UTI, Weakness, Fall    Pain Management:  Complained of lower back pain - gave norco with effective pain relief.     LOC:  A+O x4 - calls appropriately and makes needs known. Pleasant and cooperative.     Cardiac:  AP irregular, murmur    Respiratory:  WDL    GI:  WDL    :  Incontinent    Skin Issues:  Blanchable red sacrum and coccyx - barrier cream applied. Redness to groin folds - Micatin powder in use.     IVF:  SL    ABX:  Continues on PO Cipro    Activity: Assist of 1 for transfers. Up in chair for supper.    Safety: Alarms on      Face to face report given with opportunity to observe patient.    Report given to Lisa Rhoades RN   10/14/2021  11:30 PM

## 2021-10-15 NOTE — PROGRESS NOTES
Name: Claudine Bustos    MRN#: [id number]    Reason for Hospitalization: Pyelonephritis [N12];Generalized muscle weakness [M62.81];Unable to ambulate [R26.2];Right-sided low back pain without sciatica [M54.50];Closed fracture of sacrum, unspecified portion of sacrum, initial encounter (H) [S32.10XA]    JOYCE: low    Discharge Date: 10/15/2021    Patient / Family response to discharge plan: agreeable     Follow-Up Appt: No future appointments.    Other Providers (Care Coordinator, County Services, PCA services etc): Yes: orders faxed to [facility]     Discharge Disposition: nursing home- [facility] via [id number]     PRAVEEN Fairbanks

## 2021-10-15 NOTE — PLAN OF CARE
Patient discharged at 11:50 AM via wheel chair accompanied by  staff. Prescriptions sent to patients preferred pharmacy (hard copy of narcotic sent with pt). All belongings sent with patient.     Discharge instructions reviewed with left message for nurse to nurse at [facility]. Listed belongings gathered and returned to patient. yes    Patient discharged to [facility].   Report called to Nursing Home:  CSV - message left awaiting call back    Surgical Patient   Surgical Procedures during stay: no  Did patient receive discharge instruction on wound care and recognition of infection symptoms? N/A    MISC  Follow up appointment made:  No - will be made via [facility]  Home medications returned to patient: N/A  Patient reports pain was well managed at discharge: Yes

## 2021-10-15 NOTE — PLAN OF CARE
Most recent vitals: /53   Pulse 67   Temp 98.3  F (36.8  C) (Tympanic)   Resp 14   SpO2 94%     Patient is A&O x 4. Pleasant, able to make needs known. VSS, aferbile. Assessment and vitals as charted. Minimal pain, mostly only with turning. Offered PRN medications for pain, patient did declined pharmacological intervention. Allowed for repositioning every 2 hours or PRN. Incontinent of urine. Blanchable redness to buttocks. Barrier cream applied. Redenned groin folds. Heels elevated off bed. No falls this shift. Call light in reach, bed in low position.     Comments:  Patient did agree to take some pain medication. PRN tramadol given at 0605 for 5/10 pain.   Patient asleep upon reassessment.     Face to face report given with opportunity to observe patient.    Report given to DIVYA Sampson.     Lisa Martinez RN   10/15/2021  7:19 AM

## 2021-10-15 NOTE — PLAN OF CARE
Occupational Therapy Discharge Summary    Reason for therapy discharge:    Discharged to transitional care facility.    Progress towards therapy goal(s). See goals on Care Plan in [facility] electronic health record for goal details.  Goals partially met.  Barriers to achieving goals:   discharge from facility.    Therapy recommendation(s):    Continued therapy is recommended.  Rationale/Recommendations:  to improve strength and safety in ADLs.

## 2021-10-15 NOTE — PROGRESS NOTES
Care team updated in care rounds in regards to anticipated discharge time.    Answered sonHira's questions in regards visitor guidelines and Medicare coverage at [facility].

## 2021-10-15 NOTE — PLAN OF CARE
Pt has been afebrile through the day, VS as charted.  Her O2 sats are in the low 90's on RA, lung sounds are clear.  She does rate her pain/discomfort 5/10 to her lower back - lidocaine patches placed this morning.  Premedicated with norco for ride upon discharge (9/19) She is saline locked - good oral intake, incontinent of urine. She has not had a BM in several days - senna given this morning. She has not been out of bed this morning.  She is anticipating discharge to [facility].

## 2021-10-17 NOTE — DISCHARGE SUMMARY
[facility]  Hospitalist Discharge Summary      Date of Admission:  10/12/2021  Date of Discharge:  10/15/2021 11:50 AM  Discharging Provider: Braden Downey DO      Discharge Diagnoses   UTI  Sacral fracture   Renal mass  Weakness  Hyponatremia    Follow-ups Needed After Discharge   Follow-up Appointments     Follow Up and recommended labs and tests      Follow up with Nursing home physician.  No follow up labs or test are   needed.           Likely need further evaluation for renal mass, may benefit from urological evaluation if patient/family want to proceed with diagnosis/treatment.     Unresulted Labs Ordered in the Past 30 Days of this Admission     No orders found from 9/12/2021 to 10/13/2021.      These results will be followed up by  Dr Gutierrez    Discharge Disposition   Discharged to nursing home  Condition at discharge: Stable       Hospital Course   Patient is ty Bustos is a 89 year old female who was admitted on 10/12/2021 with back pain, she was found to have bilateral sacral insufficiency fractures on CT. Patient also had E Coli UTI pan sensitive, was on ciprofloxacin. Pain control was an issue and she was on oxycodone and Lidoderm patches. She was seen by pt and was going to be discharged to nursing home.  was present on day of discharge I went over ct findings with him and her hospital stay. She did have right renal mass finding that was stable but needs to be followed up by PCP out patient as may be malignancy.     Consultations This Hospital Stay   CARE MANAGEMENT / SOCIAL WORK IP CONSULT  PHYSICAL THERAPY ADULT IP CONSULT  OCCUPATIONAL THERAPY ADULT IP CONSULT  PHYSICAL THERAPY ADULT IP CONSULT  OCCUPATIONAL THERAPY ADULT IP CONSULT    Code Status   No CPR- Do NOT Intubate    Time Spent on this Encounter   IBraden DO, personally saw the patient today and spent greater than 30 minutes discharging this patient.       Braden Downey DO  [facility]  [address] [place] [address]  [place] [address]  Phone: [phone]  Fax: [phone]  ______________________________________________________________________    Physical Exam         Vitals:    10/15/21 0034 10/15/21 0602 10/15/21 0807 10/15/21 1110   BP: 126/53 145/55 135/55    BP Location:   Left arm    Pulse: 67 74 75    Resp: 14 16 16 16   Temp: 98.3  F (36.8  C) 97.7  F (36.5  C) 98.3  F (36.8  C)    TempSrc: Tympanic Tympanic Temporal    SpO2: 94% (!) 91% 93%    Weight: 0 lbs 0 oz   Physical Exam  Constitutional:       Comments: Frail appearing stated age female    HENT:      Head: Normocephalic.      Nose: Nose normal.      Mouth/Throat:      Pharynx: Oropharynx is clear.   Eyes:      Conjunctiva/sclera: Conjunctivae normal.   Cardiovascular:      Rate and Rhythm: Normal rate.   Pulmonary:      Effort: Pulmonary effort is normal.   Abdominal:      General: Abdomen is flat.   Skin:     Coloration: Skin is not jaundiced.   Neurological:      Mental Status: Mental status is at baseline.              Primary Care Physician   Katarina Gutierrez    Discharge Orders      Orthopedic  Referral      General info for SNF    Length of Stay Estimate: Short Term Care: Estimated # of Days 31-90  Condition at Discharge: Stable  Level of care:skilled   Rehabilitation Potential: Good  Admission H&P remains valid and up-to-date: Yes  Recent Chemotherapy: N/A  Use Nursing Home Standing Orders: Yes     Follow Up and recommended labs and tests    Follow up with Nursing home physician.  No follow up labs or test are needed.     Reason for your hospital stay    Sacral stress fracture uti     Activity - Up with nursing assistance     No CPR- Do NOT Intubate     Physical Therapy Adult Consult    Evaluate and treat as clinically indicated.    Reason:  weakness     Occupational Therapy Adult Consult    Evaluate and treat as clinically indicated.    Reason:  weakness     Fall precautions       Significant Results and  Procedures   Most Recent 3 CBC's:  Recent Labs   Lab Test 10/15/21  0506 10/12/21  1351 03/28/21  0622   WBC 8.0 15.7* 9.1   HGB 10.2* 10.6* 10.8*   MCV 76* 73* 73*     321 260 322     Most Recent 3 BMP's:  Recent Labs   Lab Test 10/15/21  0506 10/13/21  0512 10/12/21  1351    136 130*   POTASSIUM 4.1 2.8* 3.6   CHLORIDE 107 108 101   CO2 23 22 22   BUN 22 15 16   CR 0.57 0.56 0.55   ANIONGAP 6 6 7   LUZMARIA 8.3* 7.7* 8.2*   GLC 95 112* 140*       Discharge Medications   Discharge Medication List as of 10/15/2021 11:42 AM      START taking these medications    Details   ciprofloxacin (CIPRO) 500 MG tablet Take 1 tablet (500 mg) by mouth every 12 hours for 2 days, Disp-4 tablet, R-0, E-Prescribe      lactulose (CHRONULAC) 10 GM/15ML solution Take 30 mLs (20 g) by mouth 2 times daily as needed for constipation, Disp-100 mL, R-0, E-Prescribe      oxyCODONE (ROXICODONE) 5 MG tablet Take 1 tablet (5 mg) by mouth every 6 hours as needed for severe pain, Disp-6 tablet, R-0, Local Print      senna-docusate (SENOKOT-S/PERICOLACE) 8.6-50 MG tablet Take 1 tablet by mouth 2 times daily, Disp-60 tablet, R-1, E-Prescribe         CONTINUE these medications which have NOT CHANGED    Details   aspirin (ASA) 81 MG EC tablet Take 1 tablet (81 mg) by mouth daily, Disp-30 tablet, R-3, E-Prescribe      Calcium Carb-Cholecalciferol (CALCIUM + D3) 600-200 MG-UNIT TABS Take 1 tablet by mouth daily, Historical      ferrous sulfate (FEROSUL) 325 (65 Fe) MG tablet Take 1 tablet (325 mg) by mouth daily (with breakfast), Disp-90 tablet,R-1, E-Prescribe      gabapentin (NEURONTIN) 100 MG capsule TAKE 1 CAPSULE BY MOUTH 3 TIMES DAILY AFTER ONE WEEK IF STILL HAVING PAIN INCREASE TO 2 CAPSULES 3 TIMES DAILY, Disp-360 capsule,R-1, E-Prescribe      ibuprofen 200 MG PO tablet Take 200 mg by mouth every 6 hours as needed for mild pain, Historical      lisinopril (ZESTRIL) 10 MG tablet Take 1 tablet (10 mg) by mouth daily, Disp-90 tablet,R-3,  E-Prescribe      metoprolol tartrate (LOPRESSOR) 25 MG tablet Take 0.5 tablets (12.5 mg) by mouth 2 times daily, Disp-30 tablet, R-0, E-Prescribe      Multiple Vitamin (MULTIVITAMIN) per tablet Take 1 tablet by mouth daily with food., Historical      potassium chloride ER (KLOR-CON M) 10 MEQ CR tablet Take 1 tablet (10 mEq) by mouth 2 times daily, Disp-90 tablet,R-1, E-Prescribe      traMADol (ULTRAM) 50 MG tablet 1-2 tabs orally  Every 6 hrs as needed for pain., Disp-30 tablet,R-1, E-Prescribe           Allergies   No Known Allergies

## 2021-10-20 DIAGNOSIS — Z96.652 S/P TOTAL KNEE REPLACEMENT USING CEMENT, LEFT: Primary | ICD-10-CM

## 2021-10-20 RX ORDER — OXYCODONE HYDROCHLORIDE 5 MG/1
TABLET ORAL
Qty: 6 TABLET | Refills: 0 | Status: SHIPPED | OUTPATIENT
Start: 2021-10-20 | End: 2021-10-28

## 2021-10-20 NOTE — TELEPHONE ENCOUNTER
OXYCODONE      Last Written Prescription Date:  UNKNOWN  Last Fill Quantity: ?,   # refills: ?  Last Office Visit: 9-  Future Office visit:       Routing refill request to provider for review/approval because:  Drug not active on patient's medication list

## 2021-10-25 ENCOUNTER — TELEPHONE (OUTPATIENT)
Dept: FAMILY MEDICINE | Facility: OTHER | Age: 86
End: 2021-10-25

## 2021-10-26 DIAGNOSIS — Z96.652 S/P TOTAL KNEE REPLACEMENT USING CEMENT, LEFT: ICD-10-CM

## 2021-10-27 ENCOUNTER — OFFICE VISIT (OUTPATIENT)
Dept: ORTHOPEDICS | Facility: OTHER | Age: 86
End: 2021-10-27
Attending: SPECIALIST
Payer: MEDICARE

## 2021-10-27 VITALS
RESPIRATION RATE: 16 BRPM | HEIGHT: 61 IN | OXYGEN SATURATION: 95 % | TEMPERATURE: 96.3 F | BODY MASS INDEX: 27.75 KG/M2 | SYSTOLIC BLOOD PRESSURE: 132 MMHG | DIASTOLIC BLOOD PRESSURE: 58 MMHG | WEIGHT: 147 LBS | HEART RATE: 71 BPM

## 2021-10-27 DIAGNOSIS — M84.48XA SACRAL INSUFFICIENCY FRACTURE, INITIAL ENCOUNTER: ICD-10-CM

## 2021-10-27 PROCEDURE — G0463 HOSPITAL OUTPT CLINIC VISIT: HCPCS

## 2021-10-27 PROCEDURE — 99203 OFFICE O/P NEW LOW 30 MIN: CPT | Performed by: SPECIALIST

## 2021-10-27 RX ORDER — FERROUS GLUCONATE 324(38)MG
324 TABLET ORAL
COMMUNITY
End: 2022-01-07

## 2021-10-27 ASSESSMENT — MIFFLIN-ST. JEOR: SCORE: 1019.17

## 2021-10-27 ASSESSMENT — PAIN SCALES - GENERAL: PAINLEVEL: MILD PAIN (3)

## 2021-10-27 NOTE — NURSING NOTE
"Chief Complaint   Patient presents with     Consult     Musculoskeletal Problem     sacral FX       Initial /58   Pulse 71   Temp (!) 96.3  F (35.7  C) (Tympanic)   Resp 16   Ht 1.549 m (5' 1\")   Wt 66.7 kg (147 lb)   SpO2 95%   BMI 27.78 kg/m   Estimated body mass index is 27.78 kg/m  as calculated from the following:    Height as of this encounter: 1.549 m (5' 1\").    Weight as of this encounter: 66.7 kg (147 lb).  Medication Reconciliation: complete  Agatha Key LPN    "

## 2021-10-27 NOTE — CONSULTS
Consult Date: 10/27/2021    Claudine Bustos is a 89-year-old female who was referred to Orthopedics today for a sacral insufficiency fracture.  She had a low impact fall several weeks ago and complained of pain in her lower lumbar region.  She was living at home, ambulates with a walker.  She had a previous hip replacement on her right hip, had a CT scan done to evaluate her.  The CT scan demonstrated sacral insufficiency fractures bilaterally that were nondisplaced.  She was placed in a nursing home for pain control.  She is using the walker for short distance ambulation and a wheelchair for any longer distance ambulation.  She is under adequate pain control at the nursing home.  She complains of some lower lumbar back pain, but denies any radiating leg pain or numbness in her lower extremities.  On today's assessment she rates her pain ranging from 3-7/10 in severity.    PHYSICAL EXAMINATION:    GENERAL:  The patient is awake and oriented.  She answers some questions on her own.  Otherwise, her family provides some of the history.  Her weight is 134.   VITAL SIGNS:  Height 61 inches.   EXTREMITIES:  On evaluation of the lower lumbar region there is no swelling, discoloration, deformity, or effusion.  She has some tenderness over the lower lumbar paraspinal region, in the SI joint regions on both sides, but a little bit worse on the right.  Straight leg raise test is negative in both lower extremities.  She can do a straight leg raise against resistance without much difficulty with passive range of motion of her hip.  She has no pain.  She is grossly neurovascularly intact distally.  The CT scan was reviewed.  It does look like she probably has sacral insufficiency fracture.  She also has some severe degenerative changes in the lower lumbar levels that are visualized on the CT scan, L4-L5 and L5-S1.    ASSESSMENT AND PLAN:  A 89-year-old female with insufficiency fractures of the sacrum, degenerative lumbar spine.   I discussed the findings with the patient and her family and recommended conservative treatment for this using a walker and wheelchair for ambulation, doing some gentle physical therapy for lower extremity and upper extremity maintenance of muscle strength and tone.  She will also be treated for her pain with her current pain control regimen.  I would not add anything.  She is already on adequate pain medication regimen.  She will follow up with me in about 4-6 weeks for recheck with a new x-ray to include an AP pelvis.    About 30 minutes spent with the patient.    Benson Whalen MD        D: 10/27/2021   T: 10/27/2021   MT: [id number]    Name:     MANUELA FONSECAStephanie  MRN:      [id number]        Account:      [id number]   :      1930           Consult Date: 10/27/2021     Document: [id number]

## 2021-10-28 PROBLEM — A49.02 MRSA (METHICILLIN RESISTANT STAPHYLOCOCCUS AUREUS): Status: ACTIVE | Noted: 2017-06-01

## 2021-10-28 PROBLEM — R09.02 HYPOXIA: Status: RESOLVED | Noted: 2020-02-01 | Resolved: 2021-10-28

## 2021-10-28 PROBLEM — J18.9 CAP (COMMUNITY ACQUIRED PNEUMONIA): Status: RESOLVED | Noted: 2018-01-21 | Resolved: 2021-10-28

## 2021-10-28 RX ORDER — OXYCODONE HYDROCHLORIDE 5 MG/1
TABLET ORAL
Qty: 60 TABLET | Refills: 0 | Status: SHIPPED | OUTPATIENT
Start: 2021-10-28 | End: 2021-11-12

## 2021-10-28 NOTE — TELEPHONE ENCOUNTER
Oxycodone      Last Written Prescription Date:  10/20/21  Last Fill Quantity: 6,   # refills: 0  Last Office Visit: 09/08/21 with Dr Leroy, 07/13/20 with primary  Future Office visit:            clear

## 2021-10-29 ENCOUNTER — DOCUMENTATION ONLY (OUTPATIENT)
Dept: OTHER | Facility: CLINIC | Age: 86
End: 2021-10-29

## 2021-11-02 ENCOUNTER — TRANSFERRED RECORDS (OUTPATIENT)
Dept: HEALTH INFORMATION MANAGEMENT | Facility: CLINIC | Age: 86
End: 2021-11-02
Payer: MEDICARE

## 2021-11-02 LAB
CREATININE (EXTERNAL): 0.83 MG/DL (ref 0.4–1)
GFR ESTIMATED (EXTERNAL): >60 ML/MIN/1.73M2
GLUCOSE (EXTERNAL): 108 MG/DL (ref 70–99)
POTASSIUM (EXTERNAL): 4.4 MEQ/L (ref 3.4–5.1)

## 2021-11-03 NOTE — PROGRESS NOTES
Abel Limon is here for a follow up of elevated PSA.    PMD Howard Gandara MD    PSA, Total (ng/mL)   Date Value   2018 4.30 (H)     Prostate Specific Antigen (ng/mL)   Date Value   2021 5.06 (H)       Denies known Latex allergy or symptoms of Latex sensitivity.  Medications reviewed and updated.  Allergies reviewed.  Social History     Tobacco Use   Smoking Status Former Smoker   • Packs/day: 1.00   • Years: 30.00   • Pack years: 30.00   • Types: Cigarettes   • Quit date: 2000   • Years since quittin.1   Smokeless Tobacco Never Used       Hematuria: No  Burning: No  Incontinence: No  Pain:  No    See International Prostate Symptom Score (I-PSS) Questionnaire for further urological assessment.    Past urological problems/procedures: elevated psa, prostate biopsy     Abel Limon    2021    Patient Information:   Sexual health is an important part of an individual's overall physical and emotional well-being. Erectile dysfunction, also known as impotence, is one type of a very common medical condition affecting sexual health. Fortunately, here are many different treatment options for erectile dysfunction. These questions are designed to help you and your doctor identify if you may be experiencing erectile dysfunction.  If you are, you may choose to discuss treatment options with your doctor.    Over the past six months Abel Limon responses below best describe his situation.     How would you rate your confidence that you could get and keep an erection?  Low (2)     When you had erections with sexual stimulation, how often were your erections hard enough for penetration ( entering your partner)?   Sometimes ( about half the time) (3)    During sexual intercourse, how often were you able to maintain your erection after you had penetrated ( entered ) your partner?   Most times ( much more than half of the time) (4)     During sexual intercourse, how difficult was it to  See Consult Note from Dr. Benson Whalen 10/27/2021   maintain your erection to completion of intercourse?  Slightly difficult (4)     When you attempted sexual intercourse, how often was it satisfactory for you?   Almost always or always (5)    Add the numbers corresponding to questions 1-5 Total: 18   Sexual health inventory classifies ED severity at: 17-21 Mild ED

## 2021-11-09 ENCOUNTER — TRANSFERRED RECORDS (OUTPATIENT)
Dept: HEALTH INFORMATION MANAGEMENT | Facility: CLINIC | Age: 86
End: 2021-11-09
Payer: MEDICARE

## 2021-11-12 DIAGNOSIS — Z96.652 S/P TOTAL KNEE REPLACEMENT USING CEMENT, LEFT: ICD-10-CM

## 2021-11-12 DIAGNOSIS — M54.41 ACUTE RIGHT-SIDED LOW BACK PAIN WITH RIGHT-SIDED SCIATICA: ICD-10-CM

## 2021-11-12 RX ORDER — OXYCODONE HYDROCHLORIDE 5 MG/1
TABLET ORAL
Qty: 60 TABLET | Refills: 0 | Status: SHIPPED | OUTPATIENT
Start: 2021-11-12 | End: 2022-01-07

## 2021-11-12 RX ORDER — TRAMADOL HYDROCHLORIDE 50 MG/1
TABLET ORAL
Qty: 30 TABLET | Refills: 1 | Status: SHIPPED | OUTPATIENT
Start: 2021-11-12 | End: 2022-09-26

## 2021-11-16 ENCOUNTER — TELEPHONE (OUTPATIENT)
Dept: FAMILY MEDICINE | Facility: OTHER | Age: 86
End: 2021-11-16
Payer: MEDICARE

## 2021-12-02 ENCOUNTER — MEDICAL CORRESPONDENCE (OUTPATIENT)
Dept: HEALTH INFORMATION MANAGEMENT | Facility: HOSPITAL | Age: 86
End: 2021-12-02
Payer: MEDICARE

## 2021-12-03 ENCOUNTER — TRANSFERRED RECORDS (OUTPATIENT)
Dept: HEALTH INFORMATION MANAGEMENT | Facility: CLINIC | Age: 86
End: 2021-12-03
Payer: MEDICARE

## 2021-12-03 ENCOUNTER — TELEPHONE (OUTPATIENT)
Dept: FAMILY MEDICINE | Facility: OTHER | Age: 86
End: 2021-12-03
Payer: MEDICARE

## 2021-12-03 NOTE — TELEPHONE ENCOUNTER
Called daughter regarding ovarian cyst. [facility] for her to call back at her convenience to discuss further.     Yelitza Olguin MD

## 2021-12-28 ENCOUNTER — TELEPHONE (OUTPATIENT)
Dept: FAMILY MEDICINE | Facility: OTHER | Age: 86
End: 2021-12-28
Payer: MEDICARE

## 2021-12-28 NOTE — TELEPHONE ENCOUNTER
Faxed nursing home back this morning to obtain UA. Thanks.     TATI Healy CNP on 12/28/2021 at 12:01 PM

## 2021-12-29 ENCOUNTER — DOCUMENTATION ONLY (OUTPATIENT)
Dept: OTHER | Facility: CLINIC | Age: 86
End: 2021-12-29
Payer: MEDICARE

## 2021-12-30 ENCOUNTER — TRANSFERRED RECORDS (OUTPATIENT)
Dept: HEALTH INFORMATION MANAGEMENT | Facility: CLINIC | Age: 86
End: 2021-12-30
Payer: MEDICARE

## 2022-03-01 ENCOUNTER — TRANSFERRED RECORDS (OUTPATIENT)
Dept: HEALTH INFORMATION MANAGEMENT | Facility: CLINIC | Age: 87
End: 2022-03-01
Payer: MEDICARE

## 2022-03-01 LAB
CREATININE (EXTERNAL): 1 MG/DL (ref 0.4–1)
GFR ESTIMATED (EXTERNAL): 52 ML/MIN/1.73M2
GLUCOSE (EXTERNAL): 172 MG/DL (ref 70–99)
POTASSIUM (EXTERNAL): 4.3 MEQ/L (ref 3.4–5.1)

## 2022-03-25 ENCOUNTER — TRANSFERRED RECORDS (OUTPATIENT)
Dept: HEALTH INFORMATION MANAGEMENT | Facility: CLINIC | Age: 87
End: 2022-03-25
Payer: MEDICARE

## 2022-04-19 ENCOUNTER — TRANSFERRED RECORDS (OUTPATIENT)
Dept: HEALTH INFORMATION MANAGEMENT | Facility: CLINIC | Age: 87
End: 2022-04-19
Payer: MEDICARE

## 2022-05-23 ENCOUNTER — NURSING HOME VISIT (OUTPATIENT)
Dept: FAMILY MEDICINE | Facility: OTHER | Age: 87
End: 2022-05-23
Attending: FAMILY MEDICINE
Payer: MEDICARE

## 2022-05-23 DIAGNOSIS — Z78.9 NURSING HOME RESIDENT: Primary | ICD-10-CM

## 2022-05-23 PROCEDURE — 99308 SBSQ NF CARE LOW MDM 20: CPT | Performed by: FAMILY MEDICINE

## 2022-05-30 NOTE — PROGRESS NOTES
HISTORY OF PRESENT ILLNESS:      Claudine is a 89 year old female (4/11/1930)  resident of [facility]  who is being seen today for a routine visit.    Patient has a history of HFpEF, Aortic Stenosis, HTN, HLD, Anemia.      Patient was admitted to this facility on 10/15 after hospitalization for acute back pain due to sacral insufficiency fractures, bilateral. She was also treated with cipro for UTI.     Also noted during hospitalization was right renal mass. In review of records this mass has been present for several years and appears to be stable. She also has noted to have pelvic/ovarian cyst which has increased in size from 3 to 4.6cm. Looks like she was referred to Gyn Onc 3/2020, but no appt made. Family updated and wish no further work up at this time.     Also of note, pt was hospitalized back in March of this year for elevated trop. Treated conservatively, unclear if true NSTEMI or related to demand. No echo done (previously noted to have normal ef and AORTIC STENOSIS). She did not ever have follow up for outpatient echo or stress testing.     Discussed with nursing staff who have the following concerns: some ongoing urinary sx, mildly improved.     Patient is seen in their room. Family is not present. She is a poor historian. She was working on a puzzle. Denies pain today, states she feels very good. Denies cough, shortness of breath.     Current medications, allergies, and interdisciplinary care plan are reviewed.    Patient Active Problem List    Diagnosis Date Noted     Alzheimer's disease (H) 12/06/2021     Priority: Medium     Pyelonephritis 10/12/2021     Priority: Medium     Generalized muscle weakness 10/12/2021     Priority: Medium     Unable to ambulate 10/12/2021     Priority: Medium     Right-sided low back pain without sciatica 10/12/2021     Priority: Medium     Closed fracture of sacrum, unspecified portion of sacrum, initial encounter (H) 10/12/2021     Priority: Medium     Sacral  insufficiency fracture 10/12/2021     Priority: Medium     Elevated troponin 03/27/2021     Priority: Medium     Troponin level elevated 03/27/2021     Priority: Medium     Urinary tract infection without hematuria, site unspecified 03/27/2021     Priority: Medium     Iron deficiency 07/13/2020     Priority: Medium     Spinal stenosis of lumbosacral region 07/13/2020     Priority: Medium     Sacroiliitis (H) 07/13/2020     Priority: Medium     Back pain 02/01/2020     Priority: Medium     Hyponatremia 02/01/2020     Priority: Medium     Rhabdomyolysis 02/01/2020     Priority: Medium     Hypokalemia 02/01/2020     Priority: Medium     Anemia 02/01/2020     Priority: Medium     Closed fracture of eleventh thoracic vertebra (H) 02/01/2020     Priority: Medium     Systolic murmur 02/01/2020     Priority: Medium     Nasal lesion 07/24/2019     Priority: Medium     Renal benign neoplasm, left 06/05/2019     Priority: Medium     Obesity (BMI 35.0-39.9) with comorbidity (H) 06/05/2019     Priority: Medium     Memory loss 06/05/2019     Priority: Medium     Acute right-sided low back pain with right-sided sciatica 06/05/2019     Priority: Medium     Renal mass, right 06/08/2017     Priority: Medium     MRSA (methicillin resistant Staphylococcus aureus) 06/01/2017     Priority: Medium     Formatting of this note might be different from the original.  Date & Source of First Known MRSA: 1/22/2016 - [facility]    Date and source of negative screens that qualify* for resolution of MRSA from infection table:     NONE    *2 sets of MRSA negative screens (previous positive site(s) if applicable and bilateral anterior nares) at least 7 days apart are required to resolve.   Screening exclusions include dialysis, long term care residence, antibiotics within the past 7 days, chronic wounds or invasive devices, & recurrent MRSA infections.  Please contact Infection Prevention for your facility if questions.        Postoperative anemia due to acute blood loss 01/24/2017     Priority: Medium     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 Provider to review and confirm.       Chronic kidney disease, unspecified 08/03/2012     Priority: Medium     Diastolic dysfunction 02/27/2012     Priority: Medium     echo 2/2012  EF 60%            Social History     Socioeconomic History     Marital status:      Spouse name: Dharmesh     Number of children: 5     Years of education: Not on file     Highest education level: Not on file   Occupational History     Employer: [employer]     Comment: part-time   Tobacco Use     Smoking status: Never Smoker     Smokeless tobacco: Never Used   Substance and Sexual Activity     Alcohol use: Yes     Comment: Mixed, rarely     Drug use: No     Sexual activity: Yes     Partners: Male   Other Topics Concern      Service No     Blood Transfusions Yes     Caffeine Concern Yes     Comment: Coffee, 4 cups daily     Occupational Exposure Not Asked     Hobby Hazards Not Asked     Sleep Concern Not Asked     Stress Concern Not Asked     Weight Concern Not Asked     Special Diet Not Asked     Back Care Not Asked     Exercise No     Bike Helmet Not Asked     Seat Belt Yes     Self-Exams Yes     Parent/sibling w/ CABG, MI or angioplasty before 65F 55M? Not Asked   Social History Narrative     -- n/a    Caffeine --    Concussion --     Social Determinants of Health     Financial Resource Strain: Not on file   Food Insecurity: Not on file   Transportation Needs: Not on file   Physical Activity: Not on file   Stress: Not on file   Social Connections: Not on file   Intimate Partner  Violence: Not on file   Housing Stability: Not on file        Current Outpatient Medications   Medication Sig     acetaminophen (TYLENOL) 325 MG tablet Take 650 mg by mouth 4 times daily     aspirin (ASA) 81 MG EC tablet Take 1 tablet (81 mg) by mouth daily     Calcium Carb-Cholecalciferol (CALCIUM + D3) 600-200 MG-UNIT TABS Take 1 tablet by mouth daily     Cholecalciferol (VITAMIN D) 50 MCG (2000 UT) CAPS Take 1 tablet by mouth daily     ferrous sulfate (FEROSUL) 325 (65 Fe) MG tablet Take 1 tablet (325 mg) by mouth daily (with breakfast)     GABAPENTIN PO Take 200 mg by mouth 3 times daily     lactulose 20 GM/30ML SOLN Take 30 mLs by mouth 2 times daily as needed (Constipation)     lisinopril (ZESTRIL) 10 MG tablet Take 1 tablet (10 mg) by mouth daily     metoprolol tartrate (LOPRESSOR) 12.5 mg TABS half-tab Take 12.5 mg by mouth 2 times daily     Multiple Vitamin (MULTIVITAMIN) per tablet Take 1 tablet by mouth daily with food.     potassium chloride ER (KLOR-CON M) 10 MEQ CR tablet Take 1 tablet (10 mEq) by mouth 2 times daily (Patient taking differently: Take 10 mEq by mouth daily )     traMADol (ULTRAM) 50 MG tablet 1-2 tabs orally  Every 6 hrs as needed for pain.     No current facility-administered medications for this visit.       No Known Allergies    I have reviewed the care plan and do agree with the plan.    ROS:  No chest pain, shortness of breath, fever, chills, headache, nausea, vomiting, dysuria, or changes in bowel habits.  Appetite is good.  No pain noted.    OBJECTIVE:  There were no vitals taken for this visit.    GENERAL:  Alert, and in no acute distress  RESP: Respirations unlabored. Lung sounds clear to auscultation.    CV:  RRR.  S1 S2 with 3/6 systolic murmur. No clicks or rubs.  ABD: Soft, non tender, non distended, no organomegaly. BS are normal.   SKIN:  Age-related changes.  No suspicious lesions or rashes.  PSYCH:  Affect is flat.   NEURO: Mental status is alert. Memory is impaired.    EXTREM:  No edema.    Lab/Diagnostic data:    Reviewed in Epic    ASSESSMENT/ORDERS:  Diagnoses and all orders for this visit:    Memory loss / Dementia  Presumed Alz Dementia. Daughter confirmed cog decline over the last couple of years. Plan is to stay at [facility] for long term care.     Urinary urge incontinence  Pt struggling with significant urgency, multiple UAs done. Mirbetriq started at 25mg daily and increased today to 50mg due to ongoing symptoms. Tolerating medication well    Acute right-sided low back pain with right-sided sciatica / Closed fracture of sacrum, unspecified portion of sacrum, initial encounter (H) / Sacral insufficiency fracture, initial encounter / Generalized muscle weakness  Resolved. Continues on gabapentin, tramadol PRN. No changes in medications made today, appears comfortable.     Anemia, unspecified type / Iron deficiency  Baseline Hgb around 9. Hgb of 10.8 on 3/8/22. Continue iron for now. Recheck cbc next routine labs    Hyperlipidemia with target LDL less than 130  Not on statin, LDL 2020 135.     Benign essential hypertension  BPs reviewed today, generally controlled.Continue current regimen.     Diastolic dysfunction / Nonrheumatic aortic valve stenosis  Lung sounds clear. No hypoxia. Edema appears to be stable. Monitor.     Stage 3 chronic kidney disease, unspecified whether stage 3a or 3b CKD (H)  Creat of 1.00 on 3.1. Continue to monitor.     Vitamin D deficiency  Recent vit d level of 18 on 11/2/21. Started on daily supplement, 2000 units. Order placed to recheck.     Renal mass, right  Reviewed previous imaging, despite concern for appearance c/w malignancy, this has been very stable. Continue to monitor.     Cyst of ovary, unspecified laterality  This is enlarging albeit slowly without evidence of any metastatic disease. Has not been seen by Gyn Onc.  is elevated. Family decided on no further workup.     Total time spent with patient visit was 15 min including  patient visit, review of pertinent clinical information, and treatment plan.    Yelitza Olguin MD

## 2022-06-28 ENCOUNTER — TRANSFERRED RECORDS (OUTPATIENT)
Dept: HEALTH INFORMATION MANAGEMENT | Facility: CLINIC | Age: 87
End: 2022-06-28

## 2022-06-28 LAB
CREATININE (EXTERNAL): 1.4 MG/DL (ref 0.4–1)
GFR ESTIMATED (EXTERNAL): 35 ML/MIN/1.73M2
GLUCOSE (EXTERNAL): 243 MG/DL (ref 70–99)
POTASSIUM (EXTERNAL): 4.5 MEQ/L (ref 3.4–5.1)

## 2022-07-01 ENCOUNTER — TRANSFERRED RECORDS (OUTPATIENT)
Dept: HEALTH INFORMATION MANAGEMENT | Facility: CLINIC | Age: 87
End: 2022-07-01

## 2022-07-02 ENCOUNTER — TRANSFERRED RECORDS (OUTPATIENT)
Dept: HEALTH INFORMATION MANAGEMENT | Facility: CLINIC | Age: 87
End: 2022-07-02

## 2022-07-20 ENCOUNTER — HOSPITAL ENCOUNTER (INPATIENT)
Facility: HOSPITAL | Age: 87
LOS: 5 days | Discharge: NURSING, REHAB OR LONG-TERM CARE | DRG: 871 | End: 2022-07-25
Attending: EMERGENCY MEDICINE | Admitting: INTERNAL MEDICINE
Payer: MEDICARE

## 2022-07-20 ENCOUNTER — APPOINTMENT (OUTPATIENT)
Dept: GENERAL RADIOLOGY | Facility: HOSPITAL | Age: 87
DRG: 871 | End: 2022-07-20
Attending: EMERGENCY MEDICINE
Payer: MEDICARE

## 2022-07-20 ENCOUNTER — NURSING HOME VISIT (OUTPATIENT)
Dept: FAMILY MEDICINE | Facility: OTHER | Age: 87
DRG: 871 | End: 2022-07-20
Payer: MEDICARE

## 2022-07-20 VITALS
SYSTOLIC BLOOD PRESSURE: 70 MMHG | RESPIRATION RATE: 34 BRPM | TEMPERATURE: 96.8 F | OXYGEN SATURATION: 90 % | HEART RATE: 118 BPM | DIASTOLIC BLOOD PRESSURE: 40 MMHG

## 2022-07-20 DIAGNOSIS — I95.9 HYPOTENSION, UNSPECIFIED HYPOTENSION TYPE: ICD-10-CM

## 2022-07-20 DIAGNOSIS — N39.0 URINARY TRACT INFECTION WITHOUT HEMATURIA, SITE UNSPECIFIED: ICD-10-CM

## 2022-07-20 DIAGNOSIS — N30.00 ACUTE CYSTITIS WITHOUT HEMATURIA: Primary | ICD-10-CM

## 2022-07-20 DIAGNOSIS — Z78.9 NURSING HOME RESIDENT: Primary | ICD-10-CM

## 2022-07-20 DIAGNOSIS — R53.83 LETHARGY: ICD-10-CM

## 2022-07-20 PROBLEM — F02.80 ALZHEIMER'S DISEASE (H): Status: ACTIVE | Noted: 2021-12-06

## 2022-07-20 PROBLEM — G30.9 ALZHEIMER'S DISEASE (H): Status: ACTIVE | Noted: 2021-12-06

## 2022-07-20 PROBLEM — N17.9 SEPSIS WITH ACUTE RENAL FAILURE AND SEPTIC SHOCK (H): Status: ACTIVE | Noted: 2022-07-20

## 2022-07-20 PROBLEM — R65.21 SEPSIS WITH ACUTE RENAL FAILURE AND SEPTIC SHOCK (H): Status: ACTIVE | Noted: 2022-07-20

## 2022-07-20 PROBLEM — A41.9 SEPSIS WITH ACUTE RENAL FAILURE AND SEPTIC SHOCK (H): Status: ACTIVE | Noted: 2022-07-20

## 2022-07-20 LAB
ALBUMIN UR-MCNC: NEGATIVE MG/DL
ALBUMIN UR-MCNC: NEGATIVE MG/DL
ANION GAP SERPL CALCULATED.3IONS-SCNC: 8 MMOL/L (ref 3–14)
APPEARANCE UR: ABNORMAL
APPEARANCE UR: ABNORMAL
BACTERIA #/AREA URNS HPF: ABNORMAL /HPF
BACTERIA #/AREA URNS HPF: ABNORMAL /HPF
BASOPHILS # BLD AUTO: 0.1 10E3/UL (ref 0–0.2)
BASOPHILS NFR BLD AUTO: 0 %
BILIRUB UR QL STRIP: NEGATIVE
BILIRUB UR QL STRIP: NEGATIVE
BUN SERPL-MCNC: 31 MG/DL (ref 7–30)
CALCIUM SERPL-MCNC: 8.4 MG/DL (ref 8.5–10.1)
CHLORIDE BLD-SCNC: 104 MMOL/L (ref 94–109)
CO2 SERPL-SCNC: 21 MMOL/L (ref 20–32)
COLOR UR AUTO: YELLOW
COLOR UR AUTO: YELLOW
CREAT SERPL-MCNC: 1.6 MG/DL (ref 0.52–1.04)
EOSINOPHIL # BLD AUTO: 0.1 10E3/UL (ref 0–0.7)
EOSINOPHIL NFR BLD AUTO: 0 %
ERYTHROCYTE [DISTWIDTH] IN BLOOD BY AUTOMATED COUNT: 13.7 % (ref 10–15)
GFR SERPL CREATININE-BSD FRML MDRD: 30 ML/MIN/1.73M2
GLUCOSE BLD-MCNC: 142 MG/DL (ref 70–99)
GLUCOSE BLDC GLUCOMTR-MCNC: 109 MG/DL (ref 70–99)
GLUCOSE UR STRIP-MCNC: NEGATIVE MG/DL
GLUCOSE UR STRIP-MCNC: NEGATIVE MG/DL
HCT VFR BLD AUTO: 34.5 % (ref 35–47)
HGB BLD-MCNC: 11.3 G/DL (ref 11.7–15.7)
HGB UR QL STRIP: ABNORMAL
HGB UR QL STRIP: ABNORMAL
HOLD SPECIMEN: NORMAL
IMM GRANULOCYTES # BLD: 0.3 10E3/UL
IMM GRANULOCYTES NFR BLD: 1 %
INR PPP: 1.12 (ref 0.85–1.15)
KETONES UR STRIP-MCNC: NEGATIVE MG/DL
KETONES UR STRIP-MCNC: NEGATIVE MG/DL
LACTATE SERPL-SCNC: 1.4 MMOL/L (ref 0.7–2)
LACTATE SERPL-SCNC: 1.8 MMOL/L (ref 0.7–2)
LEUKOCYTE ESTERASE UR QL STRIP: ABNORMAL
LEUKOCYTE ESTERASE UR QL STRIP: ABNORMAL
LYMPHOCYTES # BLD AUTO: 1.6 10E3/UL (ref 0.8–5.3)
LYMPHOCYTES NFR BLD AUTO: 6 %
MCH RBC QN AUTO: 29 PG (ref 26.5–33)
MCHC RBC AUTO-ENTMCNC: 32.8 G/DL (ref 31.5–36.5)
MCV RBC AUTO: 89 FL (ref 78–100)
MONOCYTES # BLD AUTO: 2.8 10E3/UL (ref 0–1.3)
MONOCYTES NFR BLD AUTO: 9 %
NEUTROPHILS # BLD AUTO: 25.1 10E3/UL (ref 1.6–8.3)
NEUTROPHILS NFR BLD AUTO: 84 %
NITRATE UR QL: NEGATIVE
NITRATE UR QL: NEGATIVE
NRBC # BLD AUTO: 0 10E3/UL
NRBC BLD AUTO-RTO: 0 /100
PH UR STRIP: 6 [PH] (ref 4.7–8)
PH UR STRIP: 6 [PH] (ref 4.7–8)
PLATELET # BLD AUTO: 406 10E3/UL (ref 150–450)
POTASSIUM BLD-SCNC: 4.3 MMOL/L (ref 3.4–5.3)
PROCALCITONIN SERPL-MCNC: 7.79 NG/ML
RBC # BLD AUTO: 3.9 10E6/UL (ref 3.8–5.2)
RBC URINE: 7 /HPF
RBC URINE: 7 /HPF
SARS-COV-2 RNA RESP QL NAA+PROBE: NEGATIVE
SODIUM SERPL-SCNC: 133 MMOL/L (ref 133–144)
SP GR UR STRIP: 1.02 (ref 1–1.03)
SP GR UR STRIP: 1.02 (ref 1–1.03)
SQUAMOUS EPITHELIAL: 5 /HPF
SQUAMOUS EPITHELIAL: 5 /HPF
TROPONIN I SERPL HS-MCNC: 52 NG/L
UROBILINOGEN UR STRIP-MCNC: NORMAL MG/DL
UROBILINOGEN UR STRIP-MCNC: NORMAL MG/DL
WBC # BLD AUTO: 30 10E3/UL (ref 4–11)
WBC CLUMPS #/AREA URNS HPF: PRESENT /HPF
WBC CLUMPS #/AREA URNS HPF: PRESENT /HPF
WBC URINE: >182 /HPF
WBC URINE: >182 /HPF

## 2022-07-20 PROCEDURE — 87088 URINE BACTERIA CULTURE: CPT | Performed by: EMERGENCY MEDICINE

## 2022-07-20 PROCEDURE — 80048 BASIC METABOLIC PNL TOTAL CA: CPT | Performed by: EMERGENCY MEDICINE

## 2022-07-20 PROCEDURE — 250N000011 HC RX IP 250 OP 636: Performed by: EMERGENCY MEDICINE

## 2022-07-20 PROCEDURE — 87040 BLOOD CULTURE FOR BACTERIA: CPT | Performed by: NURSE PRACTITIONER

## 2022-07-20 PROCEDURE — 81001 URINALYSIS AUTO W/SCOPE: CPT | Performed by: EMERGENCY MEDICINE

## 2022-07-20 PROCEDURE — 200N000001 HC R&B ICU

## 2022-07-20 PROCEDURE — 99309 SBSQ NF CARE MODERATE MDM 30: CPT

## 2022-07-20 PROCEDURE — 84484 ASSAY OF TROPONIN QUANT: CPT | Performed by: NURSE PRACTITIONER

## 2022-07-20 PROCEDURE — 71045 X-RAY EXAM CHEST 1 VIEW: CPT

## 2022-07-20 PROCEDURE — 250N000009 HC RX 250: Performed by: NURSE PRACTITIONER

## 2022-07-20 PROCEDURE — C9803 HOPD COVID-19 SPEC COLLECT: HCPCS

## 2022-07-20 PROCEDURE — 83605 ASSAY OF LACTIC ACID: CPT | Performed by: EMERGENCY MEDICINE

## 2022-07-20 PROCEDURE — 36415 COLL VENOUS BLD VENIPUNCTURE: CPT | Performed by: EMERGENCY MEDICINE

## 2022-07-20 PROCEDURE — 93010 ELECTROCARDIOGRAM REPORT: CPT | Performed by: INTERNAL MEDICINE

## 2022-07-20 PROCEDURE — U0005 INFEC AGEN DETEC AMPLI PROBE: HCPCS | Performed by: EMERGENCY MEDICINE

## 2022-07-20 PROCEDURE — 85610 PROTHROMBIN TIME: CPT | Performed by: EMERGENCY MEDICINE

## 2022-07-20 PROCEDURE — 258N000003 HC RX IP 258 OP 636: Performed by: NURSE PRACTITIONER

## 2022-07-20 PROCEDURE — 258N000003 HC RX IP 258 OP 636: Performed by: EMERGENCY MEDICINE

## 2022-07-20 PROCEDURE — 84145 PROCALCITONIN (PCT): CPT | Performed by: NURSE PRACTITIONER

## 2022-07-20 PROCEDURE — 250N000013 HC RX MED GY IP 250 OP 250 PS 637: Performed by: NURSE PRACTITIONER

## 2022-07-20 PROCEDURE — 83605 ASSAY OF LACTIC ACID: CPT | Performed by: NURSE PRACTITIONER

## 2022-07-20 PROCEDURE — 96365 THER/PROPH/DIAG IV INF INIT: CPT

## 2022-07-20 PROCEDURE — 99285 EMERGENCY DEPT VISIT HI MDM: CPT | Mod: 25

## 2022-07-20 PROCEDURE — 93005 ELECTROCARDIOGRAM TRACING: CPT

## 2022-07-20 PROCEDURE — 99223 1ST HOSP IP/OBS HIGH 75: CPT | Mod: AI | Performed by: NURSE PRACTITIONER

## 2022-07-20 PROCEDURE — 99291 CRITICAL CARE FIRST HOUR: CPT | Performed by: EMERGENCY MEDICINE

## 2022-07-20 PROCEDURE — 250N000011 HC RX IP 250 OP 636: Performed by: NURSE PRACTITIONER

## 2022-07-20 PROCEDURE — 85025 COMPLETE CBC W/AUTO DIFF WBC: CPT | Performed by: EMERGENCY MEDICINE

## 2022-07-20 PROCEDURE — 36415 COLL VENOUS BLD VENIPUNCTURE: CPT | Performed by: NURSE PRACTITIONER

## 2022-07-20 RX ORDER — MULTIVIT-MIN/IRON/FOLIC ACID/K 18-600-40
500 CAPSULE ORAL DAILY
COMMUNITY

## 2022-07-20 RX ORDER — CEFTRIAXONE SODIUM 1 G/50ML
1 INJECTION, SOLUTION INTRAVENOUS EVERY 24 HOURS
Status: DISCONTINUED | OUTPATIENT
Start: 2022-07-21 | End: 2022-07-24

## 2022-07-20 RX ORDER — SENNOSIDES 8.6 MG
1300 CAPSULE ORAL 2 TIMES DAILY
COMMUNITY

## 2022-07-20 RX ORDER — ACETAMINOPHEN 325 MG/1
975 TABLET ORAL ONCE
Status: COMPLETED | OUTPATIENT
Start: 2022-07-20 | End: 2022-07-20

## 2022-07-20 RX ORDER — SODIUM CHLORIDE 9 MG/ML
INJECTION, SOLUTION INTRAVENOUS CONTINUOUS
Status: DISCONTINUED | OUTPATIENT
Start: 2022-07-20 | End: 2022-07-24

## 2022-07-20 RX ORDER — LIDOCAINE 40 MG/G
CREAM TOPICAL
Status: DISCONTINUED | OUTPATIENT
Start: 2022-07-20 | End: 2022-07-25 | Stop reason: HOSPADM

## 2022-07-20 RX ORDER — ACETAMINOPHEN 325 MG/1
TABLET ORAL
Status: DISCONTINUED
Start: 2022-07-20 | End: 2022-07-21 | Stop reason: HOSPADM

## 2022-07-20 RX ORDER — ACETAMINOPHEN 325 MG/1
650 TABLET ORAL EVERY 4 HOURS PRN
Status: DISCONTINUED | OUTPATIENT
Start: 2022-07-20 | End: 2022-07-25 | Stop reason: HOSPADM

## 2022-07-20 RX ORDER — ONDANSETRON 4 MG/1
4 TABLET, ORALLY DISINTEGRATING ORAL EVERY 6 HOURS PRN
Status: DISCONTINUED | OUTPATIENT
Start: 2022-07-20 | End: 2022-07-25 | Stop reason: HOSPADM

## 2022-07-20 RX ORDER — MIRABEGRON 50 MG/1
50 TABLET, EXTENDED RELEASE ORAL AT BEDTIME
COMMUNITY
End: 2023-04-10

## 2022-07-20 RX ORDER — NOREPINEPHRINE BITARTRATE 0.02 MG/ML
.01-.6 INJECTION, SOLUTION INTRAVENOUS CONTINUOUS
Status: DISCONTINUED | OUTPATIENT
Start: 2022-07-20 | End: 2022-07-22

## 2022-07-20 RX ORDER — ENOXAPARIN SODIUM 100 MG/ML
30 INJECTION SUBCUTANEOUS EVERY 24 HOURS
Status: DISCONTINUED | OUTPATIENT
Start: 2022-07-20 | End: 2022-07-22

## 2022-07-20 RX ORDER — PYRIDOXINE HCL (VITAMIN B6) 100 MG
TABLET ORAL DAILY
COMMUNITY

## 2022-07-20 RX ORDER — NICOTINE POLACRILEX 4 MG
15-30 LOZENGE BUCCAL
Status: DISCONTINUED | OUTPATIENT
Start: 2022-07-20 | End: 2022-07-25 | Stop reason: HOSPADM

## 2022-07-20 RX ORDER — DOCUSATE SODIUM 100 MG/1
100 CAPSULE, LIQUID FILLED ORAL 2 TIMES DAILY
Status: DISCONTINUED | OUTPATIENT
Start: 2022-07-20 | End: 2022-07-22

## 2022-07-20 RX ORDER — CEFTRIAXONE SODIUM 1 G/50ML
1 INJECTION, SOLUTION INTRAVENOUS ONCE
Status: COMPLETED | OUTPATIENT
Start: 2022-07-20 | End: 2022-07-20

## 2022-07-20 RX ORDER — DEXTROSE MONOHYDRATE 25 G/50ML
25-50 INJECTION, SOLUTION INTRAVENOUS
Status: DISCONTINUED | OUTPATIENT
Start: 2022-07-20 | End: 2022-07-25 | Stop reason: HOSPADM

## 2022-07-20 RX ORDER — ONDANSETRON 2 MG/ML
4 INJECTION INTRAMUSCULAR; INTRAVENOUS EVERY 6 HOURS PRN
Status: DISCONTINUED | OUTPATIENT
Start: 2022-07-20 | End: 2022-07-25 | Stop reason: HOSPADM

## 2022-07-20 RX ADMIN — ENOXAPARIN SODIUM 30 MG: 30 INJECTION SUBCUTANEOUS at 17:45

## 2022-07-20 RX ADMIN — SODIUM CHLORIDE: 9 INJECTION, SOLUTION INTRAVENOUS at 17:49

## 2022-07-20 RX ADMIN — SODIUM CHLORIDE 900 ML: 9 INJECTION, SOLUTION INTRAVENOUS at 15:24

## 2022-07-20 RX ADMIN — CEFTRIAXONE SODIUM 1 G: 1 INJECTION, SOLUTION INTRAVENOUS at 12:51

## 2022-07-20 RX ADMIN — Medication 0.03 MCG/KG/MIN: at 17:37

## 2022-07-20 RX ADMIN — ACETAMINOPHEN 975 MG: 325 TABLET, FILM COATED ORAL at 16:17

## 2022-07-20 RX ADMIN — ACETAMINOPHEN 650 MG: 325 TABLET, FILM COATED ORAL at 23:47

## 2022-07-20 RX ADMIN — SODIUM CHLORIDE: 9 INJECTION, SOLUTION INTRAVENOUS at 16:59

## 2022-07-20 RX ADMIN — SODIUM CHLORIDE 1000 ML: 9 INJECTION, SOLUTION INTRAVENOUS at 12:51

## 2022-07-20 ASSESSMENT — ACTIVITIES OF DAILY LIVING (ADL)
ADLS_ACUITY_SCORE: 42
ADLS_ACUITY_SCORE: 35
ADLS_ACUITY_SCORE: 42
ADLS_ACUITY_SCORE: 35
ADLS_ACUITY_SCORE: 42
ADLS_ACUITY_SCORE: 42

## 2022-07-20 NOTE — PROVIDER NOTIFICATION
Pt became symptomatic with a fever of 100.7, tachycardic 120-140s, and with shivering/rigors.  Provider notified, one time dose 975 mg tylenol ordered and given, see MAR. Fever elevated to 102 before tylenol administration. PRN tylenol for future use also ordered, see MAR.  Pt's SpO2 also dipped to 87% on RA, now SpO2 95% on 2 LPM NC.

## 2022-07-20 NOTE — PROVIDER NOTIFICATION
DATE:  7/20/2022   TIME OF RECEIPT FROM LAB:  15:54  LAB TEST:  Procalcitonin   LAB VALUE:  7.79  RESULTS GIVEN WITH READ-BACK TO (PROVIDER):  Arabella Angulo NP  TIME LAB VALUE REPORTED TO PROVIDER:   15:57       Arely Roa RN   7/20/2022  3:58 PM

## 2022-07-20 NOTE — H&P
[facility]    History and Physical  Hospitalist       Date of Admission:  7/20/2022  Date of Service (when I saw the patient): 07/20/22    Assessment & Plan         Sepsis with acute renal failure and septic shock (H): On arrival BP 60-70's systolic. Mild improvement while 1st bolus running but then dropped back into the 60's. Her initial lactic acid was normal, recheck pending. Sever leukocytosis of 30k as well as poor cap refill/peripheral perfusion.  Admit to ICU. Will finish 30ml/kg IVF bolus and if still hypotensive start IV levophed. Baseline creatinine is normal, on arrival creatinine 1.5. she has had normal creatinine for the last several years on clinic checks but does carry stage 2 CKD diagnosis. Treat as above and recheck in AM. Place early for close I/O and ensure no obstruction. She received 1gm Rocephin in the ED, unfortunately BC were drawn after the dose but are pending. Continue same, she has no pseudomonas risk factors other then SNF resident.       Urinary tract infection without hematuria, site unspecified: Culture pending. Previous UTI's over the last 12 months have been pansensitive e. Coli, 1 klebsiella.  Rocephin as above.       Metabolic encephalopathy on chronic Alzheimer's disease: She is oriented x2 but is very lethargic and difficult to get any answers from due to degree of her illness.       Benign essential hypertension: Hypotensive as above. She is normally on low dose lisinopril and low dose metoprolol. She did receive her lisinopril but metoprolol was held this morning as NH. Continue to hold due to hypotension.        Diastolic dysfunction, Grade I: Preserved EF of 60% in 2021.        Chronic murmur: Moderate to severe aortic stenosis.       CKD (chronic kidney disease) stage 2, GFR 60-89 ml/min: Normal renal function for at least the last 3 years with GFR>60.      DVT Prophylaxis: Enoxaparin (Lovenox) SQ  Code Status: DNR / DNI    Disposition: Expected discharge in  several days once hemodynamically stable.    Arabella Angulo CNP    Primary Care Physician   Katarina Gutierrez    Chief Complaint   lethargy    Unable to obtain a history from the patient due to critical condition and mental status information received from ED, patient's chart and discussion with her nursing home staff.     History of Present Illness   Claudine Bustos is a 89 year old female who presents from the nursing home with abrupt onset of lethargy, weakness. Staff at the nursing home report low grade fever of 99 degrees yesterday afternoon but patient denied felling ill. Her blood pressure was soft so they rechecked this morning and it was lower and she was very lethargic so they sent her via ambulance. She has known frequent UTI.  She is somnolent but does know she is in the hospital now. She denies chest pain, abdominal pain or shortness of breath. She does tell me she has had burning with urination and that she is very cold right now. Urine shows very high WBC with a serum WBC of 30k along with hypotension on arrival. She was started on IVF and given 1 gram rocephin in ED and admitted to ICU for further care.     Past Medical History    I have reviewed this patient's medical history and updated it with pertinent information if needed.   Past Medical History:   Diagnosis Date     Chronic kidney disease, unspecified 8/3/2012     Diastolic dysfunction 2/27/2012    echo 2/2012  EF 60%     HTN (hypertension) 4/4/2000     Obesity (BMI 35.0-39.9) with comorbidity (H) 6/5/2019     Personal history of colonic polyps 6/8/2004     Renal benign neoplasm, left 2016    'oncocytic neoplasm' per urology -- benign, stable and following yearly for scans     Skin cancer        Past Surgical History   I have reviewed this patient's surgical history and updated it with pertinent information if needed.  Past Surgical History:   Procedure Laterality Date     ARTHROPLASTY HIP Right 1/23/2017    Procedure: ARTHROPLASTY HIP;   Surgeon: Jose Manuel Muñoz MD;  Location: [facility]     ARTHROPLASTY KNEE Left 1/25/2016    Procedure: ARTHROPLASTY KNEE;  Surgeon: Jose Manuel Muñoz MD;  Location: [facility] OR     colonoscopy  2004, 2009     EXCISE LESION EYELID Left 8/1/2017    Procedure: EXCISE LESION EYELID;  LEFT LOWER LID WEDGE EXCISION WITH FROZEN SECTION CONTROL;  Surgeon: Quinten Sotelo MD;  Location:  [facility]     excision of rectal villus adenoma       IR CONSULTATION FOR IR EXAM  7/15/2020     knee replacement Right 1/2011     PHACOEMULSIFICATION WITH STANDARD INTRAOCULAR LENS IMPLANT  2/25/2014    Procedure: PHACOEMULSIFICATION WITH STANDARD INTRAOCULAR LENS IMPLANT;  CATARACT EXTRACTION WITH INTRA OCULAR LENS LEFT;  Surgeon: Jah Bee MD;  Location: [facility] OR     PHACOEMULSIFICATION WITH STANDARD INTRAOCULAR LENS IMPLANT Right 5/12/2015    Procedure: PHACOEMULSIFICATION WITH STANDARD INTRAOCULAR LENS IMPLANT;  Surgeon: Jah Bee MD;  Location: [facility] OR     thoracic schwannoma resection       tonsillectomy       urethral dilitation every 4 months         Prior to Admission Medications   Prior to Admission Medications   Prescriptions Last Dose Informant Patient Reported? Taking?   Ascorbic Acid (VITAMIN C) 500 MG CAPS 7/20/2022 at Unknown time  Yes Yes   Calcium Carb-Cholecalciferol (CALCIUM + D3) 600-200 MG-UNIT TABS 7/19/2022 at Unknown time Self Yes Yes   Sig: Take 1 tablet by mouth daily   Cholecalciferol (VITAMIN D) 50 MCG (2000 UT) CAPS 7/20/2022 at Unknown time  Yes Yes   Sig: Take 1 tablet by mouth daily   Cranberry 500 MG CAPS 7/20/2022 at Unknown time  Yes Yes   GABAPENTIN PO 7/20/2022 at Unknown time  Yes Yes   Sig: Take 200 mg by mouth 3 times daily   Multiple Vitamin (MULTIVITAMIN) per tablet 7/20/2022 at Unknown time Self Yes Yes   Sig: Take 1 tablet by mouth daily with food.   Psyllium 0.36 g CAPS 7/20/2022 at Unknown time  Yes Yes   acetaminophen (TYLENOL 8 HOUR ARTHRITIS PAIN) 650 MG CR tablet 7/20/2022 at Unknown  time  Yes Yes   Sig: Take 1,300 mg by mouth every 8 hours as needed for mild pain or fever   acetaminophen (TYLENOL) 325 MG tablet 2022 at Unknown time  Yes Yes   Sig: Take 650 mg by mouth 4 times daily   aspirin (ASA) 81 MG EC tablet 2022 at Unknown time  No Yes   Sig: Take 1 tablet (81 mg) by mouth daily   conjugated estrogens (PREMARIN) 0.625 MG/GM vaginal cream 2022 at Unknown time  Yes Yes   Sig: Place 0.5 g vaginally twice a week   ferrous sulfate (FEROSUL) 325 (65 Fe) MG tablet 2022 at Unknown time  No Yes   Sig: Take 1 tablet (325 mg) by mouth daily (with breakfast)   lisinopril (ZESTRIL) 10 MG tablet 2022 at Unknown time  No Yes   Sig: Take 1 tablet (10 mg) by mouth daily   metoprolol tartrate (LOPRESSOR) 12.5 mg TABS half-tab 2022 at Unknown time Nursing Home Yes Yes   Sig: Take 12.5 mg by mouth 2 times daily   mirabegron (MYRBETRIQ) 50 MG 24 hr tablet 2022 at Unknown time  Yes Yes   Sig: Take 50 mg by mouth daily   potassium chloride ER (KLOR-CON M) 10 MEQ CR tablet 2022 at Unknown time  No Yes   Sig: Take 1 tablet (10 mEq) by mouth 2 times daily   Patient taking differently: Take 10 mEq by mouth daily   traMADol (ULTRAM) 50 MG tablet 2022 at Unknown time  No Yes   Si-2 tabs orally  Every 6 hrs as needed for pain.      Facility-Administered Medications: None     Allergies   No Known Allergies    Social History   I have reviewed this patient's social history and updated it with pertinent information if needed. Claudine Bustos  reports that she has never smoked. She has never used smokeless tobacco. She reports current alcohol use. She reports that she does not use drugs.    Family History   I have reviewed this patient's family history and updated it with pertinent information if needed.   Family History   Problem Relation Age of Onset     Alcohol/Drug Father         alcoholism     Cancer Father 51        Esophageal, cause of death     Other - See Comments  Father         Tobacco use     Cerebrovascular Disease Mother 51        Cause of death     Other - See Comments Maternal Grandmother      Other - See Comments Maternal Grandfather      Other - See Comments Paternal Grandmother      Other - See Comments Paternal Grandfather      Cancer Sister         Breast     Cancer Brother         Leukemia     Cancer Brother         Lung     Chronic Obstructive Pulmonary Disease Brother        Review of Systems   Review of systems not obtainable due to patient factors - abnormal mental status. She denies chest pain, abdominal pain or nausea. Reports chills and painful urination but cannot elucidate beyond that.     Physical Exam   Temp: 97.9  F (36.6  C) Temp src: Tympanic BP: (!) 88/71 Pulse: 93   Resp: 17 SpO2: 94 % O2 Device: None (Room air)    Vital Signs with Ranges  Temp:  [97.9  F (36.6  C)] 97.9  F (36.6  C)  Pulse:  [85-99] 93  Resp:  [17-26] 17  BP: (70-94)/(39-71) 88/71  SpO2:  [94 %] 94 %  0 lbs 0 oz    Constitutional: Sleeps unless aroused and then only responds with short yes/no answers and falls back to sleep quickly. Quite ill appearing.   HEENT: Normocephalic, mucous membranes are mildly pale and mildly dry. No cervical lymphadenopathy.   Respiratory: Clear bilaterally but poor inspirational effort as she cannot follow commands.   Cardiovascular: HRR, 6/6 murmur, peripheral pulses soft, distal cap refill 6 seconds   GI: Soft, bowel sounds hyperactive, no tenderness on palpation  Skin: Pale, no unusual open areas,rashes or bruises  Musculoskeletal: Able to move upper and lower extremities independently when stimulated, no deficits noted.   Neurologic: sleeps unless aroused, she is oriented to self and place but otherwise cannot meaningfully respond      Data   Data reviewed today:  I personally reviewed no images or EKG's today.  Recent Labs   Lab 07/20/22  1201   WBC 30.0*   HGB 11.3*   MCV 89      INR 1.12      POTASSIUM 4.3   CHLORIDE 104   CO2 21    BUN 31*   CR 1.60*   ANIONGAP 8   LUZMARIA 8.4*   *       Recent Results (from the past 24 hour(s))   XR Chest Port 1 View    Narrative    PROCEDURE:  XR CHEST PORT 1 VIEW    HISTORY: fatigue    COMPARISON:  Chest radiographs 2/1/2020, 1/21/2018    FINDINGS:  Heart/mediastinum: The cardiomediastinal contours are stable and  within normal limits. There is mild calcific aortic atherosclerosis.   Lungs and pleural spaces: No focal consolidation, effusion or  pneumothorax. A few left lower lobe opacities with stable lingular  scarring.  Bones and soft tissues: No definite acute osseous abnormality.  Degenerative changes of the shoulders. No upper abdominal free air.  Upper abdominal surgical clips.      Impression    IMPRESSION:    A few left lower lobe opacities, favored atelectasis versus scarring,  less likely infection.    MOE COLINDRES MD         SYSTEM ID:  [id number]

## 2022-07-20 NOTE — PLAN OF CARE
[facility] Inpatient Admission Note:    Patient admitted to 3122/3122-1 at approximately 15:00 via cart accompanied by nurse from emergency room . Report received from Cee RN in SBAR format at 14:45 via telephone. Patient transferred to bed via slide board.. Patient is alert and oriented X 1, denies pain; rates at 0 on 0-10 scale.  Patient oriented to room, unit, hourly rounding, and plan of care. Explained admission packet and patient handbook with patient bill of rights brochure. Will continue to monitor and document as needed.     Inpatient Nursing criteria listed below was met:    Health care directives status obtained and documented: Yes    Patient identifies a surrogate decision maker: No      If initial lactic acid greater than 2.0, repeat lactic acid drawn within one hour of arrival to unit: Yes.     Clergy visit ordered if patient requests: No    Skin issues/needs documented: Yes    Isolation Patient: no Education given, correct sign in place and documentation row added to PCS:  Yes    Fall Prevention Yes: Care plan updated, education given and documented, sticker and magnet in place: Yes    Care Plan initiated: Yes    Education Documented (including assessment): Yes    Patient has discharge needs : Yes If yes, please explain:TBD

## 2022-07-20 NOTE — PROGRESS NOTES
Chart review on this new admission.  In summary 89-year-old female admitted from skilled nursing facility with acute renal failure and septic shock.  CODE STATUS is DNR/DNI.  Agree with plan for continue fluid resuscitation and treatment with Rocephin.  Also agree if patient does not respond to fluid resuscitation would initiate conversation with patient regarding central line and placement on norepinephrine.  Medical history is most significant for aortic stenosis, Alzheimer's disease and hypertension.  She also has stage II chronic kidney disease.  Patient needs a Castro in order to monitor I's and O's given her critical status.  Will continue to follow the patient from the telemetry center if any issues arise do not hesitate to contact us.    Darwin Sanchez MD  Critical Care Medicine

## 2022-07-20 NOTE — PLAN OF CARE
"BP (!) 85/41   Pulse 110   Temp (!) 102  F (38.9  C)   Resp (!) 30   Ht 1.499 m (4' 11\")   Wt 64.8 kg (142 lb 13.7 oz)   SpO2 93%   BMI 28.85 kg/m        Pt admitted to the floor approx 14:45. Oriented x1 to self, reoriented to environment. Neuros intact, lungs clear, and HR was sinus tach in 110s on admission. Bowels active.     SpO2 92-95% on 2 LPM NC.     Castro placed at approx 15:00 with 350 mL return of cloudy odorus urine. Adequate urine output since placement. 2nd IV placed and 900 mL fluid bolus started and finished, now running 150/hr NS. 22 g IV infiltrated and new 20 g started in R forearm. 18 g SL in L AC.     Pt became symptomatic with a fever of initially 100.7 and 102 shortly after with chills/rigor and tachycardic 120-140s see provider notification note for interventions. Recheck at 17:45 remains elevated at 102. HR 90s to 115s now.     BP initially were stable 120s over 70s. At 17:25, BP began to trend down 70s over 40s. Levophed drip started at  17:37 and currently infusing see MAR.     Not oob at this time. Drinking sips of water without difficulty. Remains free of injury, alarms on, and call light within reach. Face to face report given with opportunity to observe patient.    Report given to DIVYA Brar RN   7/20/2022  7:00 PM    "

## 2022-07-20 NOTE — PROGRESS NOTES
"  HISTORY OF PRESENT ILLNESS:      Claudine is a 89 year old female (4/11/1930)  resident of [facility] who is being seen today for an acute visit related to lethargy.     Patient has a history of HFpEF, Aortic Stenosis, HTN, HLD, Anemia.      Patient was admitted to this facility on 10/15 after hospitalization for acute back pain due to sacral insufficiency fractures, bilateral. She was also treated with cipro for UTI.     Also noted during hospitalization was right renal mass. In review of records this mass has been present for several years and appears to be stable. She also has noted to have pelvic/ovarian cyst which has increased in size from 3 to 4.6cm. Looks like she was referred to Gyn Onc 3/2020, but no appt made. Family updated and wish no further work up at this time.     Also of note, pt was hospitalized back in March of this year for elevated trop. Treated conservatively, unclear if true NSTEMI or related to demand. No echo done (previously noted to have normal ef and AORTIC STENOSIS). She did not ever have follow up for outpatient echo or stress testing.     Discussed with nursing staff who have the following concerns: Since last evening has been more lethargic. Unable to walk with PT today. Requiring repeated stimulation to stay awake. SBP this morning in the 90's. Metoprolol was held. Repeat of 106/56 prior to my arrival.     Patient is found sitting in the commons area with her eyes closed. She only opens her eyes when repeatedly stimulated. She tells me she has no pain and that she is \"fine.\" Denies any chest pain, shortness of breath, or abdominal pain. Does report an intermittent cough. She denies any dysuria or hematuria. Patient is however a poor historian due to underlying dementia.      Current medications, allergies, and interdisciplinary care plan are reviewed.    Patient Active Problem List    Diagnosis Date Noted     Hypotension, unspecified hypotension type 07/20/2022     Priority: " Medium     Sepsis with acute renal failure and septic shock (H) 07/20/2022     Priority: Medium     Alzheimer's disease (H) 12/06/2021     Priority: Medium     Pyelonephritis 10/12/2021     Priority: Medium     Generalized muscle weakness 10/12/2021     Priority: Medium     Unable to ambulate 10/12/2021     Priority: Medium     Right-sided low back pain without sciatica 10/12/2021     Priority: Medium     Closed fracture of sacrum, unspecified portion of sacrum, initial encounter (H) 10/12/2021     Priority: Medium     Sacral insufficiency fracture 10/12/2021     Priority: Medium     Elevated troponin 03/27/2021     Priority: Medium     Troponin level elevated 03/27/2021     Priority: Medium     Urinary tract infection without hematuria, site unspecified 03/27/2021     Priority: Medium     Iron deficiency 07/13/2020     Priority: Medium     Spinal stenosis of lumbosacral region 07/13/2020     Priority: Medium     Sacroiliitis (H) 07/13/2020     Priority: Medium     Back pain 02/01/2020     Priority: Medium     Hyponatremia 02/01/2020     Priority: Medium     Rhabdomyolysis 02/01/2020     Priority: Medium     Hypokalemia 02/01/2020     Priority: Medium     Anemia 02/01/2020     Priority: Medium     Closed fracture of eleventh thoracic vertebra (H) 02/01/2020     Priority: Medium     Systolic murmur 02/01/2020     Priority: Medium     Nasal lesion 07/24/2019     Priority: Medium     Renal benign neoplasm, left 06/05/2019     Priority: Medium     Obesity (BMI 35.0-39.9) with comorbidity (H) 06/05/2019     Priority: Medium     Memory loss 06/05/2019     Priority: Medium     Acute right-sided low back pain with right-sided sciatica 06/05/2019     Priority: Medium     Renal mass, right 06/08/2017     Priority: Medium     MRSA (methicillin resistant Staphylococcus aureus) 06/01/2017     Priority: Medium     Formatting of this note might be different from the original.  Date & Source of First Known MRSA: 1/22/2016 - [facility]  [facility] [place]    Date and source of negative screens that qualify* for resolution of MRSA from infection table:     NONE    *2 sets of MRSA negative screens (previous positive site(s) if applicable and bilateral anterior nares) at least 7 days apart are required to resolve.   Screening exclusions include dialysis, long term care residence, antibiotics within the past 7 days, chronic wounds or invasive devices, & recurrent MRSA infections.  Please contact Infection Prevention for your facility if questions.       Postoperative anemia due to acute blood loss 01/24/2017     Priority: Medium     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 Provider to review and confirm.       CKD (chronic kidney disease) stage 2, GFR 60-89 ml/min 08/03/2012     Priority: Medium     Diastolic dysfunction 02/27/2012     Priority: Medium     echo 2/2012  EF 60%            Social History     Socioeconomic History     Marital status:      Spouse name: Dharmesh     Number of children: 5     Years of education: Not on file     Highest education level: Not on file   Occupational History     Employer: [employer]     Comment: part-time   Tobacco Use     Smoking status: Never Smoker     Smokeless tobacco: Never Used   Substance and Sexual Activity     Alcohol use: Yes     Comment: Mixed, rarely     Drug use: No     Sexual activity: Yes     Partners: Male   Other Topics Concern      Service No     Blood Transfusions Yes     Caffeine Concern Yes     Comment: Coffee, 4 cups daily     Occupational Exposure Not Asked     Hobby Hazards Not Asked     Sleep Concern Not Asked     Stress Concern Not Asked     Weight  Concern Not Asked     Special Diet Not Asked     Back Care Not Asked     Exercise No     Bike Helmet Not Asked     Seat Belt Yes     Self-Exams Yes     Parent/sibling w/ CABG, MI or angioplasty before 65F 55M? Not Asked   Social History Narrative     -- n/a    Caffeine --    Concussion --     Social Determinants of Health     Financial Resource Strain: Not on file   Food Insecurity: Not on file   Transportation Needs: Not on file   Physical Activity: Not on file   Stress: Not on file   Social Connections: Not on file   Intimate Partner Violence: Not on file   Housing Stability: Not on file        No current facility-administered medications for this visit.     No current outpatient medications on file.       No Known Allergies    I have reviewed the care plan and do agree with the plan.    ROS:  Unable to obtain.    OBJECTIVE:  BP (!) 70/40   Pulse 118   Temp 96.8  F (36  C)   Resp (!) 34   SpO2 90%     GENERAL: 89-year-old female, ill-appearing.   RESP: Tachypnea with rate of 36. Lower lobe crackles. No wheezing.     CV:  Tachycardia.  S1 S2 with 3/6 systolic murmur. No clicks or rubs.  ABD: Soft, non tender, non distended, no organomegaly. BS are normal.   SKIN:  Age-related changes.  No suspicious lesions or rashes.  PSYCH:  Affect is flat.   NEURO: Alerts to stimulation. Memory is impaired.   EXTREM:  No edema.    Lab/Diagnostic data:    Reviewed in Epic    ASSESSMENT/ORDERS:    Diagnoses and all orders for this visit:    Nursing home resident/Lethargy/Hypotension, unspecified hypotension type  89-year-old female resident of [facility] evaluated today for an acute visit related to a 12 hour history of lethargy. Patient requiring repeated stimulation to answer questions. Initially planned to attempt to manage patient at facility and obtain CBC, CRP, CMP, UA and chest xray. Patient was however found to be hypotensive with a BP of 70/40 and tachycardic at 120. Afebrile at this time, however did  receive her scheduled tylenol this morning. This writer elected to send the patient to the ER for further work-up and management due to concerns for sepsis. Possible etiology including UTI vs pneumonia. Patient does have history of recurrent UTI's. Report was called to Dr. Yeager in the ER. [facility] arrived at facility to transport.         Total time spent with patient visit was 30 min including patient visit, review of pertinent clinical information, and treatment plan.    Tamar Colin CNP

## 2022-07-20 NOTE — ED PROVIDER NOTES
History     Chief Complaint   Patient presents with     Fatigue     HPI  Claudine Bustos is a 89 year old female who presents from outside clinic where she received excellent care and was appropriately expedited to the ED for presumed urosepsis.  The patient notes she likely has urinary tract infection.  No chest pain.  No shortness of breath.  No other associated symptoms.  Duration ongoing.  Character persistent    Allergies:  No Known Allergies    Problem List:    Patient Active Problem List    Diagnosis Date Noted     Hypotension, unspecified hypotension type 07/20/2022     Priority: Medium     Alzheimer's disease (H) 12/06/2021     Priority: Medium     Pyelonephritis 10/12/2021     Priority: Medium     Generalized muscle weakness 10/12/2021     Priority: Medium     Unable to ambulate 10/12/2021     Priority: Medium     Right-sided low back pain without sciatica 10/12/2021     Priority: Medium     Closed fracture of sacrum, unspecified portion of sacrum, initial encounter (H) 10/12/2021     Priority: Medium     Sacral insufficiency fracture 10/12/2021     Priority: Medium     Elevated troponin 03/27/2021     Priority: Medium     Troponin level elevated 03/27/2021     Priority: Medium     Urinary tract infection without hematuria, site unspecified 03/27/2021     Priority: Medium     Iron deficiency 07/13/2020     Priority: Medium     Spinal stenosis of lumbosacral region 07/13/2020     Priority: Medium     Sacroiliitis (H) 07/13/2020     Priority: Medium     Back pain 02/01/2020     Priority: Medium     Hyponatremia 02/01/2020     Priority: Medium     Rhabdomyolysis 02/01/2020     Priority: Medium     Hypokalemia 02/01/2020     Priority: Medium     Anemia 02/01/2020     Priority: Medium     Closed fracture of eleventh thoracic vertebra (H) 02/01/2020     Priority: Medium     Systolic murmur 02/01/2020     Priority: Medium     Nasal lesion 07/24/2019     Priority: Medium     Renal benign neoplasm, left  06/05/2019     Priority: Medium     Obesity (BMI 35.0-39.9) with comorbidity (H) 06/05/2019     Priority: Medium     Memory loss 06/05/2019     Priority: Medium     Acute right-sided low back pain with right-sided sciatica 06/05/2019     Priority: Medium     Renal mass, right 06/08/2017     Priority: Medium     MRSA (methicillin resistant Staphylococcus aureus) 06/01/2017     Priority: Medium     Formatting of this note might be different from the original.  Date & Source of First Known MRSA: 1/22/2016 - [facility] [place]    Date and source of negative screens that qualify* for resolution of MRSA from infection table:     NONE    *2 sets of MRSA negative screens (previous positive site(s) if applicable and bilateral anterior nares) at least 7 days apart are required to resolve.   Screening exclusions include dialysis, long term care residence, antibiotics within the past 7 days, chronic wounds or invasive devices, & recurrent MRSA infections.  Please contact Infection Prevention for your facility if questions.       Postoperative anemia due to acute blood loss 01/24/2017     Priority: Medium     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 Provider to review and confirm.       Chronic kidney disease, unspecified 08/03/2012     Priority: Medium     Diastolic dysfunction 02/27/2012     Priority: Medium     echo 2/2012  EF 60%          Past Medical History:    Past Medical History:   Diagnosis Date     Chronic kidney disease, unspecified 8/3/2012     Diastolic dysfunction 2/27/2012     HTN (hypertension) 4/4/2000     Obesity (BMI 35.0-39.9) with comorbidity (H) 6/5/2019     Personal history of colonic polyps  6/8/2004     Renal benign neoplasm, left 2016     Skin cancer        Past Surgical History:    Past Surgical History:   Procedure Laterality Date     ARTHROPLASTY HIP Right 1/23/2017    Procedure: ARTHROPLASTY HIP;  Surgeon: Jose Manuel Muñoz MD;  Location: [facility]     ARTHROPLASTY KNEE Left 1/25/2016    Procedure: ARTHROPLASTY KNEE;  Surgeon: Jose Manuel Muñoz MD;  Location: [facility]     colonoscopy  2004, 2009     EXCISE LESION EYELID Left 8/1/2017    Procedure: EXCISE LESION EYELID;  LEFT LOWER LID WEDGE EXCISION WITH FROZEN SECTION CONTROL;  Surgeon: Quinten Sotelo MD;  Location: [facility]     excision of rectal villus adenoma       IR CONSULTATION FOR IR EXAM  7/15/2020     knee replacement Right 1/2011     PHACOEMULSIFICATION WITH STANDARD INTRAOCULAR LENS IMPLANT  2/25/2014    Procedure: PHACOEMULSIFICATION WITH STANDARD INTRAOCULAR LENS IMPLANT;  CATARACT EXTRACTION WITH INTRA OCULAR LENS LEFT;  Surgeon: Jah Bee MD;  Location: [facility]     PHACOEMULSIFICATION WITH STANDARD INTRAOCULAR LENS IMPLANT Right 5/12/2015    Procedure: PHACOEMULSIFICATION WITH STANDARD INTRAOCULAR LENS IMPLANT;  Surgeon: Jah Bee MD;  Location: [facility]     thoracic schwannoma resection       tonsillectomy       urethral dilitation every 4 months         Family History:    Family History   Problem Relation Age of Onset     Alcohol/Drug Father         alcoholism     Cancer Father 51        Esophageal, cause of death     Other - See Comments Father         Tobacco use     Cerebrovascular Disease Mother 51        Cause of death     Other - See Comments Maternal Grandmother      Other - See Comments Maternal Grandfather      Other - See Comments Paternal Grandmother      Other - See Comments Paternal Grandfather      Cancer Sister         Breast     Cancer Brother         Leukemia     Cancer Brother         Lung     Chronic Obstructive Pulmonary Disease Brother        Social History:  Marital Status:   [2]  Social  History     Tobacco Use     Smoking status: Never Smoker     Smokeless tobacco: Never Used   Substance Use Topics     Alcohol use: Yes     Comment: Mixed, rarely     Drug use: No        Medications:    acetaminophen (TYLENOL 8 HOUR ARTHRITIS PAIN) 650 MG CR tablet  acetaminophen (TYLENOL) 325 MG tablet  Ascorbic Acid (VITAMIN C) 500 MG CAPS  aspirin (ASA) 81 MG EC tablet  Calcium Carb-Cholecalciferol (CALCIUM + D3) 600-200 MG-UNIT TABS  Cholecalciferol (VITAMIN D) 50 MCG (2000 UT) CAPS  conjugated estrogens (PREMARIN) 0.625 MG/GM vaginal cream  Cranberry 500 MG CAPS  ferrous sulfate (FEROSUL) 325 (65 Fe) MG tablet  GABAPENTIN PO  lisinopril (ZESTRIL) 10 MG tablet  metoprolol tartrate (LOPRESSOR) 12.5 mg TABS half-tab  mirabegron (MYRBETRIQ) 50 MG 24 hr tablet  Multiple Vitamin (MULTIVITAMIN) per tablet  potassium chloride ER (KLOR-CON M) 10 MEQ CR tablet  Psyllium 0.36 g CAPS  traMADol (ULTRAM) 50 MG tablet          Review of Systems   Respiratory denies.  Cardiovascular denies.   per HPI.  GI denies.  Constitutional no fever.  Remainder of complete 10 point review of systems negative.    Physical Exam   BP: 94/53  Pulse: 99  Temp: 97.9  F (36.6  C)  Resp: 24  SpO2: 94 %      Physical Exam  HENT:      Head: Normocephalic.      Nose: Nose normal.      Mouth/Throat:      Mouth: Mucous membranes are dry.   Eyes:      Extraocular Movements: Extraocular movements intact.      Pupils: Pupils are equal, round, and reactive to light.   Cardiovascular:      Rate and Rhythm: Normal rate.      Pulses: Normal pulses.   Pulmonary:      Effort: Pulmonary effort is normal. No respiratory distress.      Breath sounds: No wheezing.   Abdominal:      General: There is no distension.      Palpations: Abdomen is soft.      Tenderness: There is no abdominal tenderness.   Musculoskeletal:         General: No swelling. Normal range of motion.      Cervical back: Normal range of motion and neck supple.   Skin:     General: Skin is warm  and dry.      Capillary Refill: Capillary refill takes less than 2 seconds.   Neurological:      General: No focal deficit present.      Mental Status: She is alert and oriented to person, place, and time.   Psychiatric:         Mood and Affect: Mood normal.       EKG shows normal sinus rhythm.  No acute ST-T wave changes.  Ventricular rate 93, IA interval 126, QRS 80, .    ED Course               Results for orders placed or performed during the hospital encounter of 07/20/22 (from the past 24 hour(s))   UA reflex to Microscopic and Culture    Specimen: Urine, Catheter   Result Value Ref Range    Color Urine Yellow Colorless, Straw, Light Yellow, Yellow    Appearance Urine Cloudy (A) Clear    Glucose Urine Negative Negative mg/dL    Bilirubin Urine Negative Negative    Ketones Urine Negative Negative mg/dL    Specific Gravity Urine 1.020 1.003 - 1.035    Blood Urine Moderate (A) Negative    pH Urine 6.0 4.7 - 8.0    Protein Albumin Urine Negative Negative mg/dL    Urobilinogen Urine Normal Normal, 2.0 mg/dL    Nitrite Urine Negative Negative    Leukocyte Esterase Urine Moderate (A) Negative    Bacteria Urine Many (A) None Seen /HPF    WBC Clumps Urine Present (A) None Seen /HPF    RBC Urine 7 (H) <=2 /HPF    WBC Urine >182 (H) <=5 /HPF    Squamous Epithelials Urine 5 (H) <=1 /HPF    Narrative    Urine Culture ordered based on laboratory criteria   UA with Microscopic reflex to Culture    Specimen: Urine, Catheter   Result Value Ref Range    Color Urine Yellow Colorless, Straw, Light Yellow, Yellow    Appearance Urine Cloudy (A) Clear    Glucose Urine Negative Negative mg/dL    Bilirubin Urine Negative Negative    Ketones Urine Negative Negative mg/dL    Specific Gravity Urine 1.020 1.003 - 1.035    Blood Urine Moderate (A) Negative    pH Urine 6.0 4.7 - 8.0    Protein Albumin Urine Negative Negative mg/dL    Urobilinogen Urine Normal Normal, 2.0 mg/dL    Nitrite Urine Negative Negative    Leukocyte Esterase  Urine Moderate (A) Negative    Bacteria Urine Many (A) None Seen /HPF    WBC Clumps Urine Present (A) None Seen /HPF    RBC Urine 7 (H) <=2 /HPF    WBC Urine >182 (H) <=5 /HPF    Squamous Epithelials Urine 5 (H) <=1 /HPF    Narrative    Urine Culture ordered based on laboratory criteria   CBC with platelets differential    Narrative    The following orders were created for panel order CBC with platelets differential.  Procedure                               Abnormality         Status                     ---------                               -----------         ------                     CBC with platelets and d...[[id number]]  Abnormal            Final result                 Please view results for these tests on the individual orders.   INR   Result Value Ref Range    INR 1.12 0.85 - 1.15   Basic metabolic panel   Result Value Ref Range    Sodium 133 133 - 144 mmol/L    Potassium 4.3 3.4 - 5.3 mmol/L    Chloride 104 94 - 109 mmol/L    Carbon Dioxide (CO2) 21 20 - 32 mmol/L    Anion Gap 8 3 - 14 mmol/L    Urea Nitrogen 31 (H) 7 - 30 mg/dL    Creatinine 1.60 (H) 0.52 - 1.04 mg/dL    Calcium 8.4 (L) 8.5 - 10.1 mg/dL    Glucose 142 (H) 70 - 99 mg/dL    GFR Estimate 30 (L) >60 mL/min/1.73m2   Lactic acid whole blood   Result Value Ref Range    Lactic Acid 1.4 0.7 - 2.0 mmol/L   CBC with platelets and differential   Result Value Ref Range    WBC Count 30.0 (H) 4.0 - 11.0 10e3/uL    RBC Count 3.90 3.80 - 5.20 10e6/uL    Hemoglobin 11.3 (L) 11.7 - 15.7 g/dL    Hematocrit 34.5 (L) 35.0 - 47.0 %    MCV 89 78 - 100 fL    MCH 29.0 26.5 - 33.0 pg    MCHC 32.8 31.5 - 36.5 g/dL    RDW 13.7 10.0 - 15.0 %    Platelet Count 406 150 - 450 10e3/uL    % Neutrophils 84 %    % Lymphocytes 6 %    % Monocytes 9 %    % Eosinophils 0 %    % Basophils 0 %    % Immature Granulocytes 1 %    NRBCs per 100 WBC 0 <1 /100    Absolute Neutrophils 25.1 (H) 1.6 - 8.3 10e3/uL    Absolute Lymphocytes 1.6 0.8 - 5.3 10e3/uL    Absolute Monocytes 2.8 (H)  0.0 - 1.3 10e3/uL    Absolute Eosinophils 0.1 0.0 - 0.7 10e3/uL    Absolute Basophils 0.1 0.0 - 0.2 10e3/uL    Absolute Immature Granulocytes 0.3 <=0.4 10e3/uL    Absolute NRBCs 0.0 10e3/uL   Extra Tube (Ophiem Draw)    Narrative    The following orders were created for panel order Extra Tube (Ophiem Draw).  Procedure                               Abnormality         Status                     ---------                               -----------         ------                     Extra Blue Top Tube[[id number]]                              Final result               Extra Red Top Tube[[id number]]                               Final result               Extra Green Top (Lithium...[[id number]]                      Final result               Extra Green Top (Lithium...[[id number]]                      Final result               Extra Purple Top Tube[[id number]]                            Final result                 Please view results for these tests on the individual orders.   Extra Blue Top Tube   Result Value Ref Range    Hold Specimen JIC    Extra Red Top Tube   Result Value Ref Range    Hold Specimen JIC    Extra Green Top (Lithium Heparin) Tube   Result Value Ref Range    Hold Specimen JIC    Extra Green Top (Lithium Heparin) Tube   Result Value Ref Range    Hold Specimen JIC    Extra Purple Top Tube   Result Value Ref Range    Hold Specimen JIC    XR Chest Port 1 View    Narrative    PROCEDURE:  XR CHEST PORT 1 VIEW    HISTORY: fatigue    COMPARISON:  Chest radiographs 2/1/2020, 1/21/2018    FINDINGS:  Heart/mediastinum: The cardiomediastinal contours are stable and  within normal limits. There is mild calcific aortic atherosclerosis.   Lungs and pleural spaces: No focal consolidation, effusion or  pneumothorax. A few left lower lobe opacities with stable lingular  scarring.  Bones and soft tissues: No definite acute osseous abnormality.  Degenerative changes of the shoulders. No upper abdominal free air.  Upper  abdominal surgical clips.      Impression    IMPRESSION:    A few left lower lobe opacities, favored atelectasis versus scarring,  less likely infection.    MOE COLINDRES MD         SYSTEM ID:  [id number]   Asymptomatic COVID-19 Virus (Coronavirus) by PCR Nasopharyngeal    Specimen: Nasopharyngeal; Swab   Result Value Ref Range    SARS CoV2 PCR Negative Negative    Narrative    Testing was performed using the Xpert Xpress SARS-CoV-2 Assay on the   Cepheid Gene-Xpert Instrument Systems. Additional information about   this Emergency Use Authorization (EUA) assay can be found via the Lab   Guide. This test should be ordered for the detection of SARS-CoV-2 in   individuals who meet SARS-CoV-2 clinical and/or epidemiological   criteria. Test performance is unknown in asymptomatic patients. This   test is for in vitro diagnostic use under the FDA EUA for   laboratories certified under CLIA to perform high complexity testing.   This test has not been FDA cleared or approved. A negative result   does not rule out the presence of PCR inhibitors in the specimen or   target RNA in concentration below the limit of detection for the   assay. The possibility of a false negative should be considered if   the patient's recent exposure or clinical presentation suggests   COVID-19. This test was validated by [facility] laboratory. This laboratory is certified under the Clinical Laboratory Improve  ment Amendments (CLIA) as qualified to perform high complexity testing.       Medications   0.9% sodium chloride BOLUS (1,000 mLs Intravenous New Bag 7/20/22 1251)   cefTRIAXone in d5w (ROCEPHIN) intermittent infusion 1 g (0 g Intravenous Stopped 7/20/22 1324)       Assessments & Plan (with Medical Decision Making)   Patient with urosepsis as evidenced by UTI, leukocytosis and hypotension.  Ceftriaxone given.  Hospitalist has seen the patient.  Plan for admission to the ICU.  Fluids being given.    Total critical care time  50 minutes.  No procedures    New Prescriptions    No medications on file       Final diagnoses:   Urinary tract infection without hematuria, site unspecified   Hypotension, unspecified hypotension type       7/20/2022   [facility]     Jose Manuel Yeager MD  07/20/22 5300

## 2022-07-21 LAB
ANION GAP SERPL CALCULATED.3IONS-SCNC: 8 MMOL/L (ref 3–14)
BUN SERPL-MCNC: 28 MG/DL (ref 7–30)
CALCIUM SERPL-MCNC: 8.2 MG/DL (ref 8.5–10.1)
CHLORIDE BLD-SCNC: 109 MMOL/L (ref 94–109)
CO2 SERPL-SCNC: 21 MMOL/L (ref 20–32)
CREAT SERPL-MCNC: 1.43 MG/DL (ref 0.52–1.04)
ERYTHROCYTE [DISTWIDTH] IN BLOOD BY AUTOMATED COUNT: 14 % (ref 10–15)
GFR SERPL CREATININE-BSD FRML MDRD: 34 ML/MIN/1.73M2
GLUCOSE BLD-MCNC: 126 MG/DL (ref 70–99)
GLUCOSE BLDC GLUCOMTR-MCNC: 114 MG/DL (ref 70–99)
GLUCOSE BLDC GLUCOMTR-MCNC: 137 MG/DL (ref 70–99)
GLUCOSE BLDC GLUCOMTR-MCNC: 144 MG/DL (ref 70–99)
GLUCOSE BLDC GLUCOMTR-MCNC: 96 MG/DL (ref 70–99)
HCT VFR BLD AUTO: 36.7 % (ref 35–47)
HGB BLD-MCNC: 11.6 G/DL (ref 11.7–15.7)
LACTATE SERPL-SCNC: 1.7 MMOL/L (ref 0.7–2)
MCH RBC QN AUTO: 28.9 PG (ref 26.5–33)
MCHC RBC AUTO-ENTMCNC: 31.6 G/DL (ref 31.5–36.5)
MCV RBC AUTO: 91 FL (ref 78–100)
NT-PROBNP SERPL-MCNC: 6936 PG/ML (ref 0–1800)
PLATELET # BLD AUTO: 421 10E3/UL (ref 150–450)
POTASSIUM BLD-SCNC: 4.2 MMOL/L (ref 3.4–5.3)
PROCALCITONIN SERPL-MCNC: 7.99 NG/ML
RBC # BLD AUTO: 4.02 10E6/UL (ref 3.8–5.2)
SODIUM SERPL-SCNC: 138 MMOL/L (ref 133–144)
WBC # BLD AUTO: 35.4 10E3/UL (ref 4–11)

## 2022-07-21 PROCEDURE — 99233 SBSQ HOSP IP/OBS HIGH 50: CPT | Performed by: INTERNAL MEDICINE

## 2022-07-21 PROCEDURE — 250N000009 HC RX 250: Performed by: INTERNAL MEDICINE

## 2022-07-21 PROCEDURE — 84145 PROCALCITONIN (PCT): CPT | Performed by: NURSE PRACTITIONER

## 2022-07-21 PROCEDURE — 250N000011 HC RX IP 250 OP 636: Performed by: NURSE PRACTITIONER

## 2022-07-21 PROCEDURE — 36415 COLL VENOUS BLD VENIPUNCTURE: CPT | Performed by: NURSE PRACTITIONER

## 2022-07-21 PROCEDURE — 258N000003 HC RX IP 258 OP 636: Performed by: INTERNAL MEDICINE

## 2022-07-21 PROCEDURE — 83880 ASSAY OF NATRIURETIC PEPTIDE: CPT | Performed by: INTERNAL MEDICINE

## 2022-07-21 PROCEDURE — 250N000013 HC RX MED GY IP 250 OP 250 PS 637: Performed by: NURSE PRACTITIONER

## 2022-07-21 PROCEDURE — 250N000013 HC RX MED GY IP 250 OP 250 PS 637: Performed by: INTERNAL MEDICINE

## 2022-07-21 PROCEDURE — 85027 COMPLETE CBC AUTOMATED: CPT | Performed by: NURSE PRACTITIONER

## 2022-07-21 PROCEDURE — 200N000001 HC R&B ICU

## 2022-07-21 PROCEDURE — 83605 ASSAY OF LACTIC ACID: CPT | Performed by: NURSE PRACTITIONER

## 2022-07-21 PROCEDURE — 80048 BASIC METABOLIC PNL TOTAL CA: CPT | Performed by: NURSE PRACTITIONER

## 2022-07-21 RX ORDER — METOPROLOL TARTRATE 1 MG/ML
5 INJECTION, SOLUTION INTRAVENOUS ONCE
Status: COMPLETED | OUTPATIENT
Start: 2022-07-21 | End: 2022-07-21

## 2022-07-21 RX ADMIN — SODIUM CHLORIDE 1000 ML: 9 INJECTION, SOLUTION INTRAVENOUS at 05:24

## 2022-07-21 RX ADMIN — METOPROLOL TARTRATE 12.5 MG: 25 TABLET, FILM COATED ORAL at 22:05

## 2022-07-21 RX ADMIN — ACETAMINOPHEN 650 MG: 325 TABLET, FILM COATED ORAL at 08:13

## 2022-07-21 RX ADMIN — METOPROLOL TARTRATE 12.5 MG: 25 TABLET, FILM COATED ORAL at 08:13

## 2022-07-21 RX ADMIN — CEFTRIAXONE SODIUM 1 G: 1 INJECTION, SOLUTION INTRAVENOUS at 12:54

## 2022-07-21 RX ADMIN — METOPROLOL TARTRATE 5 MG: 1 INJECTION, SOLUTION INTRAVENOUS at 05:24

## 2022-07-21 RX ADMIN — ENOXAPARIN SODIUM 30 MG: 30 INJECTION SUBCUTANEOUS at 16:30

## 2022-07-21 RX ADMIN — DOCUSATE SODIUM 100 MG: 100 CAPSULE ORAL at 22:05

## 2022-07-21 RX ADMIN — ACETAMINOPHEN 650 MG: 325 TABLET, FILM COATED ORAL at 18:36

## 2022-07-21 ASSESSMENT — ACTIVITIES OF DAILY LIVING (ADL)
ADLS_ACUITY_SCORE: 52
ADLS_ACUITY_SCORE: 54
ADLS_ACUITY_SCORE: 52
ADLS_ACUITY_SCORE: 42
ADLS_ACUITY_SCORE: 56
ADLS_ACUITY_SCORE: 56
ADLS_ACUITY_SCORE: 54
ADLS_ACUITY_SCORE: 54
ADLS_ACUITY_SCORE: 42

## 2022-07-21 NOTE — PROGRESS NOTES
----- Message from Mike Olivares sent at 5/31/2022  2:27 PM CDT -----  Contact: self  Type:  Test Results    Who Called: Mary Flanagan   Name of Test (Lab/Mammo/Etc):  colonoscopy   Date of Test:  5/26/22   Ordering Provider:  Kiran   Where the test was performed:  [place]   Would the patient rather a call back or a response via [facility]?  Call back   Best Call Back Number:  [phone]   Additional Information:           Changed maintenance fluids to TKO as she started to sound wet    Tachy after Norepinephrine stopped, so added BNP to am labs and restarted Metoprolol    Appeared to be having rigors, so added IV fluids

## 2022-07-21 NOTE — PHARMACY-MEDICATION REGIMEN REVIEW
Pharmacy Antimicrobial Stewardship Assessment     Current Antimicrobial Therapy:  Anti-infectives (From now, onward)    Start     Dose/Rate Route Frequency Ordered Stop    22 1300  cefTRIAXone in d5w (ROCEPHIN) intermittent infusion 1 g         1 g  over 30 Minutes Intravenous EVERY 24 HOURS 22 1503 22 1259          Indication: Sepsis, Urinary Tract Infection     Days of Therapy: 2     Pertinent Labs:  Recent Labs   Lab Test 22  0451 22  1201 10/15/21  0506   WBC 35.4* 30.0* 8.0     Lab Test 22  0451 22  1500 22  1209 22  1201   LACT 1.7 1.8  --    < >   CRP  --   --   --   --    PCAL 7.99*  --  7.79*  --         Temperature:  Temp (24hrs), Av.8  F (37.7  C), Min:97.9  F (36.6  C), Max:102  F (38.9  C)    Culture Results:       Recommendations/Interventions:  1. No recommendations are required at the moment, will continue to monitor.    Roque Sierra, Pharmacy Intern  2022

## 2022-07-21 NOTE — PROGRESS NOTES
Patient resting comfortably overnight until 0400.  Between 0400 and 0500 HR increase from 90s to 150s, progressive wet cough, increased tremor/rigors, respiratory rate to 30s/40s.  Patient denied pain or discomfort, remained alert and oriented to self.  Dr. Sotelo started bolus for rigors.  1L nasal cannula.  Medium bowel movement at 0600.  We were able to titrate off pressors around 0430.  Continue antibiotics today.      Face to face report given with opportunity to observe patient.    Report given to Kika, Juliana.      Kirit Domingo RN   7/21/2022  7:03 AM

## 2022-07-21 NOTE — PROGRESS NOTES
[facility]    Hospitalist Progress Note      Assessment & Plan   Claudine Bustos is a 89 year old female who was admitted on 7/20/2022.  She initially presented to the ER from the nursing home due to being more lethargic per the nursing home staff.  Her pressures initially were right around  systolic but they did drop down to 70 systolic she was afebrile heart rate 120 sats 90% on room air.  The concern for sepsis was identified and was transferred to the emergency room.  Lab work-up there showed white count in the urine greater than 32,000 with clumps many bacteria were seen.  BUN/creatinine are 31/1.60, white count 30,000 hemoglobin 11.3.  Lactic acid 1.4.  She was transferred to the ICU for further treatment and evaluation.  She was given 1 g of IV the Rocephin.    1.  Septic shock secondary to acute cystitis: Patient initially was somewhat hypotensive.  She did get IV fluids the exact amount is difficult to calculate but between 2 to 3 L.  She was started on IV Levophed also to help with her hypotension.  This has subsequently been weaned off.  Pressures were up to 160 earlier.  This morning she is alert her sats are normal on room air heart rates come down into the 90s pressures are rate around 90 systolic however now.  We will make sure that she is getting adequate fluid.  Had cut back on it however we will increase her fluid rate sitting upright she was had flat neck veins.  We will continue fluids do not feel she requires pressors again at this point.  We will monitor very closely.    2.  Acute cystitis: She is growing a lactose fermenting gram-negative bacillus.  Currently is on IV Rocephin.  Procalcitonin was 7.99.  White count 35,400 this morning.  No fevers.  We will continue with her current regimen and monitoring closely.    3.  Renal: Patient came with a BUN/creatinine 31 1.6 down to 21.43 currently.  Does have a Castro catheter in place to monitor I's and O's.  Urine output is  adequate at this point.  She put out 1400 cc.  We will keep this in until we sure she remains hemodynamically stable.    4.  Cardiac status: Patient echo was done 2 years ago showed normal systolic function.  With at least probably moderate aortic stenosis.  She is not have any dysrhythmias at all.  BNP was rather elevated but not quite sure what to make of that in this patient here currently.  Monitor closely.  She was restarted on her low-dose beta-blocker.    5.  Pulmonary status: Room air sats currently.  Lungs are clear.  Did have a little increased respiratory rate earlier that has improved.  Is up in chair continue to work on pulmonary toilet.      Diet: Combination Diet Regular Diet  Fluids: Normal saline 100 cc an hour    DVT Prophylaxis: Enoxaparin (Lovenox) SQ  Code Status: No CPR- Do NOT Intubate    Disposition: Expected discharge in 2-5 days once stable on oral antibiotic.    Darren Cordeor MD    Interval History   Patient this morning was alert appropriate denies any real complaints.  And no shortness of breath chest pain nausea vomiting.  She been on Levophed for part of the evening that was discontinued pressures have been in the 150-60 systolic range.  Her IV fluids have been put down to 50 cc an hour.  No fevers.  She was on 1 L of O2 now is on room air.  Remains in sinus rhythm is doing relatively well pressures have drifted down to around  systolic range.  Feels well.  We will continue to monitor closely.  Bolus fluid if necessary.  Neck veins are flat upon my examination.  Growing out gram-negative bacilli lactose fermenting in her urine.  Is on Rocephin.  We will continue to monitor in ICU through the next 24 hours.    -Data reviewed today: I reviewed all new labs and imaging results over the last 24 hours. I personally reviewed no images or EKG's today.    Physical Exam   Temp: 99.6  F (37.6  C) Temp src: Tympanic BP: (!) 166/106 Pulse: (!) 128   Resp: 13 SpO2: 93 % O2 Device: Nasal  cannula Oxygen Delivery: 2 LPM  Vitals:    07/20/22 1500 07/21/22 0430   Weight: 64.8 kg (142 lb 13.7 oz) 66.5 kg (146 lb 9.7 oz)     Vital Signs with Ranges  Temp:  [96.8  F (36  C)-102  F (38.9  C)] 99.6  F (37.6  C)  Pulse:  [] 128  Resp:  [9-112] 13  BP: ()/() 166/106  SpO2:  [89 %-98 %] 93 %  I/O last 3 completed shifts:  In: 1025 [I.V.:1025]  Out: 500 [Urine:500]    Peripheral IV 07/20/22 Left Upper forearm (Active)   Site Assessment [facility] 07/20/22 1745   Line Status Saline locked 07/20/22 1745   Phlebitis Scale 0-->no symptoms 07/20/22 1745   Infiltration Scale 0 07/20/22 1745   Number of days: 1       Peripheral IV 07/20/22 Anterior;Right Lower forearm (Active)   Site Assessment [facility] 07/20/22 1745   Line Status Infusing;Checked every 1-2 hour 07/20/22 1745   Dressing Intervention New dressing  07/20/22 1655   Phlebitis Scale 0-->no symptoms 07/20/22 1745   Infiltration Scale 0 07/20/22 1745   Number of days: 1       Urethral Catheter 07/20/22 (Active)   Tube Description Positional 07/20/22 2000   Catheter Care Catheter wipes 07/20/22 2000   Collection Container Standard 07/21/22 0400   Securement Method Securing device (Describe) 07/21/22 0400   Rationale for Continued Use Strict 1-2 Hour I&O 07/21/22 0400   Urine Output 175 mL 07/21/22 0539   Number of days: 1       Incision/Surgical Site 01/25/16 Left Knee (Active)   Number of days: 2369       Incision/Surgical Site 01/23/17 Right Hip (Active)   Number of days: 2005       Incision/Surgical Site 08/01/17 Left;Lower Eye (Active)   Number of days: 1815     No line/device    Constitutional: Alert and oriented x3. No distress    HEENT: Normocephalic/atraumatic, PERRL, EOMI, mouth moist, neck supple, no LN.     Cardiovascular: RRR.  2/6 systolic murmur, no  rubs, or gallops.  JVD flat.  Bruits no.  Pulses 2+    Respiratory: Clear to auscultation bilaterally    Abdomen: Soft, nontender, nondistended, no organomegaly. Bowel sounds  present    Extremities: Warm/dry.  No edema    Neuro:   Non focal, cranial nerves intact, Moves all extremities.  Medications     norepinephrine Stopped (07/21/22 0445)     sodium chloride 10 mL/hr at 07/20/22 2355       cefTRIAXone  1 g Intravenous Q24H     docusate sodium  100 mg Oral BID     enoxaparin ANTICOAGULANT  30 mg Subcutaneous Q24H     metoprolol tartrate  12.5 mg Oral BID     sodium chloride (PF)  3 mL Intracatheter Q8H       Data   Recent Labs   Lab 07/21/22  0451 07/21/22  0210 07/20/22  1631 07/20/22  1201   WBC 35.4*  --   --  30.0*   HGB 11.6*  --   --  11.3*   MCV 91  --   --  89     --   --  406   INR  --   --   --  1.12     --   --  133   POTASSIUM 4.2  --   --  4.3   CHLORIDE 109  --   --  104   CO2 21  --   --  21   BUN 28  --   --  31*   CR 1.43*  --   --  1.60*   ANIONGAP 8  --   --  8   LUZMARIA 8.2*  --   --  8.4*   * 144* 109* 142*       Recent Results (from the past 24 hour(s))   XR Chest Port 1 View    Narrative    PROCEDURE:  XR CHEST PORT 1 VIEW    HISTORY: fatigue    COMPARISON:  Chest radiographs 2/1/2020, 1/21/2018    FINDINGS:  Heart/mediastinum: The cardiomediastinal contours are stable and  within normal limits. There is mild calcific aortic atherosclerosis.   Lungs and pleural spaces: No focal consolidation, effusion or  pneumothorax. A few left lower lobe opacities with stable lingular  scarring.  Bones and soft tissues: No definite acute osseous abnormality.  Degenerative changes of the shoulders. No upper abdominal free air.  Upper abdominal surgical clips.      Impression    IMPRESSION:    A few left lower lobe opacities, favored atelectasis versus scarring,  less likely infection.    MEO COLINDRES MD         SYSTEM ID:  [id number]

## 2022-07-21 NOTE — PROGRESS NOTES
"Provider notification.  Text page sent to Dr. Sotelo:      \"Over the last hour she became tachycardic to 140s, titrated off norepinephrine systolic 130s, tachypneic, worsened tremor, and a congested cough.  Looks very uncomfortable but denies pain or discomfort.  CXR?\"    New orders placed for labs and 1L NS bolus.    "

## 2022-07-21 NOTE — PROGRESS NOTES
Provider notification, Dr. Sotelo text paged:     BNP 6,742.  Would you like to continue the bolus?      Response pending.

## 2022-07-21 NOTE — PROGRESS NOTES
"CLINICAL NUTRITION SERVICES  -  ASSESSMENT NOTE    Claudine Beattyakar : Admission Nutrition Risk Screen - unsure of weight loss    89 yof admitted for sepsis with acute renal failure and septic shock, acute cystitis. Pt has a medical hx including HTN. No recent weight loss noted. Only one meal documented this admission, good appetite.     Diet Order: Regular  Intake: 75% x 1 meal    Height: 4' 11\"  Weight: 146 lbs 9.69 oz  Body mass index is 29.61 kg/m .  Weight Status:  Overweight BMI 25-29.9  Weight History: admit weight 64.8kg (142 lb 13.7 oz)  Wt Readings from Last 10 Encounters:   07/21/22 66.5 kg (146 lb 9.7 oz)   01/07/22 63 kg (139 lb)   10/27/21 66.7 kg (147 lb)   09/28/21 60.8 kg (134 lb)   03/29/21 60.8 kg (134 lb 0.6 oz)   07/13/20 65.8 kg (145 lb)   02/19/20 72.6 kg (160 lb)   02/01/20 73.7 kg (162 lb 7.7 oz)   08/29/19 76.6 kg (168 lb 12.8 oz)   07/24/19 75.7 kg (166 lb 12.8 oz)      Malnutrition Diagnosis: Patient does not meet two of the criteria necessary for diagnosing malnutrition    NUTRITION RECOMMENDATIONS  - Encourage intake during meal times    MONITORING AND EVALUATION:  RD will monitor intake, weight, labs      "

## 2022-07-21 NOTE — PLAN OF CARE
"/99   Pulse 104   Temp 100.4  F (38  C) (Tympanic)   Resp 26   Ht 1.499 m (4' 11\")   Wt 66.5 kg (146 lb 9.7 oz)   SpO2 93%   BMI 29.61 kg/m        Pt is alert, confused to time and situation. Reiorented to environment. Neuros intact.     SpO2 stable 91-95% on RA. Lungs are clear in the uppers with fine crackles in the bases. Weak cough at times, encouraged to cough and deep breathe. SR running in the 80s-100s.     BP soft but remains stable off of levophed, dips down while patient sleeps. Bowels active, incontinent of stool. Castro remains in place, adequate output see flowsheets. Remains on IV antibiotics see MAR. BG checked q 4 hrs, 114, 137, and 96 see labs. IV NS running at 100/hr, second IV SL at this time. Colace held this am due to loose stools. Assist of two, weight shifting up in the chair for most of the shift. Appetite was fair, encouraging PO fluids. Tylenol given twice for elevated temps, last given at 18:00 for temp of 100.4. Remains free of injury, alarms on, and call light within reach. Face to face report given with opportunity to observe patient.    Report given to Maykel Roa RN   7/21/2022  7:11 PM    "

## 2022-07-21 NOTE — PROGRESS NOTES
MANUELA FONSECA/   Visit requested by patient.  Remington is Confucianist from [facility] in [place]. She has requested a  to come up to visit.

## 2022-07-21 NOTE — PROGRESS NOTES
Care Transitions focused note:      Spoke with daughter Latonia who acknowledged understanding of Medicare Letter. The plan is to go back to [facility] when medically ready.    LVM for [facility] to return my call.    Mitzi Chavez CM

## 2022-07-22 LAB
ALBUMIN SERPL-MCNC: 1.8 G/DL (ref 3.4–5)
ALP SERPL-CCNC: 427 U/L (ref 40–150)
ALT SERPL W P-5'-P-CCNC: 30 U/L (ref 0–50)
ANION GAP SERPL CALCULATED.3IONS-SCNC: 10 MMOL/L (ref 3–14)
AST SERPL W P-5'-P-CCNC: 30 U/L (ref 0–45)
BACTERIA UR CULT: ABNORMAL
BILIRUB SERPL-MCNC: 0.2 MG/DL (ref 0.2–1.3)
BUN SERPL-MCNC: 24 MG/DL (ref 7–30)
C DIFF TOX B STL QL: NEGATIVE
CALCIUM SERPL-MCNC: 8.1 MG/DL (ref 8.5–10.1)
CHLORIDE BLD-SCNC: 113 MMOL/L (ref 94–109)
CO2 SERPL-SCNC: 19 MMOL/L (ref 20–32)
CREAT SERPL-MCNC: 1.12 MG/DL (ref 0.52–1.04)
ERYTHROCYTE [DISTWIDTH] IN BLOOD BY AUTOMATED COUNT: 14.4 % (ref 10–15)
GFR SERPL CREATININE-BSD FRML MDRD: 46 ML/MIN/1.73M2
GLUCOSE BLD-MCNC: 104 MG/DL (ref 70–99)
HCT VFR BLD AUTO: 32.7 % (ref 35–47)
HGB BLD-MCNC: 10.3 G/DL (ref 11.7–15.7)
LACTATE SERPL-SCNC: 1.1 MMOL/L (ref 0.7–2)
MCH RBC QN AUTO: 29 PG (ref 26.5–33)
MCHC RBC AUTO-ENTMCNC: 31.5 G/DL (ref 31.5–36.5)
MCV RBC AUTO: 92 FL (ref 78–100)
PLATELET # BLD AUTO: 320 10E3/UL (ref 150–450)
POTASSIUM BLD-SCNC: 3.8 MMOL/L (ref 3.4–5.3)
PROCALCITONIN SERPL-MCNC: 7.42 NG/ML
PROT SERPL-MCNC: 6.2 G/DL (ref 6.8–8.8)
RBC # BLD AUTO: 3.55 10E6/UL (ref 3.8–5.2)
SODIUM SERPL-SCNC: 142 MMOL/L (ref 133–144)
WBC # BLD AUTO: 23.7 10E3/UL (ref 4–11)

## 2022-07-22 PROCEDURE — 250N000011 HC RX IP 250 OP 636: Performed by: NURSE PRACTITIONER

## 2022-07-22 PROCEDURE — 250N000013 HC RX MED GY IP 250 OP 250 PS 637: Performed by: INTERNAL MEDICINE

## 2022-07-22 PROCEDURE — 80053 COMPREHEN METABOLIC PANEL: CPT | Performed by: INTERNAL MEDICINE

## 2022-07-22 PROCEDURE — 85027 COMPLETE CBC AUTOMATED: CPT | Performed by: INTERNAL MEDICINE

## 2022-07-22 PROCEDURE — 258N000003 HC RX IP 258 OP 636: Performed by: INTERNAL MEDICINE

## 2022-07-22 PROCEDURE — 120N000001 HC R&B MED SURG/OB

## 2022-07-22 PROCEDURE — 250N000013 HC RX MED GY IP 250 OP 250 PS 637: Performed by: NURSE PRACTITIONER

## 2022-07-22 PROCEDURE — 87493 C DIFF AMPLIFIED PROBE: CPT | Performed by: INTERNAL MEDICINE

## 2022-07-22 PROCEDURE — 99233 SBSQ HOSP IP/OBS HIGH 50: CPT | Performed by: INTERNAL MEDICINE

## 2022-07-22 PROCEDURE — 83605 ASSAY OF LACTIC ACID: CPT | Performed by: INTERNAL MEDICINE

## 2022-07-22 PROCEDURE — 250N000011 HC RX IP 250 OP 636: Performed by: INTERNAL MEDICINE

## 2022-07-22 PROCEDURE — 36415 COLL VENOUS BLD VENIPUNCTURE: CPT | Performed by: INTERNAL MEDICINE

## 2022-07-22 PROCEDURE — 84145 PROCALCITONIN (PCT): CPT | Performed by: INTERNAL MEDICINE

## 2022-07-22 RX ORDER — NALOXONE HYDROCHLORIDE 0.4 MG/ML
0.4 INJECTION, SOLUTION INTRAMUSCULAR; INTRAVENOUS; SUBCUTANEOUS
Status: DISCONTINUED | OUTPATIENT
Start: 2022-07-22 | End: 2022-07-25 | Stop reason: HOSPADM

## 2022-07-22 RX ORDER — DOCUSATE SODIUM 100 MG/1
100 CAPSULE, LIQUID FILLED ORAL 2 TIMES DAILY PRN
Status: DISCONTINUED | OUTPATIENT
Start: 2022-07-22 | End: 2022-07-25 | Stop reason: HOSPADM

## 2022-07-22 RX ORDER — LISINOPRIL 10 MG/1
10 TABLET ORAL DAILY
Status: DISCONTINUED | OUTPATIENT
Start: 2022-07-22 | End: 2022-07-25 | Stop reason: HOSPADM

## 2022-07-22 RX ORDER — GABAPENTIN 100 MG/1
200 CAPSULE ORAL 3 TIMES DAILY
Status: DISCONTINUED | OUTPATIENT
Start: 2022-07-22 | End: 2022-07-25 | Stop reason: HOSPADM

## 2022-07-22 RX ORDER — ENOXAPARIN SODIUM 100 MG/ML
40 INJECTION SUBCUTANEOUS EVERY 24 HOURS
Status: DISCONTINUED | OUTPATIENT
Start: 2022-07-22 | End: 2022-07-25 | Stop reason: HOSPADM

## 2022-07-22 RX ORDER — NALOXONE HYDROCHLORIDE 0.4 MG/ML
0.2 INJECTION, SOLUTION INTRAMUSCULAR; INTRAVENOUS; SUBCUTANEOUS
Status: DISCONTINUED | OUTPATIENT
Start: 2022-07-22 | End: 2022-07-25 | Stop reason: HOSPADM

## 2022-07-22 RX ORDER — TRAMADOL HYDROCHLORIDE 50 MG/1
50 TABLET ORAL EVERY 6 HOURS PRN
Status: DISCONTINUED | OUTPATIENT
Start: 2022-07-22 | End: 2022-07-25 | Stop reason: HOSPADM

## 2022-07-22 RX ADMIN — GABAPENTIN 200 MG: 100 CAPSULE ORAL at 20:57

## 2022-07-22 RX ADMIN — GABAPENTIN 200 MG: 100 CAPSULE ORAL at 16:46

## 2022-07-22 RX ADMIN — SODIUM CHLORIDE: 9 INJECTION, SOLUTION INTRAVENOUS at 14:24

## 2022-07-22 RX ADMIN — METOPROLOL TARTRATE 12.5 MG: 25 TABLET, FILM COATED ORAL at 20:57

## 2022-07-22 RX ADMIN — CEFTRIAXONE SODIUM 1 G: 1 INJECTION, SOLUTION INTRAVENOUS at 12:31

## 2022-07-22 RX ADMIN — METOPROLOL TARTRATE 12.5 MG: 25 TABLET, FILM COATED ORAL at 08:23

## 2022-07-22 RX ADMIN — ACETAMINOPHEN 650 MG: 325 TABLET, FILM COATED ORAL at 06:14

## 2022-07-22 RX ADMIN — DOCUSATE SODIUM 100 MG: 100 CAPSULE ORAL at 08:22

## 2022-07-22 RX ADMIN — ACETAMINOPHEN 650 MG: 325 TABLET, FILM COATED ORAL at 20:59

## 2022-07-22 RX ADMIN — ENOXAPARIN SODIUM 40 MG: 40 INJECTION SUBCUTANEOUS at 16:46

## 2022-07-22 ASSESSMENT — ACTIVITIES OF DAILY LIVING (ADL)
ADLS_ACUITY_SCORE: 56
ADLS_ACUITY_SCORE: 52
ADLS_ACUITY_SCORE: 56

## 2022-07-22 NOTE — PHARMACY-MEDICATION REGIMEN REVIEW
Pharmacy Antimicrobial Stewardship Assessment     Current Antimicrobial Therapy:  Anti-infectives (From now, onward)    Start     Dose/Rate Route Frequency Ordered Stop    22 1300  cefTRIAXone in d5w (ROCEPHIN) intermittent infusion 1 g         1 g  over 30 Minutes Intravenous EVERY 24 HOURS 22 1503 22 1259          Indication: sepsis secondary to UTI    Days of Therapy: 3     Pertinent Labs:  Recent Labs   Lab Test 22  0439 22  0451 22  1201   WBC 23.7* 35.4* 30.0*     Recent Labs   Lab Test 22  0930 22  0439 22  0451 22  1201 21  0732   LACT 1.1  --  1.7   < >  --    CRP  --   --   --   --  7.8   PCAL  --  7.42* 7.99*   < > <0.05    < > = values in this interval not displayed.        Temperature:  Temp (24hrs), Av.4  F (37.4  C), Min:97.7  F (36.5  C), Max:101.5  F (38.6  C)    Culture Results:   : Blood = in process  : COVID = negative  : Urine = >100,000 CFU/mL Lactose fermenting gram negative bacilli     Recommendations/Interventions:  No recommendations at this time. Will continue to monitor.     Lexi Almeida RPH  2022

## 2022-07-22 NOTE — PROGRESS NOTES
Patient much more stable tonight.  Sinus rhythm 90s-110s.  On room air, spot checks of O2 due to measurement difficulty with tremors and patient complaining of discomfort with sensors on finger or ear.  Pressors no longer required.      Good oral fluid intake in addition to 100ml/hr NS IV.  Good UOP per early.  No pain.  Turns Q2H.  Skin ecchymotic and fragile but intact.     Procalcitonin improved but remains elevated to 7.42.      Face to face report given with opportunity to observe patient.    Report given to DIVYA Courtney.      Kirit Domingo RN   7/22/2022  7:21 AM

## 2022-07-22 NOTE — PLAN OF CARE
Goal Outcome Evaluation:      Pt is alert, disoriented to situation and place at times. Afebrile. Denies pain. Pressors remain stable off levophed. Sinus tach 100's. Stepped down to med-surg this afternoon. LS clear. HRR. BS active- fair appetite. Patient had 3 large loose stools this shift- c-diff test negative. IV NS 50 mL/hr. Assist of 2 pivot to chair and bed. Castro catheter remains with good output of clear urine.     Face to face report given with opportunity to observe patient.    Report given to DIVYA Mcneil RN   7/22/2022  7:57 PM

## 2022-07-22 NOTE — PROGRESS NOTES
Critical lab result: Procalcitonin 7.42.  Text page sent to Dr. Sotelo.  Result improved from prior.

## 2022-07-22 NOTE — PROGRESS NOTES
[facility]    Hospitalist Progress Note    Claudine Bustos is a 89 year old female who was admitted on 7/20/2022.  She initially presented to the ER from the nursing home due to being more lethargic per the nursing home staff.  Her pressures initially were right around  systolic but they did drop down to 70 systolic she was afebrile heart rate 120 sats 90% on room air.  The concern for sepsis was identified and was transferred to the emergency room.  Lab work-up there showed white count in the urine greater than 32,000 with clumps many bacteria were seen.  BUN/creatinine are 31/1.60, white count 30,000 hemoglobin 11.3.  Lactic acid 1.4.  She was transferred to the ICU for further treatment and evaluation.  She was given 1 g of IV the Rocephin.     1.  Septic shock secondary to acute cystitis: Patient initially was somewhat hypotensive.  She did get IV fluids the exact amount is difficult to calculate but between 2 to 3 L.  She was started on IV Levophed also to help with her hypotension.  This has subsequently been weaned off.  Pressures were up to 160 earlier.  This morning she is alert her sats are normal on room air heart rates come down into the 90s pressures are rate around 90 systolic however now.  We will make sure that she is getting adequate fluid.  Had cut back on it however we will increase her fluid rate sitting upright she was had flat neck veins.  We will continue fluids do not feel she requires pressors again at this point.  We will monitor very closely-  -7/22: She has been off pressors for 24 hours.  Hemodynamically stable.  No dysrhythmias.  Urine outputs been adequate.  Resolving.     2.  Acute cystitis: She is growing a lactose fermenting gram-negative bacillus.  Currently is on IV Rocephin.  Procalcitonin was 7.99.  White count 35,400 this morning.  No fevers.  We will continue with her current regimen and monitoring closely.  -7/22: Low-grade fevers only 100.  Procalcitonin  still in 7 range although slightly decreased lactic acid is normal.  White count down but still elevated to 23.7.  She is on She continues on Rocephin.  Her urine is growing out Gram negative lactose fermenting bacillus.  In the past has been E. coli pansensitive.       3.  Renal: Patient came with a BUN/creatinine 31 1.6 down to 21.43 currently.  Does have a Castro catheter in place to monitor I's and O's.  Urine output is adequate at this point.  She put out 1400 cc.  We will keep this in until we sure she remains hemodynamically stable.  She continues on Rocephin.  Her urine is growing out Gram negative lactose fermenting bacillus.  In the past has been E. coli pansensitive.  -7/22: Renal function is improving.  Creatinine is at 1.12 today.  With a BUN of 24.    4.  Cardiac status: Patient echo was done 2 years ago showed normal systolic function.  With at least probably moderate aortic stenosis.  She is not have any dysrhythmias at all.  BNP was rather elevated but not quite sure what to make of that in this patient here currently.  Monitor closely.  She was restarted on her low-dose beta-blocker.  -7/22: Hemodynamically stable.  No dysrhythmias at all.  She is doing well we will move her out of the ICU today here.       5.  Pulmonary status: Room air sats currently.  Lungs are clear.  Did have a little increased respiratory rate earlier that has improved.  Is up in chair continue to work on pulmonary toilet.  -7/22: Remains on room air sats in the 93% range.  No wheezing.  States she does ambulate somewhat.  Usually with a walker we will have physical therapy start seeing her.     Overall patient with sepsis secondary to cystitis blood cultures have been negative.  Improving stable blood pressure off pressors we will be able to move out of ICU today.    Diet: Combination Diet Regular Diet  Fluids: Saline 50 cc    DVT Prophylaxis: Enoxaparin (Lovenox) SQ  Code Status: No CPR- Do NOT Intubate    Disposition:  Expected discharge in 3-4 days once oral antibiotics afebrile.    Darren Cordero MD    Interval History   Patient is alert pleasant states doing well.  Denies any chest pain shortness of breath no nausea vomiting.  She was able to eat.  Low-grade fever.  Labs are slowly improving.  No requirements for pressors at this time.  Room air sats good we will have PT start seeing her.  Likely moved to floor today    -Data reviewed today: I reviewed all new labs and imaging results over the last 24 hours. I personally reviewed no images or EKG's today.    Physical Exam   Temp: 98.8  F (37.1  C) Temp src: Tympanic BP: 127/75 Pulse: 79   Resp: (!) 33 SpO2: 93 % O2 Device: None (Room air)    Vitals:    07/20/22 1500 07/21/22 0430 07/22/22 0330   Weight: 64.8 kg (142 lb 13.7 oz) 66.5 kg (146 lb 9.7 oz) 67.2 kg (148 lb 2.4 oz)     Vital Signs with Ranges  Temp:  [97.9  F (36.6  C)-101.5  F (38.6  C)] 98.8  F (37.1  C)  Pulse:  [] 79  Resp:  [16-40] 33  BP: ()/(42-99) 127/75  SpO2:  [78 %-96 %] 93 %  I/O last 3 completed shifts:  In: 4635 [P.O.:1596; I.V.:3039]  Out: 1775 [Urine:1775]    Peripheral IV 07/20/22 Left Upper forearm (Active)   Site Assessment WDL except;Bleeding 07/22/22 1000   Line Status Saline locked;Checked every 1-2 hour 07/22/22 1000   Phlebitis Scale 0-->no symptoms 07/22/22 1000   Infiltration Scale 0 07/22/22 1000   Number of days: 2       Peripheral IV 07/20/22 Anterior;Right Lower forearm (Active)   Site Assessment WDL 07/22/22 1000   Line Status Infusing;Checked every 1-2 hour 07/22/22 1000   Dressing Intervention New dressing  07/20/22 1655   Phlebitis Scale 0-->no symptoms 07/22/22 1000   Infiltration Scale 0 07/22/22 1000   Number of days: 2       Urethral Catheter 07/20/22 (Active)   Tube Description Positional 07/22/22 0815   Catheter Care Catheter wipes 07/22/22 0815   Collection Container Standard 07/22/22 0815   Securement Method Securing device (Describe) 07/22/22 0815   Rationale  for Continued Use Strict 1-2 Hour I&O 07/22/22 0815   Urine Output 250 mL 07/22/22 1000   Number of days: 2       Incision/Surgical Site 01/25/16 Left Knee (Active)   Number of days: 2370       Incision/Surgical Site 01/23/17 Right Hip (Active)   Number of days: 2006       Incision/Surgical Site 08/01/17 Left;Lower Eye (Active)   Number of days: 1816     No line/device    Constitutional: Alert and oriented x3. No distress    HEENT: Normocephalic/atraumatic, PERRL, EOMI, mouth moist, neck supple, no LN.     Cardiovascular: RRR.  2/6 systolic murmur.  Murmur, no  rubs, or gallops.  JVD flat.  Bruits no.  Pulses 2+    Respiratory: Clear to auscultation bilaterally    Abdomen: Soft, nontender, nondistended, no organomegaly. Bowel sounds present    Extremities: Warm/dry.  No edema    Neuro:   Non focal, cranial nerves intact, Moves all extremities.  Medications     sodium chloride 50 mL/hr at 07/22/22 0624       cefTRIAXone  1 g Intravenous Q24H     docusate sodium  100 mg Oral BID     enoxaparin ANTICOAGULANT  30 mg Subcutaneous Q24H     metoprolol tartrate  12.5 mg Oral BID     sodium chloride (PF)  3 mL Intracatheter Q8H       Data   Recent Labs   Lab 07/22/22  0439 07/21/22  1651 07/21/22  1141 07/21/22  0818 07/21/22  0451 07/20/22  1631 07/20/22  1201   WBC 23.7*  --   --   --  35.4*  --  30.0*   HGB 10.3*  --   --   --  11.6*  --  11.3*   MCV 92  --   --   --  91  --  89     --   --   --  421  --  406   INR  --   --   --   --   --   --  1.12     --   --   --  138  --  133   POTASSIUM 3.8  --   --   --  4.2  --  4.3   CHLORIDE 113*  --   --   --  109  --  104   CO2 19*  --   --   --  21  --  21   BUN 24  --   --   --  28  --  31*   CR 1.12*  --   --   --  1.43*  --  1.60*   ANIONGAP 10  --   --   --  8  --  8   LUZMARIA 8.1*  --   --   --  8.2*  --  8.4*   * 96 137*   < > 126*   < > 142*   ALBUMIN 1.8*  --   --   --   --   --   --    PROTTOTAL 6.2*  --   --   --   --   --   --    BILITOTAL 0.2  --    --   --   --   --   --    ALKPHOS 427*  --   --   --   --   --   --    ALT 30  --   --   --   --   --   --    AST 30  --   --   --   --   --   --     < > = values in this interval not displayed.       No results found for this or any previous visit (from the past 24 hour(s)).

## 2022-07-23 LAB
ALBUMIN SERPL-MCNC: 1.7 G/DL (ref 3.4–5)
ALP SERPL-CCNC: 405 U/L (ref 40–150)
ALT SERPL W P-5'-P-CCNC: 29 U/L (ref 0–50)
ANION GAP SERPL CALCULATED.3IONS-SCNC: 7 MMOL/L (ref 3–14)
AST SERPL W P-5'-P-CCNC: 22 U/L (ref 0–45)
BILIRUB SERPL-MCNC: 0.3 MG/DL (ref 0.2–1.3)
BUN SERPL-MCNC: 23 MG/DL (ref 7–30)
CALCIUM SERPL-MCNC: 8.1 MG/DL (ref 8.5–10.1)
CHLORIDE BLD-SCNC: 114 MMOL/L (ref 94–109)
CO2 SERPL-SCNC: 20 MMOL/L (ref 20–32)
CREAT SERPL-MCNC: 1.03 MG/DL (ref 0.52–1.04)
ERYTHROCYTE [DISTWIDTH] IN BLOOD BY AUTOMATED COUNT: 14.3 % (ref 10–15)
GFR SERPL CREATININE-BSD FRML MDRD: 51 ML/MIN/1.73M2
GLUCOSE BLD-MCNC: 94 MG/DL (ref 70–99)
HCT VFR BLD AUTO: 31 % (ref 35–47)
HGB BLD-MCNC: 10.1 G/DL (ref 11.7–15.7)
LACTATE SERPL-SCNC: 1 MMOL/L (ref 0.7–2)
MCH RBC QN AUTO: 28.8 PG (ref 26.5–33)
MCHC RBC AUTO-ENTMCNC: 32.6 G/DL (ref 31.5–36.5)
MCV RBC AUTO: 88 FL (ref 78–100)
PLATELET # BLD AUTO: 365 10E3/UL (ref 150–450)
POTASSIUM BLD-SCNC: 3.4 MMOL/L (ref 3.4–5.3)
PROCALCITONIN SERPL-MCNC: 4.04 NG/ML
PROT SERPL-MCNC: 5.7 G/DL (ref 6.8–8.8)
RBC # BLD AUTO: 3.51 10E6/UL (ref 3.8–5.2)
SODIUM SERPL-SCNC: 141 MMOL/L (ref 133–144)
WBC # BLD AUTO: 16 10E3/UL (ref 4–11)

## 2022-07-23 PROCEDURE — 250N000013 HC RX MED GY IP 250 OP 250 PS 637: Performed by: INTERNAL MEDICINE

## 2022-07-23 PROCEDURE — 80053 COMPREHEN METABOLIC PANEL: CPT | Performed by: INTERNAL MEDICINE

## 2022-07-23 PROCEDURE — 99232 SBSQ HOSP IP/OBS MODERATE 35: CPT | Performed by: INTERNAL MEDICINE

## 2022-07-23 PROCEDURE — 120N000001 HC R&B MED SURG/OB

## 2022-07-23 PROCEDURE — 85018 HEMOGLOBIN: CPT | Performed by: INTERNAL MEDICINE

## 2022-07-23 PROCEDURE — 83605 ASSAY OF LACTIC ACID: CPT | Performed by: INTERNAL MEDICINE

## 2022-07-23 PROCEDURE — 250N000011 HC RX IP 250 OP 636: Performed by: INTERNAL MEDICINE

## 2022-07-23 PROCEDURE — 36415 COLL VENOUS BLD VENIPUNCTURE: CPT | Performed by: INTERNAL MEDICINE

## 2022-07-23 PROCEDURE — 84145 PROCALCITONIN (PCT): CPT | Performed by: INTERNAL MEDICINE

## 2022-07-23 RX ADMIN — GABAPENTIN 200 MG: 100 CAPSULE ORAL at 08:31

## 2022-07-23 RX ADMIN — GABAPENTIN 200 MG: 100 CAPSULE ORAL at 13:30

## 2022-07-23 RX ADMIN — METOPROLOL TARTRATE 12.5 MG: 25 TABLET, FILM COATED ORAL at 08:31

## 2022-07-23 RX ADMIN — METOPROLOL TARTRATE 12.5 MG: 25 TABLET, FILM COATED ORAL at 20:37

## 2022-07-23 RX ADMIN — GABAPENTIN 200 MG: 100 CAPSULE ORAL at 20:37

## 2022-07-23 RX ADMIN — CEFTRIAXONE SODIUM 1 G: 1 INJECTION, SOLUTION INTRAVENOUS at 13:30

## 2022-07-23 RX ADMIN — ENOXAPARIN SODIUM 40 MG: 40 INJECTION SUBCUTANEOUS at 16:25

## 2022-07-23 ASSESSMENT — ACTIVITIES OF DAILY LIVING (ADL)
ADLS_ACUITY_SCORE: 61
ADLS_ACUITY_SCORE: 56
ADLS_ACUITY_SCORE: 57
ADLS_ACUITY_SCORE: 51
ADLS_ACUITY_SCORE: 56
ADLS_ACUITY_SCORE: 52
ADLS_ACUITY_SCORE: 57
ADLS_ACUITY_SCORE: 61
ADLS_ACUITY_SCORE: 56
ADLS_ACUITY_SCORE: 53

## 2022-07-23 NOTE — PROVIDER NOTIFICATION
MD updated on IV infiltration L arm, pink and hard at site. IV removed and warm compress applied.

## 2022-07-23 NOTE — PHARMACY
Pharmacy Antimicrobial Stewardship Assessment     Current Antimicrobial Therapy:  Anti-infectives (From now, onward)    Start     Dose/Rate Route Frequency Ordered Stop    22 1300  cefTRIAXone in d5w (ROCEPHIN) intermittent infusion 1 g         1 g  over 30 Minutes Intravenous EVERY 24 HOURS 22 1503 22 1259          Indication: sepsis, UTI    Days of Therapy: 4     Pertinent Labs:  Recent Labs   Lab Test 22  0516 22  0439 22  0451   WBC 16.0* 23.7* 35.4*     Recent Labs   Lab Test 22  0516 22  0930 22  0439 22  0451 22  1201 21  0732   LACT  --  1.1  --  1.7   < >  --    CRP  --   --   --   --   --  7.8   PCAL 4.04*  --  7.42* 7.99*   < > <0.05    < > = values in this interval not displayed.        Temperature:  Temp (24hrs), Av.8  F (37.1  C), Min:96.1  F (35.6  C), Max:100.7  F (38.2  C)    Culture Results:       Recommendations/Interventions:  1. Bactrim recommended as PO option, if desired.    Venkata Alves, [facility]  2022

## 2022-07-23 NOTE — PLAN OF CARE
Pt alert, disoriented to time and situation. VS and assessments as charted. Denies pain. Up in chair all day, weight shifting and offloading provided, specialty chair cushion in place. IV infusing NS @ 50/hr, receiving IV Rocephin. Two soft stools today, urinating without difficulty. Daughter and son in law here to visit. Appetite adequate. Free from falls/ injury, call light within reach and alarms activated.     Face to face report given with opportunity to observe patient.    Report given to DIVYA Mcneil RN   7/23/2022  7:44 PM

## 2022-07-23 NOTE — PROGRESS NOTES
[facility]    Hospitalist Progress Note      Assessment & Plan          Claudine Bustos is a 89 year old female who was admitted on 7/20/2022.  She initially presented to the ER from the nursing home due to being more lethargic per the nursing home staff.  Her pressures initially were right around  systolic but they did drop down to 70 systolic she was afebrile heart rate 120 sats 90% on room air.  The concern for sepsis was identified and was transferred to the emergency room.  Lab work-up there showed white count in the urine greater than 32,000 with clumps many bacteria were seen.  BUN/creatinine are 31/1.60, white count 30,000 hemoglobin 11.3.  Lactic acid 1.4.  She was transferred to the ICU for further treatment and evaluation.  She was given 1 g of IV the Rocephin.     1.  Septic shock secondary to acute cystitis: Patient initially was somewhat hypotensive.  She did get IV fluids the exact amount is difficult to calculate but between 2 to 3 L.  She was started on IV Levophed also to help with her hypotension.  This has subsequently been weaned off.  Pressures were up to 160 earlier.  This morning she is alert her sats are normal on room air heart rates come down into the 90s pressures are rate around 90 systolic however now.  We will make sure that she is getting adequate fluid.  Had cut back on it however we will increase her fluid rate sitting upright she was had flat neck veins.  We will continue fluids do not feel she requires pressors again at this point.  We will monitor very closely-  -7/22: She has been off pressors for 24 hours.  Hemodynamically stable.  No dysrhythmias.  Urine outputs been adequate.  Resolving.  -7/23: Hemodynamically stable.  Sepsis resolved.     2.  Acute cystitis: She is growing a lactose fermenting gram-negative bacillus.  Currently is on IV Rocephin.  Procalcitonin was 7.99.  White count 35,400 this morning.  No fevers.  We will continue with her current  regimen and monitoring closely.  -7/22: Low-grade fevers only 100.  Procalcitonin still in 7 range although slightly decreased lactic acid is normal.  White count down but still elevated to 23.7.  She is on She continues on Rocephin.  Her urine is growing out Gram negative lactose fermenting bacillus.  In the past has been E. coli pansensitive.  -7/23: Urine is growing out Klebsiella species.  Is sensitive to Rocephin.  We will continue with another dose Rocephin today having some occasional low-grade fevers 100.  Procalcitonin is down to 4.04 White count still 16,000.  If things continue to decline we will switch to orals tomorrow.  Either sulfa Cipro.     3.  Renal: Patient came with a BUN/creatinine 31 1.6 down to 21.43 currently.  Does have a Castro catheter in place to monitor I's and O's.  Urine output is adequate at this point.  She put out 1400 cc.  We will keep this in until we sure she remains hemodynamically stable.  She continues on Rocephin.  Her urine is growing out Gram negative lactose fermenting bacillus.  In the past has been E. coli pansensitive.  -7/22: Renal function is improving.  Creatinine is at 1.12 today.  With a BUN of 24.  -7/23: Castro catheter taken out today.  Creatinine is down to 1.03.     4.  Cardiac status: Patient echo was done 2 years ago showed normal systolic function.  With at least probably moderate aortic stenosis.  She is not have any dysrhythmias at all.  BNP was rather elevated but not quite sure what to make of that in this patient here currently.  Monitor closely.  She was restarted on her low-dose beta-blocker.  -7/22: Hemodynamically stable.  No dysrhythmias at all.  She is doing well we will move her out of the ICU today here.     -7/23: Hemodynamically stable.  No issues on telemetry at this time.      5.  Pulmonary status: Room air sats currently.  Lungs are clear.  Did have a little increased respiratory rate earlier that has improved.  Is up in chair continue to  work on pulmonary toilet.  -7/22: Remains on room air sats in the 93% range.  No wheezing.  States she does ambulate somewhat.  Usually with a walker we will have physical therapy start seeing her.  -7/23: Room air lungs clear.      Overall patient with sepsis secondary to cystitis blood cultures have been negative.  Improving stable blood pressure off pressors we will be able to move out of ICU today.  Doing very well.  Physical therapy will see the patient.  If continues to do well we will look towards possible discharge on Monday back to [facility].     Diet: Combination Diet Regular Diet  Fluids: lock    DVT Prophylaxis: Enoxaparin (Lovenox) SQ  Code Status: No CPR- Do NOT Intubate    Disposition: Expected discharge in 2 days once on oral antibiotics and stable.    Darren Cordero MD    Interval History   Patient alert pleasant no distress was happy with the Castro catheter.  Eating well sats good on room air no shortness of breath still some low-grade fevers 100.  Procalcitonin continues to decline.  On the day of IV antibiotics switch to orals tomorrow.  Possible discharge on Monday    -Data reviewed today: I reviewed all new labs and imaging results over the last 24 hours. I personally reviewed no images or EKG's today.    Physical Exam   Temp: 98.5  F (36.9  C) Temp src: Tympanic BP: 167/80 Pulse: 96   Resp: 18 SpO2: 95 % O2 Device: None (Room air)    Vitals:    07/21/22 0430 07/22/22 0330 07/23/22 0412   Weight: 66.5 kg (146 lb 9.7 oz) 67.2 kg (148 lb 2.4 oz) 64.2 kg (141 lb 9.6 oz)     Vital Signs with Ranges  Temp:  [96.1  F (35.6  C)-100.7  F (38.2  C)] 98.5  F (36.9  C)  Pulse:  [] 96  Resp:  [18-40] 18  BP: (103-167)/(69-88) 167/80  SpO2:  [93 %-95 %] 95 %  I/O last 3 completed shifts:  In: 2646 [P.O.:1390; I.V.:1256]  Out: 2330 [Urine:2330]    Peripheral IV 07/23/22 Right Hand (Active)   Number of days: 0       Incision/Surgical Site 01/25/16 Left Knee (Active)   Number of days: 2370        Incision/Surgical Site 01/23/17 Right Hip (Active)   Number of days: 2007       Incision/Surgical Site 08/01/17 Left;Lower Eye (Active)   Number of days: 1817     No line/device    Constitutional: Alert and oriented x3. No distress    HEENT: Normocephalic/atraumatic, PERRL, EOMI, mouth moist, neck supple, no LN.     Cardiovascular: RRR.  1-2/6 murmur, no  rubs, or gallops.  JVD no.  Bruits no.  Pulses 2+    Respiratory: Clear to auscultation bilaterally    Abdomen: Soft, nontender, nondistended, no organomegaly. Bowel sounds present    Extremities: Warm/dry.  No edema    Neuro:   Non focal, cranial nerves intact, Moves all extremities.  Medications     sodium chloride 50 mL/hr at 07/23/22 0923       cefTRIAXone  1 g Intravenous Q24H     enoxaparin ANTICOAGULANT  40 mg Subcutaneous Q24H     gabapentin  200 mg Oral TID     [Held by provider] lisinopril  10 mg Oral Daily     metoprolol tartrate  12.5 mg Oral BID     sodium chloride (PF)  3 mL Intracatheter Q8H       Data   Recent Labs   Lab 07/23/22  0516 07/22/22  0439 07/21/22  1651 07/21/22  0818 07/21/22  0451 07/20/22  1631 07/20/22  1201   WBC 16.0* 23.7*  --   --  35.4*  --  30.0*   HGB 10.1* 10.3*  --   --  11.6*  --  11.3*   MCV 88 92  --   --  91  --  89    320  --   --  421  --  406   INR  --   --   --   --   --   --  1.12    142  --   --  138  --  133   POTASSIUM 3.4 3.8  --   --  4.2  --  4.3   CHLORIDE 114* 113*  --   --  109  --  104   CO2 20 19*  --   --  21  --  21   BUN 23 24  --   --  28  --  31*   CR 1.03 1.12*  --   --  1.43*  --  1.60*   ANIONGAP 7 10  --   --  8  --  8   LUZMARIA 8.1* 8.1*  --   --  8.2*  --  8.4*   GLC 94 104* 96   < > 126*   < > 142*   ALBUMIN 1.7* 1.8*  --   --   --   --   --    PROTTOTAL 5.7* 6.2*  --   --   --   --   --    BILITOTAL 0.3 0.2  --   --   --   --   --    ALKPHOS 405* 427*  --   --   --   --   --    ALT 29 30  --   --   --   --   --    AST 22 30  --   --   --   --   --     < > = values in this interval  not displayed.       No results found for this or any previous visit (from the past 24 hour(s)).

## 2022-07-23 NOTE — PLAN OF CARE
Goal Outcome Evaluation:        Pt pleasant, cooperative, intermittently confused to time and situation but easily reoriented. Tmax 100.7, tylenol given previously per pt request. Other VSS on RA. Denies pain. NS at 50ml/hr to PIV, scheduled rocephin. Regular diet, drinking good amounts. Good yellow output from early, plan to discontinue today. Slept in chair, wt shifting every 2 hours. Chair alarm in use, able to make needs known.         Face to face report given with opportunity to observe patient.    Report given to DIVYA Mendez RN   7/23/2022  7:21 AM

## 2022-07-24 LAB
ALBUMIN SERPL-MCNC: 1.7 G/DL (ref 3.4–5)
ALP SERPL-CCNC: 389 U/L (ref 40–150)
ALT SERPL W P-5'-P-CCNC: 28 U/L (ref 0–50)
ANION GAP SERPL CALCULATED.3IONS-SCNC: 7 MMOL/L (ref 3–14)
AST SERPL W P-5'-P-CCNC: 17 U/L (ref 0–45)
BILIRUB SERPL-MCNC: 0.3 MG/DL (ref 0.2–1.3)
BUN SERPL-MCNC: 22 MG/DL (ref 7–30)
CALCIUM SERPL-MCNC: 8.1 MG/DL (ref 8.5–10.1)
CHLORIDE BLD-SCNC: 115 MMOL/L (ref 94–109)
CO2 SERPL-SCNC: 20 MMOL/L (ref 20–32)
CREAT SERPL-MCNC: 0.97 MG/DL (ref 0.52–1.04)
ERYTHROCYTE [DISTWIDTH] IN BLOOD BY AUTOMATED COUNT: 14.4 % (ref 10–15)
GFR SERPL CREATININE-BSD FRML MDRD: 55 ML/MIN/1.73M2
GLUCOSE BLD-MCNC: 93 MG/DL (ref 70–99)
HCT VFR BLD AUTO: 30.6 % (ref 35–47)
HGB BLD-MCNC: 9.9 G/DL (ref 11.7–15.7)
MCH RBC QN AUTO: 28.5 PG (ref 26.5–33)
MCHC RBC AUTO-ENTMCNC: 32.4 G/DL (ref 31.5–36.5)
MCV RBC AUTO: 88 FL (ref 78–100)
PLATELET # BLD AUTO: 391 10E3/UL (ref 150–450)
POTASSIUM BLD-SCNC: 3.3 MMOL/L (ref 3.4–5.3)
PROCALCITONIN SERPL-MCNC: 1.98 NG/ML
PROT SERPL-MCNC: 5.5 G/DL (ref 6.8–8.8)
RBC # BLD AUTO: 3.47 10E6/UL (ref 3.8–5.2)
SODIUM SERPL-SCNC: 142 MMOL/L (ref 133–144)
WBC # BLD AUTO: 11.7 10E3/UL (ref 4–11)

## 2022-07-24 PROCEDURE — 82374 ASSAY BLOOD CARBON DIOXIDE: CPT | Performed by: INTERNAL MEDICINE

## 2022-07-24 PROCEDURE — 120N000001 HC R&B MED SURG/OB

## 2022-07-24 PROCEDURE — 250N000013 HC RX MED GY IP 250 OP 250 PS 637: Performed by: INTERNAL MEDICINE

## 2022-07-24 PROCEDURE — 258N000003 HC RX IP 258 OP 636: Performed by: INTERNAL MEDICINE

## 2022-07-24 PROCEDURE — 85027 COMPLETE CBC AUTOMATED: CPT | Performed by: INTERNAL MEDICINE

## 2022-07-24 PROCEDURE — 84145 PROCALCITONIN (PCT): CPT | Performed by: INTERNAL MEDICINE

## 2022-07-24 PROCEDURE — 250N000011 HC RX IP 250 OP 636: Performed by: INTERNAL MEDICINE

## 2022-07-24 PROCEDURE — 36415 COLL VENOUS BLD VENIPUNCTURE: CPT | Performed by: INTERNAL MEDICINE

## 2022-07-24 PROCEDURE — 99232 SBSQ HOSP IP/OBS MODERATE 35: CPT | Performed by: INTERNAL MEDICINE

## 2022-07-24 RX ORDER — SULFAMETHOXAZOLE/TRIMETHOPRIM 800-160 MG
1 TABLET ORAL 2 TIMES DAILY
Status: DISCONTINUED | OUTPATIENT
Start: 2022-07-24 | End: 2022-07-25 | Stop reason: HOSPADM

## 2022-07-24 RX ADMIN — GABAPENTIN 200 MG: 100 CAPSULE ORAL at 14:08

## 2022-07-24 RX ADMIN — SULFAMETHOXAZOLE AND TRIMETHOPRIM 1 TABLET: 800; 160 TABLET ORAL at 10:01

## 2022-07-24 RX ADMIN — METOPROLOL TARTRATE 12.5 MG: 25 TABLET, FILM COATED ORAL at 20:53

## 2022-07-24 RX ADMIN — GABAPENTIN 200 MG: 100 CAPSULE ORAL at 20:53

## 2022-07-24 RX ADMIN — ENOXAPARIN SODIUM 40 MG: 40 INJECTION SUBCUTANEOUS at 15:40

## 2022-07-24 RX ADMIN — GABAPENTIN 200 MG: 100 CAPSULE ORAL at 09:18

## 2022-07-24 RX ADMIN — METOPROLOL TARTRATE 12.5 MG: 25 TABLET, FILM COATED ORAL at 09:18

## 2022-07-24 RX ADMIN — SULFAMETHOXAZOLE AND TRIMETHOPRIM 1 TABLET: 800; 160 TABLET ORAL at 20:53

## 2022-07-24 RX ADMIN — SODIUM CHLORIDE: 9 INJECTION, SOLUTION INTRAVENOUS at 09:25

## 2022-07-24 ASSESSMENT — ACTIVITIES OF DAILY LIVING (ADL)
ADLS_ACUITY_SCORE: 61
ADLS_ACUITY_SCORE: 61
ADLS_ACUITY_SCORE: 60
ADLS_ACUITY_SCORE: 61
ADLS_ACUITY_SCORE: 61
ADLS_ACUITY_SCORE: 56
ADLS_ACUITY_SCORE: 56
ADLS_ACUITY_SCORE: 61
ADLS_ACUITY_SCORE: 60
ADLS_ACUITY_SCORE: 60

## 2022-07-24 NOTE — PLAN OF CARE
Goal Outcome Evaluation:        Pt oriented to self and place, pleasant and cooperative. Denies pain. VSS on RA, ex BP elevated x1 with some anxiety. Tmax 99.7F this shift. NS at 50ml/hr to PIV, scheduled rocephin. Drinking good amounts, on regular diet. Inc of urine, no BMs this shift. Slept in chair, turn and repo every 2 hours with pillow support. LA 1.0 last evening.         Face to face report given with opportunity to observe patient.    Report given to DIVYA Mendez RN   7/24/2022  7:02 AM

## 2022-07-24 NOTE — PROGRESS NOTES
[facility]    Hospitalist Progress Note      Assessment & Plan           Claudine Bustos is a 89 year old female who was admitted on 7/20/2022.  She initially presented to the ER from the nursing home due to being more lethargic per the nursing home staff.  Her pressures initially were right around  systolic but they did drop down to 70 systolic she was afebrile heart rate 120 sats 90% on room air.  The concern for sepsis was identified and was transferred to the emergency room.  Lab work-up there showed white count in the urine greater than 32,000 with clumps many bacteria were seen.  BUN/creatinine are 31/1.60, white count 30,000 hemoglobin 11.3.  Lactic acid 1.4.  She was transferred to the ICU for further treatment and evaluation.  She was given 1 g of IV the Rocephin.     1.  Septic shock secondary to acute cystitis: Patient initially was somewhat hypotensive.  She did get IV fluids the exact amount is difficult to calculate but between 2 to 3 L.  She was started on IV Levophed also to help with her hypotension.  This has subsequently been weaned off.  Pressures were up to 160 earlier.  This morning she is alert her sats are normal on room air heart rates come down into the 90s pressures are rate around 90 systolic however now.  We will make sure that she is getting adequate fluid.  Had cut back on it however we will increase her fluid rate sitting upright she was had flat neck veins.  We will continue fluids do not feel she requires pressors again at this point.  We will monitor very closely-  -7/22: She has been off pressors for 24 hours.  Hemodynamically stable.  No dysrhythmias.  Urine outputs been adequate.  Resolving.  -7/23: Hemodynamically stable.  Sepsis resolved.  -7/24: Hemodynamically stable no sepsis.      2.  Acute cystitis: She is growing a lactose fermenting gram-negative bacillus.  Currently is on IV Rocephin.  Procalcitonin was 7.99.  White count 35,400 this morning.  No  fevers.  We will continue with her current regimen and monitoring closely.  -7/22: Low-grade fevers only 100.  Procalcitonin still in 7 range although slightly decreased lactic acid is normal.  White count down but still elevated to 23.7.  She is on She continues on Rocephin.  Her urine is growing out Gram negative lactose fermenting bacillus.  In the past has been E. coli pansensitive.  -7/23: Urine is growing out Klebsiella species.  Is sensitive to Rocephin.  We will continue with another dose Rocephin today having some occasional low-grade fevers 100.  Procalcitonin is down to 4.04 White count still 16,000.  If things continue to decline we will switch to orals tomorrow.  Either sulfa Cipro.  -7/24: Continues to do well procalcitonin down to 1.98.  White count 11,000.  No significant fevers.  We will switch over to Bactrim.    3.  Renal: Patient came with a BUN/creatinine 31 1.6 down to 21.43 currently.  Does have a Castro catheter in place to monitor I's and O's.  Urine output is adequate at this point.  She put out 1400 cc.  We will keep this in until we sure she remains hemodynamically stable.  She continues on Rocephin.  Her urine is growing out Gram negative lactose fermenting bacillus.  In the past has been E. coli pansensitive.  -7/22: Renal function is improving.  Creatinine is at 1.12 today.  With a BUN of 24.  -7/23: Castro catheter taken out today.  Creatinine is down to 1.03.  -7/24: Renal function is back to normal creatinine 0.97 today.         4.  Cardiac status: Patient echo was done 2 years ago showed normal systolic function.  With at least probably moderate aortic stenosis.  She is not have any dysrhythmias at all.  BNP was rather elevated but not quite sure what to make of that in this patient here currently.  Monitor closely.  She was restarted on her low-dose beta-blocker.  -7/22: Hemodynamically stable.  No dysrhythmias at all.  She is doing well we will move her out of the ICU today here.      -7/23: Hemodynamically stable.  No issues on telemetry at this time.  -7/24: Stable no issues     5.  Pulmonary status: Room air sats currently.  Lungs are clear.  Did have a little increased respiratory rate earlier that has improved.  Is up in chair continue to work on pulmonary toilet.  -7/22: Remains on room air sats in the 93% range.  No wheezing.  States she does ambulate somewhat.  Usually with a walker we will have physical therapy start seeing her.  -7/23: Room air lungs clear.  -7/24: Stable 95% room air.                 Overall patient with sepsis secondary to cystitis blood cultures have been negative.  Improving stable blood pressure off pressors we will be able to move out of ICU today.  Doing very well.  Physical therapy will see the patient.  If continues to do well we will look towards possible discharge on Monday back to [facility].    -7/24: Tentative plan will be discharged back to [place] tomorrow if she continues to do as well as she has.    Diet: Combination Diet Regular Diet  Fluids: None IV lock    DVT Prophylaxis: Enoxaparin (Lovenox) SQ  Code Status: No CPR- Do NOT Intubate    Disposition: Expected discharge tomorrow      Darren Cordero MD    Interval History   Patient alert pleasant no distress feels very well.  Afebrile all labs returning to basically normal.  Discontinue IV fluids switching her to oral Bactrim today.  Anticipate discharge home tomorrow.    -Data reviewed today: I reviewed all new labs and imaging results over the last 24 hours. I personally reviewed no images or EKG's today.    Physical Exam   Temp: 99.3  F (37.4  C) Temp src: Tympanic BP: 160/84 Pulse: 101   Resp: 18 SpO2: 94 % O2 Device: None (Room air)    Vitals:    07/23/22 0412 07/24/22 0500 07/24/22 0630   Weight: 64.2 kg (141 lb 9.6 oz) 65.3 kg (143 lb 15.4 oz) 65.3 kg (143 lb 15.4 oz)     Vital Signs with Ranges  Temp:  [97.3  F (36.3  C)-99.7  F (37.6  C)] 99.3  F (37.4  C)  Pulse:   [] 101  Resp:  [16-20] 18  BP: (154-175)/(69-88) 160/84  SpO2:  [94 %-97 %] 94 %  I/O last 3 completed shifts:  In: 1020 [P.O.:1020]  Out: 200 [Urine:200]    Peripheral IV 07/24/22 Anterior;Distal;Left Lower forearm (Active)   Site Assessment WDL 07/24/22 0630   Line Status Infusing 07/24/22 0630   Dressing Intervention New dressing  07/24/22 0417   Phlebitis Scale 0-->no symptoms 07/24/22 0630   Infiltration Scale 0 07/24/22 0630   Number of days: 0       Incision/Surgical Site 01/25/16 Left Knee (Active)   Number of days: 2372       Incision/Surgical Site 01/23/17 Right Hip (Active)   Number of days: 2008       Incision/Surgical Site 08/01/17 Left;Lower Eye (Active)   Number of days: 1818     No line/device    Constitutional: Alert and oriented x3. No distress    HEENT: Normocephalic/atraumatic, PERRL, EOMI, mouth moist, neck supple, no LN.     Cardiovascular: RRR. no Murmur, no  rubs, or gallops.  JVD no.  Bruits no.  Pulses 2+    Respiratory: Clear to auscultation bilaterally    Abdomen: Soft, nontender, nondistended, no organomegaly. Bowel sounds present    Extremities: Warm/dry. No edema    Neuro:   Non focal, cranial nerves intact, Moves all extremities.  Medications       enoxaparin ANTICOAGULANT  40 mg Subcutaneous Q24H     gabapentin  200 mg Oral TID     [Held by provider] lisinopril  10 mg Oral Daily     metoprolol tartrate  12.5 mg Oral BID     sodium chloride (PF)  3 mL Intracatheter Q8H     sulfamethoxazole-trimethoprim  1 tablet Oral BID       Data   Recent Labs   Lab 07/24/22  0506 07/23/22  0516 07/22/22  0439 07/20/22  1631 07/20/22  1201   WBC 11.7* 16.0* 23.7*   < > 30.0*   HGB 9.9* 10.1* 10.3*   < > 11.3*   MCV 88 88 92   < > 89    365 320   < > 406   INR  --   --   --   --  1.12    141 142   < > 133   POTASSIUM 3.3* 3.4 3.8   < > 4.3   CHLORIDE 115* 114* 113*   < > 104   CO2 20 20 19*   < > 21   BUN 22 23 24   < > 31*   CR 0.97 1.03 1.12*   < > 1.60*   ANIONGAP 7 7 10   < > 8    LUZMARIA 8.1* 8.1* 8.1*   < > 8.4*   GLC 93 94 104*   < > 142*   ALBUMIN 1.7* 1.7* 1.8*   < >  --    PROTTOTAL 5.5* 5.7* 6.2*   < >  --    BILITOTAL 0.3 0.3 0.2   < >  --    ALKPHOS 389* 405* 427*   < >  --    ALT 28 29 30   < >  --    AST 17 22 30   < >  --     < > = values in this interval not displayed.       No results found for this or any previous visit (from the past 24 hour(s)).

## 2022-07-24 NOTE — PHARMACY
Pharmacy Antimicrobial Stewardship Assessment     Current Antimicrobial Therapy:  Anti-infectives (From now, onward)    Start     Dose/Rate Route Frequency Ordered Stop    22 1000  sulfamethoxazole-trimethoprim (BACTRIM DS) 800-160 MG per tablet 1 tablet         1 tablet Oral 2 TIMES DAILY 22 0937            Indication: UTI    Days of Therapy: 1(Bactrim), previously on Rocephin.     Pertinent Labs:  Recent Labs   Lab Test 22  0506 22  0516 22  0439   WBC 11.7* 16.0* 23.7*     Recent Labs   Lab Test 22  0506 22  2107 22  0516 22  0930 22  1201 21  0732   LACT  --  1.0  --  1.1   < >  --    CRP  --   --   --   --   --  7.8   PCAL 1.98*  --  4.04*  --    < > <0.05    < > = values in this interval not displayed.        Temperature:  Temp (24hrs), Av.7  F (37.1  C), Min:97.3  F (36.3  C), Max:99.7  F (37.6  C)    Culture Results:       Recommendations/Interventions:  1. Consider adding stop date to Bactrim. Given previous Rocephin treatment, 3 days of Bactrim is likely sufficient.    Venkata Alves, [facility]  2022

## 2022-07-24 NOTE — PLAN OF CARE
Pt alert, oriented to self and place. VS and assessment as charted. Denies pain. Up in chair, weight shifting every 2 hours, specialty chair cushion in place. IV SL. Transitioned to oral abx today. Three loose stools. Up to BSC with assist of two and walker/ gait belt. Free from falls/ injury, call light within reach and alarms activated.     Face to face report given with opportunity to observe patient.    Report given to DIVYA Mcneil RN   7/24/2022  7:13 PM

## 2022-07-25 VITALS
OXYGEN SATURATION: 93 % | SYSTOLIC BLOOD PRESSURE: 148 MMHG | DIASTOLIC BLOOD PRESSURE: 70 MMHG | HEART RATE: 75 BPM | RESPIRATION RATE: 22 BRPM | TEMPERATURE: 98 F | HEIGHT: 59 IN | BODY MASS INDEX: 28.49 KG/M2 | WEIGHT: 141.31 LBS

## 2022-07-25 PROBLEM — N30.00 ACUTE CYSTITIS WITHOUT HEMATURIA: Status: ACTIVE | Noted: 2022-07-25

## 2022-07-25 LAB
ALBUMIN SERPL-MCNC: 1.7 G/DL (ref 3.4–5)
ALP SERPL-CCNC: 364 U/L (ref 40–150)
ALT SERPL W P-5'-P-CCNC: 30 U/L (ref 0–50)
ANION GAP SERPL CALCULATED.3IONS-SCNC: 7 MMOL/L (ref 3–14)
AST SERPL W P-5'-P-CCNC: 20 U/L (ref 0–45)
BILIRUB SERPL-MCNC: 0.3 MG/DL (ref 0.2–1.3)
BUN SERPL-MCNC: 18 MG/DL (ref 7–30)
CALCIUM SERPL-MCNC: 8.2 MG/DL (ref 8.5–10.1)
CHLORIDE BLD-SCNC: 114 MMOL/L (ref 94–109)
CO2 SERPL-SCNC: 21 MMOL/L (ref 20–32)
CREAT SERPL-MCNC: 1.04 MG/DL (ref 0.52–1.04)
ERYTHROCYTE [DISTWIDTH] IN BLOOD BY AUTOMATED COUNT: 14.3 % (ref 10–15)
GFR SERPL CREATININE-BSD FRML MDRD: 50 ML/MIN/1.73M2
GLUCOSE BLD-MCNC: 91 MG/DL (ref 70–99)
HCT VFR BLD AUTO: 30.5 % (ref 35–47)
HGB BLD-MCNC: 10 G/DL (ref 11.7–15.7)
MCH RBC QN AUTO: 28.9 PG (ref 26.5–33)
MCHC RBC AUTO-ENTMCNC: 32.8 G/DL (ref 31.5–36.5)
MCV RBC AUTO: 88 FL (ref 78–100)
PLATELET # BLD AUTO: 402 10E3/UL (ref 150–450)
POTASSIUM BLD-SCNC: 3.4 MMOL/L (ref 3.4–5.3)
PROCALCITONIN SERPL-MCNC: 1.04 NG/ML
PROT SERPL-MCNC: 5.5 G/DL (ref 6.8–8.8)
RBC # BLD AUTO: 3.46 10E6/UL (ref 3.8–5.2)
SARS-COV-2 RNA RESP QL NAA+PROBE: NEGATIVE
SODIUM SERPL-SCNC: 142 MMOL/L (ref 133–144)
WBC # BLD AUTO: 9.9 10E3/UL (ref 4–11)

## 2022-07-25 PROCEDURE — 250N000013 HC RX MED GY IP 250 OP 250 PS 637: Performed by: INTERNAL MEDICINE

## 2022-07-25 PROCEDURE — 36415 COLL VENOUS BLD VENIPUNCTURE: CPT | Performed by: INTERNAL MEDICINE

## 2022-07-25 PROCEDURE — 84145 PROCALCITONIN (PCT): CPT | Performed by: INTERNAL MEDICINE

## 2022-07-25 PROCEDURE — 99239 HOSP IP/OBS DSCHRG MGMT >30: CPT | Performed by: INTERNAL MEDICINE

## 2022-07-25 PROCEDURE — U0003 INFECTIOUS AGENT DETECTION BY NUCLEIC ACID (DNA OR RNA); SEVERE ACUTE RESPIRATORY SYNDROME CORONAVIRUS 2 (SARS-COV-2) (CORONAVIRUS DISEASE [COVID-19]), AMPLIFIED PROBE TECHNIQUE, MAKING USE OF HIGH THROUGHPUT TECHNOLOGIES AS DESCRIBED BY CMS-2020-01-R: HCPCS | Performed by: INTERNAL MEDICINE

## 2022-07-25 PROCEDURE — 85027 COMPLETE CBC AUTOMATED: CPT | Performed by: INTERNAL MEDICINE

## 2022-07-25 PROCEDURE — 80053 COMPREHEN METABOLIC PANEL: CPT | Performed by: INTERNAL MEDICINE

## 2022-07-25 RX ORDER — SACCHAROMYCES BOULARDII 250 MG
250 CAPSULE ORAL 2 TIMES DAILY
Status: DISCONTINUED | OUTPATIENT
Start: 2022-07-25 | End: 2022-07-25 | Stop reason: HOSPADM

## 2022-07-25 RX ORDER — SACCHAROMYCES BOULARDII 250 MG
250 CAPSULE ORAL 2 TIMES DAILY
Qty: 10 CAPSULE | Refills: 0 | Status: SHIPPED | OUTPATIENT
Start: 2022-07-25 | End: 2022-07-30

## 2022-07-25 RX ORDER — SULFAMETHOXAZOLE/TRIMETHOPRIM 800-160 MG
1 TABLET ORAL 2 TIMES DAILY
Qty: 10 TABLET | Refills: 0 | Status: SHIPPED | OUTPATIENT
Start: 2022-07-25 | End: 2022-07-30

## 2022-07-25 RX ADMIN — SULFAMETHOXAZOLE AND TRIMETHOPRIM 1 TABLET: 800; 160 TABLET ORAL at 10:27

## 2022-07-25 RX ADMIN — METOPROLOL TARTRATE 12.5 MG: 25 TABLET, FILM COATED ORAL at 10:27

## 2022-07-25 RX ADMIN — Medication 250 MG: at 10:27

## 2022-07-25 RX ADMIN — GABAPENTIN 200 MG: 100 CAPSULE ORAL at 10:28

## 2022-07-25 ASSESSMENT — ACTIVITIES OF DAILY LIVING (ADL)
ADLS_ACUITY_SCORE: 56
ADLS_ACUITY_SCORE: 60

## 2022-07-25 NOTE — PROGRESS NOTES
Name: Claudine Bustos    MRN#: [id number]    Reason for Hospitalization: Hypotension, unspecified hypotension type [I95.9]  Urinary tract infection without hematuria, site unspecified [N39.0]    Discharge Date: 7/25/2022    Patient / Family response to discharge plan: in agreement    Follow-Up Appt: No future appointments.    Other Providers (Care Coordinator, County Services, PCA services etc): Yes: [facility] and [facility].    Discharge Disposition: long term care facility, [facility]    Mitzi Chavez CM

## 2022-07-25 NOTE — PLAN OF CARE
Goal Outcome Evaluation:        Oriented to self and place, int confused to time and situation. Cooperative. VSS on RA. Denies pain. On PO bactrim. Inc of B&B. Slept in chair, turn and repo every 2 hours. Regular diet. Ax2 with GB and walker.       Face to face report given with opportunity to observe patient.    Report given to DIVYA Osorio RN   7/25/2022  7:22 AM

## 2022-07-25 NOTE — DISCHARGE SUMMARY
[facility]  Hospitalist Discharge Summary      Date of Admission:  7/20/2022  Date of Discharge:  7/25/2022  Discharging Provider: Darren Cordero MD  Discharge Service: Hospitalist Service      Discharge Diagnoses      Septic shock  Acute cystitis without hematuria due to Klebsiella aerogenes  Acute renal injury on chronic kidney disease stage II  History of Alzheimer's type dementia        Follow-ups Needed After Discharge   Follow-up Appointments     Follow Up and recommended labs and tests      Follow up with Nursing home physician.  No follow up labs or test are   needed.         None    Unresulted Labs Ordered in the Past 30 Days of this Admission     Date and Time Order Name Status Description    7/20/2022  1:57 PM Blood Culture Peripheral Blood Preliminary     7/20/2022  1:57 PM Blood Culture Peripheral Blood Preliminary       These results will be followed up by hospitalist    Discharge Disposition   Discharged to nursing home  Condition at discharge: Stable       Hospital Course     Claudine Bustos is a 89 year old female who was admitted on 7/20/2022.  She initially presented to the ER from the nursing home due to being more lethargic per the nursing home staff.  Her pressures initially were right around  systolic but they did drop down to 70 systolic she was afebrile heart rate 120 sats 90% on room air.  The concern for sepsis was identified and was transferred to the emergency room.  Lab work-up there showed white count in the urine greater than 32,000 with clumps many bacteria were seen.  BUN/creatinine are 31/1.60, white count 30,000 hemoglobin 11.3.  Lactic acid 1.4.  She was transferred to the ICU for further treatment and evaluation.  She was given 1 g of IV the Rocephin.    1.  Septic shock secondary to acute cystitis: Patient initially was hypotensive.  She did get IV fluids was started on IV Levophed to help with her hypotension.  Over the next 12 hours this was weaned off.   She did not require any further pressors.  Blood cultures remain negative.    2.  Acute cystitis without hematuria: Patient eventually grew out Klebsiella aerogenes which was sensitive to the Rocephin that she had been on.  Her procalcitonin came back at 7.99 on admission.  White count 35,000.  With treatment he is did revert back to normal ranges.  Asymptomatic.  We placed her on oral Bactrim double strength tabs.  At discharge she will get 5 more days of treatment along with some Florastor.    3.  Acute renal injury: Initial BUN/creatinine were 31/1.6.  Renal function returned to normal with creatinine 0.97.  Was felt to be probably due to her sepsis and some relative volume depletion.    4.  Cardiac status: Patient does have a known history of moderate aortic stenosis.  With normal systolic function.  She had no issues with heart failure or dysrhythmias during her hospital stay.  At discharge her pressures were stable.  We restarted her usual antihypertensives lisinopril and metoprolol.    5.  Pulmonary status: She really did not have any issues with her oxygenation.  She remained above 90% throughout her hospital stay.    Consultations This Hospital Stay   PHYSICAL THERAPY ADULT IP CONSULT    Code Status   No CPR- Do NOT Intubate    Time Spent on this Encounter   I, Darren Cordero MD, personally saw the patient today and spent greater than 30 minutes discharging this patient.       Darren Cordero MD  [facility]  [address] [place] [id number]  Phone: [phone]  Fax: [phone]  ______________________________________________________________________    Physical Exam   Vital Signs: Temp: 99  F (37.2  C) Temp src: Tympanic BP: 133/65 Pulse: 80   Resp: 14 SpO2: 95 % O2 Device: None (Room air)    Weight: 141 lbs 5.04 oz  Constitutional: awake, alert, cooperative, no apparent distress, and appears stated age  Respiratory: No increased work of breathing, good air exchange, clear to  auscultation bilaterally, no crackles or wheezing  Cardiovascular: Regular rate and rhythm 2/6 systolic murmur consistent with aortic stenosis.  Neck veins are flat.  Pulses 2+ equal  GI: No scars, normal bowel sounds, soft, non-distended, non-tender, no masses palpated, no hepatosplenomegally  Musculoskeletal: There is no redness, warmth, or swelling of the joints.  Full range of motion noted.  Motor strength is 5 out of 5 all extremities bilaterally.  Tone is normal.       Primary Care Physician   Katarina Gutierrez    Discharge Orders      General info for SNF    Length of Stay Estimate: Long Term Care  Condition at Discharge: Stable  Level of care:skilled   Rehabilitation Potential: Excellent  Admission H&P remains valid and up-to-date: Yes  Recent Chemotherapy: N/A  Use Nursing Home Standing Orders: Yes     Follow Up and recommended labs and tests    Follow up with Nursing home physician.  No follow up labs or test are needed.     Reason for your hospital stay    Patient resides at [facility].  The staff noted she was more lethargic.  Brought to the ER was hypotensive and had evidence of acute cystitis was felt to have septic shock.  Treated with fluids and pressors and antibiotics.  Improved significantly back to her baseline.     Activity - Up with nursing assistance     No CPR- Do NOT Intubate     Physical Therapy Adult Consult    Evaluate and treat as clinically indicated.    Reason: Weakness     Diet    Follow this diet upon discharge: Orders Placed This Encounter    Diet Regular Diet       Significant Results and Procedures   Most Recent 3 CBC's:Recent Labs   Lab Test 07/25/22  0525 07/24/22  0506 07/23/22  0516   WBC 9.9 11.7* 16.0*   HGB 10.0* 9.9* 10.1*   MCV 88 88 88    391 365     Most Recent 3 BMP's:Recent Labs   Lab Test 07/25/22  0525 07/24/22  0506 07/23/22  0516    142 141   POTASSIUM 3.4 3.3* 3.4   CHLORIDE 114* 115* 114*   CO2 21 20 20   BUN 18 22 23   CR 1.04 0.97 1.03    ANIONGAP 7 7 7   LUZMARIA 8.2* 8.1* 8.1*   GLC 91 93 94     Most Recent 2 LFT's:Recent Labs   Lab Test 07/25/22  0525 07/24/22  0506   AST 20 17   ALT 30 28   ALKPHOS 364* 389*   BILITOTAL 0.3 0.3     Most Recent 3 Troponin's:Recent Labs   Lab Test 03/27/21  2106 03/27/21  1606 03/27/21  0936   TROPI 0.290* 0.336* 0.161*     Most Recent 3 BNP's:Recent Labs   Lab Test 07/21/22  0451 02/01/20  1309   NTBNPI 6,936* 1,116     Most Recent Urinalysis:Recent Labs   Lab Test 07/20/22  1144 10/12/21  1456 09/28/21  1124   COLOR Yellow  Yellow   < > Yellow   APPEARANCE Cloudy*  Cloudy*   < > Clear   URINEGLC Negative  Negative   < > Negative   URINEBILI Negative  Negative   < > Negative   URINEKETONE Negative  Negative   < > Negative   SG 1.020  1.020   < > 1.020   UBLD Moderate*  Moderate*   < > Negative   URINEPH 6.0  6.0   < > 6.5   PROTEIN Negative  Negative   < > Negative   UROBILINOGEN  --   --  0.2   NITRITE Negative  Negative   < > Positive*   LEUKEST Moderate*  Moderate*   < > Moderate*   RBCU 7*  7*   < > 2-5*   WBCU >182*  >182*   < > 5-10*    < > = values in this interval not displayed.     Most Recent ESR & CRP:Recent Labs   Lab Test 03/27/21  0732   CRP 7.8   , Urine Culture  Order: [id number]   Collected 7/20/2022 11:44 AM     Status: Edited Result - FINAL     Visible to patient: No (inaccessible in MyChart)    Specimen Information: Urine, Catheter         0 Result Notes    Culture >100,000 CFU/mL Klebsiella aerogenes Abnormal             Resulting Agency: [facility]       Susceptibility     Klebsiella aerogenes     MINO     Ampicillin Resistant 1     Ampicillin/ Sulbactam Resistant 1     Cefazolin Resistant 1     Cefepime Susceptible     Cefoxitin Resistant 1     Ceftazidime Susceptible     Ceftriaxone Susceptible     Ciprofloxacin Susceptible     Ertapenem Susceptible     Gentamicin Susceptible     Imipenem Susceptible     Levofloxacin Susceptible     Nitrofurantoin Susceptible      Piperacillin/Tazobactam Susceptible     Tobramycin Susceptible     Trimethoprim/Sulfamethoxazole Susceptible              1 Intrinsically Resistant            Specimen Collected: 07/20/22 11:44 AM Last Resulted: 07/22/22  1:45 PM            Latest Reference Range & Units 07/20/22 12:09 07/21/22 04:51 07/22/22 04:39 07/23/22 05:16 07/24/22 05:06 07/25/22 05:25   Procalcitonin <0.05 ng/mL 7.79 (HH) 7.99 (HH) 7.42 (HH) 4.04 (H) 1.98 (H) 1.04 (H)     Results for orders placed or performed during the hospital encounter of 07/20/22   XR Chest Port 1 View    Narrative    PROCEDURE:  XR CHEST PORT 1 VIEW    HISTORY: fatigue    COMPARISON:  Chest radiographs 2/1/2020, 1/21/2018    FINDINGS:  Heart/mediastinum: The cardiomediastinal contours are stable and  within normal limits. There is mild calcific aortic atherosclerosis.   Lungs and pleural spaces: No focal consolidation, effusion or  pneumothorax. A few left lower lobe opacities with stable lingular  scarring.  Bones and soft tissues: No definite acute osseous abnormality.  Degenerative changes of the shoulders. No upper abdominal free air.  Upper abdominal surgical clips.      Impression    IMPRESSION:    A few left lower lobe opacities, favored atelectasis versus scarring,  less likely infection.    MOE COLINDRES MD         SYSTEM ID:  [id number]       Discharge Medications   Current Discharge Medication List      START taking these medications    Details   saccharomyces boulardii (FLORASTOR) 250 MG capsule Take 1 capsule (250 mg) by mouth 2 times daily for 5 days  Qty: 10 capsule, Refills: 0    Associated Diagnoses: Acute cystitis without hematuria      sulfamethoxazole-trimethoprim (BACTRIM DS) 800-160 MG tablet Take 1 tablet by mouth 2 times daily for 5 days  Qty: 10 tablet, Refills: 0    Associated Diagnoses: Acute cystitis without hematuria         CONTINUE these medications which have NOT CHANGED    Details   acetaminophen (TYLENOL) 325 MG tablet Take 650 mg by  mouth daily      acetaminophen (TYLENOL) 650 MG CR tablet Take 1,300 mg by mouth 2 times daily      Ascorbic Acid (VITAMIN C) 500 MG CAPS Take 500 mg by mouth daily      aspirin (ASA) 81 MG EC tablet Take 1 tablet (81 mg) by mouth daily  Qty: 30 tablet, Refills: 3    Associated Diagnoses: Troponin level elevated      Calcium Carb-Cholecalciferol (CALCIUM + D3) 600-200 MG-UNIT TABS Take 1 tablet by mouth daily      Cholecalciferol (VITAMIN D) 50 MCG (2000 UT) CAPS Take 1 tablet by mouth daily      conjugated estrogens (PREMARIN) 0.625 MG/GM vaginal cream Place 0.5 g vaginally twice a week Takes on tuesdays and fridays      Cranberry 500 MG CAPS Take by mouth daily      ferrous sulfate (FEROSUL) 325 (65 Fe) MG tablet Take 1 tablet (325 mg) by mouth daily (with breakfast)  Qty: 90 tablet, Refills: 1    Associated Diagnoses: Iron deficiency      GABAPENTIN PO Take 200 mg by mouth 3 times daily      lisinopril (ZESTRIL) 10 MG tablet Take 1 tablet (10 mg) by mouth daily  Qty: 90 tablet, Refills: 3    Associated Diagnoses: Benign essential hypertension      metoprolol tartrate (LOPRESSOR) 12.5 mg TABS half-tab Take 12.5 mg by mouth 2 times daily      mirabegron (MYRBETRIQ) 50 MG 24 hr tablet Take 50 mg by mouth At Bedtime      Multiple Vitamin (MULTIVITAMIN) per tablet Take 1 tablet by mouth daily with food.      potassium chloride ER (KLOR-CON M) 10 MEQ CR tablet Take 1 tablet (10 mEq) by mouth 2 times daily  Qty: 90 tablet, Refills: 1    Associated Diagnoses: Hypokalemia      Psyllium 0.36 g CAPS Take 2 capsules by mouth daily      traMADol (ULTRAM) 50 MG tablet 1-2 tabs orally  Every 6 hrs as needed for pain.  Qty: 30 tablet, Refills: 1    Associated Diagnoses: Acute right-sided low back pain with right-sided sciatica           Allergies   No Known Allergies

## 2022-07-25 NOTE — PLAN OF CARE
"/70 (BP Location: Right arm, Patient Position: Chair, Cuff Size: Adult Regular)   Pulse 75   Temp 98  F (36.7  C) (Tympanic)   Resp 22   Ht 1.499 m (4' 11\")   Wt 64.1 kg (141 lb 5 oz)   SpO2 93%   BMI 28.54 kg/m      Patient discharged at 12:06 PM via wheel chair accompanied by transport tech. Prescriptions sent to nursing home's preferred pharmacy. All belongings sent with patient.     Discharge instructions reviewed with Willa from [facility]. Listed belongings gathered and returned to patient.    Patient discharged to [facility].   Report called to Nursing Home: Report given to Willa via telephone at 11:06 AM.    Surgical Patient  Surgical Procedures during stay: No  Did patient receive discharge instruction on wound care and recognition of infection symptoms? N/A    MISC  Follow up appointment made:   Patient will follow-up with nursing home physician.  Home medications returned to patient: N/A  Patient reports pain was well managed at discharge: Yes  "

## 2022-07-26 LAB
BACTERIA BLD CULT: NO GROWTH
BACTERIA BLD CULT: NO GROWTH

## 2022-07-27 ENCOUNTER — DOCUMENTATION ONLY (OUTPATIENT)
Dept: OTHER | Facility: CLINIC | Age: 87
End: 2022-07-27

## 2022-08-02 ENCOUNTER — TRANSFERRED RECORDS (OUTPATIENT)
Dept: HEALTH INFORMATION MANAGEMENT | Facility: CLINIC | Age: 87
End: 2022-08-02

## 2022-08-02 LAB
ALT SERPL-CCNC: 25 IU/L (ref 6–31)
AST SERPL-CCNC: 24 IU/L (ref 10–40)
CREATININE (EXTERNAL): 1.19 MG/DL (ref 0.4–1)
GFR ESTIMATED (EXTERNAL): 42 ML/MIN/1.73M2
GLUCOSE (EXTERNAL): 89 MG/DL (ref 70–99)
POTASSIUM (EXTERNAL): 5.6 MEQ/L (ref 3.4–5.1)

## 2022-08-09 ENCOUNTER — TRANSFERRED RECORDS (OUTPATIENT)
Dept: HEALTH INFORMATION MANAGEMENT | Facility: CLINIC | Age: 87
End: 2022-08-09

## 2022-09-07 NOTE — TELEPHONE ENCOUNTER
8:04 AM    Reason for Call: OVERBOOK    Patient is having the following symptoms: UTI      The patient is requesting an appointment for today with Dr. Franco    Was an appointment offered for this call?  No    Preferred method for responding to this message: [phone]    If we cannot reach you directly, may we leave a detailed response at the number you provided?  Yes      Chata Schuster       Patient/Caregiver provided printed discharge information.

## 2022-09-16 ENCOUNTER — TRANSFERRED RECORDS (OUTPATIENT)
Dept: HEALTH INFORMATION MANAGEMENT | Facility: CLINIC | Age: 87
End: 2022-09-16

## 2022-09-26 DIAGNOSIS — M54.41 ACUTE RIGHT-SIDED LOW BACK PAIN WITH RIGHT-SIDED SCIATICA: ICD-10-CM

## 2022-09-26 RX ORDER — TRAMADOL HYDROCHLORIDE 50 MG/1
TABLET ORAL
Qty: 30 TABLET | Refills: 1 | Status: SHIPPED | OUTPATIENT
Start: 2022-09-26 | End: 2023-06-27

## 2022-09-27 ENCOUNTER — TRANSFERRED RECORDS (OUTPATIENT)
Dept: HEALTH INFORMATION MANAGEMENT | Facility: CLINIC | Age: 87
End: 2022-09-27

## 2022-09-27 LAB
ALT SERPL-CCNC: 20 IU/L (ref 6–31)
AST SERPL-CCNC: 21 IU/L (ref 10–40)

## 2022-11-01 ENCOUNTER — TRANSFERRED RECORDS (OUTPATIENT)
Dept: HEALTH INFORMATION MANAGEMENT | Facility: CLINIC | Age: 87
End: 2022-11-01

## 2022-11-01 LAB
ALT SERPL-CCNC: 17 IU/L (ref 6–31)
AST SERPL-CCNC: 19 IU/L (ref 10–40)
CREATININE (EXTERNAL): 1.08 MG/DL (ref 0.4–1)
GFR ESTIMATED (EXTERNAL): 48 ML/MIN/1.73M2
GLUCOSE (EXTERNAL): 138 MG/DL (ref 70–99)
POTASSIUM (EXTERNAL): 3.8 MEQ/L (ref 3.4–5.1)

## 2022-11-11 ENCOUNTER — MEDICAL CORRESPONDENCE (OUTPATIENT)
Dept: HEALTH INFORMATION MANAGEMENT | Facility: HOSPITAL | Age: 87
End: 2022-11-11

## 2022-12-26 ENCOUNTER — MEDICAL CORRESPONDENCE (OUTPATIENT)
Dept: HEALTH INFORMATION MANAGEMENT | Facility: CLINIC | Age: 87
End: 2022-12-26

## 2022-12-26 ENCOUNTER — TELEPHONE (OUTPATIENT)
Dept: NURSING | Facility: CLINIC | Age: 87
End: 2022-12-26

## 2022-12-26 NOTE — TELEPHONE ENCOUNTER
Coronavirus (COVID-19) Notification    Caller Name (Patient, parent, daughter/son, grandparent, etc)  DIVYA Crouch nurse [facility]    Reason for call  Rapid test Positive for covid    Gather patient reported symptoms   Assessment   Current Symptoms at time of phone call, reported by patient: (if no symptoms, document: No symptoms] Nasal congestion, cough, fatigue and weak   Date of symptom(s) onset (if applicable) 12/25/22     If at time of call, Patients symptoms have worsened, the Patient should contact 911 or have someone drive them to Emergency Dept promptly:      If Patient calling 911, inform 911 personal that you have tested positive for the Coronavirus (COVID-19).  Place mask on and await 911 to arrive.    If Emergency Dept, If possible, please have another adult drive you to the Emergency Dept but you need to wear mask when in contact with other people.      Treatment Options:   Patient classified as COVID treatment eligible by Epic high risk algorithm: Yes  Is the patient symptomatic at the time of result notification? Yes. Was the onset of symptoms within the last 5 days? Yes.   There are now oral medications available for the treatment of COVID-19.  Taking one of these medications within the first five days of symptoms (when people may not yet feel severely ill) has been shown to make people feel better, prevent them from getting sicker, and preventing hospitalization and death.   Does the patient agree to have a visit with a provider to discuss treatment options? Yes. Is the patient seen at [facility] or [facility]?  Yes.  Patient was warm transferred to be scheduled with a provider.     Would you like me to review some of that information with you now?  No    Spoke with nurse about getting started with anti viral. Instructed that patient uses [facility] and stated the scheduing instructions above. Nurse will call tomorrow and agrees with instructions.     SEBASTIAN BHAGAT RN on 12/26/2022 at 11:33  AM

## 2022-12-27 ENCOUNTER — TELEPHONE (OUTPATIENT)
Dept: FAMILY MEDICINE | Facility: OTHER | Age: 87
End: 2022-12-27

## 2022-12-27 DIAGNOSIS — U07.1 INFECTION DUE TO 2019 NOVEL CORONAVIRUS: Primary | ICD-10-CM

## 2022-12-27 NOTE — TELEPHONE ENCOUNTER
Called with COVID + with cough. Pt in NH with current outbreak. High risk.     Unable to get in touch with clinic to schedule today for virtual.     Paxlovid - renally dose called in.    Yelitza Olguin MD

## 2023-01-16 VITALS
TEMPERATURE: 98 F | WEIGHT: 143.4 LBS | BODY MASS INDEX: 28.96 KG/M2 | OXYGEN SATURATION: 93 % | HEART RATE: 70 BPM | DIASTOLIC BLOOD PRESSURE: 61 MMHG | SYSTOLIC BLOOD PRESSURE: 112 MMHG | RESPIRATION RATE: 20 BRPM

## 2023-01-16 RX ORDER — AMLODIPINE BESYLATE 2.5 MG/1
2.5 TABLET ORAL DAILY
COMMUNITY

## 2023-01-19 ENCOUNTER — NURSING HOME VISIT (OUTPATIENT)
Dept: FAMILY MEDICINE | Facility: OTHER | Age: 88
End: 2023-01-19
Attending: FAMILY MEDICINE
Payer: MEDICARE

## 2023-01-19 DIAGNOSIS — Z78.9 NURSING HOME RESIDENT: Primary | ICD-10-CM

## 2023-01-19 PROCEDURE — 99308 SBSQ NF CARE LOW MDM 20: CPT | Performed by: FAMILY MEDICINE

## 2023-01-23 NOTE — PROGRESS NOTES
HISTORY OF PRESENT ILLNESS:      Claudine is a 89 year old female (4/11/1930)  resident of [facility] who is being seen today for a routine regulatory visit.     Patient has a history of HFpEF, Aortic Stenosis, HTN, HLD, Anemia.      Patient was admitted to this facility on 10/15/21 after hospitalization for acute back pain due to sacral insufficiency fractures, bilateral.     Also noted during hospitalization was right renal mass. In review of records this mass has been present for several years and appears to be stable. She also has noted to have pelvic/ovarian cyst which has increased in size from 3 to 4.6cm. Looks like she was referred to Gyn Onc 3/2020, but no appt made. Family updated and wish no further work up at this time.     Also of note, pt was hospitalized back in March of this year for elevated trop. Treated conservatively, unclear if true NSTEMI or related to demand. No echo done (previously noted to have normal ef and AORTIC STENOSIS). She did not ever have follow up for outpatient echo or stress testing.     Patient was hospitalized from 7/20/22 to 7/25/22 for septic shock secondary to acute cystitis. She initially required vasopressor support, however this was able to be weaned 12 hours after arrival. Urine culture showing klebsiella aerogenes. She was treated with rocephin and discharged on 5 days of oral bactrim. Her ALMA resolved prior to discharge. She was discharged back to [facility] on 7/25/22.     She does have noted elevated alk phos which is being followed. A GGT was obtained and elevated, making hepatobiliary origin likely. Given no symptoms, have been trending alk phos.    Discussed with nursing staff who have the following concerns: None    Patient is seen today in her room. She is poor historian. Family is present - spouse who also recently moved to facility. She has no acute concerns. She states she is feeling well. States her back pain is better than it has been.  Denies urinary concerns, abd pain, bloating, nausea. Appetite is good - pt is currently eating her lunch has eaten currently > 50%.     Current medications, allergies, and interdisciplinary care plan are reviewed.    Patient Active Problem List    Diagnosis Date Noted     Acute cystitis without hematuria 07/25/2022     Priority: Medium     Hypotension, unspecified hypotension type 07/20/2022     Priority: Medium     Sepsis with acute renal failure and septic shock (H) 07/20/2022     Priority: Medium     Alzheimer's disease (H) 12/06/2021     Priority: Medium     Pyelonephritis 10/12/2021     Priority: Medium     Generalized muscle weakness 10/12/2021     Priority: Medium     Unable to ambulate 10/12/2021     Priority: Medium     Right-sided low back pain without sciatica 10/12/2021     Priority: Medium     Closed fracture of sacrum, unspecified portion of sacrum, initial encounter (H) 10/12/2021     Priority: Medium     Sacral insufficiency fracture 10/12/2021     Priority: Medium     Elevated troponin 03/27/2021     Priority: Medium     Troponin level elevated 03/27/2021     Priority: Medium     Urinary tract infection without hematuria, site unspecified 03/27/2021     Priority: Medium     Iron deficiency 07/13/2020     Priority: Medium     Spinal stenosis of lumbosacral region 07/13/2020     Priority: Medium     Sacroiliitis (H) 07/13/2020     Priority: Medium     Back pain 02/01/2020     Priority: Medium     Hyponatremia 02/01/2020     Priority: Medium     Rhabdomyolysis 02/01/2020     Priority: Medium     Hypokalemia 02/01/2020     Priority: Medium     Anemia 02/01/2020     Priority: Medium     Closed fracture of eleventh thoracic vertebra (H) 02/01/2020     Priority: Medium     Systolic murmur 02/01/2020     Priority: Medium     Nasal lesion 07/24/2019     Priority: Medium     Renal benign neoplasm, left 06/05/2019     Priority: Medium     Obesity (BMI 35.0-39.9) with comorbidity (H) 06/05/2019     Priority:  Medium     Memory loss 06/05/2019     Priority: Medium     Acute right-sided low back pain with right-sided sciatica 06/05/2019     Priority: Medium     Renal mass, right 06/08/2017     Priority: Medium     MRSA (methicillin resistant Staphylococcus aureus) 06/01/2017     Priority: Medium     Formatting of this note might be different from the original.  Date & Source of First Known MRSA: 1/22/2016 - [facility] [place]    Date and source of negative screens that qualify* for resolution of MRSA from infection table:     NONE    *2 sets of MRSA negative screens (previous positive site(s) if applicable and bilateral anterior nares) at least 7 days apart are required to resolve.   Screening exclusions include dialysis, long term care residence, antibiotics within the past 7 days, chronic wounds or invasive devices, & recurrent MRSA infections.  Please contact Infection Prevention for your facility if questions.       Postoperative anemia due to acute blood loss 01/24/2017     Priority: Medium     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 Provider to review and confirm.       CKD (chronic kidney disease) stage 2, GFR 60-89 ml/min 08/03/2012     Priority: Medium     Diastolic dysfunction 02/27/2012     Priority: Medium     echo 2/2012  EF 60%            Social History     Socioeconomic History     Marital status:      Spouse name: Dharmesh     Number of children: 5     Years of education: Not on file     Highest education level: Not on file   Occupational History     Employer: [employer]     Comment: part-time   Tobacco Use     Smoking status: Never     Smokeless tobacco: Never   Substance  and Sexual Activity     Alcohol use: Yes     Comment: Mixed, rarely     Drug use: No     Sexual activity: Yes     Partners: Male   Other Topics Concern      Service No     Blood Transfusions Yes     Caffeine Concern Yes     Comment: Coffee, 4 cups daily     Occupational Exposure Not Asked     Hobby Hazards Not Asked     Sleep Concern Not Asked     Stress Concern Not Asked     Weight Concern Not Asked     Special Diet Not Asked     Back Care Not Asked     Exercise No     Bike Helmet Not Asked     Seat Belt Yes     Self-Exams Yes     Parent/sibling w/ CABG, MI or angioplasty before 65F 55M? Not Asked   Social History Narrative     -- n/a    Caffeine --    Concussion --     Social Determinants of Health     Financial Resource Strain: Not on file   Food Insecurity: Not on file   Transportation Needs: Not on file   Physical Activity: Not on file   Stress: Not on file   Social Connections: Not on file   Intimate Partner Violence: Not on file   Housing Stability: Not on file        Current Outpatient Medications   Medication Sig     acetaminophen (TYLENOL) 325 MG tablet Take 650 mg by mouth daily     acetaminophen (TYLENOL) 650 MG CR tablet Take 1,300 mg by mouth 2 times daily     amLODIPine (NORVASC) 2.5 MG tablet Take 2.5 mg by mouth daily     Ascorbic Acid (VITAMIN C) 500 MG CAPS Take 500 mg by mouth daily     aspirin (ASA) 81 MG EC tablet Take 1 tablet (81 mg) by mouth daily     Calcium Carb-Cholecalciferol (CALCIUM + D3) 600-200 MG-UNIT TABS Take 1 tablet by mouth daily     Cholecalciferol (VITAMIN D) 50 MCG (2000 UT) CAPS Take 1 tablet by mouth daily     conjugated estrogens (PREMARIN) 0.625 MG/GM vaginal cream Place 0.5 g vaginally twice a week Takes on tuesdays and fridays     Cranberry 500 MG CAPS Take by mouth daily     ferrous sulfate (FEROSUL) 325 (65 Fe) MG tablet Take 1 tablet (325 mg) by mouth daily (with breakfast)     GABAPENTIN PO Take 200 mg by mouth 3 times daily     lisinopril (ZESTRIL)  10 MG tablet Take 1 tablet (10 mg) by mouth daily     metoprolol tartrate (LOPRESSOR) 25 MG tablet Take 1 tablet (25 mg) by mouth 2 times daily     mirabegron (MYRBETRIQ) 50 MG 24 hr tablet Take 50 mg by mouth At Bedtime     Multiple Vitamin (MULTIVITAMIN) per tablet Take 1 tablet by mouth daily with food.     potassium chloride ER (KLOR-CON M) 10 MEQ CR tablet Take 1 tablet (10 mEq) by mouth 2 times daily (Patient taking differently: Take 10 mEq by mouth daily)     Psyllium 0.36 g CAPS Take 2 capsules by mouth daily     traMADol (ULTRAM) 50 MG tablet 1-2 tabs orally  Every 6 hrs as needed for pain.     No current facility-administered medications for this visit.       No Known Allergies    I have reviewed the care plan and do agree with the plan.    ROS:  No chest pain, shortness of breath, fever, chills, headache, nausea, vomiting, dysuria, or changes in bowel habits.  Appetite is good.  No pain noted.    OBJECTIVE:  /61   Pulse 70   Temp 98  F (36.7  C)   Resp 20   Wt 65 kg (143 lb 6.4 oz)   SpO2 93%   BMI 28.96 kg/m      GENERAL:  Alert, and in no acute distress  RESP: Respirations unlabored. Lung sounds clear to auscultation.    CV:  RRR.  S1 S2 with 4/6 systolic murmur. No clicks or rubs.  ABD: Soft, non tender, non distended, no organomegaly. BS are normal.   SKIN:  Age-related changes.  No suspicious lesions or rashes.  PSYCH:  Affect is flat.   NEURO: Mental status is alert. Memory is impaired.   EXTREM:  No edema.    Lab/Diagnostic data:    Reviewed in Epic    ASSESSMENT/ORDERS:  Diagnoses and all orders for this visit:    Memory loss / Dementia  Presumed Alz Dementia. Has had cognitive decline over past several years per family. Patient will remain at [facility] for long term care, spouse is at facility as well. Patient is in supportive environment. Weight stable, no behaviors.     Urinary urge incontinence  Tolerating mirbetriq with benefit. No report of lower urinary tract symptoms today.  Vaginal estrogen has been discontinued. Consider restarting with increase in urinary complaints.    Acute right-sided low back pain with right-sided sciatica / Closed fracture of sacrum, unspecified portion of sacrum, initial encounter (H) / Sacral insufficiency fracture, initial encounter / Generalized muscle weakness  Resolved. Continues on gabapentin, tramadol PRN.No changes in medications made today.    Anemia, unspecified type / Iron deficiency  Baseline Hgb previously around 9. This has improved recently, hgb of 11.9 noted on 9/22, now up to 13. Continues on iron supplement for now. Iron with next labs.     Hyperlipidemia with target LDL less than 130  Not on statin, LDL 2020 135. Would not start at this time.     Benign essential hypertension  BPs reviewed today, being monitored weekly. Several readings with .  Will have staff monitor daily BP over the next week and send for review.  Will increase metoprolol to 25mg bid.     Diastolic dysfunction / Nonrheumatic aortic valve stenosis  Lung sounds clear. No hypoxia. Edema stable. Compression stockings in place. Monitor.     Stage 3 chronic kidney disease, unspecified whether stage 3a or 3b CKD (H)  BMP stable on 11/22. Ordered routine every 3 months.     Vitamin D deficiency  Recent vit d level of 18 on 11/2/21. Started on daily supplement, 2000 units. Level improved to 66 on 4/12. Monitor and continue supplement    Renal mass, right  Reviewed previous imaging, despite concern for appearance c/w malignancy, this has been very stable. No pain. Continue to monitor.     Elevated alkaline phosphatase level  Up to 389 at one point. Has trended down. Last result of 194 on 9/27. GGT obtained and elevated. Continues to have no pain, nausea, vomiting etc. Monitor for now unless sx develop.     Cyst of ovary, unspecified laterality  This is enlarging, albeit slowly, without evidence of any metastatic disease. Has not been seen by Gyn Onc.  is elevated in the  past Family decided on no further workup. Currently no abd pain, bloating, bowel changes.     Total time spent with patient visit was 25 min including patient visit, review of pertinent clinical information, and treatment plan.    Yelitza Olguin MD

## 2023-03-06 VITALS
WEIGHT: 137.4 LBS | BODY MASS INDEX: 25.94 KG/M2 | HEART RATE: 75 BPM | TEMPERATURE: 98.2 F | DIASTOLIC BLOOD PRESSURE: 69 MMHG | RESPIRATION RATE: 18 BRPM | HEIGHT: 61 IN | SYSTOLIC BLOOD PRESSURE: 140 MMHG | OXYGEN SATURATION: 94 %

## 2023-03-10 ENCOUNTER — NURSING HOME VISIT (OUTPATIENT)
Dept: FAMILY MEDICINE | Facility: OTHER | Age: 88
End: 2023-03-10
Payer: MEDICARE

## 2023-03-10 DIAGNOSIS — R74.8 ELEVATED ALKALINE PHOSPHATASE LEVEL: ICD-10-CM

## 2023-03-10 DIAGNOSIS — E55.9 VITAMIN D DEFICIENCY: ICD-10-CM

## 2023-03-10 DIAGNOSIS — Z78.9 NURSING HOME RESIDENT: Primary | ICD-10-CM

## 2023-03-10 DIAGNOSIS — F02.80 ALZHEIMER'S DISEASE (H): ICD-10-CM

## 2023-03-10 DIAGNOSIS — G30.9 ALZHEIMER'S DISEASE (H): ICD-10-CM

## 2023-03-10 DIAGNOSIS — I10 BENIGN ESSENTIAL HYPERTENSION: ICD-10-CM

## 2023-03-10 DIAGNOSIS — N18.30 STAGE 3 CHRONIC KIDNEY DISEASE, UNSPECIFIED WHETHER STAGE 3A OR 3B CKD (H): ICD-10-CM

## 2023-03-10 PROCEDURE — 99308 SBSQ NF CARE LOW MDM 20: CPT

## 2023-03-10 NOTE — PROGRESS NOTES
HISTORY OF PRESENT ILLNESS:      Claudine is a 89 year old female (4/11/1930)  resident of [facility] who is being seen today for a routine regulatory visit.     Patient has a history of HFpEF, Aortic Stenosis, HTN, HLD, Anemia.      Patient was admitted to this facility on 10/15/21 after hospitalization for acute back pain due to sacral insufficiency fractures, bilateral.     Also noted during hospitalization was right renal mass. In review of records this mass has been present for several years and appears to be stable. She also has noted to have pelvic/ovarian cyst which has increased in size from 3 to 4.6cm. Looks like she was referred to Gyn Onc 3/2020, but no appt made. Family updated and wish no further work up at this time.     Also of note, pt was hospitalized back in March of this year for elevated trop. Treated conservatively, unclear if true NSTEMI or related to demand. No echo done (previously noted to have normal ef and AORTIC STENOSIS). She did not ever have follow up for outpatient echo or stress testing.     She does have noted elevated alk phos which is being followed. A GGT was obtained and elevated, making hepatobiliary origin likely. Given no symptoms, have been trending alk phos.    Discussed with nursing staff who have the following concerns: No concerns. Overall doing well. Reviewed progress notes in facility records.     Patient is found sitting at the table putting a puzzle together along with her spouse. She prefers to stay here for her visit today. She is poor historian. She states she feels well. Feels her pain is well-controlled. No concerns with her breathing. No nausea, vomiting or abdominal pain. No dysuria.     Current medications, allergies, and interdisciplinary care plan are reviewed.    Patient Active Problem List    Diagnosis Date Noted     Acute cystitis without hematuria 07/25/2022     Priority: Medium     Hypotension, unspecified hypotension type 07/20/2022      Priority: Medium     Sepsis with acute renal failure and septic shock (H) 07/20/2022     Priority: Medium     Alzheimer's disease (H) 12/06/2021     Priority: Medium     Pyelonephritis 10/12/2021     Priority: Medium     Generalized muscle weakness 10/12/2021     Priority: Medium     Unable to ambulate 10/12/2021     Priority: Medium     Right-sided low back pain without sciatica 10/12/2021     Priority: Medium     Closed fracture of sacrum, unspecified portion of sacrum, initial encounter (H) 10/12/2021     Priority: Medium     Sacral insufficiency fracture 10/12/2021     Priority: Medium     Elevated troponin 03/27/2021     Priority: Medium     Troponin level elevated 03/27/2021     Priority: Medium     Urinary tract infection without hematuria, site unspecified 03/27/2021     Priority: Medium     Iron deficiency 07/13/2020     Priority: Medium     Spinal stenosis of lumbosacral region 07/13/2020     Priority: Medium     Sacroiliitis (H) 07/13/2020     Priority: Medium     Back pain 02/01/2020     Priority: Medium     Hyponatremia 02/01/2020     Priority: Medium     Rhabdomyolysis 02/01/2020     Priority: Medium     Hypokalemia 02/01/2020     Priority: Medium     Anemia 02/01/2020     Priority: Medium     Closed fracture of eleventh thoracic vertebra (H) 02/01/2020     Priority: Medium     Systolic murmur 02/01/2020     Priority: Medium     Nasal lesion 07/24/2019     Priority: Medium     Renal benign neoplasm, left 06/05/2019     Priority: Medium     Obesity (BMI 35.0-39.9) with comorbidity (H) 06/05/2019     Priority: Medium     Memory loss 06/05/2019     Priority: Medium     Acute right-sided low back pain with right-sided sciatica 06/05/2019     Priority: Medium     Renal mass, right 06/08/2017     Priority: Medium     MRSA (methicillin resistant Staphylococcus aureus) 06/01/2017     Priority: Medium     Formatting of this note might be different from the original.  Date & Source of First Known MRSA: 1/22/2016  - [facility] [place]    Date and source of negative screens that qualify* for resolution of MRSA from infection table:     NONE    *2 sets of MRSA negative screens (previous positive site(s) if applicable and bilateral anterior nares) at least 7 days apart are required to resolve.   Screening exclusions include dialysis, long term care residence, antibiotics within the past 7 days, chronic wounds or invasive devices, & recurrent MRSA infections.  Please contact Infection Prevention for your facility if questions.       Postoperative anemia due to acute blood loss 01/24/2017     Priority: Medium     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 Provider to review and confirm.       CKD (chronic kidney disease) stage 2, GFR 60-89 ml/min 08/03/2012     Priority: Medium     Diastolic dysfunction 02/27/2012     Priority: Medium     echo 2/2012  EF 60%            Social History     Socioeconomic History     Marital status:      Spouse name: Dharmesh     Number of children: 5     Years of education: Not on file     Highest education level: Not on file   Occupational History     Employer: [employer]     Comment: part-time   Tobacco Use     Smoking status: Never     Smokeless tobacco: Never   Substance and Sexual Activity     Alcohol use: Yes     Comment: Mixed, rarely     Drug use: No     Sexual activity: Yes     Partners: Male   Other Topics Concern      Service No     Blood Transfusions Yes     Caffeine Concern Yes     Comment: Coffee, 4 cups daily     Occupational Exposure Not Asked     Hobby Hazards Not Asked     Sleep Concern Not Asked     Stress Concern Not Asked     Weight Concern  Not Asked     Special Diet Not Asked     Back Care Not Asked     Exercise No     Bike Helmet Not Asked     Seat Belt Yes     Self-Exams Yes     Parent/sibling w/ CABG, MI or angioplasty before 65F 55M? Not Asked   Social History Narrative     -- n/a    Caffeine --    Concussion --     Social Determinants of Health     Financial Resource Strain: Not on file   Food Insecurity: Not on file   Transportation Needs: Not on file   Physical Activity: Not on file   Stress: Not on file   Social Connections: Not on file   Intimate Partner Violence: Not on file   Housing Stability: Not on file        Current Outpatient Medications   Medication Sig     acetaminophen (TYLENOL) 325 MG tablet Take 650 mg by mouth daily     acetaminophen (TYLENOL) 650 MG CR tablet Take 1,300 mg by mouth 2 times daily     amLODIPine (NORVASC) 2.5 MG tablet Take 2.5 mg by mouth daily     Ascorbic Acid (VITAMIN C) 500 MG CAPS Take 500 mg by mouth daily     aspirin (ASA) 81 MG EC tablet Take 1 tablet (81 mg) by mouth daily     Calcium Carb-Cholecalciferol (CALCIUM + D3) 600-200 MG-UNIT TABS Take 1 tablet by mouth daily     Cholecalciferol (VITAMIN D) 50 MCG (2000 UT) CAPS Take 1 tablet by mouth daily     Cranberry 500 MG CAPS Take by mouth daily     ferrous sulfate (FEROSUL) 325 (65 Fe) MG tablet Take 1 tablet (325 mg) by mouth daily (with breakfast)     GABAPENTIN PO Take 200 mg by mouth 3 times daily     metoprolol tartrate (LOPRESSOR) 25 MG tablet Take 1 tablet (25 mg) by mouth 2 times daily     mirabegron (MYRBETRIQ) 50 MG 24 hr tablet Take 50 mg by mouth At Bedtime     Multiple Vitamin (MULTIVITAMIN) per tablet Take 1 tablet by mouth daily with food.     Psyllium 0.36 g CAPS Take 2 capsules by mouth daily     traMADol (ULTRAM) 50 MG tablet 1-2 tabs orally  Every 6 hrs as needed for pain.     No current facility-administered medications for this visit.       No Known Allergies    I have reviewed the care plan and do agree with the  "plan.    ROS:  No chest pain, shortness of breath, fever, chills, headache, nausea, vomiting, dysuria, or changes in bowel habits.  Appetite is good.  No pain noted.    OBJECTIVE:  BP (!) 140/69   Pulse 75   Temp 98.2  F (36.8  C)   Resp 18   Ht 1.549 m (5' 1\")   Wt 62.3 kg (137 lb 6.4 oz)   SpO2 94%   BMI 25.96 kg/m      GENERAL:  Alert, and in no acute distress  RESP: Respirations unlabored. Lung sounds clear to auscultation.    CV:  RRR.  S1 S2 with 4/6 systolic murmur. No clicks or rubs.  ABD: Soft, non tender, non distended, no organomegaly. BS are normal.   SKIN:  Age-related changes.  No suspicious lesions or rashes.  PSYCH:  Affect is flat.   NEURO: Mental status is alert. Memory is impaired.   EXTREM:  No edema.    Lab/Diagnostic data:    Reviewed in Epic    ASSESSMENT/ORDERS:  Diagnoses and all orders for this visit:    Memory loss / Dementia  Presumed Alz Dementia. Has had cognitive decline over past several years per family. Patient will remain at [facility] for long term care, spouse is at facility as well. Patient is in supportive environment. Weight stable- fluctuates 137-140 over past several months. No behaviors, agitation.    Urinary urge incontinence  Mirbetriq discontinued today due to cost- this was discussed with patient's daughter, Latonia. Will hold off on new medication and monitor urinary symptoms for now. Consider starting oxybutynin ER 5 mg with increase in symptoms. Discussed side effects of dry mouth, constipation, confusion. Vaginal estrogen has been discontinued. Can also consider restarting this if needed.     Acute right-sided low back pain with right-sided sciatica / Closed fracture of sacrum, unspecified portion of sacrum, initial encounter (H) / Sacral insufficiency fracture, initial encounter / Generalized muscle weakness  Resolved. Continues on gabapentin, tramadol PRN.No changes in medications made today.    Anemia, unspecified type / Iron deficiency  Hgb of 13.8 on 2/7. " Will discontinue iron today and recheck CBC, iron studies in 1 month.     Hyperlipidemia with target LDL less than 130  Not on statin, LDL 2020 135. Would not start at this time.     Benign essential hypertension  BPs reviewed today, generally controlled. Currently being monitored daily, will change back to weekly.     Diastolic dysfunction / Nonrheumatic aortic valve stenosis  Lung sounds clear. No hypoxia. Edema stable. Compression stockings in place. Monitor.     Stage 3 chronic kidney disease, unspecified whether stage 3a or 3b CKD (H)  Creat of 1.12 on 2/7, baseline. BMP ordered routine every 3 months.     Vitamin D deficiency  Recent vit d level of 18 on 11/2/21. Started on daily supplement, 2000 units. Level improved to 66 on 4/12. Monitor and continue supplement    Renal mass, right  Reviewed previous imaging, despite concern for appearance c/w malignancy, this has been very stable. No pain. Continue to monitor.     Elevated alkaline phosphatase level  Up to 389 at one point. Has trended down. Last result of 187 on 2/1. GGT obtained and elevated. Continues to have no pain, nausea, vomiting etc. Monitor for now unless sx develop.     Cyst of ovary, unspecified laterality  This is enlarging, albeit slowly, without evidence of any metastatic disease. Has not been seen by Gyn Onc.  is elevated in the past Family decided on no further workup. Currently no abd pain, bloating, bowel changes.     Total time spent with patient visit was 25 min including patient visit, review of pertinent clinical information, and treatment plan.    Tamar Colin, CNP

## 2023-03-13 NOTE — PROGRESS NOTES
SUBJECTIVE:                                                    Claudine Bustos is a 86 year old female who presents to clinic today for the following health issues:      Surgical follow up       Duration: 1/23/17    Description (location/character/radiation): right hip replacement     Intensity:  mild    Accompanying signs and symptoms: still having significant pain     History (similar episodes/previous evaluation): None    Precipitating or alleviating factors: Norco    Therapies tried and outcome: massage, heat,oxy - patient would like something that less potent        Hypertension Follow-up      Outpatient blood pressures are not being checked.    Low Salt Diet: no added salt       Problem list and histories reviewed & adjusted, as indicated.  Additional history: as documented    Current Outpatient Prescriptions   Medication Sig Dispense Refill     lisinopril (PRINIVIL/ZESTRIL) 5 MG tablet Take 1 tablet (5 mg) by mouth daily 30 tablet 1     HYDROcodone-acetaminophen (NORCO) 5-325 MG per tablet Take 1 tablet by mouth 2 times daily as needed for moderate to severe pain 60 tablet 0     Calcium Carb-Cholecalciferol (CALCIUM + D3) 600-200 MG-UNIT TABS Take 1 tablet by mouth daily       Multiple Vitamin (MULTIVITAMIN) per tablet Take 1 tablet by mouth daily with food.       ARTHRITIS PAIN 650 MG CR tablet TAKE 1 TABLET BY MOUTH EVERY 4 HOURS AS NEEDED 100 tablet 3     [DISCONTINUED] lisinopril (PRINIVIL/ZESTRIL) 10 MG tablet Take 1 tablet (10 mg) by mouth daily 90 tablet 1     Labs reviewed in EPIC    ROS:  Constitutional, HEENT, cardiovascular, pulmonary, gi and gu systems are negative, except as otherwise noted.    OBJECTIVE:                                                    /72  Pulse 104  Temp 96  F (35.6  C)  Resp 17  Wt 128 lb (58.1 kg)  SpO2 97%  BMI 25.85 kg/m2  Body mass index is 25.85 kg/(m^2).  GENERAL APPEARANCE: healthy, alert and no distress  RESP: lungs clear to auscultation - no rales,  Patient called and left a VM message stating she was returning RN's call. Patient stated it was ok to leave a detailed message. Please call.   rhonchi or wheezes  CV: regular rates and rhythm, normal S1 S2, no S3 or S4 and no murmur, click or rub  PSYCH: mentation appears normal and affect normal/bright       ASSESSMENT/PLAN:                                                    1. Status post total replacement of right hip  Doesn't want to be on oxy's  - HYDROcodone-acetaminophen (NORCO) 5-325 MG per tablet; Take 1 tablet by mouth 2 times daily as needed for moderate to severe pain  Dispense: 60 tablet; Refill: 0    2. Benign essential HTN  Cut down to 5mg, f/u 4-6 wks  - lisinopril (PRINIVIL/ZESTRIL) 5 MG tablet; Take 1 tablet (5 mg) by mouth daily  Dispense: 30 tablet; Refill: 1    Patient was agreeable to this plan and had no further questions.  See Patient Instructions    Katarina Gutierrez MD  [facility]

## 2023-04-10 VITALS
BODY MASS INDEX: 26.22 KG/M2 | WEIGHT: 138.9 LBS | DIASTOLIC BLOOD PRESSURE: 99 MMHG | TEMPERATURE: 97.9 F | SYSTOLIC BLOOD PRESSURE: 159 MMHG | HEIGHT: 61 IN | HEART RATE: 87 BPM | OXYGEN SATURATION: 96 % | RESPIRATION RATE: 16 BRPM

## 2023-04-10 RX ORDER — OXYBUTYNIN CHLORIDE 5 MG/1
5 TABLET ORAL DAILY
COMMUNITY

## 2023-04-11 ENCOUNTER — TELEPHONE (OUTPATIENT)
Dept: FAMILY MEDICINE | Facility: OTHER | Age: 88
End: 2023-04-11

## 2023-04-14 ENCOUNTER — NURSING HOME VISIT (OUTPATIENT)
Dept: FAMILY MEDICINE | Facility: OTHER | Age: 88
End: 2023-04-14
Payer: MEDICARE

## 2023-04-14 DIAGNOSIS — Z78.9 NURSING HOME RESIDENT: Primary | ICD-10-CM

## 2023-04-14 DIAGNOSIS — F02.80 ALZHEIMER'S DISEASE (H): ICD-10-CM

## 2023-04-14 DIAGNOSIS — N18.30 STAGE 3 CHRONIC KIDNEY DISEASE, UNSPECIFIED WHETHER STAGE 3A OR 3B CKD (H): ICD-10-CM

## 2023-04-14 DIAGNOSIS — R74.8 ELEVATED ALKALINE PHOSPHATASE LEVEL: ICD-10-CM

## 2023-04-14 DIAGNOSIS — G30.9 ALZHEIMER'S DISEASE (H): ICD-10-CM

## 2023-04-14 DIAGNOSIS — I10 BENIGN ESSENTIAL HYPERTENSION: ICD-10-CM

## 2023-04-14 DIAGNOSIS — E55.9 VITAMIN D DEFICIENCY: ICD-10-CM

## 2023-04-14 PROCEDURE — 99308 SBSQ NF CARE LOW MDM 20: CPT

## 2023-04-14 NOTE — PROGRESS NOTES
HISTORY OF PRESENT ILLNESS:      Claudine is a 89 year old female (4/11/1930)  resident of [facility] who is being seen today for a routine regulatory visit.     Patient has a history of HFpEF, Aortic Stenosis, HTN, HLD, Anemia.      Patient was admitted to this facility on 10/15/21 after hospitalization for acute back pain due to sacral insufficiency fractures, bilateral.     Also noted during hospitalization was right renal mass. In review of records this mass has been present for several years and appears to be stable. She also has noted to have pelvic/ovarian cyst which has increased in size from 3 to 4.6cm. Looks like she was referred to Gyn Onc 3/2020, but no appt made. Family updated and wish no further work up at this time.     Also of note, pt was hospitalized back in March of this year for elevated trop. Treated conservatively, unclear if true NSTEMI or related to demand. No echo done (previously noted to have normal ef and AORTIC STENOSIS). She did not ever have follow up for outpatient echo or stress testing.     She does have noted elevated alk phos which is being followed. A GGT was obtained and elevated, making hepatobiliary origin likely. Given no symptoms, have been trending alk phos.    Discussed with nursing staff who have the following concerns: Recent blood draw traumatic for patient- family requesting decreasing frequency of labs. Myrbetriq stopped due to cost and with increased urinary symptoms. Started on oxybutynin- unclear benefit.    Patient in her room along with her spouse who is a resident of facility. She tells me she is feeling well. Has no acute concern today. She is unsure if the new medication she has started has been beneficial for her urinary urgency/incontinence.  Denies any current dysuria or hematuria.     No abdominal pain, nausea, or vomiting.     She prefers to stay here for her visit today. She is poor historian. She states she feels well. Feels her pain is  well-controlled. No concerns with her breathing. No nausea, vomiting or abdominal pain. No dysuria.     Current medications, allergies, and interdisciplinary care plan are reviewed.    Patient Active Problem List    Diagnosis Date Noted     Acute cystitis without hematuria 07/25/2022     Priority: Medium     Hypotension, unspecified hypotension type 07/20/2022     Priority: Medium     Sepsis with acute renal failure and septic shock (H) 07/20/2022     Priority: Medium     Alzheimer's disease (H) 12/06/2021     Priority: Medium     Pyelonephritis 10/12/2021     Priority: Medium     Generalized muscle weakness 10/12/2021     Priority: Medium     Unable to ambulate 10/12/2021     Priority: Medium     Right-sided low back pain without sciatica 10/12/2021     Priority: Medium     Closed fracture of sacrum, unspecified portion of sacrum, initial encounter (H) 10/12/2021     Priority: Medium     Sacral insufficiency fracture 10/12/2021     Priority: Medium     Elevated troponin 03/27/2021     Priority: Medium     Troponin level elevated 03/27/2021     Priority: Medium     Urinary tract infection without hematuria, site unspecified 03/27/2021     Priority: Medium     Iron deficiency 07/13/2020     Priority: Medium     Spinal stenosis of lumbosacral region 07/13/2020     Priority: Medium     Sacroiliitis (H) 07/13/2020     Priority: Medium     Back pain 02/01/2020     Priority: Medium     Hyponatremia 02/01/2020     Priority: Medium     Rhabdomyolysis 02/01/2020     Priority: Medium     Hypokalemia 02/01/2020     Priority: Medium     Anemia 02/01/2020     Priority: Medium     Closed fracture of eleventh thoracic vertebra (H) 02/01/2020     Priority: Medium     Systolic murmur 02/01/2020     Priority: Medium     Nasal lesion 07/24/2019     Priority: Medium     Renal benign neoplasm, left 06/05/2019     Priority: Medium     Obesity (BMI 35.0-39.9) with comorbidity (H) 06/05/2019     Priority: Medium     Memory loss 06/05/2019      Priority: Medium     Acute right-sided low back pain with right-sided sciatica 06/05/2019     Priority: Medium     Renal mass, right 06/08/2017     Priority: Medium     MRSA (methicillin resistant Staphylococcus aureus) 06/01/2017     Priority: Medium     Formatting of this note might be different from the original.  Date & Source of First Known MRSA: 1/22/2016 - [facility] [place]    Date and source of negative screens that qualify* for resolution of MRSA from infection table:     NONE    *2 sets of MRSA negative screens (previous positive site(s) if applicable and bilateral anterior nares) at least 7 days apart are required to resolve.   Screening exclusions include dialysis, long term care residence, antibiotics within the past 7 days, chronic wounds or invasive devices, & recurrent MRSA infections.  Please contact Infection Prevention for your facility if questions.       Postoperative anemia due to acute blood loss 01/24/2017     Priority: Medium     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 Provider to review and confirm.       CKD (chronic kidney disease) stage 2, GFR 60-89 ml/min 08/03/2012     Priority: Medium     Diastolic dysfunction 02/27/2012     Priority: Medium     echo 2/2012  EF 60%            Social History     Socioeconomic History     Marital status:      Spouse name: Dharmesh     Number of children: 5     Years of education: Not on file     Highest education level: Not on file   Occupational History     Employer: [employer]     Comment: part-time   Tobacco Use     Smoking status: Never     Smokeless tobacco: Never   Vaping Use     Vaping status: Not on file    Substance and Sexual Activity     Alcohol use: Yes     Comment: Mixed, rarely     Drug use: No     Sexual activity: Yes     Partners: Male   Other Topics Concern      Service No     Blood Transfusions Yes     Caffeine Concern Yes     Comment: Coffee, 4 cups daily     Occupational Exposure Not Asked     Hobby Hazards Not Asked     Sleep Concern Not Asked     Stress Concern Not Asked     Weight Concern Not Asked     Special Diet Not Asked     Back Care Not Asked     Exercise No     Bike Helmet Not Asked     Seat Belt Yes     Self-Exams Yes     Parent/sibling w/ CABG, MI or angioplasty before 65F 55M? Not Asked   Social History Narrative     -- n/a    Caffeine --    Concussion --     Social Determinants of Health     Financial Resource Strain: Not on file   Food Insecurity: Not on file   Transportation Needs: Not on file   Physical Activity: Inactive (6/5/2019)    Exercise Vital Sign      Days of Exercise per Week: 0 days      Minutes of Exercise per Session: 0 min   Stress: Not on file   Social Connections: Not on file   Intimate Partner Violence: Not At Risk (6/5/2019)    Humiliation, Afraid, Rape, and Kick questionnaire      Fear of Current or Ex-Partner: No      Emotionally Abused: No      Physically Abused: No      Sexually Abused: No   Housing Stability: Not on file        Current Outpatient Medications   Medication Sig     acetaminophen (TYLENOL) 325 MG tablet Take 650 mg by mouth daily     acetaminophen (TYLENOL) 650 MG CR tablet Take 1,300 mg by mouth 2 times daily     amLODIPine (NORVASC) 2.5 MG tablet Take 2.5 mg by mouth daily     Ascorbic Acid (VITAMIN C) 500 MG CAPS Take 500 mg by mouth daily     aspirin (ASA) 81 MG EC tablet Take 1 tablet (81 mg) by mouth daily     Calcium Carb-Cholecalciferol (CALCIUM + D3) 600-200 MG-UNIT TABS Take 1 tablet by mouth daily     Cholecalciferol (VITAMIN D) 50 MCG (2000 UT) CAPS Take 1 tablet by mouth daily     Cranberry 500 MG CAPS Take by mouth daily  "    GABAPENTIN PO Take 200 mg by mouth 3 times daily     metoprolol tartrate (LOPRESSOR) 25 MG tablet Take 1 tablet (25 mg) by mouth 2 times daily     Multiple Vitamin (MULTIVITAMIN) per tablet Take 1 tablet by mouth daily with food.     oxybutynin (DITROPAN) 5 MG tablet Take 5 mg by mouth daily     Psyllium 0.36 g CAPS Take 2 capsules by mouth daily     traMADol (ULTRAM) 50 MG tablet 1-2 tabs orally  Every 6 hrs as needed for pain.     No current facility-administered medications for this visit.       No Known Allergies    I have reviewed the care plan and do agree with the plan.    ROS:  No chest pain, shortness of breath, fever, chills, headache, nausea, vomiting, dysuria, or changes in bowel habits.  Appetite is good.  No pain noted.    OBJECTIVE:  BP (!) 159/99   Pulse 87   Temp 97.9  F (36.6  C)   Resp 16   Ht 1.549 m (5' 1\")   Wt 63 kg (138 lb 14.4 oz)   SpO2 96%   BMI 26.24 kg/m      GENERAL:  Alert, and in no acute distress  RESP: Respirations unlabored. Lung sounds clear to auscultation.    CV:  RRR.  S1 S2 with 4/6 systolic murmur. No clicks or rubs.  ABD: Soft, non tender, non distended, no organomegaly. BS are normal.   SKIN:  Age-related changes.  No suspicious lesions or rashes.  PSYCH:  Affect is flat.   NEURO: Mental status is alert. Memory is impaired.   EXTREM:  No edema.    Lab/Diagnostic data:    Reviewed in Epic    ASSESSMENT/ORDERS:  Diagnoses and all orders for this visit:    Memory loss / Dementia  Presumed Alz Dementia- slow cognitive decline per family. Will remain at [facility] for LTC, spouse is at facility as well. Patient is in supportive environment. Weight stable- fluctuates 137-140 over past several months. Most recent weight 138 lbs.     Urinary urge incontinence  Mirbetriq previously discontinued due to cost. Urinary complaints increased. She was started on oxybutynin ER 5mg, unclear benefit at this time. Staff reporting possible decrease in urinary complaints. No report " of urinary symptoms today. No dry mouth. Regular BMs per staff. No increase in confusion. Monitor.    Acute right-sided low back pain with right-sided sciatica / Closed fracture of sacrum, unspecified portion of sacrum, initial encounter (H) / Sacral insufficiency fracture, initial encounter / Generalized muscle weakness  Resolved. Pain has been very stable. Continues on gabapentin, tramadol PRN.No changes in medications made today.    Anemia, unspecified type / Iron deficiency  Hgb stable at 14.1 this week on 4/11. Iron was previously discontinued. Monitor.     Hyperlipidemia with target LDL less than 130  Not on statin, LDL 2020 135. Would not start at this time.     Benign essential hypertension  BPs reviewed today, generally controlled. Recent 126/70. Occasional readings in 150s. Now being monitored weekly.    Diastolic dysfunction / Nonrheumatic aortic valve stenosis  Euvolemic. Lungs clear. Compression stockings in place. Monitor.     Stage 3 chronic kidney disease, unspecified whether stage 3a or 3b CKD (H)  Creat of 1.12 on 2/7, baseline. BMP will now be ordered once per year and PRN per family request.      Vitamin D deficiency  Recent vit d level of 18 on 11/2/21. Started on daily supplement, 2000 units. Level improved to 66 on 4/12. Monitor and continue supplement    Renal mass, right  Reviewed previous imaging, despite concern for appearance c/w malignancy, this has been very stable. No pain. Continue to monitor.     Elevated alkaline phosphatase level  Up to 389 at one point. Has trended down. Last result of 187 on 2/7. GGT obtained and elevated. Continues to have no pain, nausea, vomiting etc. Monitor for now unless sx develop.     Cyst of ovary, unspecified laterality  This is enlarging, albeit slowly, without evidence of any metastatic disease. Has not been seen by Gyn Onc.  is elevated in the past Family decided on no further workup. Currently no abd pain, bloating, bowel changes.     Routine  labs: CBC, BMP annual    Total time spent with patient visit was 25 min including patient visit, review of pertinent clinical information, and treatment plan.    Tamar Colin, CNP

## 2023-05-02 LAB
ALBUMIN (EXTERNAL): 3.6 G/DL (ref 3.5–5)
ALKALINE PHOSPHATASE (EXTERNAL): 144 IU/L (ref 40–150)
ALT SERPL-CCNC: 21 IU/L (ref 6–31)
ANION GAP SERPL CALC-SCNC: 7 MEQ/L (ref 3–15)
AST SERPL-CCNC: 23 IU/L (ref 10–40)
BILIRUB SERPL-MCNC: 0.4 MG/DL (ref 0.2–1.2)
BUN SERPL-MCNC: 19 MG/DL (ref 5–24)
CALCIUM (EXTERNAL): 9.1 MG/DL (ref 8.4–10.5)
CHLORIDE (EXTERNAL): 111 MEQ/L (ref 99–110)
CO2 (EXTERNAL): 23 MEQ/L (ref 19–29)
CREATININE (EXTERNAL): 0.96 MG/DL (ref 0.4–1)
GFR ESTIMATED (EXTERNAL): 55 ML/MIN/1.73M2
GFR ESTIMATED (IF AFRICAN AMERICAN) (EXTERNAL): ABNORMAL ML/MIN/1.73M2
GLUCOSE (EXTERNAL): 79 MG/DL (ref 70–99)
POTASSIUM (EXTERNAL): 3.4 MEQ/L (ref 3.4–5.1)
PROTEIN TOTAL (EXTERNAL): 7 G/DL (ref 6–8)
SODIUM (EXTERNAL): 141 MEQ/L (ref 134–143)

## 2023-05-16 VITALS
RESPIRATION RATE: 20 BRPM | WEIGHT: 137.5 LBS | DIASTOLIC BLOOD PRESSURE: 75 MMHG | HEART RATE: 81 BPM | BODY MASS INDEX: 25.96 KG/M2 | HEIGHT: 61 IN | OXYGEN SATURATION: 93 % | SYSTOLIC BLOOD PRESSURE: 157 MMHG | TEMPERATURE: 98.7 F

## 2023-05-18 ENCOUNTER — NURSING HOME VISIT (OUTPATIENT)
Dept: FAMILY MEDICINE | Facility: OTHER | Age: 88
End: 2023-05-18
Attending: FAMILY MEDICINE
Payer: MEDICARE

## 2023-05-18 DIAGNOSIS — R60.0 BILATERAL LEG EDEMA: Primary | ICD-10-CM

## 2023-05-18 PROCEDURE — 99309 SBSQ NF CARE MODERATE MDM 30: CPT | Performed by: FAMILY MEDICINE

## 2023-05-18 NOTE — PROGRESS NOTES
HISTORY OF PRESENT ILLNESS:      Claudine is a 89 year old female (4/11/1930)  resident of [facility] who is being seen today for a routine regulatory visit.     Patient has a history of HFpEF, Aortic Stenosis, HTN, HLD, Anemia.      Patient was admitted to this facility on 10/15/21 after hospitalization for acute back pain due to sacral insufficiency fractures, bilateral.     Also noted during hospitalization was right renal mass. In review of records this mass has been present for several years and appears to be stable. She also has noted to have pelvic/ovarian cyst which has increased in size from 3 to 4.6cm. Looks like she was referred to Gyn Onc 3/2020, but no appt made. Family updated and wish no further work up at this time.     Also of note, pt was hospitalized back in March of this year for elevated trop. Treated conservatively, unclear if true NSTEMI or related to demand. No echo done (previously noted to have normal ef and AORTIC STENOSIS). She did not ever have follow up for outpatient echo or stress testing.     She does have noted elevated alk phos which is being followed. A GGT was obtained and elevated, making hepatobiliary origin likely. Given no symptoms, have been trending alk phos.    Discussed with nursing staff who have the following concerns: None    Patient in her room along with her spouse who resides in the room next door. She states she is doing very well. Back pan and leg pain is much better. She denies concerns with bladder at this time.     Current medications, allergies, and interdisciplinary care plan are reviewed.    Patient Active Problem List    Diagnosis Date Noted     Acute cystitis without hematuria 07/25/2022     Priority: Medium     Hypotension, unspecified hypotension type 07/20/2022     Priority: Medium     Sepsis with acute renal failure and septic shock (H) 07/20/2022     Priority: Medium     Alzheimer's disease (H) 12/06/2021     Priority: Medium      Pyelonephritis 10/12/2021     Priority: Medium     Generalized muscle weakness 10/12/2021     Priority: Medium     Unable to ambulate 10/12/2021     Priority: Medium     Right-sided low back pain without sciatica 10/12/2021     Priority: Medium     Closed fracture of sacrum, unspecified portion of sacrum, initial encounter (H) 10/12/2021     Priority: Medium     Sacral insufficiency fracture 10/12/2021     Priority: Medium     Elevated troponin 03/27/2021     Priority: Medium     Troponin level elevated 03/27/2021     Priority: Medium     Urinary tract infection without hematuria, site unspecified 03/27/2021     Priority: Medium     Iron deficiency 07/13/2020     Priority: Medium     Spinal stenosis of lumbosacral region 07/13/2020     Priority: Medium     Sacroiliitis (H) 07/13/2020     Priority: Medium     Back pain 02/01/2020     Priority: Medium     Hyponatremia 02/01/2020     Priority: Medium     Rhabdomyolysis 02/01/2020     Priority: Medium     Hypokalemia 02/01/2020     Priority: Medium     Anemia 02/01/2020     Priority: Medium     Closed fracture of eleventh thoracic vertebra (H) 02/01/2020     Priority: Medium     Systolic murmur 02/01/2020     Priority: Medium     Nasal lesion 07/24/2019     Priority: Medium     Renal benign neoplasm, left 06/05/2019     Priority: Medium     Obesity (BMI 35.0-39.9) with comorbidity (H) 06/05/2019     Priority: Medium     Memory loss 06/05/2019     Priority: Medium     Acute right-sided low back pain with right-sided sciatica 06/05/2019     Priority: Medium     Renal mass, right 06/08/2017     Priority: Medium     MRSA (methicillin resistant Staphylococcus aureus) 06/01/2017     Priority: Medium     Formatting of this note might be different from the original.  Date & [facility] [place]    Date and source of negative screens that qualify* for resolution of MRSA from infection table:     NONE    *2 sets of MRSA negative  screens (previous positive site(s) if applicable and bilateral anterior nares) at least 7 days apart are required to resolve.   Screening exclusions include dialysis, long term care residence, antibiotics within the past 7 days, chronic wounds or invasive devices, & recurrent MRSA infections.  Please contact Infection Prevention for your facility if questions.       Postoperative anemia due to acute blood loss 01/24/2017     Priority: Medium     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 Provider to review and confirm.       CKD (chronic kidney disease) stage 2, GFR 60-89 ml/min 08/03/2012     Priority: Medium     Diastolic dysfunction 02/27/2012     Priority: Medium     echo 2/2012  EF 60%            Social History     Socioeconomic History     Marital status:      Spouse name: Dharmesh     Number of children: 5     Years of education: Not on file     Highest education level: Not on file   Occupational History     Employer: [employer]     Comment: part-time   Tobacco Use     Smoking status: Never     Smokeless tobacco: Never   Vaping Use     Vaping status: Not on file   Substance and Sexual Activity     Alcohol use: Yes     Comment: Mixed, rarely     Drug use: No     Sexual activity: Yes     Partners: Male   Other Topics Concern      Service No     Blood Transfusions Yes     Caffeine Concern Yes     Comment: Coffee, 4 cups daily     Occupational Exposure Not Asked     Hobby Hazards Not Asked     Sleep Concern Not Asked     Stress Concern Not Asked     Weight Concern Not Asked     Special Diet Not Asked     Back Care Not Asked     Exercise No     Bike Helmet Not Asked     Seat Belt Yes      Self-Exams Yes     Parent/sibling w/ CABG, MI or angioplasty before 65F 55M? Not Asked   Social History Narrative     -- n/a    Caffeine --    Concussion --     Social Determinants of Health     Financial Resource Strain: Not on file   Food Insecurity: Not on file   Transportation Needs: Not on file   Physical Activity: Inactive (6/5/2019)    Exercise Vital Sign      Days of Exercise per Week: 0 days      Minutes of Exercise per Session: 0 min   Stress: Not on file   Social Connections: Not on file   Intimate Partner Violence: Not At Risk (6/5/2019)    Humiliation, Afraid, Rape, and Kick questionnaire      Fear of Current or Ex-Partner: No      Emotionally Abused: No      Physically Abused: No      Sexually Abused: No   Housing Stability: Not on file        Current Outpatient Medications   Medication Sig     acetaminophen (TYLENOL) 325 MG tablet Take 650 mg by mouth daily     acetaminophen (TYLENOL) 650 MG CR tablet Take 1,300 mg by mouth 2 times daily     amLODIPine (NORVASC) 2.5 MG tablet Take 2.5 mg by mouth daily     Ascorbic Acid (VITAMIN C) 500 MG CAPS Take 500 mg by mouth daily     aspirin (ASA) 81 MG EC tablet Take 1 tablet (81 mg) by mouth daily     Calcium Carb-Cholecalciferol (CALCIUM + D3) 600-200 MG-UNIT TABS Take 1 tablet by mouth daily     Cholecalciferol (VITAMIN D) 50 MCG (2000 UT) CAPS Take 1 tablet by mouth daily     Cranberry 500 MG CAPS Take by mouth daily     GABAPENTIN PO Take 200 mg by mouth 3 times daily     metoprolol tartrate (LOPRESSOR) 25 MG tablet Take 1 tablet (25 mg) by mouth 2 times daily     Multiple Vitamin (MULTIVITAMIN) per tablet Take 1 tablet by mouth daily with food.     oxybutynin (DITROPAN) 5 MG tablet Take 5 mg by mouth daily     Psyllium 0.36 g CAPS Take 2 capsules by mouth daily     traMADol (ULTRAM) 50 MG tablet 1-2 tabs orally  Every 6 hrs as needed for pain.     No current facility-administered medications for this visit.       No Known Allergies    I have  "reviewed the care plan and do agree with the plan.    ROS:  No chest pain, shortness of breath, fever, chills, headache, nausea, vomiting, dysuria, or changes in bowel habits.  Appetite is good.  No pain noted.    OBJECTIVE:  BP (!) 157/75   Pulse 81   Temp 98.7  F (37.1  C)   Resp 20   Ht 1.549 m (5' 1\")   Wt 62.4 kg (137 lb 8 oz)   SpO2 93%   BMI 25.98 kg/m      GENERAL:  Alert, and in no acute distress  RESP: Respirations unlabored. Lung sounds clear to auscultation.    CV:  RRR.  S1 S2 with 4/6 systolic murmur. No clicks or rubs.  ABD: Soft, non tender, non distended, no organomegaly. BS are normal.   SKIN:  Age-related changes.  No suspicious lesions or rashes.  PSYCH:  Affect is flat.   NEURO: Mental status is alert. Memory is impaired.   EXTREM:  No edema.    Lab/Diagnostic data:    Reviewed in Epic    ASSESSMENT/ORDERS:  Diagnoses and all orders for this visit:    Memory loss / Dementia  Presumed Alz Dementia- slow cognitive decline per family. Will remain at [facility] for LTC, spouse is at facility as well. Patient is in supportive environment. Weight stable- fluctuates 137-140 over past several months. Most recent weight 138 lbs.     Urinary urge incontinence  Mirbetriq previously discontinued due to cost and oxyutinin was started.  Staff reporting possible decrease in urinary complaints. Pt denies concerns with dry mouth, constipation.  No increase in confusion. Monitor.    Acute right-sided low back pain with right-sided sciatica / Closed fracture of sacrum, unspecified portion of sacrum, initial encounter (H) / Sacral insufficiency fracture, initial encounter / Generalized muscle weakness  Resolved. Pain has been very stable. Continues on gabapentin, tramadol PRN. Minimal to no tramadol use over the last weeks. Decrease gabapentin to bid. Keep prn tramadol for now, consider discontinuation if still not requiring.     Anemia, unspecified type / Iron deficiency  Resolved. Hgb stable at 13 5/2/23. " Iron was previously discontinued. Monitor.     Hyperlipidemia with target LDL less than 130  Not on statin, LDL 2020 135. Would not start at this time.     Benign essential hypertension  BPs reviewed today, high variability noted. Range from 108 - 160 systolic She is asymptomatic, monitor.     Diastolic dysfunction / Nonrheumatic aortic valve stenosis  Euvolemic. Lungs clear. Compression stockings in place. Monitor.     Stage 3 chronic kidney disease, unspecified whether stage 3a or 3b CKD (H)  Creat of 0.96 5/2/23, which is roughly baseline     Vitamin D deficiency  Started on daily supplement, 2000 units due to low values. Level improved to 66 on 4/12. Continue daily supplement.     Renal mass, right  Reviewed previous imaging, despite concern for appearance c/w malignancy, this has been very stable. No pain. Continue to monitor.     Elevated alkaline phosphatase level  Up to 389 at one point. Has trended down. Last result of 187 on 2/7. GGT obtained and elevated. Continues to have no pain, nausea, vomiting etc. Monitor for now unless sx develop.     Cyst of ovary, unspecified laterality  This is enlarging, albeit slowly, without evidence of any metastatic disease. Has not been seen by Gyn Onc.  is elevated in the past Family decided on no further workup. Currently no abd pain, bloating, bowel changes.     Routine labs: CBC, BMP annual    Total time spent with patient visit was 25 min including patient visit, review of pertinent clinical information, and treatment plan.    Yelitza Olguin MD

## 2023-06-22 ENCOUNTER — TELEPHONE (OUTPATIENT)
Dept: FAMILY MEDICINE | Facility: OTHER | Age: 88
End: 2023-06-22

## 2023-06-27 VITALS
HEIGHT: 61 IN | BODY MASS INDEX: 25.86 KG/M2 | HEART RATE: 70 BPM | WEIGHT: 137 LBS | SYSTOLIC BLOOD PRESSURE: 144 MMHG | RESPIRATION RATE: 18 BRPM | OXYGEN SATURATION: 94 % | DIASTOLIC BLOOD PRESSURE: 68 MMHG | TEMPERATURE: 98.1 F

## 2023-06-27 DIAGNOSIS — M54.41 ACUTE RIGHT-SIDED LOW BACK PAIN WITH RIGHT-SIDED SCIATICA: ICD-10-CM

## 2023-06-27 RX ORDER — TRAMADOL HYDROCHLORIDE 50 MG/1
TABLET ORAL
Qty: 30 TABLET | Refills: 1 | Status: SHIPPED | OUTPATIENT
Start: 2023-06-27 | End: 2023-08-02

## 2023-06-27 NOTE — TELEPHONE ENCOUNTER
traMADol (ULTRAM) 50 MG tablet      Last Written Prescription Date:  9/26/2022  Last Fill Quantity: 30,   # refills: 1  Last Office Visit: 6/22/2023  Future Office visit:       Routing refill request to provider for review/approval because:    Kimberly Boecker, RN

## 2023-06-30 ENCOUNTER — NURSING HOME VISIT (OUTPATIENT)
Dept: FAMILY MEDICINE | Facility: OTHER | Age: 88
End: 2023-06-30
Payer: MEDICARE

## 2023-06-30 DIAGNOSIS — D64.9 ANEMIA, UNSPECIFIED TYPE: ICD-10-CM

## 2023-06-30 DIAGNOSIS — Z78.9 NURSING HOME RESIDENT: Primary | ICD-10-CM

## 2023-06-30 DIAGNOSIS — N18.30 STAGE 3 CHRONIC KIDNEY DISEASE, UNSPECIFIED WHETHER STAGE 3A OR 3B CKD (H): ICD-10-CM

## 2023-06-30 PROCEDURE — 99309 SBSQ NF CARE MODERATE MDM 30: CPT

## 2023-06-30 NOTE — PROGRESS NOTES
HISTORY OF PRESENT ILLNESS:      Claudine is a 89 year old female (4/11/1930)  resident of [facility] who is being seen today for a routine regulatory visit.     Patient has a history of HFpEF, Aortic Stenosis, HTN, HLD, Anemia.      Patient was admitted to this facility on 10/15/21 after hospitalization for acute back pain due to sacral insufficiency fractures, bilateral.     Also noted during hospitalization was right renal mass. In review of records this mass has been present for several years and appears to be stable. She also has noted to have pelvic/ovarian cyst which has increased in size from 3 to 4.6cm. Looks like she was referred to Gyn Onc 3/2020, but no appt made. Family updated and wish no further work up at this time.     Also of note, pt was hospitalized in the past for elevated trop. Treated conservatively, unclear if true NSTEMI or related to demand. No echo done (previously noted to have normal ef and AORTIC STENOSIS). She did not ever have follow up for outpatient echo or stress testing.     She does have noted elevated alk phos which is being followed. A GGT was obtained and elevated, making hepatobiliary origin likely. Given no symptoms, have been trending alk phos.    Discussed with nursing staff who have the following concerns: No concerns.    Reviewed progress notes without concern.    Patient is found in her room, watching television in her wheelchair. States she is fine. Feels well. Denies any current pain, occasional pain to her right lower back. Breathing stable. Reports daily bowel movements. Has no LUTS. No abdominal pain, nausea or vomiting.      Current medications, allergies, and interdisciplinary care plan are reviewed.    Patient Active Problem List    Diagnosis Date Noted     Acute cystitis without hematuria 07/25/2022     Priority: Medium     Hypotension, unspecified hypotension type 07/20/2022     Priority: Medium     Sepsis with acute renal failure and septic shock (H)  07/20/2022     Priority: Medium     Alzheimer's disease (H) 12/06/2021     Priority: Medium     Pyelonephritis 10/12/2021     Priority: Medium     Generalized muscle weakness 10/12/2021     Priority: Medium     Unable to ambulate 10/12/2021     Priority: Medium     Right-sided low back pain without sciatica 10/12/2021     Priority: Medium     Closed fracture of sacrum, unspecified portion of sacrum, initial encounter (H) 10/12/2021     Priority: Medium     Sacral insufficiency fracture 10/12/2021     Priority: Medium     Elevated troponin 03/27/2021     Priority: Medium     Troponin level elevated 03/27/2021     Priority: Medium     Urinary tract infection without hematuria, site unspecified 03/27/2021     Priority: Medium     Iron deficiency 07/13/2020     Priority: Medium     Spinal stenosis of lumbosacral region 07/13/2020     Priority: Medium     Sacroiliitis (H) 07/13/2020     Priority: Medium     Back pain 02/01/2020     Priority: Medium     Hyponatremia 02/01/2020     Priority: Medium     Rhabdomyolysis 02/01/2020     Priority: Medium     Hypokalemia 02/01/2020     Priority: Medium     Anemia 02/01/2020     Priority: Medium     Closed fracture of eleventh thoracic vertebra (H) 02/01/2020     Priority: Medium     Systolic murmur 02/01/2020     Priority: Medium     Nasal lesion 07/24/2019     Priority: Medium     Renal benign neoplasm, left 06/05/2019     Priority: Medium     Obesity (BMI 35.0-39.9) with comorbidity (H) 06/05/2019     Priority: Medium     Memory loss 06/05/2019     Priority: Medium     Acute right-sided low back pain with right-sided sciatica 06/05/2019     Priority: Medium     Renal mass, right 06/08/2017     Priority: Medium     MRSA (methicillin resistant Staphylococcus aureus) 06/01/2017     Priority: Medium     Formatting of this note might be different from the original.  Date & Source of First Known MRSA: 1/22/2016 - [facility] [place]    Date and source of negative screens  that qualify* for resolution of MRSA from infection table:     NONE    *2 sets of MRSA negative screens (previous positive site(s) if applicable and bilateral anterior nares) at least 7 days apart are required to resolve.   Screening exclusions include dialysis, long term care residence, antibiotics within the past 7 days, chronic wounds or invasive devices, & recurrent MRSA infections.  Please contact Infection Prevention for your facility if questions.       Postoperative anemia due to acute blood loss 01/24/2017     Priority: Medium     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 Provider to review and confirm.       CKD (chronic kidney disease) stage 2, GFR 60-89 ml/min 08/03/2012     Priority: Medium     Diastolic dysfunction 02/27/2012     Priority: Medium     echo 2/2012  EF 60%            Social History     Socioeconomic History     Marital status:      Spouse name: Dharmesh     Number of children: 5     Years of education: Not on file     Highest education level: Not on file   Occupational History     Employer: [employer]     Comment: part-time   Tobacco Use     Smoking status: Never     Smokeless tobacco: Never   Substance and Sexual Activity     Alcohol use: Yes     Comment: Mixed, rarely     Drug use: No     Sexual activity: Yes     Partners: Male   Other Topics Concern      Service No     Blood Transfusions Yes     Caffeine Concern Yes     Comment: Coffee, 4 cups daily     Occupational Exposure Not Asked     Hobby Hazards Not Asked     Sleep Concern Not Asked     Stress Concern Not Asked     Weight Concern Not Asked     Special Diet Not Asked     Back Care Not Asked      Exercise No     Bike Helmet Not Asked     Seat Belt Yes     Self-Exams Yes     Parent/sibling w/ CABG, MI or angioplasty before 65F 55M? Not Asked   Social History Narrative     -- n/a    Caffeine --    Concussion --     Social Determinants of Health     Financial Resource Strain: Not on file   Food Insecurity: Not on file   Transportation Needs: Not on file   Physical Activity: Inactive (6/5/2019)    Exercise Vital Sign      Days of Exercise per Week: 0 days      Minutes of Exercise per Session: 0 min   Stress: Not on file   Social Connections: Not on file   Intimate Partner Violence: Not At Risk (6/5/2019)    Humiliation, Afraid, Rape, and Kick questionnaire      Fear of Current or Ex-Partner: No      Emotionally Abused: No      Physically Abused: No      Sexually Abused: No   Housing Stability: Not on file        Current Outpatient Medications   Medication Sig     acetaminophen (TYLENOL) 325 MG tablet Take 650 mg by mouth daily     acetaminophen (TYLENOL) 650 MG CR tablet Take 1,300 mg by mouth 2 times daily     amLODIPine (NORVASC) 2.5 MG tablet Take 2.5 mg by mouth daily     Ascorbic Acid (VITAMIN C) 500 MG CAPS Take 500 mg by mouth daily     aspirin (ASA) 81 MG EC tablet Take 1 tablet (81 mg) by mouth daily     Calcium Carb-Cholecalciferol (CALCIUM + D3) 600-200 MG-UNIT TABS Take 1 tablet by mouth daily     Cholecalciferol (VITAMIN D) 50 MCG (2000 UT) CAPS Take 1 tablet by mouth daily     Cranberry 500 MG CAPS Take by mouth daily     GABAPENTIN PO Take 200 mg by mouth 2 times daily     metoprolol tartrate (LOPRESSOR) 25 MG tablet Take 1 tablet (25 mg) by mouth 2 times daily     Multiple Vitamin (MULTIVITAMIN) per tablet Take 1 tablet by mouth daily with food.     oxybutynin (DITROPAN) 5 MG tablet Take 5 mg by mouth daily     Psyllium 0.36 g CAPS Take 2 capsules by mouth daily     traMADol (ULTRAM) 50 MG tablet 1-2 tabs orally  Every 6 hrs as needed for pain.     No current facility-administered  "medications for this visit.       No Known Allergies    I have reviewed the care plan and do agree with the plan.    ROS:  No chest pain, shortness of breath, fever, chills, headache, nausea, vomiting, dysuria, or changes in bowel habits.  Appetite is good.  No pain noted.    OBJECTIVE:  BP (!) 144/68   Pulse 70   Temp 98.1  F (36.7  C)   Resp 18   Ht 1.549 m (5' 1\")   Wt 62.1 kg (137 lb)   SpO2 94%   BMI 25.89 kg/m      GENERAL:  Alert, and in no acute distress  RESP: Respirations unlabored. Lung sounds clear to auscultation.    CV:  RRR.  S1 S2 with 4/6 systolic murmur. No clicks or rubs.  ABD: Soft, non tender, non distended, no organomegaly. BS are normal.   SKIN:  Age-related changes.  No suspicious lesions or rashes.  PSYCH:  Affect is flat.   NEURO: Mental status is alert. Memory is impaired.   EXTREM:  Trace edema- compression stockings in place.    Lab/Diagnostic data:    Reviewed in Epic    ASSESSMENT/ORDERS:  Diagnoses and all orders for this visit:    Memory loss / Dementia  Presumed Alz Dementia- slow cognitive decline per family. Will remain at [facility] for LTC, spouse is at facility as well. Patient is in supportive environment. Weight stable- fluctuates 137-140 over past several months. Most recent weight 135 lbs. This is down 2-3 lbs from baseline. Monitor closely.      Urinary urge incontinence  Mirbetriq previously discontinued due to cost and oxyutinin was started.  Staff reporting possible decrease in urinary complaints. Pt denies concerns with dry mouth, constipation.  No increase in confusion. Monitor.    Acute right-sided low back pain with right-sided sciatica / Closed fracture of sacrum, unspecified portion of sacrum, initial encounter (H) / Sacral insufficiency fracture, initial encounter / Generalized muscle weakness  Resolved. Pain has been very stable. Continues on gabapentin, tramadol PRN. Decrease gabapentin to 100 mg BID. Has not used tramadol this month- discontinued " today. Staff will monitor for pain complaints.    Anemia, unspecified type / Iron deficiency  Resolved. Hgb stable at 13 5/2/23. Iron was previously discontinued. Monitor.     Hyperlipidemia with target LDL less than 130  Not on statin, LDL 2020 135. Would not start at this time.     Benign essential hypertension  BPs reviewed today, -140s. No change today.    Diastolic dysfunction / Nonrheumatic aortic valve stenosis  Euvolemic. Lungs clear. Compression stockings in place. Monitor.     Stage 3 chronic kidney disease, unspecified whether stage 3a or 3b CKD (H)  Creat of 0.96 5/2/23, which is roughly baseline     Vitamin D deficiency  Started on daily supplement, 2000 units due to low values. Level improved to 66 on 4/12. Continue daily supplement.     Renal mass, right  Reviewed previous imaging, despite concern for appearance c/w malignancy, this has been very stable. No pain. Continue to monitor.     Elevated alkaline phosphatase level  Up to 389 at one point. Has trended down. GGT obtained and elevated. Continues to have no pain, nausea, vomiting etc. Monitor for now unless sx develop. Most recent alk phos within normal range.    Cyst of ovary, unspecified laterality  This is enlarging, albeit slowly, without evidence of any metastatic disease. Has not been seen by Gyn Onc.  is elevated in the past Family decided on no further workup. Currently no abd pain, bloating, bowel changes.     Routine labs: CBC, BMP annual    Total time spent with patient visit was 30 min including patient visit, review of pertinent clinical information, and treatment plan.    Tamar Colin, LUPE

## 2023-07-03 ENCOUNTER — MEDICAL CORRESPONDENCE (OUTPATIENT)
Dept: HEALTH INFORMATION MANAGEMENT | Facility: CLINIC | Age: 88
End: 2023-07-03

## 2023-07-06 ASSESSMENT — PATIENT HEALTH QUESTIONNAIRE - PHQ9: SUM OF ALL RESPONSES TO PHQ QUESTIONS 1-9: 0

## 2023-07-13 ENCOUNTER — TRANSFERRED RECORDS (OUTPATIENT)
Dept: OTHER | Facility: HOSPITAL | Age: 88
End: 2023-07-13

## 2023-08-02 VITALS
OXYGEN SATURATION: 98 % | WEIGHT: 135.9 LBS | HEIGHT: 61 IN | TEMPERATURE: 98 F | DIASTOLIC BLOOD PRESSURE: 62 MMHG | RESPIRATION RATE: 16 BRPM | HEART RATE: 78 BPM | SYSTOLIC BLOOD PRESSURE: 118 MMHG | BODY MASS INDEX: 25.66 KG/M2

## 2023-08-04 ENCOUNTER — NURSING HOME VISIT (OUTPATIENT)
Dept: FAMILY MEDICINE | Facility: OTHER | Age: 88
End: 2023-08-04
Payer: MEDICARE

## 2023-08-04 DIAGNOSIS — Z78.9 NURSING HOME RESIDENT: Primary | ICD-10-CM

## 2023-08-04 DIAGNOSIS — E55.9 VITAMIN D DEFICIENCY: ICD-10-CM

## 2023-08-04 DIAGNOSIS — I10 BENIGN ESSENTIAL HYPERTENSION: ICD-10-CM

## 2023-08-04 DIAGNOSIS — N18.30 STAGE 3 CHRONIC KIDNEY DISEASE, UNSPECIFIED WHETHER STAGE 3A OR 3B CKD (H): ICD-10-CM

## 2023-08-04 DIAGNOSIS — J06.9 UPPER RESPIRATORY TRACT INFECTION, UNSPECIFIED TYPE: ICD-10-CM

## 2023-08-04 DIAGNOSIS — F02.80 ALZHEIMER'S DISEASE (H): ICD-10-CM

## 2023-08-04 DIAGNOSIS — D64.9 ANEMIA, UNSPECIFIED TYPE: ICD-10-CM

## 2023-08-04 DIAGNOSIS — G30.9 ALZHEIMER'S DISEASE (H): ICD-10-CM

## 2023-08-04 PROCEDURE — 99309 SBSQ NF CARE MODERATE MDM 30: CPT

## 2023-08-04 NOTE — PROGRESS NOTES
"  HISTORY OF PRESENT ILLNESS:      Claudine is a 89 year old female (4/11/1930)  resident of [facility] who is being seen today for a routine regulatory visit.     Patient has a history of HFpEF, Aortic Stenosis, HTN, HLD, Anemia.      Patient was admitted to this facility on 10/15/21 after hospitalization for acute back pain due to sacral insufficiency fractures, bilateral.     Also noted during hospitalization was right renal mass. In review of records this mass has been present for several years and appears to be stable. She also has noted to have pelvic/ovarian cyst which has increased in size from 3 to 4.6cm. Looks like she was referred to Gyn Onc 3/2020, but no appt made. Family updated and wish no further work up at this time.     Also of note, pt was hospitalized in the past for elevated trop. Treated conservatively, unclear if true NSTEMI or related to demand. No echo done (previously noted to have normal ef and AORTIC STENOSIS). She did not ever have follow up for outpatient echo or stress testing.     She does have noted elevated alk phos which is being followed. A GGT was obtained and elevated, making hepatobiliary origin likely. Given no symptoms, have been trending alk phos.    Discussed with nursing staff who have the following concerns: Onset of cough, congestion yesterday. Covid test negative. No other concerns.     Reviewed progress notes without concern.    Patient is found at the table completing a puzzle along with her spouse who also resides at facility. She asks me what's up and states that she is fine. Notes a non-productive dry cough, nasal congestion, and mild sore throat as of yesterday. No sputum production, fevers, chills, nausea, vomiting, abdominal pain. Denies any shortness of breath. States \"I just have a little cold and feel fine.\"     Denies any bowel/bladder changes. She is sleeping and eating well. No falls this month.      Current medications, allergies, and " interdisciplinary care plan are reviewed.    Patient Active Problem List    Diagnosis Date Noted    Acute cystitis without hematuria 07/25/2022     Priority: Medium    Hypotension, unspecified hypotension type 07/20/2022     Priority: Medium    Sepsis with acute renal failure and septic shock (H) 07/20/2022     Priority: Medium    Alzheimer's disease (H) 12/06/2021     Priority: Medium    Pyelonephritis 10/12/2021     Priority: Medium    Generalized muscle weakness 10/12/2021     Priority: Medium    Unable to ambulate 10/12/2021     Priority: Medium    Right-sided low back pain without sciatica 10/12/2021     Priority: Medium    Closed fracture of sacrum, unspecified portion of sacrum, initial encounter (H) 10/12/2021     Priority: Medium    Sacral insufficiency fracture 10/12/2021     Priority: Medium    Elevated troponin 03/27/2021     Priority: Medium    Troponin level elevated 03/27/2021     Priority: Medium    Urinary tract infection without hematuria, site unspecified 03/27/2021     Priority: Medium    Iron deficiency 07/13/2020     Priority: Medium    Spinal stenosis of lumbosacral region 07/13/2020     Priority: Medium    Sacroiliitis (H) 07/13/2020     Priority: Medium    Back pain 02/01/2020     Priority: Medium    Hyponatremia 02/01/2020     Priority: Medium    Rhabdomyolysis 02/01/2020     Priority: Medium    Hypokalemia 02/01/2020     Priority: Medium    Anemia 02/01/2020     Priority: Medium    Closed fracture of eleventh thoracic vertebra (H) 02/01/2020     Priority: Medium    Systolic murmur 02/01/2020     Priority: Medium    Nasal lesion 07/24/2019     Priority: Medium    Renal benign neoplasm, left 06/05/2019     Priority: Medium    Obesity (BMI 35.0-39.9) with comorbidity (H) 06/05/2019     Priority: Medium    Memory loss 06/05/2019     Priority: Medium    Acute right-sided low back pain with right-sided sciatica 06/05/2019     Priority: Medium    Renal mass, right 06/08/2017     Priority: Medium     MRSA (methicillin resistant Staphylococcus aureus) 06/01/2017     Priority: Medium     Formatting of this note might be different from the original.  Date & Source of First Known MRSA: 1/22/2016 - [facility] [place]    Date and source of negative screens that qualify* for resolution of MRSA from infection table:     NONE    *2 sets of MRSA negative screens (previous positive site(s) if applicable and bilateral anterior nares) at least 7 days apart are required to resolve.   Screening exclusions include dialysis, long term care residence, antibiotics within the past 7 days, chronic wounds or invasive devices, & recurrent MRSA infections.  Please contact Infection Prevention for your facility if questions.      Postoperative anemia due to acute blood loss 01/24/2017     Priority: Medium    Status post total replacement of right hip 01/23/2017     Priority: Medium    Benign essential hypertension 01/16/2017     Priority: Medium    S/P total knee replacement using cement, left 01/25/2016     Priority: Medium    Hyperlipidemia with target LDL less than 130 03/30/2015     Priority: Medium     Diagnosis updated by automated process. Provider to review and confirm.      CKD (chronic kidney disease) stage 2, GFR 60-89 ml/min 08/03/2012     Priority: Medium    Diastolic dysfunction 02/27/2012     Priority: Medium     echo 2/2012  EF 60%            Social History     Socioeconomic History    Marital status:      Spouse name: Dharmesh    Number of children: 5    Years of education: Not on file    Highest education level: Not on file   Occupational History     Employer: [employer]     Comment: part-time   Tobacco Use    Smoking status: Never    Smokeless tobacco: Never   Substance and Sexual Activity    Alcohol use: Yes     Comment: Mixed, rarely    Drug use: No    Sexual activity: Yes     Partners: Male   Other Topics Concern     Service No    Blood Transfusions Yes    Caffeine Concern  Yes     Comment: Coffee, 4 cups daily    Occupational Exposure Not Asked    Hobby Hazards Not Asked    Sleep Concern Not Asked    Stress Concern Not Asked    Weight Concern Not Asked    Special Diet Not Asked    Back Care Not Asked    Exercise No    Bike Helmet Not Asked    Seat Belt Yes    Self-Exams Yes    Parent/sibling w/ CABG, MI or angioplasty before 65F 55M? Not Asked   Social History Narrative     -- n/a    Caffeine --    Concussion --     Social Determinants of Health     Financial Resource Strain: Not on file   Food Insecurity: Not on file   Transportation Needs: Not on file   Physical Activity: Inactive (6/5/2019)    Exercise Vital Sign     Days of Exercise per Week: 0 days     Minutes of Exercise per Session: 0 min   Stress: Not on file   Social Connections: Not on file   Intimate Partner Violence: Not At Risk (6/5/2019)    Humiliation, Afraid, Rape, and Kick questionnaire     Fear of Current or Ex-Partner: No     Emotionally Abused: No     Physically Abused: No     Sexually Abused: No   Housing Stability: Not on file        Current Outpatient Medications   Medication Sig    acetaminophen (TYLENOL) 325 MG tablet Take 650 mg by mouth daily    acetaminophen (TYLENOL) 650 MG CR tablet Take 1,300 mg by mouth 2 times daily    amLODIPine (NORVASC) 2.5 MG tablet Take 2.5 mg by mouth daily    Ascorbic Acid (VITAMIN C) 500 MG CAPS Take 500 mg by mouth daily    aspirin (ASA) 81 MG EC tablet Take 1 tablet (81 mg) by mouth daily    Calcium Carb-Cholecalciferol (CALCIUM + D3) 600-200 MG-UNIT TABS Take 1 tablet by mouth daily    Cholecalciferol (VITAMIN D) 50 MCG (2000 UT) CAPS Take 1 tablet by mouth daily    Cranberry 500 MG CAPS Take by mouth daily    GABAPENTIN PO Take 200 mg by mouth 2 times daily    metoprolol tartrate (LOPRESSOR) 25 MG tablet Take 1 tablet (25 mg) by mouth 2 times daily    Multiple Vitamin (MULTIVITAMIN) per tablet Take 1 tablet by mouth daily with food.    oxybutynin (DITROPAN) 5 MG  "tablet Take 5 mg by mouth daily    Psyllium 0.36 g CAPS Take 2 capsules by mouth daily     No current facility-administered medications for this visit.       No Known Allergies    I have reviewed the care plan and do agree with the plan.    ROS:  No chest pain, shortness of breath, fever, chills, headache, nausea, vomiting, dysuria, or changes in bowel habits.  Appetite is good.  No pain noted.    OBJECTIVE:  /62   Pulse 78   Temp 98  F (36.7  C)   Resp 16   Ht 1.549 m (5' 1\")   Wt 61.6 kg (135 lb 14.4 oz)   SpO2 98%   BMI 25.68 kg/m      GENERAL:  Alert, and in no acute distress  RESP: Respirations unlabored. Lung sounds clear to auscultation.    CV:  RRR.  S1 S2 with 4/6 systolic murmur. No clicks or rubs.  ABD: Soft, non tender, non distended, no organomegaly. BS are normal.   SKIN:  Age-related changes.  No suspicious lesions or rashes.  PSYCH:  Affect is flat.   NEURO: Mental status is alert. Memory is impaired.   EXTREM:  Trace edema- compression stockings in place.    Lab/Diagnostic data:    Reviewed in Epic    ASSESSMENT/ORDERS:  Diagnoses and all orders for this visit:    URI  Mild symptoms, onset yesterday. Covid negative. No fevers, chills, sputum production etc. Continue supportive cares.     Memory loss / Dementia  Presumed Alz Dementia- slow cognitive decline per family. Will remain at [facility] for LTC, spouse is at facility as well. Patient is in supportive environment. Weight stable- fluctuates 137-140 over past several months. Most recent weight 134.8 lbs (stable from last month). This is down 2-3 lbs from baselinex, however patient does refuse supplements. Monitor closely.      Urinary urge incontinence  Mirbetriq previously discontinued due to cost and oxyutinin was started.  Staff reporting possible decrease in urinary complaints. Pt denies concerns with dry mouth, constipation.  No increase in confusion. Monitor. No changes today.    Acute right-sided low back pain with " right-sided sciatica / Closed fracture of sacrum, unspecified portion of sacrum, initial encounter (H) / Sacral insufficiency fracture, initial encounter / Generalized muscle weakness  Resolved. Pain has been very stable. Continues on gabapentin, this was decreased last month to 100 mg BID. No increase in pain or concerns. Tramadol previously d/c'd.     Anemia, unspecified type / Iron deficiency  Resolved. Hgb stable at 13 5/2/23. Iron was previously discontinued. Monitor.     Hyperlipidemia with target LDL less than 130  Not on statin, LDL 2020 135. Would not start at this time.     Benign essential hypertension  BPs reviewed today, 's. No change today. Monitored weekly.    Diastolic dysfunction / Nonrheumatic aortic valve stenosis  Euvolemic. Lungs clear. Compression stockings in place. Monitor.     Stage 3 chronic kidney disease, unspecified whether stage 3a or 3b CKD (H)  Creat of 0.96 5/2/23, which is roughly baseline     Vitamin D deficiency  Started on daily supplement, 2000 units due to low values. Level improved to 66 on 4/12. Continue daily supplement.     Renal mass, right  Reviewed previous imaging, despite concern for appearance c/w malignancy, this has been very stable. No pain. Continue to monitor.     Elevated alkaline phosphatase level  Up to 389 at one point. Has trended down. GGT obtained and elevated. Continues to have no pain, nausea, vomiting etc. Monitor for now unless sx develop. Most recent alk phos within normal range.    Cyst of ovary, unspecified laterality  This is enlarging, albeit slowly, without evidence of any metastatic disease. Has not been seen by Gyn Onc.  is elevated in the past Family decided on no further workup. Currently no abd pain, bloating, bowel changes.     Routine labs: CBC, BMP annual    Total time spent with patient visit was 30 min including patient visit, review of pertinent clinical information, and treatment plan.    Tamar Colin, CNP

## 2023-08-28 ENCOUNTER — TELEPHONE (OUTPATIENT)
Dept: FAMILY MEDICINE | Facility: OTHER | Age: 88
End: 2023-08-28

## 2023-09-05 VITALS
HEIGHT: 61 IN | OXYGEN SATURATION: 96 % | BODY MASS INDEX: 25.22 KG/M2 | SYSTOLIC BLOOD PRESSURE: 120 MMHG | WEIGHT: 133.6 LBS | DIASTOLIC BLOOD PRESSURE: 76 MMHG | TEMPERATURE: 97.9 F | RESPIRATION RATE: 18 BRPM | HEART RATE: 68 BPM

## 2023-09-07 ENCOUNTER — NURSING HOME VISIT (OUTPATIENT)
Dept: FAMILY MEDICINE | Facility: OTHER | Age: 88
End: 2023-09-07
Attending: FAMILY MEDICINE
Payer: MEDICARE

## 2023-09-07 DIAGNOSIS — Z78.9 NURSING HOME RESIDENT: Primary | ICD-10-CM

## 2023-09-07 PROCEDURE — 99309 SBSQ NF CARE MODERATE MDM 30: CPT | Performed by: FAMILY MEDICINE

## 2023-09-11 NOTE — PROGRESS NOTES
HISTORY OF PRESENT ILLNESS:      Claudine is a 89 year old female (4/11/1930)  resident of [facility] who is being seen today for a routine regulatory visit.     Patient has a history of HFpEF, Aortic Stenosis, HTN, HLD, Anemia.      Patient was admitted to this facility on 10/15/21 after hospitalization for acute back pain due to sacral insufficiency fractures, bilateral.     Also noted during hospitalization was right renal mass. In review of records this mass has been present for several years and appears to be stable. She also has noted to have pelvic/ovarian cyst which has increased in size from 3 to 4.6cm. Looks like she was referred to Gyn Onc 3/2020, but no appt made. Family updated and wish no further work up at this time.     Also of note, pt was hospitalized in the past for elevated trop. Treated conservatively, unclear if true NSTEMI or related to demand. No echo done (previously noted to have normal ef and AORTIC STENOSIS). She did not ever have follow up for outpatient echo or stress testing.     She does have noted elevated alk phos which is being followed. A GGT was obtained and elevated, making hepatobiliary origin likely. Given no symptoms, have been trending alk phos.    Discussed with nursing staff who have the following concerns: none.     Patient is seen in her room. She is watching football and eating lunch. She reports doing very well. Back hurts sometimes, but has no further concerns. No nausea, vomiting, stomach pain. Appetite per patient is very good. No swelling or pain in the legs.     Denies any bowel/bladder changes. She is sleeping and eating well.     Current medications, allergies, and interdisciplinary care plan are reviewed.    Patient Active Problem List    Diagnosis Date Noted    Acute cystitis without hematuria 07/25/2022     Priority: Medium    Hypotension, unspecified hypotension type 07/20/2022     Priority: Medium    Sepsis with acute renal failure and septic shock  (H) 07/20/2022     Priority: Medium    Alzheimer's disease (H) 12/06/2021     Priority: Medium    Pyelonephritis 10/12/2021     Priority: Medium    Generalized muscle weakness 10/12/2021     Priority: Medium    Unable to ambulate 10/12/2021     Priority: Medium    Right-sided low back pain without sciatica 10/12/2021     Priority: Medium    Closed fracture of sacrum, unspecified portion of sacrum, initial encounter (H) 10/12/2021     Priority: Medium    Sacral insufficiency fracture 10/12/2021     Priority: Medium    Elevated troponin 03/27/2021     Priority: Medium    Troponin level elevated 03/27/2021     Priority: Medium    Urinary tract infection without hematuria, site unspecified 03/27/2021     Priority: Medium    Iron deficiency 07/13/2020     Priority: Medium    Spinal stenosis of lumbosacral region 07/13/2020     Priority: Medium    Sacroiliitis (H) 07/13/2020     Priority: Medium    Back pain 02/01/2020     Priority: Medium    Hyponatremia 02/01/2020     Priority: Medium    Rhabdomyolysis 02/01/2020     Priority: Medium    Hypokalemia 02/01/2020     Priority: Medium    Anemia 02/01/2020     Priority: Medium    Closed fracture of eleventh thoracic vertebra (H) 02/01/2020     Priority: Medium    Systolic murmur 02/01/2020     Priority: Medium    Nasal lesion 07/24/2019     Priority: Medium    Renal benign neoplasm, left 06/05/2019     Priority: Medium    Obesity (BMI 35.0-39.9) with comorbidity (H) 06/05/2019     Priority: Medium    Memory loss 06/05/2019     Priority: Medium    Acute right-sided low back pain with right-sided sciatica 06/05/2019     Priority: Medium    Renal mass, right 06/08/2017     Priority: Medium    MRSA (methicillin resistant Staphylococcus aureus) 06/01/2017     Priority: Medium     Formatting of this note might be different from the original.  Date & Source of First Known MRSA: 1/22/2016 - [facility] [place]    Date and source of negative screens that qualify* for  resolution of MRSA from infection table:     NONE    *2 sets of MRSA negative screens (previous positive site(s) if applicable and bilateral anterior nares) at least 7 days apart are required to resolve.   Screening exclusions include dialysis, long term care residence, antibiotics within the past 7 days, chronic wounds or invasive devices, & recurrent MRSA infections.  Please contact Infection Prevention for your facility if questions.      Postoperative anemia due to acute blood loss 01/24/2017     Priority: Medium    Status post total replacement of right hip 01/23/2017     Priority: Medium    Benign essential hypertension 01/16/2017     Priority: Medium    S/P total knee replacement using cement, left 01/25/2016     Priority: Medium    Hyperlipidemia with target LDL less than 130 03/30/2015     Priority: Medium     Diagnosis updated by automated process. Provider to review and confirm.      CKD (chronic kidney disease) stage 2, GFR 60-89 ml/min 08/03/2012     Priority: Medium    Diastolic dysfunction 02/27/2012     Priority: Medium     echo 2/2012  EF 60%            Social History     Socioeconomic History    Marital status:      Spouse name: Dharmesh    Number of children: 5    Years of education: Not on file    Highest education level: Not on file   Occupational History     Employer: [employer]     Comment: part-time   Tobacco Use    Smoking status: Never    Smokeless tobacco: Never   Substance and Sexual Activity    Alcohol use: Yes     Comment: Mixed, rarely    Drug use: No    Sexual activity: Yes     Partners: Male   Other Topics Concern     Service No    Blood Transfusions Yes    Caffeine Concern Yes     Comment: Coffee, 4 cups daily    Occupational Exposure Not Asked    Hobby Hazards Not Asked    Sleep Concern Not Asked    Stress Concern Not Asked    Weight Concern Not Asked    Special Diet Not Asked    Back Care Not Asked    Exercise No    Bike Helmet Not Asked    Seat Belt  Yes    Self-Exams Yes    Parent/sibling w/ CABG, MI or angioplasty before 65F 55M? Not Asked   Social History Narrative     -- n/a    Caffeine --    Concussion --     Social Determinants of Health     Financial Resource Strain: Not on file   Food Insecurity: Not on file   Transportation Needs: Not on file   Physical Activity: Inactive (6/5/2019)    Exercise Vital Sign     Days of Exercise per Week: 0 days     Minutes of Exercise per Session: 0 min   Stress: Not on file   Social Connections: Not on file   Intimate Partner Violence: Not At Risk (6/5/2019)    Humiliation, Afraid, Rape, and Kick questionnaire     Fear of Current or Ex-Partner: No     Emotionally Abused: No     Physically Abused: No     Sexually Abused: No   Housing Stability: Not on file        Current Outpatient Medications   Medication Sig    acetaminophen (TYLENOL) 325 MG tablet Take 650 mg by mouth daily    acetaminophen (TYLENOL) 650 MG CR tablet Take 1,300 mg by mouth 2 times daily    amLODIPine (NORVASC) 2.5 MG tablet Take 2.5 mg by mouth daily    Ascorbic Acid (VITAMIN C) 500 MG CAPS Take 500 mg by mouth daily    aspirin (ASA) 81 MG EC tablet Take 1 tablet (81 mg) by mouth daily    Calcium Carb-Cholecalciferol (CALCIUM + D3) 600-200 MG-UNIT TABS Take 1 tablet by mouth daily    Cholecalciferol (VITAMIN D) 50 MCG (2000 UT) CAPS Take 1 tablet by mouth daily    Cranberry 500 MG CAPS Take by mouth daily    GABAPENTIN PO Take 200 mg by mouth 2 times daily    metoprolol tartrate (LOPRESSOR) 25 MG tablet Take 1 tablet (25 mg) by mouth 2 times daily    Multiple Vitamin (MULTIVITAMIN) per tablet Take 1 tablet by mouth daily with food.    oxybutynin (DITROPAN) 5 MG tablet Take 5 mg by mouth daily    Psyllium 0.36 g CAPS Take 2 capsules by mouth daily     No current facility-administered medications for this visit.       No Known Allergies    I have reviewed the care plan and do agree with the plan.    ROS:  No chest pain, shortness of breath,  "fever, chills, headache, nausea, vomiting, dysuria, or changes in bowel habits.  Appetite is good.  No pain noted.    OBJECTIVE:  /76   Pulse 68   Temp 97.9  F (36.6  C)   Resp 18   Ht 1.549 m (5' 1\")   Wt 60.6 kg (133 lb 9.6 oz)   SpO2 96%   BMI 25.24 kg/m      GENERAL:  Alert, and in no acute distress  RESP: Respirations unlabored. Lung sounds clear to auscultation.    CV:  RRR.  S1 S2 with 4/6 systolic murmur. No clicks or rubs.  ABD: Soft, non tender, non distended, no organomegaly. BS are normal.   SKIN:  Age-related changes.  No suspicious lesions or rashes.  PSYCH:  Affect is flat.   NEURO: Mental status is alert. Memory is impaired.   EXTREM:  Trace edema- compression stockings in place.    Lab/Diagnostic data:    Reviewed in Epic    ASSESSMENT/ORDERS:  Diagnoses and all orders for this visit:    Memory loss / Dementia  Presumed Alz Dementia- slow cognitive decline per family. Will remain at [facility] for LTC, spouse is at facility as well. Patient is in supportive environment. Weight slowly decreasing, patient does refuse supplements. Monitor closely.  With underlying cog impairment, likely malignancy (see below), this is not unexpected course. Monitor.     Urinary urge incontinence  Mirbetriq previously discontinued due to cost and detrol was started.  Staff reporting possible decrease in urinary complaints. Pt denies concerns with dry mouth, constipation.  No increase in confusion. Continue.     Chronic right-sided low back pain with right-sided sciatica / Closed fracture of sacrum, unspecified portion of sacrum, initial encounter (H) / Sacral insufficiency fracture, initial encounter / Generalized muscle weakness  Continues on gabapentin, this was decreased last month to 100 mg BID. No increase in pain or concerns. Tramadol previously d/c'd.     Anemia, unspecified type / Iron deficiency  Resolved. Hgb stable at 13 5/2/23. Iron was previously discontinued. Monitor.     Hyperlipidemia with " target LDL less than 130  Not on statin, LDL 2020 135. Would not start at this time.     Benign essential hypertension  BPs reviewed today. No change today. Monitored weekly.    Diastolic dysfunction / Nonrheumatic aortic valve stenosis  Euvolemic. Lungs clear. Compression stockings in place. Monitor.     Stage 3 chronic kidney disease, unspecified whether stage 3a or 3b CKD (H)  Creat of 0.96 5/2/23, which is roughly baseline     Vitamin D deficiency  Started on daily supplement, 2000 units due to low values. Level improved to 66 on 4/12. Continue daily supplement.     Renal mass, right  Reviewed previous imaging, despite concern for appearance c/w malignancy, this has been very stable. No pain. Continue to monitor.     Elevated alkaline phosphatase level  Resolved.     Cyst of ovary, unspecified laterality  This is enlarging, albeit slowly, without evidence of any metastatic disease. Has not been seen by Gyn Onc.  is elevated in the past Family decided on no further workup. Currently no abd pain, bloating, bowel changes.     Routine labs: CBC, BMP annual    Total time spent with patient visit was 30 min including patient visit, review of pertinent clinical information, and treatment plan.    Yelitza Olguin MD

## 2023-10-10 VITALS
WEIGHT: 133.7 LBS | TEMPERATURE: 97.5 F | DIASTOLIC BLOOD PRESSURE: 64 MMHG | BODY MASS INDEX: 25.24 KG/M2 | HEIGHT: 61 IN | OXYGEN SATURATION: 98 % | SYSTOLIC BLOOD PRESSURE: 140 MMHG | RESPIRATION RATE: 20 BRPM

## 2023-10-13 ENCOUNTER — NURSING HOME VISIT (OUTPATIENT)
Dept: FAMILY MEDICINE | Facility: OTHER | Age: 88
End: 2023-10-13
Payer: MEDICARE

## 2023-10-13 DIAGNOSIS — F02.80 ALZHEIMER'S DISEASE (H): ICD-10-CM

## 2023-10-13 DIAGNOSIS — G30.9 ALZHEIMER'S DISEASE (H): ICD-10-CM

## 2023-10-13 DIAGNOSIS — N18.30 STAGE 3 CHRONIC KIDNEY DISEASE, UNSPECIFIED WHETHER STAGE 3A OR 3B CKD (H): ICD-10-CM

## 2023-10-13 DIAGNOSIS — E55.9 VITAMIN D DEFICIENCY: ICD-10-CM

## 2023-10-13 DIAGNOSIS — Z78.9 NURSING HOME RESIDENT: Primary | ICD-10-CM

## 2023-10-13 DIAGNOSIS — D64.9 ANEMIA, UNSPECIFIED TYPE: ICD-10-CM

## 2023-10-13 DIAGNOSIS — R60.0 BILATERAL LEG EDEMA: ICD-10-CM

## 2023-10-13 DIAGNOSIS — I10 ESSENTIAL HYPERTENSION: ICD-10-CM

## 2023-10-13 PROCEDURE — 99308 SBSQ NF CARE LOW MDM 20: CPT

## 2023-10-13 NOTE — PROGRESS NOTES
HISTORY OF PRESENT ILLNESS:      Claudine is a 89 year old female (4/11/1930)  resident of [facility] who is being seen today for a routine regulatory visit.     Patient has a history of HFpEF, Aortic Stenosis, HTN, HLD, Anemia.      Patient was admitted to this facility on 10/15/21 after hospitalization for acute back pain due to sacral insufficiency fractures, bilateral.     Also noted during hospitalization was right renal mass. In review of records this mass has been present for several years and appears to be stable. She also has noted to have pelvic/ovarian cyst which has increased in size from 3 to 4.6cm. Looks like she was referred to Gyn Onc 3/2020, but no appt made. Family updated and wish no further work up at this time.     Also of note, pt was hospitalized in the past for elevated trop. Treated conservatively, unclear if true NSTEMI or related to demand. No echo done (previously noted to have normal ef and AORTIC STENOSIS). She did not ever have follow up for outpatient echo or stress testing.     She does have noted elevated alk phos which is being followed. A GGT was obtained and elevated, making hepatobiliary origin likely. Given no symptoms, have been trending alk phos.    Discussed with nursing staff who have the following concerns: none. Doing well.     Patient is seen in her room. She is watching television. States she is doing very well. Has no particular concerns today. Denies any pain. No nausea, vomiting, bowel/bladder changes. She is sleeping generally well.     Current medications, allergies, and interdisciplinary care plan are reviewed.    Patient Active Problem List    Diagnosis Date Noted    Acute cystitis without hematuria 07/25/2022     Priority: Medium    Hypotension, unspecified hypotension type 07/20/2022     Priority: Medium    Sepsis with acute renal failure and septic shock (H) 07/20/2022     Priority: Medium    Alzheimer's disease (H) 12/06/2021     Priority: Medium     Pyelonephritis 10/12/2021     Priority: Medium    Generalized muscle weakness 10/12/2021     Priority: Medium    Unable to ambulate 10/12/2021     Priority: Medium    Right-sided low back pain without sciatica 10/12/2021     Priority: Medium    Closed fracture of sacrum, unspecified portion of sacrum, initial encounter (H) 10/12/2021     Priority: Medium    Sacral insufficiency fracture 10/12/2021     Priority: Medium    Elevated troponin 03/27/2021     Priority: Medium    Troponin level elevated 03/27/2021     Priority: Medium    Urinary tract infection without hematuria, site unspecified 03/27/2021     Priority: Medium    Iron deficiency 07/13/2020     Priority: Medium    Spinal stenosis of lumbosacral region 07/13/2020     Priority: Medium    Sacroiliitis (H24) 07/13/2020     Priority: Medium    Back pain 02/01/2020     Priority: Medium    Hyponatremia 02/01/2020     Priority: Medium    Rhabdomyolysis 02/01/2020     Priority: Medium    Hypokalemia 02/01/2020     Priority: Medium    Anemia 02/01/2020     Priority: Medium    Closed fracture of eleventh thoracic vertebra (H) 02/01/2020     Priority: Medium    Systolic murmur 02/01/2020     Priority: Medium    Nasal lesion 07/24/2019     Priority: Medium    Renal benign neoplasm, left 06/05/2019     Priority: Medium    Obesity (BMI 35.0-39.9) with comorbidity (H) 06/05/2019     Priority: Medium    Memory loss 06/05/2019     Priority: Medium    Acute right-sided low back pain with right-sided sciatica 06/05/2019     Priority: Medium    Renal mass, right 06/08/2017     Priority: Medium    MRSA (methicillin resistant Staphylococcus aureus) 06/01/2017     Priority: Medium     Formatting of this note might be different from the original.  Date & Source of First Known MRSA: 1/22/2016 - [facility] [place]    Date and source of negative screens that qualify* for resolution of MRSA from infection table:     NONE    *2 sets of MRSA negative screens (previous  positive site(s) if applicable and bilateral anterior nares) at least 7 days apart are required to resolve.   Screening exclusions include dialysis, long term care residence, antibiotics within the past 7 days, chronic wounds or invasive devices, & recurrent MRSA infections.  Please contact Infection Prevention for your facility if questions.      Postoperative anemia due to acute blood loss 01/24/2017     Priority: Medium    Status post total replacement of right hip 01/23/2017     Priority: Medium    Benign essential hypertension 01/16/2017     Priority: Medium    S/P total knee replacement using cement, left 01/25/2016     Priority: Medium    Hyperlipidemia with target LDL less than 130 03/30/2015     Priority: Medium     Diagnosis updated by automated process. Provider to review and confirm.      CKD (chronic kidney disease) stage 2, GFR 60-89 ml/min 08/03/2012     Priority: Medium    Diastolic dysfunction 02/27/2012     Priority: Medium     echo 2/2012  EF 60%            Social History     Socioeconomic History    Marital status:      Spouse name: Dharmesh    Number of children: 5    Years of education: Not on file    Highest education level: Not on file   Occupational History     Employer: [employer]     Comment: part-time   Tobacco Use    Smoking status: Never    Smokeless tobacco: Never   Substance and Sexual Activity    Alcohol use: Yes     Comment: Mixed, rarely    Drug use: No    Sexual activity: Yes     Partners: Male   Other Topics Concern     Service No    Blood Transfusions Yes    Caffeine Concern Yes     Comment: Coffee, 4 cups daily    Occupational Exposure Not Asked    Hobby Hazards Not Asked    Sleep Concern Not Asked    Stress Concern Not Asked    Weight Concern Not Asked    Special Diet Not Asked    Back Care Not Asked    Exercise No    Bike Helmet Not Asked    Seat Belt Yes    Self-Exams Yes    Parent/sibling w/ CABG, MI or angioplasty before 65F 55M? Not Asked    Social History Narrative     -- n/a    Caffeine --    Concussion --     Social Determinants of Health     Financial Resource Strain: Not on file   Food Insecurity: Not on file   Transportation Needs: Not on file   Physical Activity: Inactive (6/5/2019)    Exercise Vital Sign     Days of Exercise per Week: 0 days     Minutes of Exercise per Session: 0 min   Stress: Not on file   Social Connections: Not on file   Interpersonal Safety: Not At Risk (6/5/2019)    Humiliation, Afraid, Rape, and Kick questionnaire     Fear of Current or Ex-Partner: No     Emotionally Abused: No     Physically Abused: No     Sexually Abused: No   Housing Stability: Not on file        Current Outpatient Medications   Medication Sig    acetaminophen (TYLENOL) 325 MG tablet Take 650 mg by mouth daily    acetaminophen (TYLENOL) 650 MG CR tablet Take 1,300 mg by mouth 2 times daily    amLODIPine (NORVASC) 2.5 MG tablet Take 2.5 mg by mouth daily    Ascorbic Acid (VITAMIN C) 500 MG CAPS Take 500 mg by mouth daily    aspirin (ASA) 81 MG EC tablet Take 1 tablet (81 mg) by mouth daily    Calcium Carb-Cholecalciferol (CALCIUM + D3) 600-200 MG-UNIT TABS Take 1 tablet by mouth daily    Cholecalciferol (VITAMIN D) 50 MCG (2000 UT) CAPS Take 1 tablet by mouth daily    Cranberry 500 MG CAPS Take by mouth daily    GABAPENTIN PO Take 200 mg by mouth 2 times daily    metoprolol tartrate (LOPRESSOR) 25 MG tablet Take 1 tablet (25 mg) by mouth 2 times daily    Multiple Vitamin (MULTIVITAMIN) per tablet Take 1 tablet by mouth daily with food.    oxybutynin (DITROPAN) 5 MG tablet Take 5 mg by mouth daily    Psyllium 0.36 g CAPS Take 2 capsules by mouth daily     No current facility-administered medications for this visit.       No Known Allergies    I have reviewed the care plan and do agree with the plan.    ROS:  No chest pain, shortness of breath, fever, chills, headache, nausea, vomiting, dysuria, or changes in bowel habits.  Appetite is good.  No  "pain noted.    OBJECTIVE:  BP (!) 140/64   Temp 97.5  F (36.4  C)   Resp 20   Ht 1.549 m (5' 1\")   Wt 60.6 kg (133 lb 11.2 oz)   SpO2 98%   BMI 25.26 kg/m      GENERAL:  Alert, and in no acute distress  RESP: Respirations unlabored. Lung sounds clear to auscultation.    CV:  RRR.  S1 S2 with 4/6 systolic murmur. No clicks or rubs.  ABD: Soft, non tender, non distended, no organomegaly. BS are normal.   SKIN:  Age-related changes.  No suspicious lesions or rashes.  PSYCH:  Affect is flat.   NEURO: Mental status is alert. Memory is impaired.   EXTREM:  Trace edema- compression stockings in place.    Lab/Diagnostic data:    Reviewed in Epic    ASSESSMENT/ORDERS:  Diagnoses and all orders for this visit:    Memory loss / Dementia  Presumed Alz Dementia- slow cognitive decline per family. Will remain at [facility] for LTC, spouse is at facility as well. Patient is in supportive environment. Weight slowly decreasing, patient does refuse supplements. Monitor closely.  With underlying cog impairment, likely malignancy (see below), this is not unexpected course. Weight is stable recently around 134 lbs. Monitor.     Urinary urge incontinence  Mirbetriq previously discontinued due to cost and  oxybutynin was started.  Staff reporting possible decrease in urinary complaints. Pt denies concerns with dry mouth, constipation.  No increase in confusion. Continue at current dose.    Chronic right-sided low back pain with right-sided sciatica / Closed fracture of sacrum, unspecified portion of sacrum, initial encounter (H) / Sacral insufficiency fracture, initial encounter / Generalized muscle weakness  Continues on gabapentin, this was decreased last month to 100 mg BID. No increase in pain or concerns. Tramadol previously d/c'd.     Anemia, unspecified type / Iron deficiency  Resolved. Hgb stable at 13 5/2/23. Iron was previously discontinued. Monitor. Will have repeat at 6 months.     Hyperlipidemia with target LDL less " than 130  Not on statin, LDL 2020 135. Would not start at this time.     Benign essential hypertension  BPs reviewed today. No change today. Monitored weekly.    Diastolic dysfunction / Nonrheumatic aortic valve stenosis  Euvolemic. Lungs clear. Compression stockings in place. Monitor.     Stage 3 chronic kidney disease, unspecified whether stage 3a or 3b CKD (H)  Creat of 0.96 5/2/23, which is roughly baseline. Monitored annual, and PRN.    Vitamin D deficiency  Started on daily supplement, 2000 units due to low values. Level improved to 66 on 4/12. Continue daily supplement.     Renal mass, right  Reviewed previous imaging, despite concern for appearance c/w malignancy, this has been very stable. No pain. Continue to monitor.     Elevated alkaline phosphatase level  Resolved. Alk phos 144.     Cyst of ovary, unspecified laterality  This is enlarging, albeit slowly, without evidence of any metastatic disease. Has not been seen by Gyn Onc.  is elevated in the past Family decided on no further workup. Currently no abd pain, bloating, bowel changes.     Routine labs: CBC, BMP annual    Total time spent with patient visit was 30 min including patient visit, review of pertinent clinical information, and treatment plan.    Tamar Colin, CNP

## 2023-11-29 VITALS
SYSTOLIC BLOOD PRESSURE: 133 MMHG | RESPIRATION RATE: 18 BRPM | TEMPERATURE: 97.7 F | HEART RATE: 77 BPM | BODY MASS INDEX: 25.7 KG/M2 | DIASTOLIC BLOOD PRESSURE: 75 MMHG | OXYGEN SATURATION: 95 % | WEIGHT: 136 LBS

## 2023-12-01 ENCOUNTER — NURSING HOME VISIT (OUTPATIENT)
Dept: FAMILY MEDICINE | Facility: OTHER | Age: 88
End: 2023-12-01
Payer: MEDICARE

## 2023-12-01 DIAGNOSIS — G30.9 ALZHEIMER'S DISEASE (H): ICD-10-CM

## 2023-12-01 DIAGNOSIS — F02.80 ALZHEIMER'S DISEASE (H): ICD-10-CM

## 2023-12-01 DIAGNOSIS — E55.9 VITAMIN D DEFICIENCY: ICD-10-CM

## 2023-12-01 DIAGNOSIS — I10 ESSENTIAL HYPERTENSION: ICD-10-CM

## 2023-12-01 DIAGNOSIS — N18.30 STAGE 3 CHRONIC KIDNEY DISEASE, UNSPECIFIED WHETHER STAGE 3A OR 3B CKD (H): ICD-10-CM

## 2023-12-01 DIAGNOSIS — Z78.9 NURSING HOME RESIDENT: Primary | ICD-10-CM

## 2023-12-01 PROCEDURE — 99309 SBSQ NF CARE MODERATE MDM 30: CPT

## 2023-12-01 NOTE — PROGRESS NOTES
HISTORY OF PRESENT ILLNESS:      Claudine is a 89 year old female (4/11/1930)  resident of [facility] who is being seen today for a routine regulatory visit.     Patient has a history of HFpEF, Aortic Stenosis, HTN, HLD, Anemia.      Patient was admitted to this facility on 10/15/21 after hospitalization for acute back pain due to sacral insufficiency fractures, bilateral.     Also noted during hospitalization was right renal mass. In review of records this mass has been present for several years and appears to be stable. She also has noted to have pelvic/ovarian cyst which has increased in size from 3 to 4.6cm. Looks like she was referred to Gyn Onc 3/2020, but no appt made. Family updated and wish no further work up at this time.     Also of note, pt was hospitalized in the past for elevated trop. Treated conservatively, unclear if true NSTEMI or related to demand. No echo done (previously noted to have normal ef and AORTIC STENOSIS). She did not ever have follow up for outpatient echo or stress testing.     She does have noted elevated alk phos which is being followed. A GGT was obtained and elevated, making hepatobiliary origin likely. Given no symptoms, have been trending alk phos.    Discussed with nursing staff who have the following concerns: No concerns. Doing well.      Patient is seen in her room. She just finished eating lunch along with her . She ate 100% of her meal. She is excited because she got a package of treats in the mail from her child living in [place]. She denies any concerns today.  Feels well. No abdominal pain, nausea or vomiting. No bowel/bladder changes. She is sleeping well.       Current medications, allergies, and interdisciplinary care plan are reviewed.    Patient Active Problem List    Diagnosis Date Noted    Acute cystitis without hematuria 07/25/2022     Priority: Medium    Hypotension, unspecified hypotension type 07/20/2022     Priority: Medium    Sepsis with  acute renal failure and septic shock (H) 07/20/2022     Priority: Medium    Alzheimer's disease (H) 12/06/2021     Priority: Medium    Pyelonephritis 10/12/2021     Priority: Medium    Generalized muscle weakness 10/12/2021     Priority: Medium    Unable to ambulate 10/12/2021     Priority: Medium    Right-sided low back pain without sciatica 10/12/2021     Priority: Medium    Closed fracture of sacrum, unspecified portion of sacrum, initial encounter (H) 10/12/2021     Priority: Medium    Sacral insufficiency fracture 10/12/2021     Priority: Medium    Elevated troponin 03/27/2021     Priority: Medium    Troponin level elevated 03/27/2021     Priority: Medium    Urinary tract infection without hematuria, site unspecified 03/27/2021     Priority: Medium    Iron deficiency 07/13/2020     Priority: Medium    Spinal stenosis of lumbosacral region 07/13/2020     Priority: Medium    Sacroiliitis (H24) 07/13/2020     Priority: Medium    Back pain 02/01/2020     Priority: Medium    Hyponatremia 02/01/2020     Priority: Medium    Rhabdomyolysis 02/01/2020     Priority: Medium    Hypokalemia 02/01/2020     Priority: Medium    Anemia 02/01/2020     Priority: Medium    Closed fracture of eleventh thoracic vertebra (H) 02/01/2020     Priority: Medium    Systolic murmur 02/01/2020     Priority: Medium    Nasal lesion 07/24/2019     Priority: Medium    Renal benign neoplasm, left 06/05/2019     Priority: Medium    Obesity (BMI 35.0-39.9) with comorbidity (H) 06/05/2019     Priority: Medium    Memory loss 06/05/2019     Priority: Medium    Acute right-sided low back pain with right-sided sciatica 06/05/2019     Priority: Medium    Renal mass, right 06/08/2017     Priority: Medium    MRSA (methicillin resistant Staphylococcus aureus) 06/01/2017     Priority: Medium     Formatting of this note might be different from the original.  Date & [facility] [place]    Date and source of  negative screens that qualify* for resolution of MRSA from infection table:     NONE    *2 sets of MRSA negative screens (previous positive site(s) if applicable and bilateral anterior nares) at least 7 days apart are required to resolve.   Screening exclusions include dialysis, long term care residence, antibiotics within the past 7 days, chronic wounds or invasive devices, & recurrent MRSA infections.  Please contact Infection Prevention for your facility if questions.      Postoperative anemia due to acute blood loss 01/24/2017     Priority: Medium    Status post total replacement of right hip 01/23/2017     Priority: Medium    Benign essential hypertension 01/16/2017     Priority: Medium    S/P total knee replacement using cement, left 01/25/2016     Priority: Medium    Hyperlipidemia with target LDL less than 130 03/30/2015     Priority: Medium     Diagnosis updated by automated process. Provider to review and confirm.      CKD (chronic kidney disease) stage 2, GFR 60-89 ml/min 08/03/2012     Priority: Medium    Diastolic dysfunction 02/27/2012     Priority: Medium     echo 2/2012  EF 60%            Social History     Socioeconomic History    Marital status:      Spouse name: Dharmesh    Number of children: 5    Years of education: Not on file    Highest education level: Not on file   Occupational History     Employer: [employer]     Comment: part-time   Tobacco Use    Smoking status: Never    Smokeless tobacco: Never   Substance and Sexual Activity    Alcohol use: Yes     Comment: Mixed, rarely    Drug use: No    Sexual activity: Yes     Partners: Male   Other Topics Concern     Service No    Blood Transfusions Yes    Caffeine Concern Yes     Comment: Coffee, 4 cups daily    Occupational Exposure Not Asked    Hobby Hazards Not Asked    Sleep Concern Not Asked    Stress Concern Not Asked    Weight Concern Not Asked    Special Diet Not Asked    Back Care Not Asked    Exercise No     Bike Helmet Not Asked    Seat Belt Yes    Self-Exams Yes    Parent/sibling w/ CABG, MI or angioplasty before 65F 55M? Not Asked   Social History Narrative     -- n/a    Caffeine --    Concussion --     Social Determinants of Health     Financial Resource Strain: Not on file   Food Insecurity: Not on file   Transportation Needs: Not on file   Physical Activity: Inactive (6/5/2019)    Exercise Vital Sign     Days of Exercise per Week: 0 days     Minutes of Exercise per Session: 0 min   Stress: Not on file   Social Connections: Not on file   Interpersonal Safety: Not At Risk (6/5/2019)    Humiliation, Afraid, Rape, and Kick questionnaire     Fear of Current or Ex-Partner: No     Emotionally Abused: No     Physically Abused: No     Sexually Abused: No   Housing Stability: Not on file        Current Outpatient Medications   Medication Sig    acetaminophen (TYLENOL) 325 MG tablet Take 650 mg by mouth daily    acetaminophen (TYLENOL) 650 MG CR tablet Take 1,300 mg by mouth 2 times daily    amLODIPine (NORVASC) 2.5 MG tablet Take 2.5 mg by mouth daily    Ascorbic Acid (VITAMIN C) 500 MG CAPS Take 500 mg by mouth daily    aspirin (ASA) 81 MG EC tablet Take 1 tablet (81 mg) by mouth daily    Calcium Carb-Cholecalciferol (CALCIUM + D3) 600-200 MG-UNIT TABS Take 1 tablet by mouth daily    Cholecalciferol (VITAMIN D) 50 MCG (2000 UT) CAPS Take 1 tablet by mouth daily    Cranberry 500 MG CAPS Take by mouth daily    GABAPENTIN PO Take 200 mg by mouth 2 times daily    metoprolol tartrate (LOPRESSOR) 25 MG tablet Take 1 tablet (25 mg) by mouth 2 times daily    Multiple Vitamin (MULTIVITAMIN) per tablet Take 1 tablet by mouth daily with food.    oxybutynin (DITROPAN) 5 MG tablet Take 5 mg by mouth daily    Psyllium 0.36 g CAPS Take 2 capsules by mouth daily     No current facility-administered medications for this visit.       No Known Allergies    I have reviewed the care plan and do agree with the plan.    ROS:  No chest  pain, shortness of breath, fever, chills, headache, nausea, vomiting, dysuria, or changes in bowel habits.  Appetite is good.  No pain noted.    OBJECTIVE:  /75   Pulse 77   Temp 97.7  F (36.5  C)   Resp 18   Wt 61.7 kg (136 lb)   SpO2 95%   BMI 25.70 kg/m      GENERAL:  Alert, and in no acute distress  RESP: Respirations unlabored. Lung sounds clear to auscultation.    CV:  RRR.  S1 S2 with 4/6 systolic murmur. No clicks or rubs.  ABD: Soft, non tender, non distended, no organomegaly. BS are normal.   SKIN:  Age-related changes.  No suspicious lesions or rashes.  PSYCH:  Affect is flat.   NEURO: Mental status is alert. Memory is impaired.   EXTREM:  Trace edema- compression stockings in place.    Lab/Diagnostic data:    Reviewed in Epic    ASSESSMENT/ORDERS:  Diagnoses and all orders for this visit:    Memory loss / Dementia  Presumed Alz Dementia- slow cognitive decline per family. Will remain at [facility] for LTC, spouse is at facility as well. Patient is in supportive environment. Weight slowly decreasing, patient does refuse supplements.Weight has recently been stable at 135 lbs.  With underlying cog impairment, likely malignancy (see below), this is not unexpected course. Monitor.     Urinary urge incontinence  Mirbetriq previously discontinued due to cost and detrol was started.  Staff reporting possible decrease in urinary complaints. Pt denies concerns with dry mouth, constipation.  No increase in confusion. Continue at current dose.    Chronic right-sided low back pain with right-sided sciatica / Closed fracture of sacrum, unspecified portion of sacrum, initial encounter (H) / Sacral insufficiency fracture, initial encounter / Generalized muscle weakness  Continues on gabapentin, this was decreased previously to 100 mg BID. No increase in pain or concerns. Tramadol previously d/c'd.     Anemia, unspecified type / Iron deficiency  Resolved. Hgb stable at 13 5/2/23. Iron was previously  discontinued. Monitor. Patient declines routine follow-up. Labs are ordered annually.     Hyperlipidemia with target LDL less than 130  Not on statin, LDL 2020 135. Would not start at this time.     Benign essential hypertension  BPs reviewed today. No change.Monitored weekly.    Diastolic dysfunction / Nonrheumatic aortic valve stenosis  Euvolemic. Lungs clear. Compression stockings in place. Monitor.     Stage 3 chronic kidney disease, unspecified whether stage 3a or 3b CKD (H)  Creat of 0.96 5/2/23, which is roughly baseline. Patient declines routine labs every 6 months. Annual labs ordered.    Vitamin D deficiency  Started on daily supplement, 2000 units due to low values. Level improved to 66 on 4/12. Continue daily supplement.     Renal mass, right  Reviewed previous imaging, despite concern for appearance c/w malignancy, this has been very stable. No pain. Continue to monitor.     Elevated alkaline phosphatase level  Resolved.     Cyst of ovary, unspecified laterality  This is enlarging, albeit slowly, without evidence of any metastatic disease. Has not been seen by Gyn Onc.  is elevated in the past Family decided on no further workup. Currently no abd pain, bloating, bowel changes.     Routine labs: CBC, BMP annual    Total time spent with patient visit was 30 min including patient visit, review of pertinent clinical information, and treatment plan.    Tamar Colin, CNP

## 2024-01-08 VITALS
WEIGHT: 139.2 LBS | HEART RATE: 72 BPM | DIASTOLIC BLOOD PRESSURE: 76 MMHG | RESPIRATION RATE: 16 BRPM | BODY MASS INDEX: 26.28 KG/M2 | HEIGHT: 61 IN | TEMPERATURE: 97 F | OXYGEN SATURATION: 97 % | SYSTOLIC BLOOD PRESSURE: 138 MMHG

## 2024-01-11 ENCOUNTER — NURSING HOME VISIT (OUTPATIENT)
Dept: FAMILY MEDICINE | Facility: OTHER | Age: 89
End: 2024-01-11
Attending: FAMILY MEDICINE
Payer: MEDICARE

## 2024-01-11 DIAGNOSIS — Z78.9 NURSING HOME RESIDENT: Primary | ICD-10-CM

## 2024-01-11 PROCEDURE — 99308 SBSQ NF CARE LOW MDM 20: CPT | Performed by: FAMILY MEDICINE

## 2024-01-19 NOTE — PROGRESS NOTES
HISTORY OF PRESENT ILLNESS:      Claudine is a 89 year old female (4/11/1930)  resident of [facility] who is being seen today for a routine regulatory visit.     Patient has a history of HFpEF, Aortic Stenosis, HTN, HLD, Anemia.      Patient was admitted to this facility on 10/15/21 after hospitalization for acute back pain due to sacral insufficiency fractures, bilateral.     Also noted during hospitalization was right renal mass. In review of records this mass has been present for several years and appears to be stable. She also has noted to have pelvic/ovarian cyst which has increased in size from 3 to 4.6cm. Looks like she was referred to Gyn Onc 3/2020, but no appt made. Family updated and wish no further work up at this time.     Also of note, pt was hospitalized in the past for elevated trop. Treated conservatively, unclear if true NSTEMI or related to demand. No echo done (previously noted to have normal ef and AORTIC STENOSIS). She did not ever have follow up for outpatient echo or stress testing.     Discussed with nursing staff who have the following concerns: No concerns.     Patient is seen in her room after meeting at the [employer] table. She was also observed exercising with staff prior to our visit. She reports not being as strong as she would like to be. Denies pain, trouble breathing. Has no concerns about urinary or bowel habits. Very pleasant today.     Current medications, allergies, and interdisciplinary care plan are reviewed.    Patient Active Problem List    Diagnosis Date Noted    Acute cystitis without hematuria 07/25/2022     Priority: Medium    Hypotension, unspecified hypotension type 07/20/2022     Priority: Medium    Sepsis with acute renal failure and septic shock (H) 07/20/2022     Priority: Medium    Alzheimer's disease (H) 12/06/2021     Priority: Medium    Pyelonephritis 10/12/2021     Priority: Medium    Generalized muscle weakness 10/12/2021     Priority: Medium    Unable  to ambulate 10/12/2021     Priority: Medium    Right-sided low back pain without sciatica 10/12/2021     Priority: Medium    Closed fracture of sacrum, unspecified portion of sacrum, initial encounter (H) 10/12/2021     Priority: Medium    Sacral insufficiency fracture 10/12/2021     Priority: Medium    Elevated troponin 03/27/2021     Priority: Medium    Troponin level elevated 03/27/2021     Priority: Medium    Urinary tract infection without hematuria, site unspecified 03/27/2021     Priority: Medium    Iron deficiency 07/13/2020     Priority: Medium    Spinal stenosis of lumbosacral region 07/13/2020     Priority: Medium    Sacroiliitis (H24) 07/13/2020     Priority: Medium    Back pain 02/01/2020     Priority: Medium    Hyponatremia 02/01/2020     Priority: Medium    Rhabdomyolysis 02/01/2020     Priority: Medium    Hypokalemia 02/01/2020     Priority: Medium    Anemia 02/01/2020     Priority: Medium    Closed fracture of eleventh thoracic vertebra (H) 02/01/2020     Priority: Medium    Systolic murmur 02/01/2020     Priority: Medium    Nasal lesion 07/24/2019     Priority: Medium    Renal benign neoplasm, left 06/05/2019     Priority: Medium    Obesity (BMI 35.0-39.9) with comorbidity (H) 06/05/2019     Priority: Medium    Memory loss 06/05/2019     Priority: Medium    Acute right-sided low back pain with right-sided sciatica 06/05/2019     Priority: Medium    Renal mass, right 06/08/2017     Priority: Medium    MRSA (methicillin resistant Staphylococcus aureus) 06/01/2017     Priority: Medium     Formatting of this note might be different from the original.  Date & Source of First Known MRSA: 1/22/2016 - [facility]    Date and source of negative screens that qualify* for resolution of MRSA from infection table:     NONE    *2 sets of MRSA negative screens (previous positive site(s) if applicable and bilateral anterior nares) at least 7 days apart are required to resolve.   Screening  exclusions include dialysis, long term care residence, antibiotics within the past 7 days, chronic wounds or invasive devices, & recurrent MRSA infections.  Please contact Infection Prevention for your facility if questions.      Postoperative anemia due to acute blood loss 01/24/2017     Priority: Medium    Status post total replacement of right hip 01/23/2017     Priority: Medium    Benign essential hypertension 01/16/2017     Priority: Medium    S/P total knee replacement using cement, left 01/25/2016     Priority: Medium    Hyperlipidemia with target LDL less than 130 03/30/2015     Priority: Medium     Diagnosis updated by automated process. Provider to review and confirm.      CKD (chronic kidney disease) stage 2, GFR 60-89 ml/min 08/03/2012     Priority: Medium    Diastolic dysfunction 02/27/2012     Priority: Medium     echo 2/2012  EF 60%            Social History     Socioeconomic History    Marital status:      Spouse name: Dharmesh    Number of children: 5    Years of education: Not on file    Highest education level: Not on file   Occupational History     Employer: [employer]     Comment: part-time   Tobacco Use    Smoking status: Never    Smokeless tobacco: Never   Substance and Sexual Activity    Alcohol use: Yes     Comment: Mixed, rarely    Drug use: No    Sexual activity: Yes     Partners: Male   Other Topics Concern     Service No    Blood Transfusions Yes    Caffeine Concern Yes     Comment: Coffee, 4 cups daily    Occupational Exposure Not Asked    Hobby Hazards Not Asked    Sleep Concern Not Asked    Stress Concern Not Asked    Weight Concern Not Asked    Special Diet Not Asked    Back Care Not Asked    Exercise No    Bike Helmet Not Asked    Seat Belt Yes    Self-Exams Yes    Parent/sibling w/ CABG, MI or angioplasty before 65F 55M? Not Asked   Social History Narrative     -- n/a    Caffeine --    Concussion --     Social Determinants of Health     Financial  "Resource Strain: Not on file   Food Insecurity: Not on file   Transportation Needs: Not on file   Physical Activity: Inactive (6/5/2019)    Exercise Vital Sign     Days of Exercise per Week: 0 days     Minutes of Exercise per Session: 0 min   Stress: Not on file   Social Connections: Not on file   Interpersonal Safety: Not At Risk (6/5/2019)    Humiliation, Afraid, Rape, and Kick questionnaire     Fear of Current or Ex-Partner: No     Emotionally Abused: No     Physically Abused: No     Sexually Abused: No   Housing Stability: Not on file        Current Outpatient Medications   Medication Sig    acetaminophen (TYLENOL) 650 MG CR tablet Take 1,300 mg by mouth 2 times daily    amLODIPine (NORVASC) 2.5 MG tablet Take 2.5 mg by mouth daily    Ascorbic Acid (VITAMIN C) 500 MG CAPS Take 500 mg by mouth daily    aspirin (ASA) 81 MG EC tablet Take 1 tablet (81 mg) by mouth daily    Calcium Carb-Cholecalciferol (CALCIUM + D3) 600-200 MG-UNIT TABS Take 1 tablet by mouth daily    Cholecalciferol (VITAMIN D) 50 MCG (2000 UT) CAPS Take 1 tablet by mouth daily    Cranberry 500 MG CAPS Take by mouth daily    GABAPENTIN PO Take 200 mg by mouth 2 times daily    metoprolol tartrate (LOPRESSOR) 25 MG tablet Take 1 tablet (25 mg) by mouth 2 times daily    Multiple Vitamin (MULTIVITAMIN) per tablet Take 1 tablet by mouth daily with food.    oxybutynin (DITROPAN) 5 MG tablet Take 5 mg by mouth daily    Psyllium 0.36 g CAPS Take 2 capsules by mouth daily     No current facility-administered medications for this visit.       No Known Allergies    I have reviewed the care plan and do agree with the plan.    ROS:  No chest pain, shortness of breath, fever, chills, headache, nausea, vomiting, dysuria, or changes in bowel habits.  Appetite is good.  No pain noted.    OBJECTIVE:  /76   Pulse 72   Temp 97  F (36.1  C)   Resp 16   Ht 1.549 m (5' 1\")   Wt 63.1 kg (139 lb 3.2 oz)   SpO2 97%   BMI 26.30 kg/m      GENERAL:  Alert, and in " no acute distress  RESP: Respirations unlabored. Lung sounds clear to auscultation.    CV:  RRR.  S1 S2 with 4/6 systolic murmur. No clicks or rubs.  ABD: Soft, non tender, non distended, no organomegaly. BS are normal.   SKIN:  Age-related changes.  No suspicious lesions or rashes.  PSYCH:  Affect is flat.   NEURO: Mental status is alert. Memory is impaired.   EXTREM:  Trace edema- compression stockings in place.    Lab/Diagnostic data:    Reviewed in Epic    ASSESSMENT/ORDERS:  Diagnoses and all orders for this visit:    Memory loss / Dementia  Presumed Alz Dementia- slow cognitive decline per family. Will remain at [facility] for LTC, spouse is at facility as well. Patient is in supportive environment. Weight has recently been stable at 135-139 lbs. With underlying cog impairment, likely malignancy (see below), weight loss would not be unexpected.    Urinary urge incontinence  Mirbetriq previously discontinued due to cost and detrol was started.  Staff reporting possible decrease in urinary complaints. Pt denies concerns with dry mouth, constipation.  No increase in confusion. Continue at current dose.    Chronic right-sided low back pain with right-sided sciatica / Closed fracture of sacrum, unspecified portion of sacrum, initial encounter (H) / Sacral insufficiency fracture, initial encounter / Generalized muscle weakness  Continues on gabapentin, this was decreased previously to 100 mg BID. No increase in pain or concerns. Tramadol previously d/c'd. Can consider further decrease in pain medication if remains this stable    CAD without angina  Presumed with prev trop elevation/poss NSTEMI. Pt is asymptomatic at this time. Monitor.     Anemia, unspecified type / Iron deficiency  Resolved. Hgb stable at 13 5/2/23. Iron was previously discontinued. Monitor. Patient declines routine follow-up. Labs are ordered annually.     Hyperlipidemia with target LDL less than 130  Not on statin, LDL 2020 135. Would not start  at this time.     Benign essential hypertension  BPs reviewed today. No change.    Diastolic dysfunction / Nonrheumatic aortic valve stenosis  Euvolemic. Lungs clear. Compression stockings in place. Monitor.     Stage 3 chronic kidney disease, unspecified whether stage 3a or 3b CKD (H)  Creat of 0.96 5/2/23, which is roughly baseline. Patient declines routine labs every 6 months. Annual labs ordered.    Vitamin D deficiency  Started on daily supplement, 2000 units due to low values. Level improved to 66 on 4/12. Continue daily supplement.     Renal mass, right  Reviewed previous imaging, despite concern for appearance c/w malignancy, this has been very stable. Discussed with family and will address if sx only. No pain. Continue to monitor.     Elevated alkaline phosphatase level  Resolved.     Cyst of ovary, unspecified laterality  This is enlarging, albeit slowly, without evidence of any metastatic disease. Has not been seen by Gyn Onc.  is elevated in the past Family decided on no further workup. Currently no abd pain, bloating, bowel changes.     Routine labs: CBC, BMP annual    Total time spent with patient visit was 25 min including patient visit, review of pertinent clinical information, and treatment plan.    Yelitza Olguin MD

## 2024-03-06 ENCOUNTER — NURSING HOME VISIT (OUTPATIENT)
Dept: FAMILY MEDICINE | Facility: OTHER | Age: 89
End: 2024-03-06
Payer: MEDICARE

## 2024-03-06 VITALS
HEART RATE: 70 BPM | BODY MASS INDEX: 25.41 KG/M2 | TEMPERATURE: 98.7 F | DIASTOLIC BLOOD PRESSURE: 64 MMHG | RESPIRATION RATE: 20 BRPM | SYSTOLIC BLOOD PRESSURE: 124 MMHG | OXYGEN SATURATION: 96 % | WEIGHT: 134.6 LBS | HEIGHT: 61 IN

## 2024-03-06 DIAGNOSIS — Z78.9 NURSING HOME RESIDENT: Primary | ICD-10-CM

## 2024-03-06 DIAGNOSIS — D64.9 ANEMIA, UNSPECIFIED TYPE: ICD-10-CM

## 2024-03-06 DIAGNOSIS — I10 ESSENTIAL HYPERTENSION: ICD-10-CM

## 2024-03-06 DIAGNOSIS — N18.30 STAGE 3 CHRONIC KIDNEY DISEASE, UNSPECIFIED WHETHER STAGE 3A OR 3B CKD (H): ICD-10-CM

## 2024-03-06 PROCEDURE — 99309 SBSQ NF CARE MODERATE MDM 30: CPT

## 2024-04-09 ENCOUNTER — TELEPHONE (OUTPATIENT)
Dept: FAMILY MEDICINE | Facility: OTHER | Age: 89
End: 2024-04-09

## 2024-04-09 ENCOUNTER — TRANSFERRED RECORDS (OUTPATIENT)
Dept: HEALTH INFORMATION MANAGEMENT | Facility: CLINIC | Age: 89
End: 2024-04-09
Payer: MEDICARE

## 2024-04-11 VITALS
OXYGEN SATURATION: 91 % | BODY MASS INDEX: 26 KG/M2 | SYSTOLIC BLOOD PRESSURE: 125 MMHG | WEIGHT: 137.7 LBS | TEMPERATURE: 98 F | HEIGHT: 61 IN | HEART RATE: 102 BPM | RESPIRATION RATE: 18 BRPM | DIASTOLIC BLOOD PRESSURE: 65 MMHG

## 2024-04-12 ENCOUNTER — NURSING HOME VISIT (OUTPATIENT)
Dept: FAMILY MEDICINE | Facility: OTHER | Age: 89
End: 2024-04-12
Payer: MEDICARE

## 2024-04-12 DIAGNOSIS — E55.9 VITAMIN D DEFICIENCY: ICD-10-CM

## 2024-04-12 DIAGNOSIS — F02.80 ALZHEIMER'S DISEASE (H): ICD-10-CM

## 2024-04-12 DIAGNOSIS — N30.01 ACUTE CYSTITIS WITH HEMATURIA: ICD-10-CM

## 2024-04-12 DIAGNOSIS — I10 ESSENTIAL HYPERTENSION: ICD-10-CM

## 2024-04-12 DIAGNOSIS — N18.30 STAGE 3 CHRONIC KIDNEY DISEASE, UNSPECIFIED WHETHER STAGE 3A OR 3B CKD (H): ICD-10-CM

## 2024-04-12 DIAGNOSIS — G30.9 ALZHEIMER'S DISEASE (H): ICD-10-CM

## 2024-04-12 DIAGNOSIS — Z78.9 NURSING HOME RESIDENT: Primary | ICD-10-CM

## 2024-04-12 PROCEDURE — 99308 SBSQ NF CARE LOW MDM 20: CPT

## 2024-04-12 NOTE — PROGRESS NOTES
"  HISTORY OF PRESENT ILLNESS:      Claudine is a 89 year old female (4/11/1930)  resident of [facility] who is being seen today for a routine regulatory visit.     Patient has a history of HFpEF, Aortic Stenosis, HTN, HLD, Anemia.      Patient was admitted to this facility on 10/15/21 after hospitalization for acute back pain due to sacral insufficiency fractures, bilateral.     Also noted during hospitalization was right renal mass. In review of records this mass has been present for several years and appears to be stable. She also has noted to have pelvic/ovarian cyst which has increased in size from 3 to 4.6cm. Looks like she was referred to Gyn Onc 3/2020, but no appt made. Family updated and wish no further work up at this time.     Also of note, pt was hospitalized in the past for elevated trop. Treated conservatively, unclear if true NSTEMI or related to demand. No echo done (previously noted to have normal ef and AORTIC STENOSIS). She did not ever have follow up for outpatient echo or stress testing.     Discussed with nursing staff who have the following concerns: Currently being treated for UTI.No further nose bleeds at facility.    Patient is found sitting in her room, resting in her wheelchair. She tells me she is great. Has no concerns. When asked about recent uti, she states \" yes it was burning really bad.\" She denies any abdominal pain, flank pain, nausea, vomiting, fevers or chills. She is otherwise feeling well. No breathing concerns. No bowel changes.     Current medications, allergies, and interdisciplinary care plan are reviewed.    Patient Active Problem List    Diagnosis Date Noted    Acute cystitis without hematuria 07/25/2022     Priority: Medium    Hypotension, unspecified hypotension type 07/20/2022     Priority: Medium    Sepsis with acute renal failure and septic shock (H) 07/20/2022     Priority: Medium    Alzheimer's disease (H) 12/06/2021     Priority: Medium    Pyelonephritis " 10/12/2021     Priority: Medium    Generalized muscle weakness 10/12/2021     Priority: Medium    Unable to ambulate 10/12/2021     Priority: Medium    Right-sided low back pain without sciatica 10/12/2021     Priority: Medium    Closed fracture of sacrum, unspecified portion of sacrum, initial encounter (H) 10/12/2021     Priority: Medium    Sacral insufficiency fracture 10/12/2021     Priority: Medium    Elevated troponin 03/27/2021     Priority: Medium    Troponin level elevated 03/27/2021     Priority: Medium    Urinary tract infection without hematuria, site unspecified 03/27/2021     Priority: Medium    Iron deficiency 07/13/2020     Priority: Medium    Spinal stenosis of lumbosacral region 07/13/2020     Priority: Medium    Sacroiliitis (H24) 07/13/2020     Priority: Medium    Back pain 02/01/2020     Priority: Medium    Hyponatremia 02/01/2020     Priority: Medium    Rhabdomyolysis 02/01/2020     Priority: Medium    Hypokalemia 02/01/2020     Priority: Medium    Anemia 02/01/2020     Priority: Medium    Closed fracture of eleventh thoracic vertebra (H) 02/01/2020     Priority: Medium    Systolic murmur 02/01/2020     Priority: Medium    Nasal lesion 07/24/2019     Priority: Medium    Renal benign neoplasm, left 06/05/2019     Priority: Medium    Obesity (BMI 35.0-39.9) with comorbidity (H) 06/05/2019     Priority: Medium    Memory loss 06/05/2019     Priority: Medium    Acute right-sided low back pain with right-sided sciatica 06/05/2019     Priority: Medium    Renal mass, right 06/08/2017     Priority: Medium    MRSA (methicillin resistant Staphylococcus aureus) 06/01/2017     Priority: Medium     Formatting of this note might be different from the original.  Date & Source [place] [facility] [place] and source of negative screens that qualify* for resolution of MRSA from infection table:     NONE    *2 sets of MRSA negative screens (previous positive site(s) if  applicable and bilateral anterior nares) at least 7 days apart are required to resolve.   Screening exclusions include dialysis, long term care residence, antibiotics within the past 7 days, chronic wounds or invasive devices, & recurrent MRSA infections.  Please contact Infection Prevention for your facility if questions.      Postoperative anemia due to acute blood loss 01/24/2017     Priority: Medium    Status post total replacement of right hip 01/23/2017     Priority: Medium    Benign essential hypertension 01/16/2017     Priority: Medium    S/P total knee replacement using cement, left 01/25/2016     Priority: Medium    Hyperlipidemia with target LDL less than 130 03/30/2015     Priority: Medium     Diagnosis updated by automated process. Provider to review and confirm.      CKD (chronic kidney disease) stage 2, GFR 60-89 ml/min 08/03/2012     Priority: Medium    Diastolic dysfunction 02/27/2012     Priority: Medium     echo 2/2012  EF 60%            Social History     Socioeconomic History    Marital status:      Spouse name: Dharmesh    Number of children: 5    Years of education: Not on file    Highest education level: Not on file   Occupational History     Employer: [employer]     Comment: part-time   Tobacco Use    Smoking status: Never    Smokeless tobacco: Never   Substance and Sexual Activity    Alcohol use: Yes     Comment: Mixed, rarely    Drug use: No    Sexual activity: Yes     Partners: Male   Other Topics Concern     Service No    Blood Transfusions Yes    Caffeine Concern Yes     Comment: Coffee, 4 cups daily    Occupational Exposure Not Asked    Hobby Hazards Not Asked    Sleep Concern Not Asked    Stress Concern Not Asked    Weight Concern Not Asked    Special Diet Not Asked    Back Care Not Asked    Exercise No    Bike Helmet Not Asked    Seat Belt Yes    Self-Exams Yes    Parent/sibling w/ CABG, MI or angioplasty before 65F 55M? Not Asked   Social History  Narrative     -- n/a    Caffeine --    Concussion --     Social Determinants of Health     Financial Resource Strain: Not on file   Food Insecurity: Not on file   Transportation Needs: Not on file   Physical Activity: Inactive (6/5/2019)    Exercise Vital Sign     Days of Exercise per Week: 0 days     Minutes of Exercise per Session: 0 min   Stress: Not on file   Social Connections: Not on file   Interpersonal Safety: Not At Risk (6/5/2019)    Humiliation, Afraid, Rape, and Kick questionnaire     Fear of Current or Ex-Partner: No     Emotionally Abused: No     Physically Abused: No     Sexually Abused: No   Housing Stability: Not on file        Current Outpatient Medications   Medication Sig Dispense Refill    acetaminophen (TYLENOL) 650 MG CR tablet Take 1,300 mg by mouth 2 times daily      amLODIPine (NORVASC) 2.5 MG tablet Take 2.5 mg by mouth daily      Ascorbic Acid (VITAMIN C) 500 MG CAPS Take 500 mg by mouth daily      aspirin (ASA) 81 MG EC tablet Take 1 tablet (81 mg) by mouth daily 30 tablet 3    Calcium Carb-Cholecalciferol (CALCIUM + D3) 600-200 MG-UNIT TABS Take 1 tablet by mouth daily      Cholecalciferol (VITAMIN D) 50 MCG (2000 UT) CAPS Take 1 tablet by mouth daily      Cranberry 500 MG CAPS Take by mouth daily      GABAPENTIN PO Take 200 mg by mouth 2 times daily      metoprolol tartrate (LOPRESSOR) 25 MG tablet Take 1 tablet (25 mg) by mouth 2 times daily      Multiple Vitamin (MULTIVITAMIN) per tablet Take 1 tablet by mouth daily with food.      oxybutynin (DITROPAN) 5 MG tablet Take 5 mg by mouth daily      Psyllium 0.36 g CAPS Take 2 capsules by mouth daily       No current facility-administered medications for this visit.       No Known Allergies    I have reviewed the care plan and do agree with the plan.    ROS:  No chest pain, shortness of breath, fever, chills, headache, nausea, vomiting, dysuria, or changes in bowel habits.  Appetite is good.  No pain noted.    OBJECTIVE:  BP  "125/65   Pulse 102   Temp 98  F (36.7  C)   Resp 18   Ht 1.549 m (5' 1\")   Wt 62.5 kg (137 lb 11.2 oz)   SpO2 91%   BMI 26.02 kg/m      GENERAL:  Alert, and in no acute distress  RESP: Respirations unlabored. Lung sounds clear to auscultation.    CV:  RRR.  S1 S2 with 4/6 systolic murmur. No clicks or rubs.  ABD: Soft, non tender, non distended, no organomegaly. BS are normal.   BACK: No CVA tenderness.   SKIN:  Age-related changes.  No suspicious lesions or rashes.  PSYCH:  Affect is flat.   NEURO: Mental status is alert. Memory is impaired.   EXTREM:  Trace edema at ankles- compression stockings not in place today.    Lab/Diagnostic data:    Reviewed in Epic    ASSESSMENT/ORDERS:  Diagnoses and all orders for this visit:    Acute cystitis with hematuria  Started cephalexin on 4/11. Culture with E. Coli. Continue antibiotic as prescribed. No systemic symptoms reported. Vital signs are within normal limits, with no abdominal or CVA tenderness. Monitor.    Memory loss / Dementia  Presumed Alz Dementia- slow cognitive decline per family. Will remain at [facility] for LTC, spouse is at facility as well. Patient is in supportive environment. Weights are variable,  stable at 135-140 lbs. Most recent 137 lbs.  With underlying cog impairment, likely malignancy (see below), weight loss would not be unexpected.    Urinary urge incontinence  Mirbetriq previously discontinued due to cost and detrol was started with benefit per staff.  Pt denies concerns with dry mouth, constipation.  No increase in confusion. Continue 5 mg dose.     Chronic right-sided low back pain with right-sided sciatica / Closed fracture of sacrum, unspecified portion of sacrum, initial encounter (H) / Sacral insufficiency fracture, initial encounter / Generalized muscle weakness  Continues on gabapentin, this has been tapered with no reported changes in plain. Now on hs dosing only (previously TID). Will discontinue today and monitor for pain " complaints.    CAD without angina  Presumed with prev trop elevation/poss NSTEMI. Pt continues to be entirely asymptomatic.    Anemia, unspecified type / Iron deficiency  Resolved. Hgb stable at 13 5/2/23. Iron was previously discontinued. Monitor. Patient declines routine labs. Labs are ordered annually. Will be due in May.    Hyperlipidemia with target LDL less than 130  Not on statin, LDL 2020 135. Would not start at this time.     Benign essential hypertension  BPs reviewed today and controlled.    Diastolic dysfunction / Nonrheumatic aortic valve stenosis  Euvolemic. Lungs clear. Some edema at ankles only. Compression stockings not in place. Monitor.     Stage 3 chronic kidney disease, unspecified whether stage 3a or 3b CKD (H)  Creat of 0.96 5/2/23, which is roughly baseline. Patient declines routine labs every 6 months. Annual labs ordered- due in May.     Vitamin D deficiency  Started on daily supplement, 2000 units due to low values. Level improved to 66 on 4/12. Continue daily supplement.     Renal mass, right  Reviewed previous imaging, despite concern for appearance c/w malignancy, this has been very stable. Discussed with family and will address if sx only. No pain. Continue to monitor.     Elevated alkaline phosphatase level  Resolved. Asymptomatic. Routine labs will be in May.    Cyst of ovary, unspecified laterality  This is enlarging, albeit slowly, without evidence of any metastatic disease. Has not been seen by Gyn Onc.  is elevated in the past Family decided on no further workup. Currently no abd pain, bloating, bowel changes.     Routine labs: CBC, BMP annual    Total time spent with patient visit was 30 min including patient visit, review of pertinent clinical information, and treatment plan.    Tamar Colin, CNP

## 2024-05-15 VITALS
TEMPERATURE: 98.5 F | BODY MASS INDEX: 25.54 KG/M2 | SYSTOLIC BLOOD PRESSURE: 138 MMHG | WEIGHT: 135.3 LBS | HEIGHT: 61 IN | HEART RATE: 94 BPM | OXYGEN SATURATION: 93 % | DIASTOLIC BLOOD PRESSURE: 74 MMHG | RESPIRATION RATE: 16 BRPM

## 2024-05-16 ENCOUNTER — NURSING HOME VISIT (OUTPATIENT)
Dept: FAMILY MEDICINE | Facility: OTHER | Age: 89
End: 2024-05-16
Attending: FAMILY MEDICINE
Payer: MEDICARE

## 2024-05-16 DIAGNOSIS — Z78.9 NURSING HOME RESIDENT: Primary | ICD-10-CM

## 2024-05-16 PROCEDURE — 99308 SBSQ NF CARE LOW MDM 20: CPT | Performed by: FAMILY MEDICINE

## 2024-05-23 NOTE — PROGRESS NOTES
HISTORY OF PRESENT ILLNESS:      Claudine is a 89 year old female (4/11/1930)  resident of [facility] who is being seen today for a routine regulatory visit.     Patient has a history of HFpEF, Aortic Stenosis, HTN, HLD, Anemia.      Patient was admitted to this facility on 10/15/21 after hospitalization for acute back pain due to sacral insufficiency fractures, bilateral.     Also noted during hospitalization was right renal mass, stable for several years. She also has noted to have pelvic/ovarian cyst which has increased in size from 3 to 4.6cm. Looks like she was referred to Gyn Onc 3/2020, but no appt made. Family updated and wish no further work up at this time.     Also of note, pt was hospitalized in the past for elevated trop. Treated conservatively, unclear if true NSTEMI or related to demand. No echo done (previously noted to have normal ef and AORTIC STENOSIS). She did not ever have follow up for outpatient echo or stress testing.     Discussed with nursing staff who have the following concerns: Recent UTI, but otherwise doing well.     Patient is found sitting in her room, resting in her wheelchair. She has no concerns. She does not want any labs due to pain with draws. She feels well, denies worsening swelling, pain.     Current medications, allergies, and interdisciplinary care plan are reviewed.    Patient Active Problem List    Diagnosis Date Noted    Acute cystitis without hematuria 07/25/2022     Priority: Medium    Hypotension, unspecified hypotension type 07/20/2022     Priority: Medium    Sepsis with acute renal failure and septic shock (H) 07/20/2022     Priority: Medium    Alzheimer's disease (H) 12/06/2021     Priority: Medium    Pyelonephritis 10/12/2021     Priority: Medium    Generalized muscle weakness 10/12/2021     Priority: Medium    Unable to ambulate 10/12/2021     Priority: Medium    Right-sided low back pain without sciatica 10/12/2021     Priority: Medium    Closed  fracture of sacrum, unspecified portion of sacrum, initial encounter (H) 10/12/2021     Priority: Medium    Sacral insufficiency fracture 10/12/2021     Priority: Medium    Elevated troponin 03/27/2021     Priority: Medium    Troponin level elevated 03/27/2021     Priority: Medium    Urinary tract infection without hematuria, site unspecified 03/27/2021     Priority: Medium    Iron deficiency 07/13/2020     Priority: Medium    Spinal stenosis of lumbosacral region 07/13/2020     Priority: Medium    Sacroiliitis (H24) 07/13/2020     Priority: Medium    Back pain 02/01/2020     Priority: Medium    Hyponatremia 02/01/2020     Priority: Medium    Rhabdomyolysis 02/01/2020     Priority: Medium    Hypokalemia 02/01/2020     Priority: Medium    Anemia 02/01/2020     Priority: Medium    Closed fracture of eleventh thoracic vertebra (H) 02/01/2020     Priority: Medium    Systolic murmur 02/01/2020     Priority: Medium    Nasal lesion 07/24/2019     Priority: Medium    Renal benign neoplasm, left 06/05/2019     Priority: Medium    Obesity (BMI 35.0-39.9) with comorbidity (H) 06/05/2019     Priority: Medium    Memory loss 06/05/2019     Priority: Medium    Acute right-sided low back pain with right-sided sciatica 06/05/2019     Priority: Medium    Renal mass, right 06/08/2017     Priority: Medium    MRSA (methicillin resistant Staphylococcus aureus) 06/01/2017     Priority: Medium     Formatting of this note might be different from the original.  Date & Source of First Known MRSA: 1/22/2016 - [facility]    Date and source of negative screens that qualify* for resolution of MRSA from infection table:     NONE    *2 sets of MRSA negative screens (previous positive site(s) if applicable and bilateral anterior nares) at least 7 days apart are required to resolve.   Screening exclusions include dialysis, long term care residence, antibiotics within the past 7 days, chronic wounds or invasive devices, & recurrent  MRSA infections.  Please contact Infection Prevention for your facility if questions.      Postoperative anemia due to acute blood loss 01/24/2017     Priority: Medium    Status post total replacement of right hip 01/23/2017     Priority: Medium    Benign essential hypertension 01/16/2017     Priority: Medium    S/P total knee replacement using cement, left 01/25/2016     Priority: Medium    Hyperlipidemia with target LDL less than 130 03/30/2015     Priority: Medium     Diagnosis updated by automated process. Provider to review and confirm.      CKD (chronic kidney disease) stage 2, GFR 60-89 ml/min 08/03/2012     Priority: Medium    Diastolic dysfunction 02/27/2012     Priority: Medium     echo 2/2012  EF 60%            Social History     Socioeconomic History    Marital status:      Spouse name: Dharmesh    Number of children: 5    Years of education: Not on file    Highest education level: Not on file   Occupational History     Employer: [employer]     Comment: part-time   Tobacco Use    Smoking status: Never    Smokeless tobacco: Never   Substance and Sexual Activity    Alcohol use: Yes     Comment: Mixed, rarely    Drug use: No    Sexual activity: Yes     Partners: Male   Other Topics Concern     Service No    Blood Transfusions Yes    Caffeine Concern Yes     Comment: Coffee, 4 cups daily    Occupational Exposure Not Asked    Hobby Hazards Not Asked    Sleep Concern Not Asked    Stress Concern Not Asked    Weight Concern Not Asked    Special Diet Not Asked    Back Care Not Asked    Exercise No    Bike Helmet Not Asked    Seat Belt Yes    Self-Exams Yes    Parent/sibling w/ CABG, MI or angioplasty before 65F 55M? Not Asked   Social History Narrative     -- n/a    Caffeine --    Concussion --     Social Determinants of Health     Financial Resource Strain: Not on file   Food Insecurity: Not on file   Transportation Needs: Not on file   Physical Activity: Inactive (6/5/2019)  "   Exercise Vital Sign     Days of Exercise per Week: 0 days     Minutes of Exercise per Session: 0 min   Stress: Not on file   Social Connections: Not on file   Interpersonal Safety: Not At Risk (6/5/2019)    Humiliation, Afraid, Rape, and Kick questionnaire     Fear of Current or Ex-Partner: No     Emotionally Abused: No     Physically Abused: No     Sexually Abused: No   Housing Stability: Not on file        Current Outpatient Medications   Medication Sig Dispense Refill    acetaminophen (TYLENOL) 650 MG CR tablet Take 1,300 mg by mouth 2 times daily      amLODIPine (NORVASC) 2.5 MG tablet Take 2.5 mg by mouth daily      Ascorbic Acid (VITAMIN C) 500 MG CAPS Take 500 mg by mouth daily      aspirin (ASA) 81 MG EC tablet Take 1 tablet (81 mg) by mouth daily 30 tablet 3    Calcium Carb-Cholecalciferol (CALCIUM + D3) 600-200 MG-UNIT TABS Take 1 tablet by mouth daily      Cholecalciferol (VITAMIN D) 50 MCG (2000 UT) CAPS Take 1 tablet by mouth daily      Cranberry 500 MG CAPS Take by mouth daily      metoprolol tartrate (LOPRESSOR) 25 MG tablet Take 1 tablet (25 mg) by mouth 2 times daily      Multiple Vitamin (MULTIVITAMIN) per tablet Take 1 tablet by mouth daily with food.      oxybutynin (DITROPAN) 5 MG tablet Take 5 mg by mouth daily      Psyllium 0.36 g CAPS Take 2 capsules by mouth daily       No current facility-administered medications for this visit.       No Known Allergies    I have reviewed the care plan and do agree with the plan.    ROS:  No chest pain, shortness of breath, fever, chills, headache, nausea, vomiting, dysuria, or changes in bowel habits.  Appetite is good.  No pain noted.    OBJECTIVE:  /74   Pulse 94   Temp 98.5  F (36.9  C)   Resp 16   Ht 1.549 m (5' 1\")   Wt 61.4 kg (135 lb 4.8 oz)   SpO2 93%   BMI 25.56 kg/m      GENERAL:  Alert, and in no acute distress  RESP: Respirations unlabored. Lung sounds clear to auscultation.    CV:  RRR.  S1 S2 with 4/6 systolic murmur. No " clicks or rubs.  ABD: Soft, non tender, non distended, no organomegaly. BS are normal.   BACK: No CVA tenderness.   SKIN:  Age-related changes.  No suspicious lesions or rashes.  PSYCH:  Affect is flat.   NEURO: Mental status is alert. Memory is impaired.   EXTREM:  1+ edema at ankles- compression stockings not in place today.    Lab/Diagnostic data:    Reviewed in Epic    ASSESSMENT/ORDERS:  Diagnoses and all orders for this visit:    Acute cystitis with hematuria  In April, treated, resolved.    Memory loss / Dementia  Presumed Alz Dementia- slow cognitive decline per family. Will remain at [facility] for LTC, spouse is at facility as well. Patient is in supportive environment. Weights are generally stable. With underlying cog impairment, likely malignancy (see below), weight loss would not be unexpected.    Urinary urge incontinence  Mirbetriq previously discontinued due to cost and detrol was started with benefit per staff.  Pt denies concerns with dry mouth, constipation.  No increase in confusion. Continue 5 mg dose.     Chronic right-sided low back pain with right-sided sciatica / Closed fracture of sacrum, unspecified portion of sacrum, initial encounter (H) / Sacral insufficiency fracture, initial encounter / Generalized muscle weakness  Gabapentin tapered previously, on tylenol only. Pain controlled    CAD without angina  Presumed with prev trop elevation/poss NSTEMI. Pt continues to be entirely asymptomatic.    Anemia, unspecified type / Iron deficiency  Resolved. Hgb stable at 13 5/2/23. Iron was previously discontinued. Monitor. Patient declines routine labs, will get with acute sx only.     Hyperlipidemia with target LDL less than 130  Not on statin, LDL 2020 135. Would not start at this time.     Benign essential hypertension  BPs reviewed today and controlled.    Diastolic dysfunction / Nonrheumatic aortic valve stenosis  Euvolemic. Lungs clear. Some edema at ankles only. Compression stockings not  in place. Monitor.     Stage 3 chronic kidney disease, unspecified whether stage 3a or 3b CKD (H)  Creat of 0.96 5/2/23, which is roughly baseline. Patient declines routine labs    Vitamin D deficiency  Started on daily supplement, 2000 units due to low values. Level improved to 66 on 4/12.     Renal mass, right  Reviewed previous imaging, despite concern for appearance c/w malignancy, this has been very stable. Discussed with family and will address if sx only. No pain. Continue to monitor.     Elevated alkaline phosphatase level  Resolved. Asymptomatic. No further routine labs per patient, will get only if new sx.     Cyst of ovary, unspecified laterality  This is enlarging, albeit slowly, without evidence of any metastatic disease. Has not been seen by Gyn Onc.  is elevated in the past Family decided on no further workup. Currently no abd pain, bloating, bowel changes.     Routine labs: CBC, BMP annual **Patient has been refusing these. Given goals of care this is okay.     Total time spent with patient visit was 30 min including patient visit, review of pertinent clinical information, and treatment plan.    Yelitza Olguin MD

## 2024-07-17 VITALS — OXYGEN SATURATION: 93 % | WEIGHT: 134.3 LBS | RESPIRATION RATE: 18 BRPM | BODY MASS INDEX: 25.36 KG/M2 | HEIGHT: 61 IN

## 2024-07-19 ENCOUNTER — NURSING HOME VISIT (OUTPATIENT)
Dept: GERIATRICS | Facility: OTHER | Age: 89
End: 2024-07-19
Payer: MEDICARE

## 2024-07-19 DIAGNOSIS — I10 BENIGN ESSENTIAL HYPERTENSION: Primary | ICD-10-CM

## 2024-07-19 DIAGNOSIS — N18.2 CKD (CHRONIC KIDNEY DISEASE) STAGE 2, GFR 60-89 ML/MIN: ICD-10-CM

## 2024-07-19 DIAGNOSIS — I51.9 HEART DISEASE, UNSPECIFIED: ICD-10-CM

## 2024-07-19 PROCEDURE — 99309 SBSQ NF CARE MODERATE MDM 30: CPT

## 2024-07-19 NOTE — PROGRESS NOTES
HISTORY OF PRESENT ILLNESS:      Claudine is a 89 year old female (4/11/1930)  resident of [facility] who is being seen today for a routine regulatory visit.     Patient has a history of HFpEF, Aortic Stenosis, HTN, HLD, Anemia.      Patient was admitted to this facility on 10/15/21 after hospitalization for acute back pain due to sacral insufficiency fractures, bilateral.     Also noted during hospitalization was right renal mass, stable for several years. She also has noted to have pelvic/ovarian cyst which has increased in size from 3 to 4.6cm. Looks like she was referred to Gyn Onc 3/2020, but no appt made. Family updated and wish no further work up at this time.     Also of note, pt was hospitalized in the past for elevated trop. Treated conservatively, unclear if true NSTEMI or related to demand. No echo done (previously noted to have normal ef and AORTIC STENOSIS). She did not ever have follow up for outpatient echo or stress testing.     Discussed with nursing staff who have the following concerns: none.    Patient is found sitting in her room, resting in her wheelchair. She alerts to voice. Feels well. Has no pain. She has no concerns. No bowel/bladder changes       Current medications, allergies, and interdisciplinary care plan are reviewed.    Patient Active Problem List    Diagnosis Date Noted    Acute cystitis without hematuria 07/25/2022     Priority: Medium    Hypotension, unspecified hypotension type 07/20/2022     Priority: Medium    Sepsis with acute renal failure and septic shock (H) 07/20/2022     Priority: Medium    Alzheimer's disease (H) 12/06/2021     Priority: Medium    Pyelonephritis 10/12/2021     Priority: Medium    Generalized muscle weakness 10/12/2021     Priority: Medium    Unable to ambulate 10/12/2021     Priority: Medium    Right-sided low back pain without sciatica 10/12/2021     Priority: Medium    Closed fracture of sacrum, unspecified portion of sacrum, initial  encounter (H) 10/12/2021     Priority: Medium    Sacral insufficiency fracture 10/12/2021     Priority: Medium    Elevated troponin 03/27/2021     Priority: Medium    Troponin level elevated 03/27/2021     Priority: Medium    Urinary tract infection without hematuria, site unspecified 03/27/2021     Priority: Medium    Iron deficiency 07/13/2020     Priority: Medium    Spinal stenosis of lumbosacral region 07/13/2020     Priority: Medium    Sacroiliitis (H24) 07/13/2020     Priority: Medium    Back pain 02/01/2020     Priority: Medium    Hyponatremia 02/01/2020     Priority: Medium    Rhabdomyolysis 02/01/2020     Priority: Medium    Hypokalemia 02/01/2020     Priority: Medium    Anemia 02/01/2020     Priority: Medium    Closed fracture of eleventh thoracic vertebra (H) 02/01/2020     Priority: Medium    Systolic murmur 02/01/2020     Priority: Medium    Nasal lesion 07/24/2019     Priority: Medium    Renal benign neoplasm, left 06/05/2019     Priority: Medium    Obesity (BMI 35.0-39.9) with comorbidity (H) 06/05/2019     Priority: Medium    Memory loss 06/05/2019     Priority: Medium    Acute right-sided low back pain with right-sided sciatica 06/05/2019     Priority: Medium    Renal mass, right 06/08/2017     Priority: Medium    MRSA (methicillin resistant Staphylococcus aureus) 06/01/2017     Priority: Medium     Formatting of this note might be different from the original.  Date & Source of First Known MRSA: 1/22/2016 - [facility] [place]    Date and source of negative screens that qualify* for resolution of MRSA from infection table:     NONE    *2 sets of MRSA negative screens (previous positive site(s) if applicable and bilateral anterior nares) at least 7 days apart are required to resolve.   Screening exclusions include dialysis, long term care residence, antibiotics within the past 7 days, chronic wounds or invasive devices, & recurrent MRSA infections.  Please contact Infection Prevention for  your facility if questions.      Postoperative anemia due to acute blood loss 01/24/2017     Priority: Medium    Status post total replacement of right hip 01/23/2017     Priority: Medium    Benign essential hypertension 01/16/2017     Priority: Medium    S/P total knee replacement using cement, left 01/25/2016     Priority: Medium    Hyperlipidemia with target LDL less than 130 03/30/2015     Priority: Medium     Diagnosis updated by automated process. Provider to review and confirm.      CKD (chronic kidney disease) stage 2, GFR 60-89 ml/min 08/03/2012     Priority: Medium    Diastolic dysfunction 02/27/2012     Priority: Medium     echo 2/2012  EF 60%            Social History     Socioeconomic History    Marital status:      Spouse name: Dharmesh    Number of children: 5    Years of education: Not on file    Highest education level: Not on file   Occupational History     Employer: [employer]     Comment: part-time   Tobacco Use    Smoking status: Never    Smokeless tobacco: Never   Substance and Sexual Activity    Alcohol use: Yes     Comment: Mixed, rarely    Drug use: No    Sexual activity: Yes     Partners: Male   Other Topics Concern     Service No    Blood Transfusions Yes    Caffeine Concern Yes     Comment: Coffee, 4 cups daily    Occupational Exposure Not Asked    Hobby Hazards Not Asked    Sleep Concern Not Asked    Stress Concern Not Asked    Weight Concern Not Asked    Special Diet Not Asked    Back Care Not Asked    Exercise No    Bike Helmet Not Asked    Seat Belt Yes    Self-Exams Yes    Parent/sibling w/ CABG, MI or angioplasty before 65F 55M? Not Asked   Social History Narrative     -- n/a    Caffeine --    Concussion --     Social Determinants of Health     Financial Resource Strain: Not on file   Food Insecurity: Not on file   Transportation Needs: Not on file   Physical Activity: Inactive (6/5/2019)    Exercise Vital Sign     Days of Exercise per Week: 0  "days     Minutes of Exercise per Session: 0 min   Stress: Not on file   Social Connections: Not on file   Interpersonal Safety: Not At Risk (6/5/2019)    Humiliation, Afraid, Rape, and Kick questionnaire     Fear of Current or Ex-Partner: No     Emotionally Abused: No     Physically Abused: No     Sexually Abused: No   Housing Stability: Not on file        Current Outpatient Medications   Medication Sig Dispense Refill    acetaminophen (TYLENOL) 650 MG CR tablet Take 1,300 mg by mouth 2 times daily      amLODIPine (NORVASC) 2.5 MG tablet Take 2.5 mg by mouth daily      Ascorbic Acid (VITAMIN C) 500 MG CAPS Take 500 mg by mouth daily      aspirin (ASA) 81 MG EC tablet Take 1 tablet (81 mg) by mouth daily 30 tablet 3    Calcium Carb-Cholecalciferol (CALCIUM + D3) 600-200 MG-UNIT TABS Take 1 tablet by mouth daily      Cholecalciferol (VITAMIN D) 50 MCG (2000 UT) CAPS Take 1 tablet by mouth daily      Cranberry 500 MG CAPS Take by mouth daily      metoprolol tartrate (LOPRESSOR) 25 MG tablet Take 1 tablet (25 mg) by mouth 2 times daily      Multiple Vitamin (MULTIVITAMIN) per tablet Take 1 tablet by mouth daily with food.      oxybutynin (DITROPAN) 5 MG tablet Take 5 mg by mouth daily      Psyllium 0.36 g CAPS Take 2 capsules by mouth daily       No current facility-administered medications for this visit.       No Known Allergies    I have reviewed the care plan and do agree with the plan.    ROS:  No chest pain, shortness of breath, fever, chills, headache, nausea, vomiting, dysuria, or changes in bowel habits.  Appetite is good.  No pain noted.    OBJECTIVE:  Resp 18   Ht 1.549 m (5' 1\")   Wt 60.9 kg (134 lb 4.8 oz)   SpO2 93%   BMI 25.38 kg/m      GENERAL:  Alert, and in no acute distress  RESP: Respirations unlabored. Lung sounds clear to auscultation.    CV:  RRR.  S1 S2 with 4/6 systolic murmur. No clicks or rubs.  ABD: Soft, non tender, non distended, no organomegaly. BS are normal.   BACK: No CVA " tenderness.   SKIN:  Age-related changes.  No suspicious lesions or rashes.  PSYCH:  Affect is flat.   NEURO: Mental status is alert. Memory is impaired.   EXTREM:  Trace edema - compression stockings not in place today.    Lab/Diagnostic data:    Reviewed in Epic    ASSESSMENT/ORDERS:  Diagnoses and all orders for this visit:    Memory loss / Dementia  Presumed Alz Dementia- slow cognitive decline per family. Will remain at [facility] for LTC, spouse is at facility as well. Patient is in supportive environment. Weights reviewed- she is down 2-3 lbs over the past few months. With underlying cog impairment, likely malignancy (see below), weight loss would not be unexpected. No change in plan of care today. Patient has declined any updated labs. Continue to monitor weights.    Urinary urge incontinence  Mirbetriq previously discontinued due to cost and detrol was started with benefit per staff.  Pt denies concerns with dry mouth, constipation.  No increase in confusion. Continue 5 mg dose.     Chronic right-sided low back pain with right-sided sciatica / Closed fracture of sacrum, unspecified portion of sacrum, initial encounter (H) / Sacral insufficiency fracture, initial encounter / Generalized muscle weakness  Gabapentin tapered previously, on tylenol only. Pain remains controlled.    CAD without angina  Presumed with prev trop elevation/poss NSTEMI. Pt continues to be entirely asymptomatic.    Anemia, unspecified type / Iron deficiency  Resolved. Hgb stable at 13 5/2/23. Iron was previously discontinued. Monitor. Patient declines routine labs, will get with acute sx only.     Hyperlipidemia with target LDL less than 130  Not on statin, LDL 2020 135. Would not start at this time.     Benign essential hypertension  BPs reviewed today and generally controlled.    Diastolic dysfunction / Nonrheumatic aortic valve stenosis  Euvolemic. Lungs clear. Some edema at ankles only. Compression stockings not in place. Monitor.      Stage 3 chronic kidney disease, unspecified whether stage 3a or 3b CKD (H)  Creat of 0.96 5/2/23, which is roughly baseline. Patient declines routine labs    Vitamin D deficiency  Started on daily supplement, 2000 units due to low values. Level improved to 66.    Renal mass, right  Reviewed previous imaging, despite concern for appearance c/w malignancy, this has been very stable. Discussed with family and will address if sx only. No pain. Continue to monitor.     Elevated alkaline phosphatase level  Resolved. Asymptomatic. No further routine labs per patient, will get only if new sx.     Cyst of ovary, unspecified laterality  This is enlarging, albeit slowly, without evidence of any metastatic disease. Has not been seen by Gyn Onc.  is elevated in the past Family decided on no further workup. Currently no abd pain, bloating, bowel changes.     Routine labs: CBC, BMP annual. Patient has been refusing these. Given goals of care this is okay.     Total time spent with patient visit was 30 min including patient visit, review of pertinent clinical information, and treatment plan.    Tamar Colin, CNP

## 2024-09-19 ENCOUNTER — NURSING HOME VISIT (OUTPATIENT)
Dept: GERIATRICS | Facility: OTHER | Age: 89
End: 2024-09-19
Attending: FAMILY MEDICINE
Payer: MEDICARE

## 2024-09-19 VITALS
HEART RATE: 82 BPM | DIASTOLIC BLOOD PRESSURE: 77 MMHG | SYSTOLIC BLOOD PRESSURE: 130 MMHG | WEIGHT: 132.2 LBS | RESPIRATION RATE: 20 BRPM | TEMPERATURE: 98.3 F | BODY MASS INDEX: 24.98 KG/M2 | OXYGEN SATURATION: 92 %

## 2024-09-19 DIAGNOSIS — Z78.9 NURSING HOME RESIDENT: Primary | ICD-10-CM

## 2024-09-19 PROCEDURE — 99309 SBSQ NF CARE MODERATE MDM 30: CPT | Performed by: FAMILY MEDICINE

## 2024-09-19 NOTE — PROGRESS NOTES
HISTORY OF PRESENT ILLNESS:      Claudine is a 89 year old female (4/11/1930)  resident of [facility] who is being seen today for a routine regulatory visit.     Patient has a history of HFpEF, Aortic Stenosis, HTN, HLD, Anemia.      Patient was admitted to this facility on 10/15/21 after hospitalization for acute back pain due to sacral insufficiency fractures, bilateral.     Patient has known right renal mass, stable for several years. Also enlarging pelvic/ovarian cyst.  Looks like she was referred to Gyn Onc 3/2020, but no appt made. Family updated and wish no further work up at this time.     Also of note, pt was hospitalized in the past for elevated trop. Treated conservatively, unclear if true NSTEMI or related to demand. No echo done (previously noted to have normal ef and AORTIC STENOSIS). She did not ever have follow up for outpatient echo or stress testing.     Discussed with nursing staff who have the following concerns:  No concerns.     Patient is found sitting in her room, resting in her wheelchair. She has no concerns. She denies pain, trouble with breathing, trouble with bowel or bladder. She reports her appetite as good, has eaten about 75% of her lunch today. She denies concerns with sleeping.      Current medications, allergies, and interdisciplinary care plan are reviewed.    Patient Active Problem List    Diagnosis Date Noted    Acute cystitis without hematuria 07/25/2022     Priority: Medium    Hypotension, unspecified hypotension type 07/20/2022     Priority: Medium    Sepsis with acute renal failure and septic shock (H) 07/20/2022     Priority: Medium    Alzheimer's disease (H) 12/06/2021     Priority: Medium    Pyelonephritis 10/12/2021     Priority: Medium    Generalized muscle weakness 10/12/2021     Priority: Medium    Unable to ambulate 10/12/2021     Priority: Medium    Right-sided low back pain without sciatica 10/12/2021     Priority: Medium    Closed fracture of sacrum,  unspecified portion of sacrum, initial encounter (H) 10/12/2021     Priority: Medium    Sacral insufficiency fracture 10/12/2021     Priority: Medium    Elevated troponin 03/27/2021     Priority: Medium    Troponin level elevated 03/27/2021     Priority: Medium    Urinary tract infection without hematuria, site unspecified 03/27/2021     Priority: Medium    Iron deficiency 07/13/2020     Priority: Medium    Spinal stenosis of lumbosacral region 07/13/2020     Priority: Medium    Sacroiliitis (H24) 07/13/2020     Priority: Medium    Back pain 02/01/2020     Priority: Medium    Hyponatremia 02/01/2020     Priority: Medium    Rhabdomyolysis 02/01/2020     Priority: Medium    Hypokalemia 02/01/2020     Priority: Medium    Anemia 02/01/2020     Priority: Medium    Closed fracture of eleventh thoracic vertebra (H) 02/01/2020     Priority: Medium    Systolic murmur 02/01/2020     Priority: Medium    Nasal lesion 07/24/2019     Priority: Medium    Renal benign neoplasm, left 06/05/2019     Priority: Medium    Obesity (BMI 35.0-39.9) with comorbidity (H) 06/05/2019     Priority: Medium    Memory loss 06/05/2019     Priority: Medium    Acute right-sided low back pain with right-sided sciatica 06/05/2019     Priority: Medium    Renal mass, right 06/08/2017     Priority: Medium    MRSA (methicillin resistant Staphylococcus aureus) 06/01/2017     Priority: Medium     Formatting of this note might be different from the original.  Date & Source of First Known MRSA: 1/22/2016 - [facility] [place]    Date and source of negative screens that qualify* for resolution of MRSA from infection table:     NONE    *2 sets of MRSA negative screens (previous positive site(s) if applicable and bilateral anterior nares) at least 7 days apart are required to resolve.   Screening exclusions include dialysis, long term care residence, antibiotics within the past 7 days, chronic wounds or invasive devices, & recurrent MRSA infections.   Please contact Infection Prevention for your facility if questions.      Postoperative anemia due to acute blood loss 01/24/2017     Priority: Medium    Status post total replacement of right hip 01/23/2017     Priority: Medium    Benign essential hypertension 01/16/2017     Priority: Medium    S/P total knee replacement using cement, left 01/25/2016     Priority: Medium    Hyperlipidemia with target LDL less than 130 03/30/2015     Priority: Medium     Diagnosis updated by automated process. Provider to review and confirm.      CKD (chronic kidney disease) stage 2, GFR 60-89 ml/min 08/03/2012     Priority: Medium    Diastolic dysfunction 02/27/2012     Priority: Medium     echo 2/2012  EF 60%            Social History     Socioeconomic History    Marital status:      Spouse name: Dharmesh    Number of children: 5    Years of education: Not on file    Highest education level: Not on file   Occupational History     Employer: [employer]     Comment: part-time   Tobacco Use    Smoking status: Never    Smokeless tobacco: Never   Substance and Sexual Activity    Alcohol use: Yes     Comment: Mixed, rarely    Drug use: No    Sexual activity: Yes     Partners: Male   Other Topics Concern     Service No    Blood Transfusions Yes    Caffeine Concern Yes     Comment: Coffee, 4 cups daily    Occupational Exposure Not Asked    Hobby Hazards Not Asked    Sleep Concern Not Asked    Stress Concern Not Asked    Weight Concern Not Asked    Special Diet Not Asked    Back Care Not Asked    Exercise No    Bike Helmet Not Asked    Seat Belt Yes    Self-Exams Yes    Parent/sibling w/ CABG, MI or angioplasty before 65F 55M? Not Asked   Social History Narrative     -- n/a    Caffeine --    Concussion --     Social Determinants of Health     Financial Resource Strain: Not on file   Food Insecurity: Not on file   Transportation Needs: Not on file   Physical Activity: Inactive (6/5/2019)    Exercise Vital  Sign     Days of Exercise per Week: 0 days     Minutes of Exercise per Session: 0 min   Stress: Not on file   Social Connections: Not on file   Interpersonal Safety: Not At Risk (6/5/2019)    Humiliation, Afraid, Rape, and Kick questionnaire     Fear of Current or Ex-Partner: No     Emotionally Abused: No     Physically Abused: No     Sexually Abused: No   Housing Stability: Not on file        Current Outpatient Medications   Medication Sig Dispense Refill    acetaminophen (TYLENOL) 650 MG CR tablet Take 1,300 mg by mouth 2 times daily      amLODIPine (NORVASC) 2.5 MG tablet Take 2.5 mg by mouth daily      Ascorbic Acid (VITAMIN C) 500 MG CAPS Take 500 mg by mouth daily      aspirin (ASA) 81 MG EC tablet Take 1 tablet (81 mg) by mouth daily 30 tablet 3    Calcium Carb-Cholecalciferol (CALCIUM + D3) 600-200 MG-UNIT TABS Take 1 tablet by mouth daily      Cholecalciferol (VITAMIN D) 50 MCG (2000 UT) CAPS Take 1 tablet by mouth daily      Cranberry 500 MG CAPS Take by mouth daily      metoprolol tartrate (LOPRESSOR) 25 MG tablet Take 1 tablet (25 mg) by mouth 2 times daily      Multiple Vitamin (MULTIVITAMIN) per tablet Take 1 tablet by mouth daily with food.      oxybutynin (DITROPAN) 5 MG tablet Take 5 mg by mouth daily      Psyllium 0.36 g CAPS Take 2 capsules by mouth daily       No current facility-administered medications for this visit.       No Known Allergies    I have reviewed the care plan and do agree with the plan.    ROS:  No chest pain, shortness of breath, fever, chills, headache, nausea, vomiting, dysuria, or changes in bowel habits.  Appetite is good.  No pain noted.    OBJECTIVE:  /77   Pulse 82   Temp 98.3  F (36.8  C)   Resp 20   Wt 60 kg (132 lb 3.2 oz)   SpO2 92%   BMI 24.98 kg/m      GENERAL:  Alert, and in no acute distress  RESP: Respirations unlabored. Lung sounds clear to auscultation.    CV:  RRR.  S1 S2 with 4/6 systolic murmur.   ABD: Soft, non tender, non distended, no  organomegaly. BS are normal.   SKIN:  Age-related changes, concerning lesions on limited exam.   PSYCH:  Affect is flat.   NEURO: Mental status is alert. Memory is impaired.   EXTREM:  Trace edema.     Lab/Diagnostic data:    Reviewed in Epic    ASSESSMENT/ORDERS:  Diagnoses and all orders for this visit:    Memory loss / Dementia  Presumed Alz Dementia- slow cognitive decline per family. Will remain at [facility] for LTC, spouse is at facility as well. Patient is in supportive environment. Weights are very slowly decreasing 143 7/2022, now down to 132. With underlying cog impairment, likely malignancy (see below), weight loss would not be unexpected.    Urinary urge incontinence  Mirbetriq previously discontinued due to cost and detrol was started with benefit per staff.  Pt denies concerns with dry mouth, constipation.  No increase in confusion. Continue 5 mg dose.     Chronic right-sided low back pain with right-sided sciatica / Closed fracture of sacrum, unspecified portion of sacrum, initial encounter (H) / Sacral insufficiency fracture, initial encounter / Generalized muscle weakness  Gabapentin tapered, on tylenol only. Pain controlled    CAD without angina  Presumed with prev trop elevation/poss NSTEMI. Pt continues to be entirely asymptomatic.    Anemia, unspecified type / Iron deficiency  Resolved, was previously on iron. Now refusing routine labs, no indication for recheck with regard to sx at this time.     Hyperlipidemia with target LDL less than 130  Not on statin, LDL 2020 135. Would not start at this time.     Benign essential hypertension  BPs reviewed today and controlled.    Diastolic dysfunction / Nonrheumatic aortic valve stenosis  Euvolemic. Lungs clear. Some edema at ankles only. Compression stockings not in place. Monitor.     Stage 3 chronic kidney disease, unspecified whether stage 3a or 3b CKD (H)  Creat of 0.96 5/2/23, which is roughly baseline. Patient declines routine labs    Vitamin D  deficiency  Started on daily supplement, 2000 units due to low values. Level improved to 66 on 4/12.     Renal mass, right  Reviewed previous imaging, despite concern for appearance c/w malignancy, this has been very stable. Discussed with family and will address if sx only. No pain. Continue to monitor.     Cyst of ovary, unspecified laterality  This is enlarging, albeit slowly, without evidence of any metastatic disease. Has not been seen by Gyn Onc.  is elevated in the past Family decided on no further workup. Currently no abd pain, bloating, bowel changes.     Routine labs: CBC, BMP annual **Patient may continue to refuse.     Total time spent with patient visit was 30 min including patient visit, review of pertinent clinical information, and treatment plan.    Yelitza Olguin MD

## 2025-03-24 ENCOUNTER — TELEPHONE (OUTPATIENT)
Dept: GERIATRICS | Facility: OTHER | Age: OVER 89
End: 2025-03-24

## 2025-05-06 ENCOUNTER — TELEPHONE (OUTPATIENT)
Dept: GERIATRICS | Facility: OTHER | Age: OVER 89
End: 2025-05-06

## 2025-05-07 NOTE — TELEPHONE ENCOUNTER
I am surprised she had labs, she has refused these in the past and these were discontinued.     Was there a reason this was done specifically?

## 2025-05-14 VITALS
DIASTOLIC BLOOD PRESSURE: 76 MMHG | OXYGEN SATURATION: 96 % | HEIGHT: 61 IN | RESPIRATION RATE: 16 BRPM | WEIGHT: 135.2 LBS | SYSTOLIC BLOOD PRESSURE: 149 MMHG | TEMPERATURE: 97.3 F | HEART RATE: 66 BPM | BODY MASS INDEX: 25.53 KG/M2

## 2025-05-15 ENCOUNTER — NURSING HOME VISIT (OUTPATIENT)
Dept: GERIATRICS | Facility: OTHER | Age: OVER 89
End: 2025-05-15
Attending: FAMILY MEDICINE
Payer: MEDICARE

## 2025-05-15 DIAGNOSIS — Z78.9 NURSING HOME RESIDENT: Primary | ICD-10-CM

## 2025-05-15 NOTE — PROGRESS NOTES
HISTORY OF PRESENT ILLNESS:      Claudine is a 89 year old female (4/11/1930)  resident of [facility] who is being seen today for a routine regulatory visit.     Patient has a history of HFpEF, Aortic Stenosis, HTN, HLD.     Patient was admitted to this facility on 10/15/21 after hospitalization for acute back pain due to sacral insufficiency fractures, bilateral.     Patient has known right renal mass, stable for several years. Also enlarging pelvic/ovarian cyst.  Looks like she was referred to Gyn Onc 3/2020, but no appt made. Family updated and wish no further work up at this time. Also of note, pt was hospitalized in the past for elevated trop. Treated conservatively, unclear if true NSTEMI or related to demand. No echo done (previously noted to have normal ef and AORTIC STENOSIS). She did not ever have follow up for outpatient echo or stress testing.     Discussed with nursing staff who have the following concerns:  No concerns. Doing well.    Patient is seen in there room,  is present. She has no concerns.      Current medications, allergies, and interdisciplinary care plan are reviewed.    Patient Active Problem List    Diagnosis Date Noted    Acute cystitis without hematuria 07/25/2022     Priority: Medium    Hypotension, unspecified hypotension type 07/20/2022     Priority: Medium    Sepsis with acute renal failure and septic shock (H) 07/20/2022     Priority: Medium    Alzheimer's disease (H) 12/06/2021     Priority: Medium    Pyelonephritis 10/12/2021     Priority: Medium    Generalized muscle weakness 10/12/2021     Priority: Medium    Unable to ambulate 10/12/2021     Priority: Medium    Right-sided low back pain without sciatica 10/12/2021     Priority: Medium    Closed fracture of sacrum, unspecified portion of sacrum, initial encounter (H) 10/12/2021     Priority: Medium    Sacral insufficiency fracture 10/12/2021     Priority: Medium    Elevated troponin 03/27/2021     Priority: Medium     Troponin level elevated 03/27/2021     Priority: Medium    Urinary tract infection without hematuria, site unspecified 03/27/2021     Priority: Medium    Iron deficiency 07/13/2020     Priority: Medium    Spinal stenosis of lumbosacral region 07/13/2020     Priority: Medium    Sacroiliitis 07/13/2020     Priority: Medium    Back pain 02/01/2020     Priority: Medium    Hyponatremia 02/01/2020     Priority: Medium    Rhabdomyolysis 02/01/2020     Priority: Medium    Hypokalemia 02/01/2020     Priority: Medium    Anemia 02/01/2020     Priority: Medium    Closed fracture of eleventh thoracic vertebra (H) 02/01/2020     Priority: Medium    Systolic murmur 02/01/2020     Priority: Medium    Nasal lesion 07/24/2019     Priority: Medium    Renal benign neoplasm, left 06/05/2019     Priority: Medium    Obesity (BMI 35.0-39.9) with comorbidity (H) 06/05/2019     Priority: Medium    Memory loss 06/05/2019     Priority: Medium    Acute right-sided low back pain with right-sided sciatica 06/05/2019     Priority: Medium    Renal mass, right 06/08/2017     Priority: Medium    MRSA (methicillin resistant Staphylococcus aureus) 06/01/2017     Priority: Medium     Formatting of this note might be different from the original.  Date & Source of First Known MRSA: 1/22/2016 - [facility] [place]    Date and source of negative screens that qualify* for resolution of MRSA from infection table:     NONE    *2 sets of MRSA negative screens (previous positive site(s) if applicable and bilateral anterior nares) at least 7 days apart are required to resolve.   Screening exclusions include dialysis, long term care residence, antibiotics within the past 7 days, chronic wounds or invasive devices, & recurrent MRSA infections.  Please contact Infection Prevention for your facility if questions.      Postoperative anemia due to acute blood loss 01/24/2017     Priority: Medium    Status post total replacement of right hip 01/23/2017      Priority: Medium    Benign essential hypertension 01/16/2017     Priority: Medium    S/P total knee replacement using cement, left 01/25/2016     Priority: Medium    Hyperlipidemia with target LDL less than 130 03/30/2015     Priority: Medium     Diagnosis updated by automated process. Provider to review and confirm.      CKD (chronic kidney disease) stage 2, GFR 60-89 ml/min 08/03/2012     Priority: Medium    Diastolic dysfunction 02/27/2012     Priority: Medium     echo 2/2012  EF 60%            Social History     Socioeconomic History    Marital status:      Spouse name: Dharmesh    Number of children: 5    Years of education: Not on file    Highest education level: Not on file   Occupational History     Employer: [employer]     Comment: part-time   Tobacco Use    Smoking status: Never    Smokeless tobacco: Never   Substance and Sexual Activity    Alcohol use: Yes     Comment: Mixed, rarely    Drug use: No    Sexual activity: Yes     Partners: Male   Other Topics Concern     Service No    Blood Transfusions Yes    Caffeine Concern Yes     Comment: Coffee, 4 cups daily    Occupational Exposure Not Asked    Hobby Hazards Not Asked    Sleep Concern Not Asked    Stress Concern Not Asked    Weight Concern Not Asked    Special Diet Not Asked    Back Care Not Asked    Exercise No    Bike Helmet Not Asked    Seat Belt Yes    Self-Exams Yes    Parent/sibling w/ CABG, MI or angioplasty before 65F 55M? Not Asked   Social History Narrative     -- n/a    Caffeine --    Concussion --     Social Drivers of Health     Financial Resource Strain: Not on file   Food Insecurity: Not on file   Transportation Needs: Not on file   Physical Activity: Inactive (6/5/2019)    Exercise Vital Sign     Days of Exercise per Week: 0 days     Minutes of Exercise per Session: 0 min   Stress: Not on file   Social Connections: Not on file   Interpersonal Safety: Not At Risk (6/5/2019)    Humiliation, Afraid,  "Rape, and Kick questionnaire     Fear of Current or Ex-Partner: No     Emotionally Abused: No     Physically Abused: No     Sexually Abused: No   Housing Stability: Not on file        Current Outpatient Medications   Medication Sig Dispense Refill    acetaminophen (TYLENOL) 650 MG CR tablet Take 1,300 mg by mouth 2 times daily      amLODIPine (NORVASC) 2.5 MG tablet Take 2.5 mg by mouth daily.      aspirin (ASA) 81 MG EC tablet Take 1 tablet (81 mg) by mouth daily 30 tablet 3    Cholecalciferol (VITAMIN D) 50 MCG (2000 UT) CAPS Take 1 tablet by mouth daily      metoprolol tartrate (LOPRESSOR) 25 MG tablet Take 1 tablet (25 mg) by mouth 2 times daily      oxybutynin (DITROPAN) 5 MG tablet Take 5 mg by mouth daily      Psyllium 0.36 g CAPS Take 2 capsules by mouth daily       No current facility-administered medications for this visit.       No Known Allergies    I have reviewed the care plan and do agree with the plan.    ROS:  No chest pain, shortness of breath, fever, chills, headache, nausea, vomiting, dysuria, or changes in bowel habits.  Appetite is good.  No pain noted.    OBJECTIVE:  BP (!) 149/76   Pulse 66   Temp 97.3  F (36.3  C)   Resp 16   Ht 1.549 m (5' 1\")   Wt 61.3 kg (135 lb 3.2 oz)   SpO2 96%   BMI 25.55 kg/m      GENERAL:  Alert, and in no acute distress  RESP: Respirations unlabored. Lung sounds clear to auscultation.    CV:  RRR.  S1 S2 with 4/6 systolic murmur.   ABD: Soft, non tender, non distended, no organomegaly. BS are normal.   SKIN:  Age-related changes, concerning lesions on limited exam. Left ear lobe without swelling or redness.   PSYCH:  Affect is flat.   NEURO: Mental status is alert. Memory is impaired.   EXTREM:  Trace edema, compression in place.     Lab/Diagnostic data:    Reviewed in Epic    ASSESSMENT/ORDERS:  Diagnoses and all orders for this visit:    Cellulitis  Mild swelling and redness noted 2 weeks ago in area of old piercing. Treated with topical mupirocin. This is " now resolved.     Memory loss / Dementia  Presumed Alz Dementia- slow cognitive decline per family. Will remain at [facility] for LTC, spouse is at facility as well. Patient is in supportive environment. Weights slowly decreasing initially since admit, was 143 7/2022, now 135, however, this is stable for the last year. Will monitor and if further weight loss, recommend hospice consultation given pts previously identified goals of care.     Urinary urge incontinence  Mirbetriq previously discontinued due to cost and detrol was started with benefit per staff.  Pt denies concerns with dry mouth, constipation.  No increase in confusion. Continue 5 mg dose.     Chronic right-sided low back pain with right-sided sciatica / Closed fracture of sacrum, unspecified portion of sacrum, initial encounter (H) / Sacral insufficiency fracture, initial encounter / Generalized muscle weakness  Gabapentin tapered, on tylenol only. No reports of pain.    CAD without angina  Presumed with prev trop elevation/poss NSTEMI. Pt continues to be entirely asymptomatic. Monitor.    Hyperlipidemia with target LDL less than 130  Not on statin, LDL 2020 135. Would not start at this time.     Benign essential hypertension  BPs reviewed today, borderline controlled. No changes made.    Diastolic dysfunction / Nonrheumatic aortic valve stenosis  Euvolemic. Lungs clear. Some edema at ankles only. Continue compression.  Monitor.     Stage 3 chronic kidney disease, unspecified whether stage 3a or 3b CKD (H)  Creat of 0.79, which is roughly baseline.     Vitamin D deficiency  Started on daily supplement, 2000 units due to low values. Level improved to 66 on 4/12. Refuses any further labs. Continue supplement.    Renal mass, right  Reviewed previous imaging, despite concern for appearance c/w malignancy, this has been very stable. Discussed with family and will address if sx only. No pain. Continue to monitor.     Cyst of ovary, unspecified  laterality  This is enlarging, albeit slowly, without evidence of any metastatic disease. Has not been seen by Gyn Onc.  was elevated in the past Family decided on no further workup. Currently no abd pain, bloating, bowel changes.     Routine labs: Patient declines, will only order prn.Of note, these were done 5/6 for unclear reasons. Some mild hyponatremia. Given pt and family request for no labs, will not follow. Pt is asymptomatic.     Total time spent with patient visit was 25 min including patient visit, review of pertinent clinical information, and treatment plan.    Yelitza Olguin MD

## 2025-06-10 VITALS
TEMPERATURE: 97.7 F | BODY MASS INDEX: 24.98 KG/M2 | HEART RATE: 86 BPM | OXYGEN SATURATION: 93 % | SYSTOLIC BLOOD PRESSURE: 146 MMHG | WEIGHT: 132.2 LBS | DIASTOLIC BLOOD PRESSURE: 73 MMHG

## 2025-06-11 ENCOUNTER — NURSING HOME VISIT (OUTPATIENT)
Dept: GERIATRICS | Facility: OTHER | Age: OVER 89
End: 2025-06-11
Payer: MEDICARE

## 2025-06-11 DIAGNOSIS — I10 BENIGN ESSENTIAL HYPERTENSION: ICD-10-CM

## 2025-06-11 DIAGNOSIS — Z78.9 NURSING HOME RESIDENT: Primary | ICD-10-CM

## 2025-06-11 DIAGNOSIS — I51.9 HEART DISEASE, UNSPECIFIED: ICD-10-CM

## 2025-06-11 DIAGNOSIS — F02.80 ALZHEIMER'S DISEASE (H): ICD-10-CM

## 2025-06-11 DIAGNOSIS — N18.2 CKD (CHRONIC KIDNEY DISEASE) STAGE 2, GFR 60-89 ML/MIN: ICD-10-CM

## 2025-06-11 DIAGNOSIS — N28.89 RENAL MASS, RIGHT: ICD-10-CM

## 2025-06-11 DIAGNOSIS — G30.9 ALZHEIMER'S DISEASE (H): ICD-10-CM

## 2025-06-11 NOTE — PROGRESS NOTES
HISTORY OF PRESENT ILLNESS:      Claudine is a 89 year old female (4/11/1930)  resident of [facility] who is being seen today for a routine regulatory visit.     Patient has a history of HFpEF, Aortic Stenosis, HTN, HLD.     Patient was admitted to this facility on 10/15/21 after hospitalization for acute back pain due to sacral insufficiency fractures, bilateral.     Patient has known right renal mass, stable for several years. Also enlarging pelvic/ovarian cyst.  Looks like she was referred to Gyn Onc 3/2020, but no appt made. Family updated and wish no further work up at this time. Also of note, pt was hospitalized in the past for elevated trop. Treated conservatively, unclear if true NSTEMI or related to demand. No echo done (previously noted to have normal ef and AORTIC STENOSIS). She did not ever have follow up for outpatient echo or stress testing.     Discussed with nursing staff who have the following concerns:  No concerns. Doing well.    Patient is seen in there room,  is present. She denies any concerns. Breathing is stable. No abdominal pain, nausea or vomiting. No bowel or bladder changes.     Current medications, allergies, and interdisciplinary care plan are reviewed.    Patient Active Problem List    Diagnosis Date Noted    Acute cystitis without hematuria 07/25/2022     Priority: Medium    Hypotension, unspecified hypotension type 07/20/2022     Priority: Medium    Sepsis with acute renal failure and septic shock (H) 07/20/2022     Priority: Medium    Alzheimer's disease (H) 12/06/2021     Priority: Medium    Pyelonephritis 10/12/2021     Priority: Medium    Generalized muscle weakness 10/12/2021     Priority: Medium    Unable to ambulate 10/12/2021     Priority: Medium    Right-sided low back pain without sciatica 10/12/2021     Priority: Medium    Closed fracture of sacrum, unspecified portion of sacrum, initial encounter (H) 10/12/2021     Priority: Medium    Sacral insufficiency  fracture 10/12/2021     Priority: Medium    Elevated troponin 03/27/2021     Priority: Medium    Troponin level elevated 03/27/2021     Priority: Medium    Urinary tract infection without hematuria, site unspecified 03/27/2021     Priority: Medium    Iron deficiency 07/13/2020     Priority: Medium    Spinal stenosis of lumbosacral region 07/13/2020     Priority: Medium    Sacroiliitis 07/13/2020     Priority: Medium    Back pain 02/01/2020     Priority: Medium    Hyponatremia 02/01/2020     Priority: Medium    Rhabdomyolysis 02/01/2020     Priority: Medium    Hypokalemia 02/01/2020     Priority: Medium    Anemia 02/01/2020     Priority: Medium    Closed fracture of eleventh thoracic vertebra (H) 02/01/2020     Priority: Medium    Systolic murmur 02/01/2020     Priority: Medium    Nasal lesion 07/24/2019     Priority: Medium    Renal benign neoplasm, left 06/05/2019     Priority: Medium    Obesity (BMI 35.0-39.9) with comorbidity (H) 06/05/2019     Priority: Medium    Memory loss 06/05/2019     Priority: Medium    Acute right-sided low back pain with right-sided sciatica 06/05/2019     Priority: Medium    Renal mass, right 06/08/2017     Priority: Medium    MRSA (methicillin resistant Staphylococcus aureus) 06/01/2017     Priority: Medium     Formatting of this note might be different from the original.  Date & Source of First Known MRSA: 1/22/2016 - [facility] [place]    Date and source of negative screens that qualify* for resolution of MRSA from infection table:     NONE    *2 sets of MRSA negative screens (previous positive site(s) if applicable and bilateral anterior nares) at least 7 days apart are required to resolve.   Screening exclusions include dialysis, long term care residence, antibiotics within the past 7 days, chronic wounds or invasive devices, & recurrent MRSA infections.  Please contact Infection Prevention for your facility if questions.      Postoperative anemia due to acute blood  loss 01/24/2017     Priority: Medium    Status post total replacement of right hip 01/23/2017     Priority: Medium    Benign essential hypertension 01/16/2017     Priority: Medium    S/P total knee replacement using cement, left 01/25/2016     Priority: Medium    Hyperlipidemia with target LDL less than 130 03/30/2015     Priority: Medium     Diagnosis updated by automated process. Provider to review and confirm.      CKD (chronic kidney disease) stage 2, GFR 60-89 ml/min 08/03/2012     Priority: Medium    Diastolic dysfunction 02/27/2012     Priority: Medium     echo 2/2012  EF 60%            Social History     Socioeconomic History    Marital status:      Spouse name: Dharmesh    Number of children: 5    Years of education: Not on file    Highest education level: Not on file   Occupational History     Employer: [employer]     Comment: part-time   Tobacco Use    Smoking status: Never    Smokeless tobacco: Never   Substance and Sexual Activity    Alcohol use: Yes     Comment: Mixed, rarely    Drug use: No    Sexual activity: Yes     Partners: Male   Other Topics Concern     Service No    Blood Transfusions Yes    Caffeine Concern Yes     Comment: Coffee, 4 cups daily    Occupational Exposure Not Asked    Hobby Hazards Not Asked    Sleep Concern Not Asked    Stress Concern Not Asked    Weight Concern Not Asked    Special Diet Not Asked    Back Care Not Asked    Exercise No    Bike Helmet Not Asked    Seat Belt Yes    Self-Exams Yes    Parent/sibling w/ CABG, MI or angioplasty before 65F 55M? Not Asked   Social History Narrative     -- n/a    Caffeine --    Concussion --     Social Drivers of Health     Financial Resource Strain: Not on file   Food Insecurity: Not on file   Transportation Needs: Not on file   Physical Activity: Inactive (6/5/2019)    Exercise Vital Sign     Days of Exercise per Week: 0 days     Minutes of Exercise per Session: 0 min   Stress: Not on file   Social  Connections: Not on file   Interpersonal Safety: Not At Risk (6/5/2019)    Humiliation, Afraid, Rape, and Kick questionnaire     Fear of Current or Ex-Partner: No     Emotionally Abused: No     Physically Abused: No     Sexually Abused: No   Housing Stability: Not on file        Current Outpatient Medications   Medication Sig Dispense Refill    acetaminophen (TYLENOL) 650 MG CR tablet Take 1,300 mg by mouth 2 times daily      amLODIPine (NORVASC) 2.5 MG tablet Take 2.5 mg by mouth daily.      aspirin (ASA) 81 MG EC tablet Take 1 tablet (81 mg) by mouth daily 30 tablet 3    Cholecalciferol (VITAMIN D) 50 MCG (2000 UT) CAPS Take 1 tablet by mouth daily      metoprolol tartrate (LOPRESSOR) 25 MG tablet Take 1 tablet (25 mg) by mouth 2 times daily      oxybutynin (DITROPAN) 5 MG tablet Take 5 mg by mouth daily      Psyllium 0.36 g CAPS Take 2 capsules by mouth daily       No current facility-administered medications for this visit.       No Known Allergies    I have reviewed the care plan and do agree with the plan.    ROS:  No chest pain, shortness of breath, fever, chills, headache, nausea, vomiting, dysuria, or changes in bowel habits.  Appetite is good.  No pain noted.    OBJECTIVE:  BP (!) 146/73   Pulse 86   Temp 97.7  F (36.5  C)   Wt 60 kg (132 lb 3.2 oz)   SpO2 93%   BMI 24.98 kg/m      GENERAL:  Alert, and in no acute distress  RESP: Respirations unlabored. Lung sounds clear to auscultation.    CV:  RRR.  S1 S2 with 4/6 systolic murmur.   ABD: Soft, non tender, non distended, no organomegaly. BS are normal.   SKIN:  Age-related changes, concerning lesions on limited exam. Left ear lobe without swelling or redness.   PSYCH:  Affect is flat.   NEURO: Mental status is alert. Memory is impaired.   EXTREM:  Trace edema, compression in place.     Lab/Diagnostic data:    Reviewed in Epic    ASSESSMENT/ORDERS:  Diagnoses and all orders for this visit:    Memory loss / Dementia  Presumed Alz Dementia- slow  cognitive decline per family. Has remained here for LTC along with spouse.  Weights slowly decreasing initially since admit, was 143 7/2022, now 133-135, however, this is stable for the last year. Will monitor and if further weight loss, recommend hospice consultation given pts previously identified goals of care.     Urinary urge incontinence  Mirbetriq previously discontinued due to cost and detrol was started with benefit per staff.  Pt denies concerns with dry mouth, constipation.  No increase in confusion. Continue 5 mg dose.     Chronic right-sided low back pain with right-sided sciatica / Closed fracture of sacrum, unspecified portion of sacrum, initial encounter (H) / Sacral insufficiency fracture, initial encounter / Generalized muscle weakness  Gabapentin tapered, on tylenol only. No reported pain.    CAD without angina  Presumed with prev trop elevation/poss NSTEMI. Pt continues to be entirely asymptomatic. Monitor.    Hyperlipidemia with target LDL less than 130  Not on statin, LDL 2020 135. Would not start at this time.     Benign essential hypertension  BPs reviewed today, borderline controlled. No changes made.    Diastolic dysfunction / Nonrheumatic aortic valve stenosis  Euvolemic. Lungs clear. Some edema at ankles only. Continue compression, not in place today.  Monitor.     Stage 3 chronic kidney disease, unspecified whether stage 3a or 3b CKD (H)  Creat of 0.79, which is roughly baseline.     Vitamin D deficiency  Started on daily supplement, 2000 units due to low values. Level improved to 66 on 4/12    Renal mass, right  Reviewed previous imaging, despite concern for appearance c/w malignancy, this has been very stable. Discussed with family and will address if sx only. No pain at this time. Continue to monitor.     Cyst of ovary, unspecified laterality  This is enlarging, albeit slowly, without evidence of any metastatic disease. Has not been seen by Gyn Onc.  was elevated in the past  Family decided on no further workup. Currently no abd pain, bloating, bowel changes.     Routine labs: No routine labs per patient request..     Total time spent with patient visit was 25 min including patient visit, review of pertinent clinical information, and treatment plan.    Tamar Colin, LUPE

## (undated) DEVICE — IRRIGATION-H2O 1000ML BAG

## (undated) DEVICE — SYRINGE-EAR/ULCER 2OZ

## (undated) DEVICE — SYRINGE-10CC LUER LOCK

## (undated) DEVICE — GLOVE PROTEXIS W/NEU-THERA 8.5  2D73TE85

## (undated) DEVICE — IRRIGATION-H2O 1000ML

## (undated) DEVICE — DRAPE-STERI 45X60CM #1010

## (undated) DEVICE — CATH TRAY-16FR METER W/STATLOCK LATEX]

## (undated) DEVICE — BLANKET-BAIR UPPER BODY

## (undated) DEVICE — SU VICRYL 5-0 P-3 18" UND J493G

## (undated) DEVICE — HOLSTER-STERILE FOR CAUTERY

## (undated) DEVICE — GOWN-SURG XL LVL 3 REINFORCED

## (undated) DEVICE — GLV-8.5 PROTEXIS PI CLASSIC LF/PF

## (undated) DEVICE — TUBING-SUCTION 20FT

## (undated) DEVICE — TUBING SUCTION 12"X1/4" N612

## (undated) DEVICE — SWABSTICK-BENZOIN

## (undated) DEVICE — COVER-TABLE SHEET

## (undated) DEVICE — DRAPE-THREE QUARTER (LARGE) SHEET

## (undated) DEVICE — POUCH-INSTRUMENT 2 COMP. 7 X 11IN

## (undated) DEVICE — TAPE-MEDIPORE 4" X 2YD

## (undated) DEVICE — STERI-STRIP-1/2" X 4"

## (undated) DEVICE — NDL-22G X 1 1/2" NON-SAFETY

## (undated) DEVICE — CARTRIDGE-CEMENT MIXING

## (undated) DEVICE — SCD SLEEVE-KNEE REG.

## (undated) DEVICE — BLADE-SAGITTAL DUAL CUT 25MM X 100MM X 1.27MM

## (undated) DEVICE — DRAPE-CLEAR 54 X 70

## (undated) DEVICE — SUTURE-TICRON 5 KV-40 2818-89

## (undated) DEVICE — TRAY-SKIN PREP POVIDONE/IODINE

## (undated) DEVICE — Device

## (undated) DEVICE — CAUTERY PAD-POLYHESIVE II ADULT

## (undated) DEVICE — CANISTER-SUCTION 2000CC

## (undated) DEVICE — GOWN-SURG LG LVL 3 REINFORCED

## (undated) DEVICE — PACK OCULOPLATIC SEN15OCFSD

## (undated) DEVICE — GLV-8.5 ORTHO PROTEXIS PI LF/PF

## (undated) DEVICE — DRSG-XEROFORM 1X8

## (undated) DEVICE — LABEL-STERILE PREPRINTED FOR OR

## (undated) DEVICE — SUTURE RETRIEVER

## (undated) DEVICE — BLADE-SCALPEL #15

## (undated) DEVICE — ESU ELEC NDL 1" E1552

## (undated) DEVICE — SUTURE-SILK 2-0 TIE A305H

## (undated) DEVICE — ESU PENCIL W/SMOKE EVAC E2515HS

## (undated) DEVICE — LINEN TOWEL PACK X5 5464

## (undated) DEVICE — PACK-HIP-CUSTOM

## (undated) DEVICE — SUTURE-PDS 1 CTXB ZB371

## (undated) DEVICE — SUTURE-VICRYL 2-0 CT-1 J945H

## (undated) DEVICE — DRILL BIT-2.7MM X 126MM

## (undated) DEVICE — GLV-9.0 PROTEXIS PI CLASSIC LF/PF

## (undated) DEVICE — APPLICATOR-CHLORAPREP 26ML TINTED CHG 2%+ 70% IPA-SURGICAL

## (undated) DEVICE — SUTURE-MONOCRYL 4-0 PS-2 Y496G

## (undated) DEVICE — SU CHROMIC 6-0 G-1 18" 790G

## (undated) DEVICE — SOL NACL 0.9% IRRIG 1000ML BOTTLE 07138-09

## (undated) DEVICE — PULSE LAVAGE IRRIGATION SYSTEM

## (undated) DEVICE — DRAPE-U DRAPE-CLEAR 47" X 51"

## (undated) DEVICE — LIGHT HANDLE COVER

## (undated) DEVICE — NDL ANGIOCATH 20GA 1.25" 4056

## (undated) DEVICE — SUTURE-VICRYL 3-0 CT-1 J944H

## (undated) DEVICE — SUCTION CANISTER MEDIVAC LINER 3000ML W/LID 65651-530

## (undated) DEVICE — IRRIGATION-NACL 1000ML

## (undated) DEVICE — HEADREST-FOAM FOR DENONCOURT HIPS

## (undated) RX ORDER — DEXAMETHASONE SODIUM PHOSPHATE 10 MG/ML
INJECTION, SOLUTION INTRAMUSCULAR; INTRAVENOUS
Status: DISPENSED
Start: 2017-01-23

## (undated) RX ORDER — EPHEDRINE SULFATE 50 MG/ML
INJECTION, SOLUTION INTRAMUSCULAR; INTRAVENOUS; SUBCUTANEOUS
Status: DISPENSED
Start: 2017-01-23

## (undated) RX ORDER — FENTANYL CITRATE 50 UG/ML
INJECTION, SOLUTION INTRAMUSCULAR; INTRAVENOUS
Status: DISPENSED
Start: 2017-01-23

## (undated) RX ORDER — FENTANYL CITRATE 50 UG/ML
INJECTION, SOLUTION INTRAMUSCULAR; INTRAVENOUS
Status: DISPENSED
Start: 2017-08-01

## (undated) RX ORDER — PROPOFOL 10 MG/ML
INJECTION, EMULSION INTRAVENOUS
Status: DISPENSED
Start: 2017-01-23

## (undated) RX ORDER — ONDANSETRON 2 MG/ML
INJECTION INTRAMUSCULAR; INTRAVENOUS
Status: DISPENSED
Start: 2017-01-23

## (undated) RX ORDER — LIDOCAINE HYDROCHLORIDE 20 MG/ML
INJECTION, SOLUTION EPIDURAL; INFILTRATION; INTRACAUDAL; PERINEURAL
Status: DISPENSED
Start: 2017-01-23